# Patient Record
Sex: FEMALE | Race: WHITE | NOT HISPANIC OR LATINO | Employment: OTHER | URBAN - METROPOLITAN AREA
[De-identification: names, ages, dates, MRNs, and addresses within clinical notes are randomized per-mention and may not be internally consistent; named-entity substitution may affect disease eponyms.]

---

## 2017-01-17 ENCOUNTER — HOSPITAL ENCOUNTER (OUTPATIENT)
Dept: NON INVASIVE DIAGNOSTICS | Facility: CLINIC | Age: 82
Discharge: HOME | End: 2017-01-17
Payer: MEDICARE

## 2017-01-17 DIAGNOSIS — I73.9 PERIPHERAL VASCULAR DISEASE (HCC): ICD-10-CM

## 2017-01-17 DIAGNOSIS — L97.919 NON-PRESSURE CHRONIC ULCER OF RIGHT LOWER LEG (HCC): ICD-10-CM

## 2017-01-17 PROCEDURE — 93926 LOWER EXTREMITY STUDY: CPT

## 2017-01-18 ENCOUNTER — GENERIC CONVERSION - ENCOUNTER (OUTPATIENT)
Dept: OTHER | Facility: OTHER | Age: 82
End: 2017-01-18

## 2017-01-23 ENCOUNTER — ALLSCRIPTS OFFICE VISIT (OUTPATIENT)
Dept: OTHER | Facility: OTHER | Age: 82
End: 2017-01-23

## 2017-01-23 ENCOUNTER — LAB REQUISITION (OUTPATIENT)
Dept: LAB | Facility: HOSPITAL | Age: 82
End: 2017-01-23
Payer: MEDICARE

## 2017-01-23 ENCOUNTER — GENERIC CONVERSION - ENCOUNTER (OUTPATIENT)
Dept: OTHER | Facility: OTHER | Age: 82
End: 2017-01-23

## 2017-01-23 DIAGNOSIS — N18.2 CHRONIC KIDNEY DISEASE, STAGE II (MILD): ICD-10-CM

## 2017-01-23 DIAGNOSIS — E78.49 OTHER HYPERLIPIDEMIA: ICD-10-CM

## 2017-01-23 DIAGNOSIS — E11.9 TYPE 2 DIABETES MELLITUS WITHOUT COMPLICATIONS (HCC): ICD-10-CM

## 2017-01-23 DIAGNOSIS — I10 ESSENTIAL (PRIMARY) HYPERTENSION: ICD-10-CM

## 2017-01-23 DIAGNOSIS — E03.9 HYPOTHYROIDISM: ICD-10-CM

## 2017-01-23 DIAGNOSIS — D64.9 ANEMIA: ICD-10-CM

## 2017-01-23 DIAGNOSIS — I73.9 PERIPHERAL VASCULAR DISEASE (HCC): ICD-10-CM

## 2017-01-23 LAB
ALBUMIN SERPL BCP-MCNC: 3.3 G/DL (ref 3.5–5)
ALP SERPL-CCNC: 85 U/L (ref 46–116)
ALT SERPL W P-5'-P-CCNC: 15 U/L (ref 12–78)
ANION GAP SERPL CALCULATED.3IONS-SCNC: 8 MMOL/L (ref 4–13)
AST SERPL W P-5'-P-CCNC: 11 U/L (ref 5–45)
BASOPHILS # BLD AUTO: 0.06 THOUSANDS/ΜL (ref 0–0.1)
BASOPHILS NFR BLD AUTO: 1 % (ref 0–1)
BILIRUB SERPL-MCNC: 0.23 MG/DL (ref 0.2–1)
BUN SERPL-MCNC: 40 MG/DL (ref 5–25)
CALCIUM SERPL-MCNC: 8.4 MG/DL (ref 8.3–10.1)
CHLORIDE SERPL-SCNC: 105 MMOL/L (ref 100–108)
CHOLEST SERPL-MCNC: 139 MG/DL (ref 50–200)
CO2 SERPL-SCNC: 29 MMOL/L (ref 21–32)
CREAT SERPL-MCNC: 2 MG/DL (ref 0.6–1.3)
EOSINOPHIL # BLD AUTO: 0.67 THOUSAND/ΜL (ref 0–0.61)
EOSINOPHIL NFR BLD AUTO: 7 % (ref 0–6)
ERYTHROCYTE [DISTWIDTH] IN BLOOD BY AUTOMATED COUNT: 14.7 % (ref 11.6–15.1)
EST. AVERAGE GLUCOSE BLD GHB EST-MCNC: 154 MG/DL
GFR SERPL CREATININE-BSD FRML MDRD: 24 ML/MIN/1.73SQ M
GLUCOSE SERPL-MCNC: 121 MG/DL (ref 65–140)
HBA1C MFR BLD: 7 % (ref 4.2–6.3)
HCT VFR BLD AUTO: 33.7 % (ref 34.8–46.1)
HDLC SERPL-MCNC: 45 MG/DL (ref 40–60)
HGB BLD-MCNC: 10.7 G/DL (ref 11.5–15.4)
LDLC SERPL CALC-MCNC: 69 MG/DL (ref 0–100)
LYMPHOCYTES # BLD AUTO: 2.06 THOUSANDS/ΜL (ref 0.6–4.47)
LYMPHOCYTES NFR BLD AUTO: 22 % (ref 14–44)
MCH RBC QN AUTO: 29.5 PG (ref 26.8–34.3)
MCHC RBC AUTO-ENTMCNC: 31.8 G/DL (ref 31.4–37.4)
MCV RBC AUTO: 93 FL (ref 82–98)
MONOCYTES # BLD AUTO: 0.71 THOUSAND/ΜL (ref 0.17–1.22)
MONOCYTES NFR BLD AUTO: 8 % (ref 4–12)
NEUTROPHILS # BLD AUTO: 5.94 THOUSANDS/ΜL (ref 1.85–7.62)
NEUTS SEG NFR BLD AUTO: 62 % (ref 43–75)
NRBC BLD AUTO-RTO: 0 /100 WBCS
PLATELET # BLD AUTO: 297 THOUSANDS/UL (ref 149–390)
PMV BLD AUTO: 11.1 FL (ref 8.9–12.7)
POTASSIUM SERPL-SCNC: 4.8 MMOL/L (ref 3.5–5.3)
PROT SERPL-MCNC: 7.2 G/DL (ref 6.4–8.2)
RBC # BLD AUTO: 3.63 MILLION/UL (ref 3.81–5.12)
SODIUM SERPL-SCNC: 142 MMOL/L (ref 136–145)
TRIGL SERPL-MCNC: 123 MG/DL
TSH SERPL DL<=0.05 MIU/L-ACNC: 2.89 UIU/ML (ref 0.36–3.74)
WBC # BLD AUTO: 9.48 THOUSAND/UL (ref 4.31–10.16)

## 2017-01-23 PROCEDURE — 84443 ASSAY THYROID STIM HORMONE: CPT | Performed by: FAMILY MEDICINE

## 2017-01-23 PROCEDURE — 80061 LIPID PANEL: CPT | Performed by: FAMILY MEDICINE

## 2017-01-23 PROCEDURE — 83036 HEMOGLOBIN GLYCOSYLATED A1C: CPT | Performed by: FAMILY MEDICINE

## 2017-01-23 PROCEDURE — 85025 COMPLETE CBC W/AUTO DIFF WBC: CPT | Performed by: FAMILY MEDICINE

## 2017-01-23 PROCEDURE — 80053 COMPREHEN METABOLIC PANEL: CPT | Performed by: FAMILY MEDICINE

## 2017-02-13 ENCOUNTER — ALLSCRIPTS OFFICE VISIT (OUTPATIENT)
Dept: OTHER | Facility: OTHER | Age: 82
End: 2017-02-13

## 2017-02-13 ENCOUNTER — GENERIC CONVERSION - ENCOUNTER (OUTPATIENT)
Dept: OTHER | Facility: OTHER | Age: 82
End: 2017-02-13

## 2017-03-21 ENCOUNTER — GENERIC CONVERSION - ENCOUNTER (OUTPATIENT)
Dept: OTHER | Facility: OTHER | Age: 82
End: 2017-03-21

## 2017-05-01 ENCOUNTER — GENERIC CONVERSION - ENCOUNTER (OUTPATIENT)
Dept: OTHER | Facility: OTHER | Age: 82
End: 2017-05-01

## 2017-05-27 ENCOUNTER — ALLSCRIPTS OFFICE VISIT (OUTPATIENT)
Dept: OTHER | Facility: OTHER | Age: 82
End: 2017-05-27

## 2017-05-27 ENCOUNTER — LAB REQUISITION (OUTPATIENT)
Dept: LAB | Facility: HOSPITAL | Age: 82
End: 2017-05-27
Payer: MEDICARE

## 2017-05-27 DIAGNOSIS — N18.2 CHRONIC KIDNEY DISEASE, STAGE II (MILD): ICD-10-CM

## 2017-05-27 DIAGNOSIS — I10 ESSENTIAL (PRIMARY) HYPERTENSION: ICD-10-CM

## 2017-05-27 DIAGNOSIS — D64.9 ANEMIA: ICD-10-CM

## 2017-05-27 DIAGNOSIS — E03.9 HYPOTHYROIDISM: ICD-10-CM

## 2017-05-27 DIAGNOSIS — E11.3599: ICD-10-CM

## 2017-05-27 LAB
ALBUMIN SERPL BCP-MCNC: 3.4 G/DL (ref 3.5–5)
ALP SERPL-CCNC: 86 U/L (ref 46–116)
ALT SERPL W P-5'-P-CCNC: 17 U/L (ref 12–78)
ANION GAP SERPL CALCULATED.3IONS-SCNC: 6 MMOL/L (ref 4–13)
AST SERPL W P-5'-P-CCNC: 13 U/L (ref 5–45)
BASOPHILS # BLD AUTO: 0.05 THOUSANDS/ΜL (ref 0–0.1)
BASOPHILS NFR BLD AUTO: 1 % (ref 0–1)
BILIRUB SERPL-MCNC: 0.29 MG/DL (ref 0.2–1)
BUN SERPL-MCNC: 37 MG/DL (ref 5–25)
CALCIUM SERPL-MCNC: 8.5 MG/DL (ref 8.3–10.1)
CHLORIDE SERPL-SCNC: 107 MMOL/L (ref 100–108)
CHOLEST SERPL-MCNC: 99 MG/DL (ref 50–200)
CO2 SERPL-SCNC: 30 MMOL/L (ref 21–32)
CREAT SERPL-MCNC: 1.81 MG/DL (ref 0.6–1.3)
EOSINOPHIL # BLD AUTO: 0.49 THOUSAND/ΜL (ref 0–0.61)
EOSINOPHIL NFR BLD AUTO: 5 % (ref 0–6)
ERYTHROCYTE [DISTWIDTH] IN BLOOD BY AUTOMATED COUNT: 14.9 % (ref 11.6–15.1)
EST. AVERAGE GLUCOSE BLD GHB EST-MCNC: 151 MG/DL
GFR SERPL CREATININE-BSD FRML MDRD: 27 ML/MIN/1.73SQ M
GLUCOSE P FAST SERPL-MCNC: 113 MG/DL (ref 65–99)
HBA1C MFR BLD: 6.9 % (ref 4.2–6.3)
HCT VFR BLD AUTO: 35.3 % (ref 34.8–46.1)
HDLC SERPL-MCNC: 47 MG/DL (ref 40–60)
HGB BLD-MCNC: 10.9 G/DL (ref 11.5–15.4)
LDLC SERPL CALC-MCNC: 30 MG/DL (ref 0–100)
LYMPHOCYTES # BLD AUTO: 1.83 THOUSANDS/ΜL (ref 0.6–4.47)
LYMPHOCYTES NFR BLD AUTO: 20 % (ref 14–44)
MCH RBC QN AUTO: 28.7 PG (ref 26.8–34.3)
MCHC RBC AUTO-ENTMCNC: 30.9 G/DL (ref 31.4–37.4)
MCV RBC AUTO: 93 FL (ref 82–98)
MONOCYTES # BLD AUTO: 0.68 THOUSAND/ΜL (ref 0.17–1.22)
MONOCYTES NFR BLD AUTO: 7 % (ref 4–12)
NEUTROPHILS # BLD AUTO: 6.3 THOUSANDS/ΜL (ref 1.85–7.62)
NEUTS SEG NFR BLD AUTO: 67 % (ref 43–75)
NRBC BLD AUTO-RTO: 0 /100 WBCS
PLATELET # BLD AUTO: 286 THOUSANDS/UL (ref 149–390)
PMV BLD AUTO: 11.4 FL (ref 8.9–12.7)
POTASSIUM SERPL-SCNC: 4.8 MMOL/L (ref 3.5–5.3)
PROT SERPL-MCNC: 7.3 G/DL (ref 6.4–8.2)
RBC # BLD AUTO: 3.8 MILLION/UL (ref 3.81–5.12)
SODIUM SERPL-SCNC: 143 MMOL/L (ref 136–145)
TRIGL SERPL-MCNC: 108 MG/DL
TSH SERPL DL<=0.05 MIU/L-ACNC: 3 UIU/ML (ref 0.36–3.74)
WBC # BLD AUTO: 9.38 THOUSAND/UL (ref 4.31–10.16)

## 2017-05-27 PROCEDURE — 80053 COMPREHEN METABOLIC PANEL: CPT | Performed by: FAMILY MEDICINE

## 2017-05-27 PROCEDURE — 83036 HEMOGLOBIN GLYCOSYLATED A1C: CPT | Performed by: FAMILY MEDICINE

## 2017-05-27 PROCEDURE — 84443 ASSAY THYROID STIM HORMONE: CPT | Performed by: FAMILY MEDICINE

## 2017-05-27 PROCEDURE — 85025 COMPLETE CBC W/AUTO DIFF WBC: CPT | Performed by: FAMILY MEDICINE

## 2017-05-27 PROCEDURE — 80061 LIPID PANEL: CPT | Performed by: FAMILY MEDICINE

## 2017-05-28 ENCOUNTER — GENERIC CONVERSION - ENCOUNTER (OUTPATIENT)
Dept: OTHER | Facility: OTHER | Age: 82
End: 2017-05-28

## 2017-06-20 DIAGNOSIS — I73.9 PERIPHERAL VASCULAR DISEASE (HCC): ICD-10-CM

## 2017-06-20 DIAGNOSIS — L97.919 NON-PRESSURE CHRONIC ULCER OF RIGHT LOWER LEG (HCC): ICD-10-CM

## 2017-06-22 ENCOUNTER — HOSPITAL ENCOUNTER (OUTPATIENT)
Dept: NON INVASIVE DIAGNOSTICS | Facility: CLINIC | Age: 82
Discharge: HOME | End: 2017-06-22
Payer: MEDICARE

## 2017-06-22 DIAGNOSIS — I73.9 PERIPHERAL VASCULAR DISEASE (HCC): ICD-10-CM

## 2017-06-22 DIAGNOSIS — L97.919 NON-PRESSURE CHRONIC ULCER OF RIGHT LOWER LEG (HCC): ICD-10-CM

## 2017-06-22 PROCEDURE — 93925 LOWER EXTREMITY STUDY: CPT

## 2017-06-22 PROCEDURE — 93923 UPR/LXTR ART STDY 3+ LVLS: CPT

## 2017-06-28 ENCOUNTER — ALLSCRIPTS OFFICE VISIT (OUTPATIENT)
Dept: OTHER | Facility: OTHER | Age: 82
End: 2017-06-28

## 2017-07-06 ENCOUNTER — APPOINTMENT (OUTPATIENT)
Dept: WOUND CARE | Facility: HOSPITAL | Age: 82
End: 2017-07-06
Payer: MEDICARE

## 2017-07-06 PROCEDURE — 11042 DBRDMT SUBQ TIS 1ST 20SQCM/<: CPT

## 2017-07-06 PROCEDURE — 99203 OFFICE O/P NEW LOW 30 MIN: CPT

## 2017-07-20 ENCOUNTER — APPOINTMENT (OUTPATIENT)
Dept: WOUND CARE | Facility: HOSPITAL | Age: 82
End: 2017-07-20
Payer: MEDICARE

## 2017-07-20 PROCEDURE — 17250 CHEM CAUT OF GRANLTJ TISSUE: CPT | Performed by: PODIATRIST

## 2017-07-27 ENCOUNTER — APPOINTMENT (OUTPATIENT)
Dept: WOUND CARE | Facility: HOSPITAL | Age: 82
End: 2017-07-27
Payer: MEDICARE

## 2017-07-27 PROCEDURE — 11042 DBRDMT SUBQ TIS 1ST 20SQCM/<: CPT | Performed by: PODIATRIST

## 2017-08-03 ENCOUNTER — APPOINTMENT (OUTPATIENT)
Dept: WOUND CARE | Facility: HOSPITAL | Age: 82
End: 2017-08-03
Payer: MEDICARE

## 2017-08-03 PROCEDURE — 11042 DBRDMT SUBQ TIS 1ST 20SQCM/<: CPT | Performed by: PODIATRIST

## 2017-08-17 ENCOUNTER — APPOINTMENT (OUTPATIENT)
Dept: WOUND CARE | Facility: HOSPITAL | Age: 82
End: 2017-08-17
Payer: MEDICARE

## 2017-08-17 PROCEDURE — 99212 OFFICE O/P EST SF 10 MIN: CPT | Performed by: PODIATRIST

## 2017-09-21 ENCOUNTER — APPOINTMENT (OUTPATIENT)
Dept: WOUND CARE | Facility: HOSPITAL | Age: 82
End: 2017-09-21
Payer: MEDICARE

## 2017-09-21 PROCEDURE — 11042 DBRDMT SUBQ TIS 1ST 20SQCM/<: CPT | Performed by: PODIATRIST

## 2017-09-28 ENCOUNTER — APPOINTMENT (OUTPATIENT)
Dept: WOUND CARE | Facility: HOSPITAL | Age: 82
End: 2017-09-28
Payer: MEDICARE

## 2017-09-28 PROCEDURE — 99212 OFFICE O/P EST SF 10 MIN: CPT | Performed by: PODIATRIST

## 2017-09-30 ENCOUNTER — ALLSCRIPTS OFFICE VISIT (OUTPATIENT)
Dept: OTHER | Facility: OTHER | Age: 82
End: 2017-09-30

## 2017-09-30 ENCOUNTER — LAB REQUISITION (OUTPATIENT)
Dept: LAB | Facility: HOSPITAL | Age: 82
End: 2017-09-30
Payer: MEDICARE

## 2017-09-30 DIAGNOSIS — I73.9 PERIPHERAL VASCULAR DISEASE (HCC): ICD-10-CM

## 2017-09-30 DIAGNOSIS — D64.9 ANEMIA: ICD-10-CM

## 2017-09-30 DIAGNOSIS — L97.919 VARICOSE VEINS OF RIGHT LOWER EXTREMITY WITH ULCER (HCC): ICD-10-CM

## 2017-09-30 DIAGNOSIS — I10 ESSENTIAL (PRIMARY) HYPERTENSION: ICD-10-CM

## 2017-09-30 DIAGNOSIS — I83.019 VARICOSE VEINS OF RIGHT LOWER EXTREMITY WITH ULCER (HCC): ICD-10-CM

## 2017-09-30 DIAGNOSIS — E11.3599: ICD-10-CM

## 2017-09-30 DIAGNOSIS — E03.9 HYPOTHYROIDISM: ICD-10-CM

## 2017-09-30 DIAGNOSIS — N18.2 CHRONIC KIDNEY DISEASE, STAGE II (MILD): ICD-10-CM

## 2017-09-30 LAB
ALBUMIN SERPL BCP-MCNC: 3.3 G/DL (ref 3.5–5)
ALP SERPL-CCNC: 82 U/L (ref 46–116)
ALT SERPL W P-5'-P-CCNC: 13 U/L (ref 12–78)
ANION GAP SERPL CALCULATED.3IONS-SCNC: 6 MMOL/L (ref 4–13)
AST SERPL W P-5'-P-CCNC: 11 U/L (ref 5–45)
BASOPHILS # BLD AUTO: 0.05 THOUSANDS/ΜL (ref 0–0.1)
BASOPHILS NFR BLD AUTO: 1 % (ref 0–1)
BILIRUB SERPL-MCNC: 0.26 MG/DL (ref 0.2–1)
BUN SERPL-MCNC: 33 MG/DL (ref 5–25)
CALCIUM SERPL-MCNC: 8.6 MG/DL (ref 8.3–10.1)
CHLORIDE SERPL-SCNC: 105 MMOL/L (ref 100–108)
CHOLEST SERPL-MCNC: 83 MG/DL (ref 50–200)
CO2 SERPL-SCNC: 29 MMOL/L (ref 21–32)
CREAT SERPL-MCNC: 1.91 MG/DL (ref 0.6–1.3)
EOSINOPHIL # BLD AUTO: 0.9 THOUSAND/ΜL (ref 0–0.61)
EOSINOPHIL NFR BLD AUTO: 10 % (ref 0–6)
ERYTHROCYTE [DISTWIDTH] IN BLOOD BY AUTOMATED COUNT: 15 % (ref 11.6–15.1)
EST. AVERAGE GLUCOSE BLD GHB EST-MCNC: 154 MG/DL
GFR SERPL CREATININE-BSD FRML MDRD: 25 ML/MIN/1.73SQ M
GLUCOSE P FAST SERPL-MCNC: 125 MG/DL (ref 65–99)
HBA1C MFR BLD: 7 % (ref 4.2–6.3)
HCT VFR BLD AUTO: 33.6 % (ref 34.8–46.1)
HDLC SERPL-MCNC: 43 MG/DL (ref 40–60)
HGB BLD-MCNC: 10.4 G/DL (ref 11.5–15.4)
LDLC SERPL CALC-MCNC: 15 MG/DL (ref 0–100)
LYMPHOCYTES # BLD AUTO: 1.86 THOUSANDS/ΜL (ref 0.6–4.47)
LYMPHOCYTES NFR BLD AUTO: 20 % (ref 14–44)
MCH RBC QN AUTO: 28.4 PG (ref 26.8–34.3)
MCHC RBC AUTO-ENTMCNC: 31 G/DL (ref 31.4–37.4)
MCV RBC AUTO: 92 FL (ref 82–98)
MONOCYTES # BLD AUTO: 0.72 THOUSAND/ΜL (ref 0.17–1.22)
MONOCYTES NFR BLD AUTO: 8 % (ref 4–12)
NEUTROPHILS # BLD AUTO: 5.64 THOUSANDS/ΜL (ref 1.85–7.62)
NEUTS SEG NFR BLD AUTO: 61 % (ref 43–75)
NRBC BLD AUTO-RTO: 0 /100 WBCS
PLATELET # BLD AUTO: 293 THOUSANDS/UL (ref 149–390)
PMV BLD AUTO: 10.8 FL (ref 8.9–12.7)
POTASSIUM SERPL-SCNC: 5 MMOL/L (ref 3.5–5.3)
PROT SERPL-MCNC: 7.2 G/DL (ref 6.4–8.2)
RBC # BLD AUTO: 3.66 MILLION/UL (ref 3.81–5.12)
SODIUM SERPL-SCNC: 140 MMOL/L (ref 136–145)
TRIGL SERPL-MCNC: 123 MG/DL
TSH SERPL DL<=0.05 MIU/L-ACNC: 2.15 UIU/ML (ref 0.36–3.74)
WBC # BLD AUTO: 9.19 THOUSAND/UL (ref 4.31–10.16)

## 2017-09-30 PROCEDURE — 83036 HEMOGLOBIN GLYCOSYLATED A1C: CPT | Performed by: FAMILY MEDICINE

## 2017-09-30 PROCEDURE — 85025 COMPLETE CBC W/AUTO DIFF WBC: CPT | Performed by: FAMILY MEDICINE

## 2017-09-30 PROCEDURE — 84443 ASSAY THYROID STIM HORMONE: CPT | Performed by: FAMILY MEDICINE

## 2017-09-30 PROCEDURE — 80053 COMPREHEN METABOLIC PANEL: CPT | Performed by: FAMILY MEDICINE

## 2017-09-30 PROCEDURE — 80061 LIPID PANEL: CPT | Performed by: FAMILY MEDICINE

## 2017-10-01 ENCOUNTER — GENERIC CONVERSION - ENCOUNTER (OUTPATIENT)
Dept: OTHER | Facility: OTHER | Age: 82
End: 2017-10-01

## 2017-10-27 ENCOUNTER — GENERIC CONVERSION - ENCOUNTER (OUTPATIENT)
Dept: OTHER | Facility: OTHER | Age: 82
End: 2017-10-27

## 2017-10-27 NOTE — PROGRESS NOTES
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) (E78 4)   5  Hypothyroidism (244 9) (E03 9)   6  Type 2 diabetes mellitus with proliferative retinopathy (250 50,362 02) (M76 6500)   · 3 month followup   7  PAD (peripheral artery disease) (443 9) (I73 9)   8  Venous stasis ulcer of right lower extremity (454 0) (I83 019)   9  Need for vaccination (V05 9) (Z23)    Plan  Anemia, Benign essential hypertension, Chronic kidney disease (CKD), stage II (mild),  Familial combined hyperlipidemia, Hypothyroidism, PAD (peripheral artery disease),  Type 2 diabetes mellitus with proliferative retinopathy, Venous stasis ulcer  of right lower extremity    · (1) CBC/PLT/DIFF; Status:Active; Requested for:66Bjw7095;    · (1) COMPREHENSIVE METABOLIC PANEL; Status:Active; Requested for:68Qvs3522;    · (1) HEMOGLOBIN A1C; Status:Active; Requested for:94Nez2817;    · (1) LIPID PANEL FASTING W DIRECT LDL REFLEX; Status:Active; Requested  for:65Rpi1910;    · (1) TSH WITH FT4 REFLEX; Status:Active; Requested for:47Ftz9877;   Depression screening    · *VB - PHQ-9 Tool; Status:Complete - Retrospective By Protocol Authorization;   Done:  79JOP5270 09:38AM  Need for vaccination    · Fluzone High-Dose 0 5 ML Intramuscular Suspension Prefilled Syringe;  INJECT 0 5  ML Intramuscular; To Be Done: 76UYR1347  Screening for other and unspecified genitourinary condition    · *VB - Urinary Incontinence Screen (Dx Z13 89 Screen for UI); Status:Complete -  Retrospective By Protocol Authorization;   Done: 24GUV5504 09:38AM    Discussion/Summary    --DM: Last A1c was 6 9%  Doing well on current insulin regimen; Lantus 26 and Humalog 21+23  No significant hypoglycemiaulcerations/PAD: Patient still with occasional leg ulcers, but overall this is improved   Patient no longer sees wound care center, but is still seeing vascular surgeonLast creatinine 1 81  This is stable/improved  Will check labs todayDoing well on levothyroxine 75 ?g dailyDoing well on atorvastatin 20 mg dailyDoing well on rare use of alprazolam without side effects or falls  shot todayis due for DEXA scan near future    Patient declines today, states she will get it if we order at next visittoday   will callmos (w/ labs)  Possible side effects of new medications were reviewed with the patient/guardian today  The treatment plan was reviewed with the patient/guardian  The patient/guardian understands and agrees with the treatment plan      Chief Complaint  Pt presents for a f/u  Patient is here today for follow up of chronic conditions described in HPI  History of Present Illness  Patient presents with granddaughter today for recheck of chronic medical problems  Overall she is doing well  She still gets occasional leg ulcers, but overall this is much improved  Patient still sees vascular surgery  Doing well on current insulin regimen without significant hypoglycemia  Doing well on thyroid medication, levothyroxine  Doing well on cholesterol medication, atorvastatin  Patient is fasting this morning      Review of Systems    Constitutional: No fever, no chills, feels well, no tiredness, no recent weight gain or weight loss  Cardiovascular: No complaints of slow heart rate, no fast heart rate, no chest pain, no palpitations, no leg claudication, no lower extremity edema  Respiratory: No complaints of shortness of breath, no wheezing, no cough, no SOB on exertion, no orthopnea, no PND  Gastrointestinal: No complaints of abdominal pain, no constipation, no nausea or vomiting, no diarrhea, no bloody stools  Genitourinary: No complaints of dysuria, no incontinence, no pelvic pain, no dysmenorrhea, no vaginal discharge or bleeding  Active Problems  1  Anemia (285 9) (D64 9)   2  Anxiety (300 00) (F41 9)   3  Arthritis (716 90) (M19 90)   4  Benign essential hypertension (401 1) (I10)   5  Chronic kidney disease (CKD), stage II (mild) (585 2) (N18 2)   6  Chronic low back pain (724 2,338 29) (M54 5,G89 29)   7  Chronic venous insufficiency (459 81) (I87 2)   8  Claudication (443 9) (I73 9)   9  Dermatitis (692 9) (L30 9)   10  Essential anemia (285 9) (D64 9)   11  Familial combined hyperlipidemia (272 2) (E78 4)   12  Hypothyroidism (244 9) (E03 9)   13  Osteopenia (733 90) (M85 80)   14  PAD (peripheral artery disease) (443 9) (I73 9)   15  Type 2 diabetes mellitus with proliferative retinopathy (250 50,362 02) (E11 3599)   16  Ulcer of lower extremity, right, with unspecified severity (707 10) (L97 919)   17  Vaginal candidiasis (112 1) (B37 3)   18  Venous stasis ulcer of right lower extremity (454 0) (I83 019)   19  Vitamin D deficiency (268 9) (E55 9)   20  Wound of right ankle (891 0) (S91 001A)    Past Medical History  1  History of Adenoma of large intestine (211 3) (D12 6)   2  History of Constipation, chronic (564 00) (K59 09)   3  History of Cystocele   4  History of Depression screen (V79 0) (Z13 89)   5  Diabetes Mellitus (250 00)   6  History of Diverticulosis (562 10) (K57 90)   7  History of Fecal incontinence (787 60) (R15 9)   8  History of Female stress incontinence (625 6) (N39 3)   9  History of Flu vaccine need (V04 81) (Z23)   10  History of acute bacterial sinusitis (V12 69) (Z87 09)   11  History of allergy (V15 09) (Z88 9)   12  History of cataract (V12 49) (Z86 69)   13  History of fatigue (V13 89) (Z87 898)   14  History of glaucoma (V12 49) (Z86 69)   15  History of senile atrophic vaginitis (V13 29) (Z87 42)   16  History of urinary frequency (V13 09) (Z87 898)   17  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)   18  History of Mixed incontinence (788 33) (N39 46)   19  History of Need for influenza vaccination (V04 81) (Z23)   20  History of Nocturia (788 43) (R35 1)   21   History of Osteoarthritis, chronic (715 90) (M19 90)   22  Personal history of gout (V12 29) (Z86 39)   23  History of Screening for other and unspecified genitourinary condition (V81 6) (Z13 89)   24  History of Special screening for other neurological conditions (V80 09) (Z13 89)    The active problems and past medical history were reviewed and updated today  Surgical History  1  History of Ankle Surgery   2  History of Cataract Surgery   3  History of Cholecystectomy   4  History of Tubal Ligation    Family History  Mother    1  Family history of malignant neoplasm of stomach (V16 0) (Z80 0)  Father    2  Family history of   Sister    3  Family history of    4  Family history of diabetes mellitus (V18 0) (Z83 3)    Social History   · Denied: History of Alcohol Use (History)   · Former smoker (C93 32) (A86 393)  The social history was reviewed and updated today  The social history was reviewed and is unchanged  Current Meds   1  Allegra Allergy 180 MG Oral Tablet Recorded   2  ALPRAZolam 0 25 MG Oral Tablet; TAKE 1 TABLET AT BEDTIME AS NEEDED; Therapy: 12DQP6411 to (Evaluate:2016); Last Rx:2016; Status: ACTIVE -   Renewal Voided Ordered   3  Aspir-81 81 MG Oral Tablet Delayed Release; Therapy: (Recorded:94Zhd7304) to Recorded   4  Atorvastatin Calcium 20 MG Oral Tablet; Take 1 tablet daily; Therapy: 54QYR4435 to (Evaluate:2018)  Requested for: 2017; Last   Rx:2017 Ordered   5  Doxazosin Mesylate 2 MG Oral Tablet; TAKE 1 TABLET DAILY; Therapy: 34VAM9044 to (Evaluate:2018)  Requested for: 69KAZ3401; Last   FS:05ZHW2189 Ordered   6  DrRx Diflucan 150 MG #1; one tab po now; Therapy: 36IHI9737 to (Last Rx:2017) Ordered   7  Furosemide 80 MG Oral Tablet; TAKE 1/2 TABLET TWICE DAILY; Therapy: 99YXQ9830 to ()  Requested for: 32Sup6635; Last   Rx:26Zbr5929 Ordered   8  Klor-Con M20 20 MEQ Oral Tablet Extended Release; TAKE 1 TABLET DAILY;    Therapy: 64FFO0971 to (Evaluate:18Jan2018)  Requested for: 72HSI0497; Last   XP:23KYI3434 Ordered   9  Lantus SoloStar 100 UNIT/ML Subcutaneous Solution Pen-injector; inject 21 units   subcutaneously once daily; Therapy: 76HNK3559 to (Tomasa Finch)  Requested for: 36HZR0588; Last   Rx:86Ocz3033 Ordered   10  Levothyroxine Sodium 75 MCG Oral Tablet; TAKE 1 TABLET DAILY AS DIRECTED; Therapy: 49Esg1334 to (Evaluate:18Jan2018)  Requested for: 36VBK2446; Last    Rx:23Jan2017 Ordered   11  Losartan Potassium 100 MG Oral Tablet; TAKE 1 TABLET DAILY; Therapy: 50ULP5780 to (Evaluate:18Jan2018)  Requested for: 92XUM1608; Last    RP:61LOY6078 Ordered   12  Mometasone Furoate 0 1 % External Cream; APPLY SPARINGLY TO AFFECTED AREAS    TWICE DAILY  (AM AND PM); Therapy: 18Ntg0840 to (Last Rx:06Ozd6807)  Requested for: 58Reg1019 Ordered   13  NovoLOG 100 UNIT/ML Subcutaneous Solution; 25 UNITS SQ TID; Therapy: 27VKG0845 to (Last Rx:31Ubb3788) Ordered   14  ReliOn Pen Needles 31G X 8 MM Miscellaneous; USE AS DIRECTED; Therapy: 19Kcu7360 to (Evaluate:18Jan2018)  Requested for: 30KKV2219; Last    Rx:23Jan2017 Ordered    The medication list was reviewed and updated today  Allergies  1  Aspirin TABS   2  ACE Inhibitors    Vitals  Vital Signs    Recorded: 44QNS1241 09:25AM   Temperature 98 1 F, Tympanic   Heart Rate 81   Pulse Quality Normal   Respiration Quality Normal   Respiration 16   Systolic 622, LUE, Sitting   Diastolic 78, LUE, Sitting   Height 5 ft 3 in   Weight 226 lb 5 oz   BMI Calculated 40 09   BSA Calculated 2 04   O2 Saturation 99, RA   LMP post menopause   Pain Scale 0     Physical Exam    Constitutional   General appearance: No acute distress, well appearing and well nourished  Pulmonary   Respiratory effort: No increased work of breathing or signs of respiratory distress  Auscultation of lungs: Clear to auscultation  Cardiovascular   Palpation of heart: Normal PMI, no thrills      Auscultation of heart: Normal rate and rhythm, normal S1 and S2, without murmurs  Examination of extremities for edema and/or varicosities: Normal     Carotid pulses: Normal     Lymphatic   Palpation of lymph nodes in neck: No lymphadenopathy  Psychiatric   Orientation to person, place, and time: Normal     Mood and affect: Normal          Results/Data  *VB - PHQ-9 Tool 30Sep2017 09:38AM Erving Pickens     Test Name Result Flag Reference   PHQ-9 Adult Depression Score 0     PHQ-9 Adult Depression Screening Negative       *VB - Urinary Incontinence Screen (Dx Z13 89 Screen for UI) 99VKS8041 09:38AM Erving Pickens     Test Name Result Flag Reference   Urinary Incontinence Assessment 09RBH3700       *VB - Fall Risk Assessment  (Dx Z13 89 Screen for Neurologic Disorder) 58FPK8180 09:34AM Erving Pickens     Test Name Result Flag Reference   Falls Risk      No falls in the past year       Health Management  Type 2 diabetes mellitus with proliferative retinopathy   *VB - Eye Exam; every 1 year; Last 89GLM4044; Next Due: 34ITL0985; Active    Signatures   Electronically signed by :  Danna Gustafson DO; Sep 30 2017  9:55AM EST                       (Author)

## 2017-10-31 ENCOUNTER — GENERIC CONVERSION - ENCOUNTER (OUTPATIENT)
Dept: OTHER | Facility: OTHER | Age: 82
End: 2017-10-31

## 2018-01-09 NOTE — MISCELLANEOUS
October 27, 2017    To Whom It May Concern:    Ms Siomara Johnson is a long time, current patient of [facility]  Due to her current and decreasing health issues, she is having increased difficulty  walking outside of her home and safely walking to the mailbox  I ask that you please make an acceptation and deliver her mail to her door  If you have any questions or concerns, please contact my office at [phone]  Sincerely,         SAE Claire        Electronically signed Ralph Faith   Oct 27 2017 11:42AM EST Author

## 2018-01-10 NOTE — RESULT NOTES
Verified Results  (1) CBC/PLT/DIFF 23JVZ8217 10:22AM [email or web address] Order Number: [id number]     Test Name Result Flag Reference   WBC COUNT 9 48 Thousand/uL  4 31-10 16   RBC COUNT 3 63 Million/uL L 3 81-5 12   HEMOGLOBIN 10 7 g/dL L 11 5-15 4   HEMATOCRIT 33 7 % L 34 8-46  1   MCV 93 fL  82-98   MCH 29 5 pg  26 8-34 3   MCHC 31 8 g/dL  31 4-37 4   RDW 14 7 %  11 6-15 1   MPV 11 1 fL  8 9-12 7   PLATELET COUNT 485 Thousands/uL  149-390   nRBC AUTOMATED 0 /100 WBCs     NEUTROPHILS RELATIVE PERCENT 62 %  43-75   LYMPHOCYTES RELATIVE PERCENT 22 %  14-44   MONOCYTES RELATIVE PERCENT 8 %  4-12   EOSINOPHILS RELATIVE PERCENT 7 % H 0-6   BASOPHILS RELATIVE PERCENT 1 %  0-1   NEUTROPHILS ABSOLUTE COUNT 5 94 Thousands/?L  1 85-7 62   LYMPHOCYTES ABSOLUTE COUNT 2 06 Thousands/?L  0 60-4 47   MONOCYTES ABSOLUTE COUNT 0 71 Thousand/?L  0 17-1 22   EOSINOPHILS ABSOLUTE COUNT 0 67 Thousand/?L H 0 00-0 61   BASOPHILS ABSOLUTE COUNT 0 06 Thousands/?L  0 00-0 10   - Patient Instructions: This bloodwork is non-fasting  Please drink two glasses of water morning of bloodwork  (1) COMPREHENSIVE METABOLIC PANEL 52AIT9798 14:27VA [email or web address] Order Number: [id number]     Test Name Result Flag Reference   GLUCOSE,RANDM 121 mg/dL     If the patient is fasting, the ADA then defines impaired fasting glucose as > 100 mg/dL and diabetes as > or equal to 123 mg/dL     SODIUM 142 mmol/L  136-145   POTASSIUM 4 8 mmol/L  3 5-5 3   CHLORIDE 105 mmol/L  100-108   CARBON DIOXIDE 29 mmol/L  21-32   ANION GAP (CALC) 8 mmol/L  4-13   BLOOD UREA NITROGEN 40 mg/dL H 5-25   CREATININE 2 00 mg/dL H 0 60-1 30   Standardized to IDMS reference method   CALCIUM 8 4 mg/dL  8 3-10 1   BILI, TOTAL 0 23 mg/dL  0 20-1 00   ALK PHOSPHATAS 85 U/L     ALT (SGPT) 15 U/L  12-78   AST(SGOT) 11 U/L  5-45   ALBUMIN 3 3 g/dL L 3 5-5 0   TOTAL PROTEIN 7 2 g/dL  6 4-8 2   eGFR Non-African American 24 0 ml/min/1 73sq m     [place] [facility] Disease Education Program recommendations are as follows:  GFR calculation is accurate only with a steady state creatinine  Chronic Kidney disease less than 60 ml/min/1 73 sq  meters  Kidney failure less than 15 ml/min/1 73 sq  meters  (1) HEMOGLOBIN A1C 61SUY7124 10:22AM [facility] Order Number: [id number]     Test Name Result Flag Reference   HEMOGLOBIN A1C 7 0 % H 4 2-6 3   EST  AVG  GLUCOSE 154 mg/dl       (1) LIPID PANEL FASTING W DIRECT LDL REFLEX 70QUP6385 10:22AM [facility] Order Number: [id number]     Test Name Result Flag Reference   CHOLESTEROL 139 mg/dL     LDL CHOLESTEROL CALCULATED 69 mg/dL  0-100   - Patient Instructions: This is a fasting blood test  Water, black tea or black coffee only after 9:00pm the night before test   Drink 2 glasses of water the morning of test       Triglyceride:         Normal              <150 mg/dl       Borderline High    150-199 mg/dl       High               200-499 mg/dl       Very High          >499 mg/dl  Cholesterol:         Desirable        <200 mg/dl      Borderline High  200-239 mg/dl      High             >239 mg/dl  HDL Cholesterol:        High    >59 mg/dL      Low     <41 mg/dL  LDL Cholesterol:        Optimal          <100 mg/dl        Near Optimal     100-129 mg/dl        Above Optimal          Borderline High   130-159 mg/dl          High              160-189 mg/dl          Very High        >189 mg/dl  LDL CALCULATED:    This screening LDL is a calculated result  It does not have the accuracy of the Direct Measured LDL in the monitoring of patients with hyperlipidemia and/or statin therapy  Direct Measure LDL (LCB266) must be ordered separately in these patients  TRIGLYCERIDES 123 mg/dL  <=150   Specimen collection should occur prior to N-Acetylcysteine or Metamizole administration due to the potential for falsely depressed results     HDL,DIRECT 45 mg/dL  40-60   Specimen collection should occur prior to Metamizole administration due to the potential for falsely depressed results  (1) TSH WITH FT4 REFLEX 23Jan2017 10:22AM Martha Parikh Order Number: [id number]     Test Name Result Flag Reference   TSH 2 890 uIU/mL  0 358-3 740   Patients undergoing fluorescein dye angiography may retain small amounts of fluorescein in the body for 48-72 hours post procedure  Samples containing fluorescein can produce falsely depressed TSH values  If the patient had this procedure,a specimen should be resubmitted post fluorescein clearance            The recommended reference ranges for TSH during pregnancy are as follows:  First trimester 0 1 to 2 5 uIU/mL  Second trimester  0 2 to 3 0 uIU/mL  Third trimester 0 3 to 3 0 uIU/m

## 2018-01-11 NOTE — RESULT NOTES
Verified Results  (1) CBC/PLT/DIFF 55KLF3389 03:36PM Estefany Brambila     Test Name Result Flag Reference   WBC COUNT 8 03 Thousand/uL  4 31-10 16   RBC COUNT 3 62 Million/uL L 3 81-5 12   HEMOGLOBIN 10 7 g/dL L 11 5-15 4   HEMATOCRIT 32 8 % L 34 8-46  1   MCV 90 6 fL  82 0-98 0   MCH 29 6 pg  26 8-34 3   MCHC 32 6 g/dL  31 4-37 4   RDW 14 3 %  11 6-15 1   MPV 11 0 fL  8 9-12 7   PLATELET COUNT 878 Thousands/uL  149-390   nRBC AUTOMATED 0 /100 WBCs     NEUTROPHILS RELATIVE PERCENT 67 %  43-75   LYMPHOCYTES RELATIVE PERCENT 19 %  14-44   MONOCYTES RELATIVE PERCENT 9 %  4-12   EOSINOPHILS RELATIVE PERCENT 5 %  0-6   BASOPHILS RELATIVE PERCENT 0 %  0-1   NEUTROPHILS ABSOLUTE COUNT 5 33 Thousands/µL  1 85-7 62   LYMPHOCYTES ABSOLUTE COUNT 1 56 Thousands/µL  0 60-4 47   MONOCYTES ABSOLUTE COUNT 0 71 Thousand/µL  0 17-1 22   EOSINOPHILS ABSOLUTE COUNT 0 38 Thousand/µL  0 00-0 61   BASOPHILS ABSOLUTE COUNT 0 03 Thousands/µL  0 00-0 10     (1) COMPREHENSIVE METABOLIC PANEL 84XTK9397 95:24MN Estefany Brambila   National Kidney Disease Education Program recommendations are as follows:  GFR calculation is accurate only with a steady state creatinine  Chronic Kidney disease less than 60 ml/min/1 73 sq  meters  Kidney failure less than 15 ml/min/1 73 sq  meters       Test Name Result Flag Reference   GLUCOSE,RANDM 137 mg/dL     SODIUM 137 mmol/L  136-145   POTASSIUM 5 0 mmol/L  3 5-5 3   CHLORIDE 106 mmol/L  100-108   CARBON DIOXIDE 24 mmol/L  21-32   ANION GAP (CALC) 7 mmol/L  4-13   BLOOD UREA NITROGEN 48 mg/dL H 5-25   CREATININE 1 80 mg/dL H 0 60-1 30   CALCIUM 8 3 mg/dL  8 3-10 1   BILI, TOTAL 0 35 mg/dL  0 20-1 00   ALK PHOSPHATAS 88 U/L     ALT (SGPT) 22 U/L  12-78   AST(SGOT) 15 U/L  5-45   ALBUMIN 3 6 g/dL  3 5-5 0   TOTAL PROTEIN 7 1 g/dL  6 4-8 2   eGFR Non-African American 27 2 ml/min/1 73sq m       (1) LIPID PANEL, FASTING 87ELB2871 02:49PM Estefany Brambila   Triglyceride:         Normal              <150 mg/dl Borderline High    150-199 mg/dl       High               200-499 mg/dl       Very High          >499 mg/dl  Cholesterol:         Desirable        <200 mg/dl      Borderline High  200-239 mg/dl      High             >239 mg/dl  HDL Cholesterol:        High    >59 mg/dL      Low     <41 mg/dL  LDL CALCULATED:    This screening LDL is a calculated result  It does not have the accuracy of the Direct Measured LDL in the monitoring of patients with hyperlipidemia and/or statin therapy  Direct Measure LDL (MXS741) must be ordered separately in these patients  Test Name Result Flag Reference   CHOLESTEROL 148 mg/dL     HDL,DIRECT 42 mg/dL  40-60   LDL CHOLESTEROL CALCULATED 78 mg/dL  0-100   TRIGLYCERIDES 138 mg/dL  <=150     (1) TSH WITH FT4 REFLEX [id number] 02:49PM Santosh Preston   Patients undergoing fluorescein dye angiography may retain small amounts of fluorescein in the body for 48-72 hours post procedure  Samples containing fluorescein can produce falsely depressed TSH values  If the patient had this procedure,a specimen should be resubmitted post fluorescein clearance          The recommended reference ranges for TSH during pregnancy are as follows:  First trimester 0 1 to 2 5 uIU/mL  Second trimester  0 2 to 3 0 uIU/mL  Third trimester 0 3 to 3 0 uIU/m     Test Name Result Flag Reference   TSH 2 230 uIU/mL  0 358-3 740

## 2018-01-11 NOTE — MISCELLANEOUS
Message  i spoke w/ pt   she states that new formulary will not cover humalog starting jan 1, 2017, but it will cover novolog  will refill at that time      Signatures   Electronically signed by :  Jona Durham DO; Nov 25 2016 12:31PM EST                       (Author)

## 2018-01-12 VITALS
HEART RATE: 80 BPM | RESPIRATION RATE: 16 BRPM | OXYGEN SATURATION: 91 % | TEMPERATURE: 96.1 F | BODY MASS INDEX: 41.64 KG/M2 | SYSTOLIC BLOOD PRESSURE: 120 MMHG | DIASTOLIC BLOOD PRESSURE: 66 MMHG | WEIGHT: 226.25 LBS | HEIGHT: 62 IN

## 2018-01-12 VITALS
OXYGEN SATURATION: 92 % | HEIGHT: 63 IN | TEMPERATURE: 97.9 F | BODY MASS INDEX: 40.49 KG/M2 | HEART RATE: 79 BPM | RESPIRATION RATE: 17 BRPM | SYSTOLIC BLOOD PRESSURE: 120 MMHG | DIASTOLIC BLOOD PRESSURE: 78 MMHG | WEIGHT: 228.5 LBS

## 2018-01-12 VITALS
RESPIRATION RATE: 18 BRPM | SYSTOLIC BLOOD PRESSURE: 122 MMHG | DIASTOLIC BLOOD PRESSURE: 78 MMHG | HEIGHT: 62 IN | HEART RATE: 86 BPM

## 2018-01-12 NOTE — RESULT NOTES
Verified Results  (1) CBC/PLT/DIFF [id number] 09:42AM [facility] Order Number: [id number]     Test Name Result Flag Reference   WBC COUNT 9 38 Thousand/uL  4 31-10 16   RBC COUNT 3 80 Million/uL L 3 81-5 12   HEMOGLOBIN 10 9 g/dL L 11 5-15 4   HEMATOCRIT 35 3 %  34 8-46  1   MCV 93 fL  82-98   MCH 28 7 pg  26 8-34 3   MCHC 30 9 g/dL L 31 4-37 4   RDW 14 9 %  11 6-15 1   MPV 11 4 fL  8 9-12 7   PLATELET COUNT 979 Thousands/uL  149-390   nRBC AUTOMATED 0 /100 WBCs     NEUTROPHILS RELATIVE PERCENT 67 %  43-75   LYMPHOCYTES RELATIVE PERCENT 20 %  14-44   MONOCYTES RELATIVE PERCENT 7 %  4-12   EOSINOPHILS RELATIVE PERCENT 5 %  0-6   BASOPHILS RELATIVE PERCENT 1 %  0-1   NEUTROPHILS ABSOLUTE COUNT 6 30 Thousands/? ??L  1 85-7 62   LYMPHOCYTES ABSOLUTE COUNT 1 83 Thousands/? ??L  0 60-4 47   MONOCYTES ABSOLUTE COUNT 0 68 Thousand/? ??L  0 17-1 22   EOSINOPHILS ABSOLUTE COUNT 0 49 Thousand/? ??L  0 00-0 61   BASOPHILS ABSOLUTE COUNT 0 05 Thousands/? ??L  0 00-0 10     (1) COMPREHENSIVE METABOLIC PANEL [id number] 41:28UA [facility] Gavel Order Number: [id number]     Test Name Result Flag Reference   SODIUM 143 mmol/L  136-145   POTASSIUM 4 8 mmol/L  3 5-5 3   CHLORIDE 107 mmol/L  100-108   CARBON DIOXIDE 30 mmol/L  21-32   ANION GAP (CALC) 6 mmol/L  4-13   BLOOD UREA NITROGEN 37 mg/dL H 5-25   CREATININE 1 81 mg/dL H 0 60-1 30   Standardized to IDMS reference method   CALCIUM 8 5 mg/dL  8 3-10 1   BILI, TOTAL 0 29 mg/dL  0 20-1 00   ALK PHOSPHATAS 86 U/L     ALT (SGPT) 17 U/L  12-78   AST(SGOT) 13 U/L  5-45   ALBUMIN 3 4 g/dL L 3 5-5 0   TOTAL PROTEIN 7 3 g/dL  6 4-8 2   eGFR Non-African American 27 0 ml/min/1 73sq [facility] Disease Education Program recommendations are as follows:  GFR calculation is accurate only with a steady state creatinine  Chronic Kidney disease less than 60 ml/min/1 73 sq  meters  Kidney failure less than 15 ml/min/1 73 sq  meters     GLUCOSE FASTING 113 mg/dL H 65-99     (1) HEMOGLOBIN A1C [id number] 09:42AM [employer] Order Number: [id number]     Test Name Result Flag Reference   HEMOGLOBIN A1C 6 9 % H 4 2-6 3   EST  AVG  GLUCOSE 151 mg/dl       (1) TSH WITH FT4 REFLEX [id number] 09:42AM [employer] Order Number: [id number]     Test Name Result Flag Reference   TSH 3 000 uIU/mL  0 358-3 740   Patients undergoing fluorescein dye angiography may retain small amounts of fluorescein in the body for 48-72 hours post procedure  Samples containing fluorescein can produce falsely depressed TSH values  If the patient had this procedure,a specimen should be resubmitted post fluorescein clearance  The recommended reference ranges for TSH during pregnancy are as follows:  First trimester 0 1 to 2 5 uIU/mL  Second trimester  0 2 to 3 0 uIU/mL  Third trimester 0 3 to 3 0 uIU/m     (1) LIPID PANEL FASTING W DIRECT LDL REFLEX [id number] 09:42AM [employer] Order Number: [id number]     Test Name Result Flag Reference   CHOLESTEROL 99 mg/dL     LDL CHOLESTEROL CALCULATED 30 mg/dL  0-100   Triglyceride:         Normal              <150 mg/dl       Borderline High    150-199 mg/dl       High               200-499 mg/dl       Very High          >499 mg/dl  Cholesterol:         Desirable        <200 mg/dl      Borderline High  200-239 mg/dl      High             >239 mg/dl  HDL Cholesterol:        High    >59 mg/dL      Low     <41 mg/dL  LDL Cholesterol:        Optimal          <100 mg/dl        Near Optimal     100-129 mg/dl        Above Optimal          Borderline High   130-159 mg/dl          High              160-189 mg/dl          Very High        >189 mg/dl  LDL CALCULATED:    This screening LDL is a calculated result  It does not have the accuracy of the Direct Measured LDL in the monitoring of patients with hyperlipidemia and/or statin therapy  Direct Measure LDL (CKL555) must be ordered separately in these patients  TRIGLYCERIDES 108 mg/dL  <=150   Specimen collection should occur prior to N-Acetylcysteine or Metamizole administration due to the potential for falsely depressed results  HDL,DIRECT 47 mg/dL  40-60   Specimen collection should occur prior to Metamizole administration due to the potential for falsely depressed results

## 2018-01-12 NOTE — RESULT NOTES
Verified Results  VAS LOWER LIMB ARTERIAL DUPLEX, LIMITED UNILATERAL/GRAFT [id number] 02:08PM Walker Mccann Order Number: [id number]    - Patient Instructions: To schedule this appointment, please contact Central Scheduling at [phone]    w/ MAGNOLIA     Test Name Result Flag Reference   VAS LOWER LIMB ARTERIAL DUPLEX, LIMITED UNILATERAL/GRAFT (Report)     [facility] REPORT   CLINICAL:   Indications: Left Atherosclerosis with Ulceration [I70 25]  Patient has a non-pressure superficial skin ulcer of right lower leg, with   skin that is red and shiny  Risk Factors: The patient has history of obesity, HTN and diabetes (diabetes)  Clinical   Right Pressure: 141/ mm Hg, Left Pressure: 144/ mm Hg  FINDINGS:      Segment        Right         Left                        Impression PSV EDV PSV EDV    Common Femoral Artery Normal   183  25         Prox Profunda            99  0         Prox SFA        50-75%   370  44         Mid SFA              153  18         Dist SFA              178  29         Proximal Pop            168  28         Distal Pop             147  22         Dist  Ant  Tibial         146  23 156  0             CONCLUSION:   Impression:   Right Lower Limb:   There is a 50-75% stenosis in the proximal superficial femoral artery, and   diffuse atherosclerotic arterial disease throughout the remaining portions of   the femoropopliteal vessels  Findings suggest tibioperoneal disease  Ankle Pressure: 254 mm Hg , MAGNOLIA: 1 76, supra-normal, consistent with poorly   compressible vessels  Right Metatarsal Pressure: 108 mm Hg  Right Great Toe Pressure 89 mm Hg , above healing level  Left Lower Limb:   Diffuse atherosclerotic arterial disease throughout the femoropopliteal vessels   with no definite evidence for focal stenosis  Ankle Pressure 177 mm Hg , MAGNOLIA: 1 23, normal range  Left Metatarsal Pressure: 158 mm Hg  Left Great Toe Pressure 55 mm Hg , above healing level  SIGNATURE:   Electronically Signed by: Kasandra Marie MD, RPVI on 2017-01-17 06:47:34 PM       Plan  Claudication, PAD (peripheral artery disease)    · *1 - [facility] VASCULAR Physician Referral  Consult Only: the expectation is that the  referring provider will communicate back to the patient on treatment options  Evaluation  and Treatment: the expectation is that the referred to provider will communicate back  to the patient on treatment options    Status: Active  Requested for: 47SWW2022  Care Summary provided  : Yes

## 2018-01-13 VITALS
DIASTOLIC BLOOD PRESSURE: 78 MMHG | WEIGHT: 226.31 LBS | TEMPERATURE: 98.1 F | RESPIRATION RATE: 16 BRPM | HEIGHT: 63 IN | SYSTOLIC BLOOD PRESSURE: 122 MMHG | HEART RATE: 81 BPM | OXYGEN SATURATION: 99 % | BODY MASS INDEX: 40.1 KG/M2

## 2018-01-14 VITALS
DIASTOLIC BLOOD PRESSURE: 80 MMHG | HEART RATE: 86 BPM | HEIGHT: 63 IN | SYSTOLIC BLOOD PRESSURE: 124 MMHG | RESPIRATION RATE: 18 BRPM

## 2018-01-15 NOTE — RESULT NOTES
Verified Results  (1) COMPREHENSIVE METABOLIC PANEL [id number] 00:42YO Hank Cregideons     Test Name Result Flag Reference   GLUCOSE,RANDM 122 mg/dL     If the patient is fasting, the ADA then defines impaired fasting glucose as > 100 mg/dL and diabetes as > or equal to 123 mg/dL  SODIUM 139 mmol/L  136-145   POTASSIUM 4 8 mmol/L  3 5-5 3   CHLORIDE 104 mmol/L  100-108   CARBON DIOXIDE 30 mmol/L  21-32   ANION GAP (CALC) 5 mmol/L  4-13   BLOOD UREA NITROGEN 41 mg/dL H 5-25   CREATININE 2 02 mg/dL H 0 60-1 30   Standardized to IDMS reference method   CALCIUM 8 4 mg/dL  8 3-10 1   BILI, TOTAL 0 34 mg/dL  0 20-1 00   ALK PHOSPHATAS 86 U/L     ALT (SGPT) 18 U/L  12-78   AST(SGOT) 11 U/L  5-45   ALBUMIN 3 5 g/dL  3 5-5 0   TOTAL PROTEIN 7 0 g/dL  6 4-8 2   eGFR Non-African American 23 8 ml/min/1 73sq [facility] [employer] Disease Education Program recommendations are as follows:  GFR calculation is accurate only with a steady state creatinine  Chronic Kidney disease less than 60 ml/min/1 73 sq  meters  Kidney failure less than 15 ml/min/1 73 sq  meters  (1) HEMOGLOBIN A1C 75NKF0684 09:49AM Hank Cregideons     Test Name Result Flag Reference   HEMOGLOBIN A1C 6 9 % H 4 2-6 3   EST  AVG   GLUCOSE 151 mg/dl

## 2018-01-15 NOTE — RESULT NOTES
Verified Results  (1) CBC/PLT/DIFF 56Wmo4938 10:02AM Ivan Gregory Order Number: [id number]     Test Name Result Flag Reference   WBC COUNT 8 94 Thousand/uL  4 31-10 16   RBC COUNT 3 72 Million/uL L 3 81-5 12   HEMOGLOBIN 10 9 g/dL L 11 5-15 4   HEMATOCRIT 35 0 %  34 8-46  1   MCV 94 fL  82-98   MCH 29 3 pg  26 8-34 3   MCHC 31 1 g/dL L 31 4-37 4   RDW 14 9 %  11 6-15 1   MPV 10 9 fL  8 9-12 7   PLATELET COUNT 942 Thousands/uL  149-390   nRBC AUTOMATED 0 /100 WBCs     - Patient Instructions: This bloodwork is non-fasting  Please drink two glasses of water morning of bloodwork  This is an appended report  These results have been appended to a previously verified report  NEUTROPHILS - REL 64 %  43-75   LYMPHOCYTES - REL 24 %  14-44   MONOCYTES - REL 4 %  4-12   EOSINOPHILS - REL 7 % H 0-6   BASOPHILS - REL 1 %  0-1   NEUTROPHILS ABS 5 72 Thousand/uL  1 85-7 62   LYMPHOTCYTES ABS 2 15 Thousand/uL  0 60-4 47   MONOCYTES ABS 0 36 Thousand/uL  0 00-1 22   EOSINOPHILS ABS 0 63 Thousand/uL H 0 00-0 40   BASOPHILS ABS 0 09 Thousand/uL  0 00-0 10   TOTAL COUNTED 100     PLT ESTIMATE Adequate  Adequate   - Patient Instructions: This bloodwork is non-fasting  Please drink two glasses of water morning of bloodwork  (1) COMPREHENSIVE METABOLIC PANEL 60JJU7212 26:40HX Ivan Gregory Order Number: [id number]     Test Name Result Flag Reference   GLUCOSE,RANDM 130 mg/dL     If the patient is fasting, the ADA then defines impaired fasting glucose as > 100 mg/dL and diabetes as > or equal to 123 mg/dL     SODIUM 140 mmol/L  136-145   POTASSIUM 5 5 mmol/L H 3 5-5 3   CHLORIDE 104 mmol/L  100-108   CARBON DIOXIDE 29 mmol/L  21-32   ANION GAP (CALC) 7 mmol/L  4-13   BLOOD UREA NITROGEN 50 mg/dL H 5-25   CREATININE 2 21 mg/dL H 0 60-1 30   Standardized to IDMS reference method   CALCIUM 8 5 mg/dL  8 3-10 1   BILI, TOTAL 0 30 mg/dL  0 20-1 00   ALK PHOSPHATAS 84 U/L     ALT (SGPT) 15 U/L 12-78   AST(SGOT) 11 U/L  5-45   ALBUMIN 3 4 g/dL L 3 5-5 0   TOTAL PROTEIN 7 1 g/dL  6 4-8 2   eGFR Non-African American 21 5 ml/min/1 73sq m     [facility] Disease Education Program recommendations are as follows:  GFR calculation is accurate only with a steady state creatinine  Chronic Kidney disease less than 60 ml/min/1 73 sq  meters  Kidney failure less than 15 ml/min/1 73 sq  meters  (1) HEMOGLOBIN A1C 52Pcr9957 10:02AM [employer] Order Number: [id number]     Test Name Result Flag Reference   HEMOGLOBIN A1C 6 5 % H 4 2-6 3   EST  AVG  GLUCOSE 140 mg/dl       (1) LIPID PANEL FASTING W DIRECT LDL REFLEX 16Mvb6527 10:02AM Vetr Order Number: [id number]     Test Name Result Flag Reference   CHOLESTEROL 151 mg/dL     LDL CHOLESTEROL CALCULATED 77 mg/dL  0-100   - Patient Instructions: This is a fasting blood test  Water, black tea or black coffee only after 9:00pm the night before test   Drink 2 glasses of water the morning of test       Triglyceride:         Normal              <150 mg/dl       Borderline High    150-199 mg/dl       High               200-499 mg/dl       Very High          >499 mg/dl  Cholesterol:         Desirable        <200 mg/dl      Borderline High  200-239 mg/dl      High             >239 mg/dl  HDL Cholesterol:        High    >59 mg/dL      Low     <41 mg/dL  LDL Cholesterol:        Optimal          <100 mg/dl        Near Optimal     100-129 mg/dl        Above Optimal          Borderline High   130-159 mg/dl          High              160-189 mg/dl          Very High        >189 mg/dl  LDL CALCULATED:    This screening LDL is a calculated result  It does not have the accuracy of the Direct Measured LDL in the monitoring of patients with hyperlipidemia and/or statin therapy  Direct Measure LDL (LBE869) must be ordered separately in these patients     TRIGLYCERIDES 165 mg/dL H <=150   Specimen collection should occur prior to N-Acetylcysteine or Metamizole administration due to the potential for falsely depressed results  HDL,DIRECT 41 mg/dL  40-60   Specimen collection should occur prior to Metamizole administration due to the potential for falsely depressed results  (1) TSH WITH FT4 REFLEX 31Hvr3739 10:02AM Jorge De Luna Order Number: [id number]     Test Name Result Flag Reference   TSH 3 300 uIU/mL  0 358-3 740   Patients undergoing fluorescein dye angiography may retain small amounts of fluorescein in the body for 48-72 hours post procedure  Samples containing fluorescein can produce falsely depressed TSH values  If the patient had this procedure,a specimen should be resubmitted post fluorescein clearance            The recommended reference ranges for TSH during pregnancy are as follows:  First trimester 0 1 to 2 5 uIU/mL  Second trimester  0 2 to 3 0 uIU/mL  Third trimester 0 3 to 3 0 uIU/m

## 2018-01-16 NOTE — PROGRESS NOTES
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) (E78 4)   5  Hypothyroidism (244 9) (E03 9)   6  Type 2 diabetes mellitus with proliferative retinopathy (250 50,362 02) (O86 2886)   · 3 month followup   7  PAD (peripheral artery disease) (443 9) (I73 9)   8  Venous stasis ulcer of right lower extremity (454 0) (I83 019)   9  Need for vaccination (V05 9) (Z23)   10  Encounter for preventive health examination (V70 0) (Z00 00)    Plan  Anemia, Benign essential hypertension, Chronic kidney disease (CKD), stage II (mild),  Familial combined hyperlipidemia, Hypothyroidism, PAD (peripheral artery disease),  Type 2 diabetes mellitus with proliferative retinopathy, Venous stasis  ulcer of right lower extremity    · (1) CBC/PLT/DIFF; Status:Active; Requested for:18Bec2994;    · (1) COMPREHENSIVE METABOLIC PANEL; Status:Active; Requested for:88Dnf9557;    · (1) HEMOGLOBIN A1C; Status:Active; Requested for:45Uxt1515;    · (1) LIPID PANEL FASTING W DIRECT LDL REFLEX; Status:Active; Requested  for:90Jco1128;    · (1) TSH WITH FT4 REFLEX; Status:Active; Requested for:50Fdr7294;   Depression screening    · *VB - PHQ-9 Tool; Status:Complete - Retrospective By Protocol Authorization;   Done:  84QWD4914 09:38AM  Need for vaccination    · Fluzone High-Dose 0 5 ML Intramuscular Suspension Prefilled Syringe;  INJECT 0 5  ML Intramuscular; To Be Done: 44MNR0181  Screening for other and unspecified genitourinary condition    · *VB - Urinary Incontinence Screen (Dx Z13 89 Screen for UI);  Status:Complete -  Retrospective By Protocol Authorization;   Done: 43TVK4469 09:38AM  Special screening for other neurological conditions    · *VB - Fall Risk Assessment  (Dx Z13 89 Screen for Neurologic Disorder);  Status:Complete - Retrospective By Protocol Authorization;   Done: 52AQE2370 09: 34AM    Discussion/Summary    Medicare wellness visit  Patient has a living will and health care power of   Prescient screen is negative today  Fall risk assessment was negative  Patient experiences urinary incontinence, but refuses referral  Flu shot given today  Patient is current with pneumonia vaccine series  Patient refuses Zostavax  Patient refuses colonoscopy and further mammograms  She is due for DEXA scan, but would like to get it done early next year  Impression: Subsequent Annual Wellness Visit  Cardiovascular screening and counseling: the risks and benefits of screening were discussed  Diabetes screening and counseling: the risks and benefits of screening were discussed  Colorectal cancer screening and counseling: the risks and benefits of screening were discussed  Breast cancer screening and counseling: the risks and benefits of screening were discussed  Cervical cancer screening and counseling: the risks and benefits of screening were discussed  Osteoporosis screening and counseling: the risks and benefits of screening were discussed  Abdominal aortic aneurysm screening and counseling: the risks and benefits of screening were discussed  Glaucoma screening and counseling: the risks and benefits of screening were discussed  HIV screening and counseling: the risks and benefits of screening were discussed  Immunizations: the risks and benefits of influenza vaccination were discussed with the patient, influenza vaccine is due today, the risks and benefits of pneumococcal vaccination were discussed with the patient, the lifetime pneumococcal vaccine has been completed, the risks and benefits of the Zostavax vaccine were discussed with the patient and Zostavax vaccination up to date  Advance Directive Planning: complete and up to date  History of Present Illness  HPI: AWV   Welcome to [employer] and Wellness Visits:  The patient is being seen for the subsequent annual wellness visit  Medicare Screening and Risk Factors   Hospitalizations: no previous hospitalizations  Medicare Screening Tests Risk Questions   Abdominal aortic aneurysm risk assessment: none indicated  Osteoporosis risk assessment: , female gender and over 48years of age  HIV risk assessment: none indicated  Drug and Alcohol Use: The patient has never smoked cigarettes  The patient reports never drinking alcohol  Alcohol concern:   The patient has no concerns about alcohol abuse  She has never used illicit drugs  Diet and Physical Activity: Current diet includes well balanced meals, 2 servings of fruit per day and 2 servings of meat per day  She exercises 3 times per week  Exercise: walking 45 minutes per day  Mood Disorder and Cognitive Impairment Screening: PHQ-9 Depression Scale   Over the past 2 weeks, how often have you been bothered by the following problems? 1 ) Little interest or pleasure in doing things? Not at all    2 ) Feeling down, depressed or hopeless? Not at all    3 ) Trouble falling asleep or sleeping too much? Not at all    4 ) Feeling tired or having little energy? Not at all    5 ) Poor appetite or overeating? Not at all    6 ) Feeling bad about yourself, or that you are a failure, or have let yourself or your family down? Not at all    7 ) Trouble concentrating on things, such as reading a newspaper or watching television? Not at all    8 ) Moving or speaking so slowly that other people could have noticed, or the opposite, moving or speaking faster than usual? Not at all  TOTAL SCORE: 0    How difficult have these problems made it for you to do your work, take care of things at home, or get along with people? Not at all  She denies feeling down, depressed, or hopeless over the past two weeks  She denies feeling little interest or pleasure in doing things over the past two weeks     Cognitive impairment screening: denies difficulty learning/retaining new information, denies difficulty handling complex tasks, denies difficulty with reasoning, denies difficulty with spatial ability and orientation, denies difficulty with language and denies difficulty with behavior  Functional Ability/Level of Safety: Hearing is normal bilaterally and normal in the right ear  She denies hearing difficulties  She does not use a hearing aid  The patient is currently unable to participate in social activities and unable to drive, but able to do activities of daily living without limitations  Activities of daily living details: needs help doing laundry, but does not need help using the phone, no transportation help needed, does not need help shopping, no meal preparation help needed, does not need help doing housework, does not need help managing medications and does not need help managing money  Fall risk factors: The patient fell 0 times in the past 12 months  Injury History: polypharmacy, no alcohol use, no mobility impairment, no antidepressant use, no deconditioning, no postural hypotension, no sedative use, visual impairment, urinary incontinence, no antihypertensive use, no cognitive impairment, up and go test was unsteady or greater than thirty seconds and no previous fall  Home safety risk factors:  loose rugs, but no unfamiliar surroundings, no poor household lighting, no uneven floors, no household clutter, grab bars in the bathroom and handrails on the stairs  Advance Directives: Advance directives: living will, durable power of  for health care directives and advance directives     Co-Managers and Medical Equipment/Suppliers: See Patient Care Team      Patient Care Team    Care Team Member Role Specialty Office Number   Edmond Martinez MD Specialist Ophthalmology [phone]   Yehuda Bonilla MD Specialist Gastroenterology Adult [phone]   KATHY LAZARO DPM Specialist Podiatry [phone]   [facility] Specialist Obstetrics/Gynecology [phone] Deena Davis DO Specialist Anesthesia [phone]   Maggie Barragan MD Specialist Ophthalmology [phone]   Vivien Garza MD Specialist Cardiology [phone]   [address] Referring [facility] , [place] [address] [facility]  Vascular Surgery [phone], [address] [facility]  Vascular Surgery [phone]     Active Problems    1  Anemia (285 9) (D64 9)   2  Anxiety (300 00) (F41 9)   3  Arthritis (716 90) (M19 90)   4  Benign essential hypertension (401 1) (I10)   5  Chronic kidney disease (CKD), stage II (mild) (585 2) (N18 2)   6  Chronic low back pain (724 2,338 29) (M54 5,G89 29)   7  Chronic venous insufficiency (459 81) (I87 2)   8  Claudication (443 9) (I73 9)   9  Dermatitis (692 9) (L30 9)   10  Essential anemia (285 9) (D64 9)   11  Familial combined hyperlipidemia (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) (E78 4)   12  Hypothyroidism (244 9) (E03 9)   13  Osteopenia (733 90) (M85 80)   14  PAD (peripheral artery disease) (443 9) (I73 9)   15  Type 2 diabetes mellitus with proliferative retinopathy (250 50,362 02) (E11 3599)   16  Ulcer of lower extremity, right, with unspecified severity (707 10) (L97 919)   17  Vaginal candidiasis (112 1) (B37 3)   18  Venous stasis ulcer of right lower extremity (454 0) (I83 019)   19  Vitamin D deficiency (268 9) (E55 9)   20   Wound of right ankle (891 0) (S91 001A)    Past Medical History    · History of Adenoma of large intestine (211 3) (D12 6)   · History of Constipation, chronic (564 00) (K59 09)   · History of Cystocele   · History of Depression screen (V79 0) (Z13 89)   · Diabetes Mellitus (250 00)   · History of Diverticulosis (562 10) (K57 90)   · History of Fecal incontinence (787 60) (R15 9)   · History of Female stress incontinence (625 6) (N39 3)   · History of Flu vaccine need (V04 81) (Z23)   · History of acute bacterial sinusitis (V12 69) (Z87 09)   · History of allergy (V15 09) (Z88 9)   · History of cataract (V12 49) (Z86 69)   · History of fatigue (V13 89) (Q84 042)   · History of glaucoma (V12 49) (Z86 69)   · History of senile atrophic vaginitis (V13 29) (Z87 42)   · History of urinary frequency (V13 09) (U00 003)   · 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)   · History of Mixed incontinence (788 33) (N39 46)   · History of Need for influenza vaccination (V04 81) (Z23)   · History of Nocturia (788 43) (R35 1)   · History of Osteoarthritis, chronic (715 90) (M19 90)   · Personal history of gout (V12 29) (Z86 39)   · History of Screening for other and unspecified genitourinary condition (V81 6) (Z13 89)   · History of Special screening for other neurological conditions (V80 09) (Z13 89)    The active problems and past medical history were reviewed and updated today  Surgical History    · History of Ankle Surgery   · History of Cataract Surgery   · History of Cholecystectomy   · History of Tubal Ligation    Family History  Mother    · Family history of malignant neoplasm of stomach (V16 0) (Z80 0)  Father    · Family history of   Sister    · Family history of    · Family history of diabetes mellitus (V18 0) (Z83 3)    Social History    · Denied: History of Alcohol Use (History)   · Former smoker (V15 82) (M52 876)  The social history was reviewed and updated today  The social history was reviewed and is unchanged  Current Meds   1  Allegra Allergy 180 MG Oral Tablet Recorded   2  ALPRAZolam 0 25 MG Oral Tablet; TAKE 1 TABLET AT BEDTIME AS NEEDED; Therapy: 30FZW3689 to (Evaluate:62Dpu9516); Last Rx:2016; Status: ACTIVE -   Renewal Voided Ordered   3  Aspir-81 81 MG Oral Tablet Delayed Release; Therapy: (Recorded:52Pco6498) to Recorded   4  Atorvastatin Calcium 20 MG Oral Tablet; Take 1 tablet daily; Therapy: 05TSX7338 to (Evaluate:2018)  Requested for: 2017; Last   Rx:2017 Ordered   5  Doxazosin Mesylate 2 MG Oral Tablet; TAKE 1 TABLET DAILY;    Therapy: 06RYS5188 to (FDQSWURS:72IRC2512)  Requested for: 40EFI7570; Last   BJ:87LIG8756 Ordered   6  DrRx Diflucan 150 MG #1; one tab po now; Therapy: 92XND0647 to (Last Rx:04Jan2017) Ordered   7  Furosemide 80 MG Oral Tablet; TAKE 1/2 TABLET TWICE DAILY; Therapy: 81XIH0955 to ()  Requested for: 15Fut9824; Last   Rx:60Xqq6234 Ordered   8  Klor-Con M20 20 MEQ Oral Tablet Extended Release; TAKE 1 TABLET DAILY; Therapy: 86GPD1466 to (Evaluate:18Jan2018)  Requested for: 25JED2086; Last   JZ:19UQP1177 Ordered   9  Lantus SoloStar 100 UNIT/ML Subcutaneous Solution Pen-injector; inject 21 units   subcutaneously once daily; Therapy: 06WDD4399 to (Claudell Sofia)  Requested for: 57PYO0316; Last   Rx:65Gih5257 Ordered   10  Levothyroxine Sodium 75 MCG Oral Tablet; TAKE 1 TABLET DAILY AS DIRECTED; Therapy: 05Rbo7521 to (Evaluate:18Jan2018)  Requested for: 96GWY6879; Last    Rx:23Jan2017 Ordered   11  Losartan Potassium 100 MG Oral Tablet; TAKE 1 TABLET DAILY; Therapy: 55BBT2227 to (Evaluate:18Jan2018)  Requested for: 61NDP0739; Last    IX:33CEE0059 Ordered   12  Mometasone Furoate 0 1 % External Cream; APPLY SPARINGLY TO AFFECTED AREAS    TWICE DAILY  (AM AND PM); Therapy: 92Pxz8339 to (Last Rx:20Xof5731)  Requested for: 16Iul4104 Ordered   13  NovoLOG 100 UNIT/ML Subcutaneous Solution; 25 UNITS SQ TID; Therapy: 86PUI0696 to (Last Rx:56Nas9462) Ordered   14  ReliOn Pen Needles 31G X 8 MM Miscellaneous; USE AS DIRECTED; Therapy: 91Tta3536 to (Evaluate:18Jan2018)  Requested for: 76EUV7585; Last    Rx:23Jan2017 Ordered    The medication list was reviewed and updated today  Allergies    1  Aspirin TABS   2  ACE Inhibitors    Immunizations   ** Printed in Appendix #1 below       Vitals  Signs    Temperature: 98 1 F, Tympanic  Heart Rate: 81  Pulse Quality: Normal  Respiration Quality: Normal  Respiration: 16  Systolic: 397, LUE, Sitting  Diastolic: 78, LUE, Sitting  Height: 5 ft 3 in  Weight: 226 lb 5 oz  BMI Calculated: 40 09  BSA Calculated: 2 04  O2 Saturation: 99, RA  LMP: post menopause  Pain Scale: 0    Results/Data  *VB - Fall Risk Assessment  (Dx Z13 89 Screen for Neurologic Disorder) 2017 09:34AM [employer]     Test Name Result Flag Reference   Falls Risk      No falls in the past year       Health Management  Type 2 diabetes mellitus with proliferative retinopathy   *VB - Eye Exam; every 1 year; Last 96DGV6000; Next Due: 65XJL5551; Active    Signatures   Electronically signed by :  Dalila Hopper DO; Sep 30 2017  9:58AM EST                       (Author)    Appendix #1     Patient: Virginie Ku ; : 1935; MRN: [id number]      1 2 3 4 5 6    Influenza  2012 07-Dec-2012 13-Dec-2013 29-Aug-2014 05-Sep-2015 17-Sep-2016    PCV  2015         PPSV  2012 07-Dec-2012

## 2018-01-18 NOTE — RESULT NOTES
Verified Results  (1) CBC/PLT/DIFF [id number] 10:15AM Gabino Terrell Order Number: [id number]     Test Name Result Flag Reference   WBC COUNT 9 19 Thousand/uL  4 31-10 16   RBC COUNT 3 66 Million/uL L 3 81-5 12   HEMOGLOBIN 10 4 g/dL L 11 5-15 4   HEMATOCRIT 33 6 % L 34 8-46  1   MCV 92 fL  82-98   MCH 28 4 pg  26 8-34 3   MCHC 31 0 g/dL L 31 4-37 4   RDW 15 0 %  11 6-15 1   MPV 10 8 fL  8 9-12 7   PLATELET COUNT 042 Thousands/uL  149-390   nRBC AUTOMATED 0 /100 WBCs     NEUTROPHILS RELATIVE PERCENT 61 %  43-75   LYMPHOCYTES RELATIVE PERCENT 20 %  14-44   MONOCYTES RELATIVE PERCENT 8 %  4-12   EOSINOPHILS RELATIVE PERCENT 10 % H 0-6   BASOPHILS RELATIVE PERCENT 1 %  0-1   NEUTROPHILS ABSOLUTE COUNT 5 64 Thousands/? ??L  1 85-7 62   LYMPHOCYTES ABSOLUTE COUNT 1 86 Thousands/? ??L  0 60-4 47   MONOCYTES ABSOLUTE COUNT 0 72 Thousand/? ??L  0 17-1 22   EOSINOPHILS ABSOLUTE COUNT 0 90 Thousand/? ??L H 0 00-0 61   BASOPHILS ABSOLUTE COUNT 0 05 Thousands/? ??L  0 00-0 10     (1) COMPREHENSIVE METABOLIC PANEL [id number] 19:58YK Gabino Terrell Order Number: [id number]     Test Name Result Flag Reference   SODIUM 140 mmol/L  136-145   POTASSIUM 5 0 mmol/L  3 5-5 3   CHLORIDE 105 mmol/L  100-108   CARBON DIOXIDE 29 mmol/L  21-32   ANION GAP (CALC) 6 mmol/L  4-13   BLOOD UREA NITROGEN 33 mg/dL H 5-25   CREATININE 1 91 mg/dL H 0 60-1 30   Standardized to IDMS reference method   CALCIUM 8 6 mg/dL  8 3-10 1   BILI, TOTAL 0 26 mg/dL  0 20-1 00   ALK PHOSPHATAS 82 U/L     ALT (SGPT) 13 U/L  12-78   Specimen collection should occur prior to Sulfasalazine and/or Sulfapyridine administration due to the potential for falsely depressed results  AST(SGOT) 11 U/L  5-45   Specimen collection should occur prior to Sulfasalazine administration due to the potential for falsely depressed results     ALBUMIN 3 3 g/dL L 3 5-5 0   TOTAL PROTEIN 7 2 g/dL  6 4-8 2   eGFR 25 ml/min/1 73sq m     National Kidney Disease Education Program recommendations are as follows:  GFR calculation is accurate only with a steady state creatinine  Chronic Kidney disease less than 60 ml/min/1 73 sq  meters  Kidney failure less than 15 ml/min/1 73 sq  meters  GLUCOSE FASTING 125 mg/dL H 65-99   Specimen collection should occur prior to Sulfasalazine administration due to the potential for falsely depressed results  Specimen collection should occur prior to Sulfapyridine administration due to the potential for falsely elevated results  (1) HEMOGLOBIN A1C 30Sep2017 10:15AM [facility] Order Number: [id number]     Test Name Result Flag Reference   HEMOGLOBIN A1C 7 0 % H 4 2-6 3   EST  AVG  GLUCOSE 154 mg/dl       (1) LIPID PANEL FASTING W DIRECT LDL REFLEX 30Sep2017 10:15AM [facility] Order Number: [id number]     Test Name Result Flag Reference   CHOLESTEROL 83 mg/dL     LDL CHOLESTEROL CALCULATED 15 mg/dL  0-100   Triglyceride:        Normal <150 mg/dl   Borderline High 150-199 mg/dl   High 200-499 mg/dl   Very High >499 mg/dl      Cholesterol:       Desirable <200 mg/dl    Borderline High 200-239 mg/dl    High >239 mg/dl      HDL Cholesterol:       High>59 mg/dL    Low <41 mg/dL      HDL Cholesterol:       High>59 mg/dL    Low <41 mg/dL      This screening LDL is a calculated result  It does not have the accuracy of the Direct Measured LDL in the monitoring of patients with hyperlipidemia and/or statin therapy  Direct Measure LDL (YCI644) must be ordered separately in these patients  TRIGLYCERIDES 123 mg/dL  <=150   Specimen collection should occur prior to N-Acetylcysteine or Metamizole administration due to the potential for falsely depressed results  HDL,DIRECT 43 mg/dL  40-60   Specimen collection should occur prior to Metamizole administration due to the potential for falsley depressed results       (1) TSH WITH FT4 REFLEX 30Sep2017 10:15AM Ling MAZARIEGOS Order Number: [id number]     Test Name Result Flag Reference   TSH 2 150 uIU/mL  0 358-3 740   Patients undergoing fluorescein dye angiography may retain small amounts of fluorescein in the body for 48-72 hours post procedure  Samples containing fluorescein can produce falsely depressed TSH values  If the patient had this procedure,a specimen should be resubmitted post fluorescein clearance            The recommended reference ranges for TSH during pregnancy are as follows:  First trimester 0 1 to 2 5 uIU/mL  Second trimester  0 2 to 3 0 uIU/mL  Third trimester 0 3 to 3 0 uIU/m

## 2018-01-22 ENCOUNTER — ALLSCRIPTS OFFICE VISIT (OUTPATIENT)
Dept: OTHER | Facility: OTHER | Age: 83
End: 2018-01-22

## 2018-01-22 DIAGNOSIS — Z00.00 ENCOUNTER FOR GENERAL ADULT MEDICAL EXAMINATION WITHOUT ABNORMAL FINDINGS: ICD-10-CM

## 2018-01-23 NOTE — PROGRESS NOTES
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) (E78 4)   5  Anxiety (300 00) (F41 9)   6  Hypothyroidism (244 9) (E03 9)    Plan   Benign essential hypertension    · Doxazosin Mesylate 2 MG Oral Tablet; TAKE 1 TABLET DAILY   · Furosemide 80 MG Oral Tablet; TAKE 1/2 TABLET TWICE DAILY   · Klor-Con M20 20 MEQ Oral Tablet Extended Release; TAKE 1 TABLET DAILY   · Losartan Potassium 100 MG Oral Tablet (Cozaar); TAKE 1 TABLET DAILY  Benign essential hypertension, Chronic kidney disease (CKD), stage II (mild), Familial    combined hyperlipidemia, Hypothyroidism, Type 2 diabetes mellitus with proliferative    retinopathy    · (1) COMPREHENSIVE METABOLIC PANEL; Status:Hold For - Exact Date; Requested    for:After [id number];    · (1) HEMOGLOBIN A1C; Status:Hold For - Exact Date; Requested for:After [id number];    · (1) LIPID PANEL FASTING W DIRECT LDL REFLEX; Status:Hold For - Exact Date; Requested for:After [id number];    · (1) TSH WITH FT4 REFLEX; Status:Hold For - Exact Date; Requested for:After    [id number]; Health Maintenance    · * DXA BONE DENSITY SPINE HIP AND PELVIS; Status:Hold For - Scheduling; Requested    for:22Jan2018;   Hypothyroidism    · Levothyroxine Sodium 75 MCG Oral Tablet; TAKE 1 TABLET DAILY AS DIRECTED  Type 2 diabetes mellitus with proliferative retinopathy    · Lantus SoloStar 100 UNIT/ML Subcutaneous Solution Pen-injector; inject 21    units subcutaneously once daily   · NovoLOG 100 UNIT/ML Subcutaneous Solution; 25 UNITS SQ TID   · ReliOn Pen Needles 31G X 8 MM Miscellaneous; USE AS DIRECTED  Ulcer of lower extremity, right, with unspecified severity    · Atorvastatin Calcium 20 MG Oral Tablet;  Take 1 tablet daily    Discussion/Summary      --DM: last a1c was 6  9%  pt will be getting labs done in near future  doing well on lantus 26 and humalog (21+23)  no significant hypoglycemia  Will call with lab results chronic kidney disease: Last creatinine 1 81 will check labs in near future hypothyroid: Doing well on levothyroxine 75 mcg daily  Will check labs in near future lipids: Doing well on atorvastatin 20 mg daily  Will check lipids in near future anxiety: Doing well with rare use of alprazolam ( without side effects or falls)  Will call with lab results and determine next follow-up ( most likely 4 months with labs prior at at [facility], cbc, cmp   lipids, a1c, tsh, micro)  Possible side effects of new medications were reviewed with the patient/guardian today  The treatment plan was reviewed with the patient/guardian  The patient/guardian understands and agrees with the treatment plan      Chief Complaint   pt here for her 4 month f/u and has a blood blister on her right ankle that she noticed today  History of Present Illness   Patient presents for recheck of chronic medical problems today  Overall patient is doing well  Doing well on insulin without any recent hypoglycemia  Doing well on cholesterol med, atorvastatin  Doing well on levothyroxine for hypothyroid  Review of Systems        Constitutional: No fever, no chills, feels well, no tiredness, no recent weight gain or weight loss  Cardiovascular: No complaints of slow heart rate, no fast heart rate, no chest pain, no palpitations, no leg claudication, no lower extremity edema  Respiratory: No complaints of shortness of breath, no wheezing, no cough, no SOB on exertion, no orthopnea, no PND  Gastrointestinal: No complaints of abdominal pain, no constipation, no nausea or vomiting, no diarrhea, no bloody stools  Genitourinary: No complaints of dysuria, no incontinence, no pelvic pain, no dysmenorrhea, no vaginal discharge or bleeding  Active Problems   1   Anemia (285 9) (D64 9) 2  Anxiety (300 00) (F41 9)   3  Arthritis (716 90) (M19 90)   4  Benign essential hypertension (401 1) (I10)   5  Chronic kidney disease (CKD), stage II (mild) (585 2) (N18 2)   6  Chronic low back pain (724 2,338 29) (M54 5,G89 29)   7  Chronic venous insufficiency (459 81) (I87 2)   8  Claudication (443 9) (I73 9)   9  Depression screening (V79 0) (Z13 89)   10  Dermatitis (692 9) (L30 9)   11  Essential anemia (285 9) (D64 9)   12  Familial combined hyperlipidemia (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) (E78 4)   13  Hypothyroidism (244 9) (E03 9)   14  Need for vaccination (V05 9) (Z23)   15  Osteopenia (733 90) (M85 80)   16  PAD (peripheral artery disease) (443 9) (I73 9)   17  Screening for other and unspecified genitourinary condition (V81 6) (Z13 89)   18  Special screening for other neurological conditions (V80 09) (Z13 89)   19  Type 2 diabetes mellitus with proliferative retinopathy (250 50,362 02) (E11 3599)   20  Ulcer of lower extremity, right, with unspecified severity (707 10) (L97 919)   21  Vaginal candidiasis (112 1) (B37 3)   22  Venous stasis ulcer of right lower extremity (454 0) (I83 019)   23  Vitamin D deficiency (268 9) (E55 9)   24  Wound of right ankle (891 0) (S91 001A)    Past Medical History   1  History of Adenoma of large intestine (211 3) (D12 6)   2  History of Constipation, chronic (564 00) (K59 09)   3  History of Cystocele   4  History of Depression screen (V79 0) (Z13 89)   5  Diabetes Mellitus (250 00)   6  History of Diverticulosis (562 10) (K57 90)   7  History of Fecal incontinence (787 60) (R15 9)   8  History of Female stress incontinence (625 6) (N39 3)   9  History of Flu vaccine need (V04 81) (Z23)   10  History of acute bacterial sinusitis (V12 69) (Z87 09)   11  History of allergy (V15 09) (Z88 9)   12  History of cataract (V12 49) (Z86 69)   13  History of fatigue (V13 89) (Z87 898)   14  History of glaucoma (V12 49) (Z86 69)   15  History of senile atrophic vaginitis (V13 29) (Z87 42)   16  History of urinary frequency (V13 09) (Z87 898)   17  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)   18  History of Mixed incontinence (788 33) (N39 46)   19  History of Need for influenza vaccination (V04 81) (Z23)   20  History of Nocturia (788 43) (R35 1)   21  History of Osteoarthritis, chronic (715 90) (M19 90)   22  Personal history of gout (V12 29) (Z86 39)   23  History of Screening for other and unspecified genitourinary condition (V81 6) (Z13 89)   24  History of Special screening for other neurological conditions (V80 09) (Z13 89)     The active problems and past medical history were reviewed and updated today  Surgical History   1  History of Ankle Surgery   2  History of Cataract Surgery   3  History of Cholecystectomy   4  History of Tubal Ligation    Family History   Mother    1  Family history of malignant neoplasm of stomach (V16 0) (Z80 0)  Father    2  Family history of   Sister    3  Family history of    4  Family history of diabetes mellitus (V18 0) (Z83 3)    Social History    · Denied: History of Alcohol Use (History)   · Former smoker (M96 64) (F48 641)  The social history was reviewed and updated today  The social history was reviewed and is unchanged  Current Meds    1  Allegra Allergy 180 MG Oral Tablet Recorded   2  ALPRAZolam 0 25 MG Oral Tablet; TAKE 1 TABLET AT BEDTIME AS NEEDED; Therapy: 54QEP1813 to (Evaluate:67Hch5931); Last Rx:2016; Status: ACTIVE -     Renewal Voided Ordered   3  Aspir-81 81 MG Oral Tablet Delayed Release; Therapy: (Recorded:79Zcj3620) to Recorded   4  Atorvastatin Calcium 20 MG Oral Tablet; Take 1 tablet daily; Therapy: 59BGK3581 to (Evaluate:2018)  Requested for: 2017; Last     Rx:2017 Ordered   5  Doxazosin Mesylate 2 MG Oral Tablet; TAKE 1 TABLET DAILY; Therapy: 49JQW7151 to (Evaluate:2018)  Requested for: 93SVM2563; Last     AZ:14BUM9991 Ordered   6   Furosemide 80 MG Oral Tablet; TAKE 1/2 TABLET TWICE DAILY; Therapy: 53VXH4915 to ()  Requested for: 65Iju4461; Last     Rx:59Xud9833 Ordered   7  Klor-Con M20 20 MEQ Oral Tablet Extended Release; TAKE 1 TABLET DAILY; Therapy: 74KIE9941 to (Evaluate:18Jan2018)  Requested for: 51HXN2211; Last     GF:72VYX6553 Ordered   8  Lantus SoloStar 100 UNIT/ML Subcutaneous Solution Pen-injector; inject 21 units     subcutaneously once daily; Therapy: 75MXO6422 to 446 1104)  Requested for: 82LUD3758; Last     Rx:06Jan2018 Ordered   9  Levothyroxine Sodium 75 MCG Oral Tablet; TAKE 1 TABLET DAILY AS DIRECTED; Therapy: 30Vtd5385 to (Evaluate:18Jan2018)  Requested for: 36CZY0820; Last     Rx:23Jan2017 Ordered   10  Losartan Potassium 100 MG Oral Tablet; TAKE 1 TABLET DAILY; Therapy: 19RRS7526 to (Evaluate:18Jan2018)  Requested for: 72CEW1991; Last      Rx:23Jan2017 Ordered   11  Mometasone Furoate 0 1 % External Cream; APPLY SPARINGLY TO AFFECTED AREAS      TWICE DAILY  (AM AND PM); Therapy: 27Oma9394 to (Last Rx:16Esp0981)  Requested for: 03Roj1352 Ordered   12  NovoLOG 100 UNIT/ML Subcutaneous Solution; 25 UNITS SQ TID; Therapy: 94GRE0799 to (Last Rx:91Ovu1113) Ordered   13  ReliOn Pen Needles 31G X 8 MM Miscellaneous; USE AS DIRECTED; Therapy: 94Qjj9977 to (Evaluate:18Jan2018)  Requested for: 08PLX5875; Last      Rx:23Jan2017 Ordered     The medication list was reviewed and updated today  Allergies   1  Aspirin TABS   2   ACE Inhibitors    Vitals   Vital Signs    Recorded: 75GEV6403 02:17PM   Temperature 98 1 F, Tympanic   Heart Rate 71   Pulse Quality Normal   Respiration Quality Normal   Respiration 15   Systolic 490, LUE, Sitting   Diastolic 70, LUE, Sitting   Height 5 ft 3 in   Weight 227 lb 3 oz   BMI Calculated 40 24   BSA Calculated 2 04   O2 Saturation 99   Pain Scale 0     Physical Exam        Constitutional      General appearance: No acute distress, well appearing and well nourished  Pulmonary      Respiratory effort: No increased work of breathing or signs of respiratory distress  Auscultation of lungs: Clear to auscultation  Cardiovascular      Palpation of heart: Normal PMI, no thrills  Auscultation of heart: Normal rate and rhythm, normal S1 and S2, without murmurs  Examination of extremities for edema and/or varicosities: Normal        Carotid pulses: Normal        Lymphatic      Palpation of lymph nodes in neck: No lymphadenopathy  Psychiatric      Orientation to person, place, and time: Normal        Mood and affect: Normal           Health Management   Type 2 diabetes mellitus with proliferative retinopathy   *VB - Eye Exam; every 1 year; Last 77NJJ3976; Next Due: 85PKC2874; Active    Signatures    Electronically signed by :  Elvia River DO; Jan 22 2018  2:47PM EST                       (Author)

## 2018-01-31 ENCOUNTER — TELEPHONE (OUTPATIENT)
Dept: FAMILY MEDICINE CLINIC | Facility: CLINIC | Age: 83
End: 2018-01-31

## 2018-01-31 DIAGNOSIS — E11.8 TYPE 2 DIABETES MELLITUS WITH COMPLICATION, WITH LONG-TERM CURRENT USE OF INSULIN (HCC): Primary | ICD-10-CM

## 2018-01-31 DIAGNOSIS — Z79.4 TYPE 2 DIABETES MELLITUS WITH COMPLICATION, WITH LONG-TERM CURRENT USE OF INSULIN (HCC): Primary | ICD-10-CM

## 2018-01-31 RX ORDER — FEXOFENADINE HCL 180 MG/1
TABLET ORAL
COMMUNITY

## 2018-01-31 RX ORDER — LEVOTHYROXINE SODIUM 0.07 MG/1
1 TABLET ORAL DAILY
COMMUNITY
Start: 2012-12-12 | End: 2019-02-16 | Stop reason: SDUPTHER

## 2018-01-31 RX ORDER — LOSARTAN POTASSIUM 100 MG/1
1 TABLET ORAL DAILY
COMMUNITY
Start: 2012-05-07 | End: 2019-03-23 | Stop reason: SDUPTHER

## 2018-01-31 RX ORDER — FUROSEMIDE 80 MG
0.5 TABLET ORAL 2 TIMES DAILY
COMMUNITY
Start: 2011-05-17 | End: 2018-06-14 | Stop reason: SDUPTHER

## 2018-01-31 RX ORDER — ATORVASTATIN CALCIUM 20 MG/1
1 TABLET, FILM COATED ORAL DAILY
COMMUNITY
Start: 2017-02-13 | End: 2019-01-23 | Stop reason: SDUPTHER

## 2018-01-31 RX ORDER — ALPRAZOLAM 0.25 MG/1
1 TABLET ORAL
COMMUNITY
Start: 2013-12-13 | End: 2018-10-15 | Stop reason: ALTCHOICE

## 2018-01-31 RX ORDER — POTASSIUM CHLORIDE 20 MEQ/1
1 TABLET, EXTENDED RELEASE ORAL DAILY
COMMUNITY
Start: 2011-04-26 | End: 2019-06-28 | Stop reason: SDUPTHER

## 2018-01-31 NOTE — TELEPHONE ENCOUNTER
Pt's daughter dayanara called and left a voicemail requesting Brandin Lynn  needs a new rx written  I called and spoke to Brandin Lynn she needs her rx written  for her Novolog 100 unit/ ML pen pt uses 25 units SQ twice a day and pt usually gets one pack of 5 pens per month  She was prescribed Vials instead and the pharmacy stated that they will take them back for her when they received her new rx  Please send pt's rx to the [facility] on [address]  Pt's number is [phone]    Pt was informed that you are out of the office today 1/31/18  She has one full pen left and is still using another pen

## 2018-02-05 DIAGNOSIS — E10.8 TYPE 1 DIABETES MELLITUS WITH COMPLICATION (HCC): Primary | ICD-10-CM

## 2018-02-10 LAB
ALBUMIN SERPL-MCNC: 3.6 G/DL (ref 3.6–5.1)
ALBUMIN/GLOB SERPL: 1.1 (CALC) (ref 1–2.5)
ALP SERPL-CCNC: 78 U/L (ref 33–130)
ALT SERPL-CCNC: 8 U/L (ref 6–29)
AST SERPL-CCNC: 12 U/L (ref 10–35)
BILIRUB SERPL-MCNC: 0.3 MG/DL (ref 0.2–1.2)
BUN SERPL-MCNC: 52 MG/DL (ref 7–25)
BUN/CREAT SERPL: 22 (CALC) (ref 6–22)
CALCIUM SERPL-MCNC: 9.1 MG/DL (ref 8.6–10.4)
CHLORIDE SERPL-SCNC: 105 MMOL/L (ref 98–110)
CHOLEST SERPL-MCNC: 101 MG/DL
CHOLEST/HDLC SERPL: 2.3 (CALC)
CO2 SERPL-SCNC: 29 MMOL/L (ref 20–31)
CREAT SERPL-MCNC: 2.38 MG/DL (ref 0.6–0.88)
GLOBULIN SER CALC-MCNC: 3.2 G/DL (CALC) (ref 1.9–3.7)
GLUCOSE SERPL-MCNC: 151 MG/DL (ref 65–99)
HBA1C MFR BLD: 6.6 % OF TOTAL HGB
HDLC SERPL-MCNC: 43 MG/DL
LDLC SERPL CALC-MCNC: 39 MG/DL (CALC)
NONHDLC SERPL-MCNC: 58 MG/DL (CALC)
POTASSIUM SERPL-SCNC: 5.2 MMOL/L (ref 3.5–5.3)
PROT SERPL-MCNC: 6.8 G/DL (ref 6.1–8.1)
SL AMB EGFR AFRICAN AMERICAN: 22 ML/MIN/1.73M2
SL AMB EGFR NON AFRICAN AMERICAN: 19 ML/MIN/1.73M2
SODIUM SERPL-SCNC: 142 MMOL/L (ref 135–146)
TRIGL SERPL-MCNC: 108 MG/DL
TSH SERPL-ACNC: 1.99 MIU/L (ref 0.4–4.5)

## 2018-02-13 DIAGNOSIS — L97.919 NON-PRESSURE CHRONIC ULCER OF RIGHT LOWER LEG (HCC): ICD-10-CM

## 2018-03-29 ENCOUNTER — HOSPITAL ENCOUNTER (OUTPATIENT)
Dept: BONE DENSITY | Facility: MEDICAL CENTER | Age: 83
Discharge: HOME | End: 2018-03-29
Payer: MEDICARE

## 2018-03-29 DIAGNOSIS — Z00.00 ENCOUNTER FOR GENERAL ADULT MEDICAL EXAMINATION WITHOUT ABNORMAL FINDINGS: ICD-10-CM

## 2018-03-29 DIAGNOSIS — Z13.820 SCREENING FOR OSTEOPOROSIS: ICD-10-CM

## 2018-03-29 PROCEDURE — 77080 DXA BONE DENSITY AXIAL: CPT

## 2018-05-10 ENCOUNTER — OFFICE VISIT (OUTPATIENT)
Dept: FAMILY MEDICINE CLINIC | Facility: CLINIC | Age: 83
End: 2018-05-10
Payer: MEDICARE

## 2018-05-10 VITALS
HEART RATE: 59 BPM | WEIGHT: 229.3 LBS | TEMPERATURE: 98.4 F | DIASTOLIC BLOOD PRESSURE: 70 MMHG | SYSTOLIC BLOOD PRESSURE: 118 MMHG | BODY MASS INDEX: 43.29 KG/M2 | RESPIRATION RATE: 16 BRPM | OXYGEN SATURATION: 99 % | HEIGHT: 61 IN

## 2018-05-10 DIAGNOSIS — I73.9 PAD (PERIPHERAL ARTERY DISEASE) (HCC): ICD-10-CM

## 2018-05-10 DIAGNOSIS — I73.9 CLAUDICATION (HCC): Primary | ICD-10-CM

## 2018-05-10 PROCEDURE — 99214 OFFICE O/P EST MOD 30 MIN: CPT | Performed by: FAMILY MEDICINE

## 2018-05-10 RX ORDER — DOXAZOSIN 2 MG/1
1 TABLET ORAL DAILY
COMMUNITY
Start: 2011-04-26 | End: 2019-01-23 | Stop reason: SDUPTHER

## 2018-05-10 NOTE — PROGRESS NOTES
Assessment/Plan:    PAD (peripheral artery disease) ([facility] )  Patient presents with ongoing bilateral leg swelling, redness, pain and numbness     Symptoms have been present for many months, but somewhat worse in the last few weeks     Patient denies any chest pain or shortness of breath  I reviewed her most recent arterial duplex the July 2017 which revealed diffuse atherosclerotic disease in both legs  She has an appointment to see vascular surgeon on June 11th  I suggested that she call an attempt to move that appointment up a little bit if possible, otherwise keep the June 11th appointment (since otherwise, her symptoms appear stable)  Diagnoses and all orders for this visit:    Claudication [facility])    PAD (peripheral artery disease) ([facility] )    Other orders  -     doxazosin (CARDURA) 2 mg tablet; Take 1 tablet by mouth daily      keep next upcoming appointment in June with fasting blood work prior      Subjective:      Patient ID: Milo De Leon is a [de-identified] y o  female  Patient presents with ongoing bilateral leg swelling, redness, pain and numbness     Symptoms have been present for many months, but somewhat worse in the last few weeks     Patient denies any chest pain or shortness of breath  The following portions of the patient's history were reviewed and updated as appropriate: allergies, current medications, past family history, past medical history, past social history, past surgical history and problem list     Review of Systems   Respiratory: Negative  Cardiovascular: Negative  Gastrointestinal: Negative  Genitourinary: Negative  Skin: Positive for color change           Objective:      /70 (BP Location: Left arm, Patient Position: Sitting, Cuff Size: Standard)   Pulse 59   Temp 98 4 °F (36 9 °C) (Tympanic)   Resp 16   Ht 5' 1" (1 549 m)   Wt 104 kg (229 lb 4 8 oz)   SpO2 99%   BMI 43 33 kg/m²          Physical Exam   Cardiovascular: Normal rate, regular rhythm, normal heart sounds and intact distal pulses  Carotids: no bruits  Ext: no edema   Pulmonary/Chest: Effort normal  No respiratory distress  She has no wheezes  She has no rales  Psychiatric: She has a normal mood and affect   Her behavior is normal  Thought content normal

## 2018-05-10 NOTE — ASSESSMENT & PLAN NOTE
Patient presents with ongoing bilateral leg swelling, redness, pain and numbness     Symptoms have been present for many months, but somewhat worse in the last few weeks     Patient denies any chest pain or shortness of breath  I reviewed her most recent arterial duplex the July 2017 which revealed diffuse atherosclerotic disease in both legs  She has an appointment to see vascular surgeon on June 11th  I suggested that she call an attempt to move that appointment up a little bit if possible, otherwise keep the June 11th appointment (since otherwise, her symptoms appear stable)

## 2018-06-11 ENCOUNTER — HOSPITAL ENCOUNTER (OUTPATIENT)
Dept: NON INVASIVE DIAGNOSTICS | Facility: CLINIC | Age: 83
Discharge: HOME | End: 2018-06-11

## 2018-06-11 DIAGNOSIS — L97.919 NON-PRESSURE CHRONIC ULCER OF RIGHT LOWER LEG (HCC): ICD-10-CM

## 2018-06-14 ENCOUNTER — OFFICE VISIT (OUTPATIENT)
Dept: FAMILY MEDICINE CLINIC | Facility: CLINIC | Age: 83
End: 2018-06-14
Payer: MEDICARE

## 2018-06-14 VITALS
WEIGHT: 225.2 LBS | OXYGEN SATURATION: 99 % | DIASTOLIC BLOOD PRESSURE: 60 MMHG | BODY MASS INDEX: 42.52 KG/M2 | SYSTOLIC BLOOD PRESSURE: 118 MMHG | RESPIRATION RATE: 16 BRPM | HEART RATE: 70 BPM | TEMPERATURE: 98.8 F | HEIGHT: 61 IN

## 2018-06-14 DIAGNOSIS — Z79.4 TYPE 2 DIABETES MELLITUS WITH COMPLICATION, WITH LONG-TERM CURRENT USE OF INSULIN (HCC): ICD-10-CM

## 2018-06-14 DIAGNOSIS — E78.49 FAMILIAL COMBINED HYPERLIPIDEMIA: ICD-10-CM

## 2018-06-14 DIAGNOSIS — E11.3553 TYPE 2 DIABETES MELLITUS WITH STABLE PROLIFERATIVE RETINOPATHY OF BOTH EYES, WITH LONG-TERM CURRENT USE OF INSULIN (HCC): ICD-10-CM

## 2018-06-14 DIAGNOSIS — I73.9 CLAUDICATION (HCC): ICD-10-CM

## 2018-06-14 DIAGNOSIS — Z79.4 TYPE 2 DIABETES MELLITUS WITH STABLE PROLIFERATIVE RETINOPATHY OF BOTH EYES, WITH LONG-TERM CURRENT USE OF INSULIN (HCC): ICD-10-CM

## 2018-06-14 DIAGNOSIS — I10 BENIGN HYPERTENSION: Primary | ICD-10-CM

## 2018-06-14 DIAGNOSIS — E03.9 HYPOTHYROIDISM, UNSPECIFIED TYPE: ICD-10-CM

## 2018-06-14 DIAGNOSIS — N18.2 CHRONIC KIDNEY DISEASE (CKD), STAGE II (MILD): ICD-10-CM

## 2018-06-14 DIAGNOSIS — F41.9 ANXIETY: ICD-10-CM

## 2018-06-14 DIAGNOSIS — E11.8 TYPE 2 DIABETES MELLITUS WITH COMPLICATION, WITH LONG-TERM CURRENT USE OF INSULIN (HCC): ICD-10-CM

## 2018-06-14 DIAGNOSIS — I73.9 PAD (PERIPHERAL ARTERY DISEASE) (HCC): ICD-10-CM

## 2018-06-14 LAB
ALBUMIN SERPL BCP-MCNC: 3.2 G/DL (ref 3.5–5)
ALP SERPL-CCNC: 76 U/L (ref 46–116)
ALT SERPL W P-5'-P-CCNC: 16 U/L (ref 12–78)
ANION GAP SERPL CALCULATED.3IONS-SCNC: 6 MMOL/L (ref 4–13)
AST SERPL W P-5'-P-CCNC: 12 U/L (ref 5–45)
BASOPHILS # BLD AUTO: 0.07 THOUSANDS/ΜL (ref 0–0.1)
BASOPHILS NFR BLD AUTO: 1 % (ref 0–1)
BILIRUB SERPL-MCNC: 0.37 MG/DL (ref 0.2–1)
BUN SERPL-MCNC: 49 MG/DL (ref 5–25)
CALCIUM SERPL-MCNC: 8.5 MG/DL (ref 8.3–10.1)
CHLORIDE SERPL-SCNC: 106 MMOL/L (ref 100–108)
CHOLEST SERPL-MCNC: 83 MG/DL (ref 50–200)
CO2 SERPL-SCNC: 29 MMOL/L (ref 21–32)
CREAT SERPL-MCNC: 2.21 MG/DL (ref 0.6–1.3)
EOSINOPHIL # BLD AUTO: 0.57 THOUSAND/ΜL (ref 0–0.61)
EOSINOPHIL NFR BLD AUTO: 6 % (ref 0–6)
ERYTHROCYTE [DISTWIDTH] IN BLOOD BY AUTOMATED COUNT: 14.4 % (ref 11.6–15.1)
EST. AVERAGE GLUCOSE BLD GHB EST-MCNC: 166 MG/DL
GFR SERPL CREATININE-BSD FRML MDRD: 20 ML/MIN/1.73SQ M
GLUCOSE P FAST SERPL-MCNC: 148 MG/DL (ref 65–99)
HBA1C MFR BLD: 7.4 % (ref 4.2–6.3)
HCT VFR BLD AUTO: 33.5 % (ref 34.8–46.1)
HDLC SERPL-MCNC: 39 MG/DL (ref 40–60)
HGB BLD-MCNC: 10 G/DL (ref 11.5–15.4)
IMM GRANULOCYTES # BLD AUTO: 0.06 THOUSAND/UL (ref 0–0.2)
IMM GRANULOCYTES NFR BLD AUTO: 1 % (ref 0–2)
LDLC SERPL CALC-MCNC: 24 MG/DL (ref 0–100)
LYMPHOCYTES # BLD AUTO: 1.84 THOUSANDS/ΜL (ref 0.6–4.47)
LYMPHOCYTES NFR BLD AUTO: 20 % (ref 14–44)
MCH RBC QN AUTO: 28.3 PG (ref 26.8–34.3)
MCHC RBC AUTO-ENTMCNC: 29.9 G/DL (ref 31.4–37.4)
MCV RBC AUTO: 95 FL (ref 82–98)
MONOCYTES # BLD AUTO: 0.73 THOUSAND/ΜL (ref 0.17–1.22)
MONOCYTES NFR BLD AUTO: 8 % (ref 4–12)
NEUTROPHILS # BLD AUTO: 5.91 THOUSANDS/ΜL (ref 1.85–7.62)
NEUTS SEG NFR BLD AUTO: 64 % (ref 43–75)
NRBC BLD AUTO-RTO: 0 /100 WBCS
PLATELET # BLD AUTO: 281 THOUSANDS/UL (ref 149–390)
PMV BLD AUTO: 10.9 FL (ref 8.9–12.7)
POTASSIUM SERPL-SCNC: 5.1 MMOL/L (ref 3.5–5.3)
PROT SERPL-MCNC: 7.1 G/DL (ref 6.4–8.2)
RBC # BLD AUTO: 3.53 MILLION/UL (ref 3.81–5.12)
SODIUM SERPL-SCNC: 141 MMOL/L (ref 136–145)
TRIGL SERPL-MCNC: 101 MG/DL
TSH SERPL DL<=0.05 MIU/L-ACNC: 3.57 UIU/ML (ref 0.36–3.74)
WBC # BLD AUTO: 9.18 THOUSAND/UL (ref 4.31–10.16)

## 2018-06-14 PROCEDURE — 85025 COMPLETE CBC W/AUTO DIFF WBC: CPT | Performed by: FAMILY MEDICINE

## 2018-06-14 PROCEDURE — 80061 LIPID PANEL: CPT | Performed by: FAMILY MEDICINE

## 2018-06-14 PROCEDURE — 84443 ASSAY THYROID STIM HORMONE: CPT | Performed by: FAMILY MEDICINE

## 2018-06-14 PROCEDURE — 80053 COMPREHEN METABOLIC PANEL: CPT | Performed by: FAMILY MEDICINE

## 2018-06-14 PROCEDURE — 99214 OFFICE O/P EST MOD 30 MIN: CPT | Performed by: FAMILY MEDICINE

## 2018-06-14 PROCEDURE — 83036 HEMOGLOBIN GLYCOSYLATED A1C: CPT | Performed by: FAMILY MEDICINE

## 2018-06-14 PROCEDURE — 36415 COLL VENOUS BLD VENIPUNCTURE: CPT | Performed by: FAMILY MEDICINE

## 2018-06-14 RX ORDER — FUROSEMIDE 80 MG
80 TABLET ORAL DAILY
Qty: 90 TABLET | Refills: 1 | Status: SHIPPED | OUTPATIENT
Start: 2018-06-14 | End: 2019-01-23 | Stop reason: SDUPTHER

## 2018-06-14 NOTE — ASSESSMENT & PLAN NOTE
Lab Results   Component Value Date    HGBA1C 6 6 (H) 02/09/2018       No results for input(s): POCGLU in the last 72 hours  Blood Sugar Average: Last 72 hrs:  last a1c 6 9%  Patient doing well on current dosage of insulin; Lantus 21 and Humalog (21 + 23)  No significant hypoglycemia  Will repeat labs today  Patient is current with foot and eye exams

## 2018-06-14 NOTE — PROGRESS NOTES
Assessment/Plan:    Anxiety  Doing well on rare use of alprazolam  Without side effects or falls    Chronic kidney disease (CKD), stage II (mild)   Last creatinine was 1 81  Will repeat today    Familial combined hyperlipidemia   Doing well on atorvastatin 20 mg daily  Will check lipids today    Hypothyroidism   Doing well on levothyroxine 75 mcg daily  Will check labs today  Type 2 diabetes mellitus with proliferative retinopathy (HCC)  Lab Results   Component Value Date    HGBA1C 6 6 (H) 02/09/2018       No results for input(s): POCGLU in the last 72 hours  Blood Sugar Average: Last 72 hrs:  last a1c 6 9%  Patient doing well on current dosage of insulin; Lantus 21 and Humalog (21 + 23)  No significant hypoglycemia  Will repeat labs today  Patient is current with foot and eye exams  Diagnoses and all orders for this visit:    Benign hypertension  -     furosemide (LASIX) 80 mg tablet; Take 1 tablet (80 mg total) by mouth daily    Type 2 diabetes mellitus with complication, with long-term current use of insulin ([facility])  -     insulin aspart (NOVOLOG) 100 units/mL injection; Inject 25 Units under the skin 2 (two) times a day for 90 days  -     CBC and differential  -     Comprehensive metabolic panel  -     Hemoglobin A1C  -     Microalbumin / creatinine urine ratio    Anxiety    Chronic kidney disease (CKD), stage II (mild)    Claudication (HCC)    Familial combined hyperlipidemia  -     Lipid Panel with Direct LDL reflex    Hypothyroidism, unspecified type  -     TSH, 3rd generation with Free T4 reflex    PAD (peripheral artery disease) ([address] )    Type 2 diabetes mellitus with stable proliferative retinopathy of both eyes, with long-term current use of insulin ([address] )        WILL CHECK LABS TODAY  WILL CALL   4 MONTHS (awv) , FASTING BLOOD WORK AND APPOINTMENT      Subjective:      Patient ID: Matthew Friedman is a [de-identified] y o  female      HPI    The following portions of the patient's history were reviewed and updated as appropriate: allergies, current medications, past family history, past medical history, past social history, past surgical history and problem list     Review of Systems   Respiratory: Negative  Cardiovascular: Negative  Gastrointestinal: Negative  Genitourinary: Negative  Objective:      /60 (BP Location: Left arm, Patient Position: Sitting, Cuff Size: Standard)   Pulse 70   Temp 98 8 °F (37 1 °C) (Tympanic)   Resp 16   Ht 5' 1" (1 549 m)   Wt 102 kg (225 lb 3 2 oz)   SpO2 99%   BMI 42 55 kg/m²          Physical Exam   Cardiovascular: Normal rate, regular rhythm, normal heart sounds and intact distal pulses  Carotids: no bruits  Ext: no edema   Pulmonary/Chest: Effort normal  No respiratory distress  She has no wheezes  She has no rales  Psychiatric: She has a normal mood and affect   Her behavior is normal  Thought content normal

## 2018-06-18 DIAGNOSIS — E10.8 TYPE 1 DIABETES MELLITUS WITH COMPLICATION (HCC): ICD-10-CM

## 2018-06-18 LAB
CREAT UR-MCNC: 122 MG/DL
MICROALBUMIN UR-MCNC: 6.9 MG/L (ref 0–20)
MICROALBUMIN/CREAT 24H UR: 6 MG/G CREATININE (ref 0–30)

## 2018-06-18 PROCEDURE — 82570 ASSAY OF URINE CREATININE: CPT | Performed by: FAMILY MEDICINE

## 2018-06-18 PROCEDURE — 82043 UR ALBUMIN QUANTITATIVE: CPT | Performed by: FAMILY MEDICINE

## 2018-06-18 RX ORDER — INSULIN ASPART 100 [IU]/ML
INJECTION, SOLUTION INTRAVENOUS; SUBCUTANEOUS
Qty: 3 PEN | Refills: 1 | Status: SHIPPED | OUTPATIENT
Start: 2018-06-18 | End: 2018-07-27 | Stop reason: SDUPTHER

## 2018-06-18 NOTE — TELEPHONE ENCOUNTER
The patients grand daughter in law called to fix a mistake that was made with the patients medication as follows: The patients grand daughter in law would like the insulin to be changed to an injectable pen instead of the liquid insulin that was ordered  Please refill the injectable pen in place of the insulin that was accidentally ordered  Please call the patients grand daughter in law back with any questions or concerns  [phone]      insulin aspart (NOVOLOG) 100 units/mL injection [[id number]]   Order Details   Dose: 25 Units Route: Subcutaneous Frequency: 2 times daily   Dispense Quantity:  45 mL Refills:  5 Fills remaining:  --           Sig: Inject 25 Units under the skin 2 (two) times a day for 90 days

## 2018-06-25 ENCOUNTER — HOSPITAL ENCOUNTER (OUTPATIENT)
Dept: NON INVASIVE DIAGNOSTICS | Facility: CLINIC | Age: 83
Discharge: HOME | End: 2018-06-25
Payer: MEDICARE

## 2018-06-25 PROCEDURE — 93923 UPR/LXTR ART STDY 3+ LVLS: CPT

## 2018-06-25 PROCEDURE — 93925 LOWER EXTREMITY STUDY: CPT

## 2018-06-26 DIAGNOSIS — I73.9 PAD (PERIPHERAL ARTERY DISEASE) (HCC): Primary | ICD-10-CM

## 2018-06-26 PROCEDURE — 93922 UPR/L XTREMITY ART 2 LEVELS: CPT | Performed by: SURGERY

## 2018-06-26 PROCEDURE — 93925 LOWER EXTREMITY STUDY: CPT | Performed by: SURGERY

## 2018-07-27 DIAGNOSIS — E10.8 TYPE 1 DIABETES MELLITUS WITH COMPLICATION (HCC): ICD-10-CM

## 2018-09-05 DIAGNOSIS — E10.8 TYPE 1 DIABETES MELLITUS WITH COMPLICATION (HCC): ICD-10-CM

## 2018-10-15 ENCOUNTER — OFFICE VISIT (OUTPATIENT)
Dept: FAMILY MEDICINE CLINIC | Facility: CLINIC | Age: 83
End: 2018-10-15
Payer: MEDICARE

## 2018-10-15 VITALS
SYSTOLIC BLOOD PRESSURE: 118 MMHG | BODY MASS INDEX: 41.14 KG/M2 | HEIGHT: 61 IN | HEART RATE: 74 BPM | OXYGEN SATURATION: 97 % | DIASTOLIC BLOOD PRESSURE: 62 MMHG | RESPIRATION RATE: 15 BRPM | WEIGHT: 217.9 LBS | TEMPERATURE: 96.3 F

## 2018-10-15 DIAGNOSIS — Z23 NEED FOR IMMUNIZATION AGAINST INFLUENZA: ICD-10-CM

## 2018-10-15 DIAGNOSIS — Z79.4 TYPE 2 DIABETES MELLITUS WITH STABLE PROLIFERATIVE RETINOPATHY OF BOTH EYES, WITH LONG-TERM CURRENT USE OF INSULIN (HCC): ICD-10-CM

## 2018-10-15 DIAGNOSIS — E03.9 HYPOTHYROIDISM, UNSPECIFIED TYPE: ICD-10-CM

## 2018-10-15 DIAGNOSIS — R32 URINARY INCONTINENCE, UNSPECIFIED TYPE: ICD-10-CM

## 2018-10-15 DIAGNOSIS — Z00.00 ENCOUNTER FOR MEDICARE ANNUAL WELLNESS EXAM: Primary | ICD-10-CM

## 2018-10-15 DIAGNOSIS — F41.9 ANXIETY: ICD-10-CM

## 2018-10-15 DIAGNOSIS — N18.2 CHRONIC KIDNEY DISEASE (CKD), STAGE II (MILD): ICD-10-CM

## 2018-10-15 DIAGNOSIS — E78.49 FAMILIAL COMBINED HYPERLIPIDEMIA: ICD-10-CM

## 2018-10-15 DIAGNOSIS — E11.3553 TYPE 2 DIABETES MELLITUS WITH STABLE PROLIFERATIVE RETINOPATHY OF BOTH EYES, WITH LONG-TERM CURRENT USE OF INSULIN (HCC): ICD-10-CM

## 2018-10-15 DIAGNOSIS — E10.8 TYPE 1 DIABETES MELLITUS WITH COMPLICATION (HCC): ICD-10-CM

## 2018-10-15 LAB
ALBUMIN SERPL BCP-MCNC: 3.4 G/DL (ref 3.5–5)
ALP SERPL-CCNC: 77 U/L (ref 46–116)
ALT SERPL W P-5'-P-CCNC: 16 U/L (ref 12–78)
ANION GAP SERPL CALCULATED.3IONS-SCNC: 5 MMOL/L (ref 4–13)
AST SERPL W P-5'-P-CCNC: 11 U/L (ref 5–45)
BASOPHILS # BLD AUTO: 0.07 THOUSANDS/ΜL (ref 0–0.1)
BASOPHILS NFR BLD AUTO: 1 % (ref 0–1)
BILIRUB SERPL-MCNC: 0.4 MG/DL (ref 0.2–1)
BUN SERPL-MCNC: 54 MG/DL (ref 5–25)
CALCIUM SERPL-MCNC: 8.5 MG/DL (ref 8.3–10.1)
CHLORIDE SERPL-SCNC: 102 MMOL/L (ref 100–108)
CHOLEST SERPL-MCNC: 86 MG/DL (ref 50–200)
CO2 SERPL-SCNC: 29 MMOL/L (ref 21–32)
CREAT SERPL-MCNC: 2.18 MG/DL (ref 0.6–1.3)
EOSINOPHIL # BLD AUTO: 0.53 THOUSAND/ΜL (ref 0–0.61)
EOSINOPHIL NFR BLD AUTO: 5 % (ref 0–6)
ERYTHROCYTE [DISTWIDTH] IN BLOOD BY AUTOMATED COUNT: 15.6 % (ref 11.6–15.1)
EST. AVERAGE GLUCOSE BLD GHB EST-MCNC: 137 MG/DL
GFR SERPL CREATININE-BSD FRML MDRD: 21 ML/MIN/1.73SQ M
GLUCOSE P FAST SERPL-MCNC: 123 MG/DL (ref 65–99)
HBA1C MFR BLD: 6.4 % (ref 4.2–6.3)
HCT VFR BLD AUTO: 34.6 % (ref 34.8–46.1)
HDLC SERPL-MCNC: 42 MG/DL (ref 40–60)
HGB BLD-MCNC: 10.5 G/DL (ref 11.5–15.4)
IMM GRANULOCYTES # BLD AUTO: 0.02 THOUSAND/UL (ref 0–0.2)
IMM GRANULOCYTES NFR BLD AUTO: 0 % (ref 0–2)
LDLC SERPL CALC-MCNC: 20 MG/DL (ref 0–100)
LYMPHOCYTES # BLD AUTO: 1.73 THOUSANDS/ΜL (ref 0.6–4.47)
LYMPHOCYTES NFR BLD AUTO: 17 % (ref 14–44)
MCH RBC QN AUTO: 28.6 PG (ref 26.8–34.3)
MCHC RBC AUTO-ENTMCNC: 30.3 G/DL (ref 31.4–37.4)
MCV RBC AUTO: 94 FL (ref 82–98)
MONOCYTES # BLD AUTO: 0.68 THOUSAND/ΜL (ref 0.17–1.22)
MONOCYTES NFR BLD AUTO: 7 % (ref 4–12)
NEUTROPHILS # BLD AUTO: 7.01 THOUSANDS/ΜL (ref 1.85–7.62)
NEUTS SEG NFR BLD AUTO: 70 % (ref 43–75)
NRBC BLD AUTO-RTO: 0 /100 WBCS
PLATELET # BLD AUTO: 290 THOUSANDS/UL (ref 149–390)
PMV BLD AUTO: 11.5 FL (ref 8.9–12.7)
POTASSIUM SERPL-SCNC: 4.8 MMOL/L (ref 3.5–5.3)
PROT SERPL-MCNC: 7.1 G/DL (ref 6.4–8.2)
RBC # BLD AUTO: 3.67 MILLION/UL (ref 3.81–5.12)
SODIUM SERPL-SCNC: 136 MMOL/L (ref 136–145)
TRIGL SERPL-MCNC: 119 MG/DL
TSH SERPL DL<=0.05 MIU/L-ACNC: 2.73 UIU/ML (ref 0.36–3.74)
WBC # BLD AUTO: 10.04 THOUSAND/UL (ref 4.31–10.16)

## 2018-10-15 PROCEDURE — 90662 IIV NO PRSV INCREASED AG IM: CPT | Performed by: FAMILY MEDICINE

## 2018-10-15 PROCEDURE — 83036 HEMOGLOBIN GLYCOSYLATED A1C: CPT | Performed by: FAMILY MEDICINE

## 2018-10-15 PROCEDURE — 85025 COMPLETE CBC W/AUTO DIFF WBC: CPT | Performed by: FAMILY MEDICINE

## 2018-10-15 PROCEDURE — 99214 OFFICE O/P EST MOD 30 MIN: CPT | Performed by: FAMILY MEDICINE

## 2018-10-15 PROCEDURE — 80061 LIPID PANEL: CPT | Performed by: FAMILY MEDICINE

## 2018-10-15 PROCEDURE — G0008 ADMIN INFLUENZA VIRUS VAC: HCPCS | Performed by: FAMILY MEDICINE

## 2018-10-15 PROCEDURE — 84443 ASSAY THYROID STIM HORMONE: CPT | Performed by: FAMILY MEDICINE

## 2018-10-15 PROCEDURE — 36415 COLL VENOUS BLD VENIPUNCTURE: CPT | Performed by: FAMILY MEDICINE

## 2018-10-15 PROCEDURE — G0439 PPPS, SUBSEQ VISIT: HCPCS | Performed by: FAMILY MEDICINE

## 2018-10-15 PROCEDURE — 80053 COMPREHEN METABOLIC PANEL: CPT | Performed by: FAMILY MEDICINE

## 2018-10-15 RX ORDER — DIAPER,BRIEF,ADULT, DISPOSABLE
EACH MISCELLANEOUS
Qty: 30 EACH | Refills: 5 | Status: SHIPPED | OUTPATIENT
Start: 2018-10-15

## 2018-10-15 NOTE — PROGRESS NOTES
Assessment/Plan:    Anxiety   Patient still has multiple stressors in her life  Mainly issues with sick friends and relatives  Otherwise she is stable  She has not needed or taken alprazolam in a few years  Chronic kidney disease (CKD), stage II (mild)    Last creatinine 2 21 ( prior 2 38)  Stable  Will continue to monitor  Will check labs today    Familial combined hyperlipidemia   Doing well on atorvastatin 20 mg daily  Will check labs today    Hypothyroidism   Doing well on levothyroxine 75 mcg daily  Will continue to monitor  Will check labs today    Type 2 diabetes mellitus with proliferative retinopathy ([facility] )  Lab Results   Component Value Date    HGBA1C 7 4 (H) 06/14/2018       No results for input(s): POCGLU in the last 72 hours  Blood Sugar Average: Last 72 hrs:    A1c from June was 7 4%, previously 6 9%)  Current insulin dose is Lantus 21 and Humalog ( 21 and 23 )  Patient with occasional bouts of hypoglycemia, which she readily treats with snacks or juice  Current with eye exams  Will check labs today  Diagnoses and all orders for this visit:    Encounter for Medicare annual wellness exam    Type 1 diabetes mellitus with complication (HCC)  -     insulin aspart (NOVOLOG FLEXPEN) 100 Units/mL injection pen; Inject 25 Units under the skin 2 (two) times a day with meals    Need for immunization against influenza  -     influenza vaccine, [id number], high-dose, PF 0 5 mL, for patients 65 yr+ (FLUZONE HIGH-DOSE)    Type 2 diabetes mellitus with stable proliferative retinopathy of both eyes, with long-term current use of insulin (HCC)  -     insulin glargine (LANTUS SOLOSTAR) 100 units/mL injection pen;  Inject 2 Units under the skin daily  -     Comprehensive metabolic panel  -     Hemoglobin A1C    Hypothyroidism, unspecified type  -     TSH, 3rd generation with Free T4 reflex    Familial combined hyperlipidemia  -     Lipid Panel with Direct LDL reflex    Chronic kidney disease (CKD), stage II (mild)  -     CBC and differential  -     Comprehensive metabolic panel    Anxiety    Urinary incontinence, unspecified type  -     Incontinence Supply Disposable (CERTAINTY ADJ UNDERWEAR LARGE) MISC; by Does not apply route daily at bedtime        FLU SHOT TODAY    FASTING BLOOD WORK TODAY  WILL CALL   4 MONTHS, APPOINTMENT AND LABS    Subjective:      Patient ID: Hyacinth Jean-Baptiste is a [de-identified] y o  female  HPI    The following portions of the patient's history were reviewed and updated as appropriate: allergies, current medications, past family history, past medical history, past social history, past surgical history and problem list     Review of Systems   Respiratory: Negative  Cardiovascular: Negative  Gastrointestinal: Negative  Genitourinary: Negative  Objective:      /62   Pulse 74   Temp (!) 96 3 °F (35 7 °C) (Tympanic)   Resp 15   Ht 5' 1" (1 549 m)   Wt 98 8 kg (217 lb 14 4 oz)   SpO2 97%   BMI 41 17 kg/m²          Physical Exam   Cardiovascular: Normal rate, regular rhythm, normal heart sounds and intact distal pulses  Carotids: no bruits  Ext: no edema   Pulmonary/Chest: Effort normal  No respiratory distress  She has no wheezes  She has no rales  Psychiatric: She has a normal mood and affect   Her behavior is normal  Thought content normal

## 2018-10-15 NOTE — ASSESSMENT & PLAN NOTE
Patient still has multiple stressors in her life  Mainly issues with sick friends and relatives  Otherwise she is stable  She has not needed or taken alprazolam in a few years

## 2018-10-15 NOTE — PROGRESS NOTES
Assessment and Plan:  Problem List Items Addressed This Visit     Type 2 diabetes mellitus with proliferative retinopathy (HCC)    Relevant Medications    insulin aspart (NOVOLOG FLEXPEN) 100 Units/mL injection pen    insulin glargine (LANTUS SOLOSTAR) 100 units/mL injection pen    Other Relevant Orders    Comprehensive metabolic panel    Hemoglobin A1C    Chronic kidney disease (CKD), stage II (mild)    Relevant Orders    CBC and differential    Comprehensive metabolic panel    Hypothyroidism    Relevant Orders    TSH, 3rd generation with Free T4 reflex    Familial combined hyperlipidemia    Relevant Orders    Lipid Panel with Direct LDL reflex    Anxiety      Other Visit Diagnoses     Encounter for Medicare annual wellness exam    -  Primary    Type 1 diabetes mellitus with complication ([facility] )        Relevant Medications    insulin aspart (NOVOLOG FLEXPEN) 100 Units/mL injection pen    insulin glargine (LANTUS SOLOSTAR) 100 units/mL injection pen    Need for immunization against influenza        Relevant Orders    influenza vaccine, [id number], high-dose, PF 0 5 mL, for patients 65 yr+ (FLUZONE HIGH-DOSE)        Health Maintenance Due   Topic Date Due    Depression Screening PHQ  1935    DTaP,Tdap,and Td Vaccines (1 - Tdap) 12/01/1958    INFLUENZA VACCINE  07/01/2018      Medicare wellness visit  Depression screen negative  Fall risk negative  Patient does experience urinary incontinence, but refuses medical referral   Flu shot today  Current with pneumonia vaccine  Refuses shingles vaccine  Refuses colonoscopy mammography      HPI:  Patient Active Problem List   Diagnosis    PAD (peripheral artery disease) (HCC)    Type 2 diabetes mellitus with proliferative retinopathy (HCC)    Chronic kidney disease (CKD), stage II (mild)    Hypothyroidism    Familial combined hyperlipidemia    Anxiety     Past Medical History:   Diagnosis Date    Adenoma of large intestine     Allergy     last assessed: 10/18/2013    Cataract     Constipation, chronic     Diabetes mellitus (HCC)     anemia vitamin d deficiency glaucoma hypertension hypothyroidism; last assessed: 5/7/2012    Diverticulosis     Fecal incontinence     Female stress incontinence     Glaucoma     Gout     History of cystocele     Intrinsic sphincter deficiency     Mixed incontinence     Nocturia     Osteoarthritis, chronic     Senile atrophic vaginitis     Urinary frequency      Past Surgical History:   Procedure Laterality Date    ANKLE SURGERY Bilateral     CATARACT EXTRACTION      CHOLECYSTECTOMY      TUBAL LIGATION       Family History   Problem Relation Age of Onset    Stomach cancer Mother     Diabetes Sister         mellitus     History   Smoking Status    Former Smoker   Smokeless Tobacco    Never Used     History   Alcohol Use No      History   Drug Use No         Current Outpatient Prescriptions   Medication Sig Dispense Refill    ALPRAZolam (XANAX) 0 25 mg tablet Take 1 tablet by mouth      atorvastatin (LIPITOR) 20 mg tablet Take 1 tablet by mouth daily      doxazosin (CARDURA) 2 mg tablet Take 1 tablet by mouth daily      fexofenadine (ALLEGRA) 180 MG tablet Take by mouth      furosemide (LASIX) 80 mg tablet Take 1 tablet (80 mg total) by mouth daily 90 tablet 1    levothyroxine 75 mcg tablet Take 1 tablet by mouth daily      losartan (COZAAR) 100 MG tablet Take 1 tablet by mouth daily      potassium chloride (KLOR-CON M20) 20 mEq tablet Take 1 tablet by mouth daily      insulin aspart (NOVOLOG FLEXPEN) 100 Units/mL injection pen Inject 25 Units under the skin 2 (two) times a day with meals 3 pen 5    insulin aspart (NOVOLOG) 100 units/mL injection Inject 25 Units under the skin 2 (two) times a day for 90 days 45 mL 5    insulin glargine (LANTUS SOLOSTAR) 100 units/mL injection pen Inject 2 Units under the skin daily 5 pen 5     No current facility-administered medications for this visit        Allergies Allergen Reactions    Ace Inhibitors      Annotation - [id number]: short of breath    Aspirin      Immunization History   Administered Date(s) Administered    H1N1, All Formulations 01/01/2012    Influenza Split High Dose Preservative Free IM 09/05/2015, 09/17/2016, 09/30/2017    Influenza TIV (IM) 12/07/2012, 12/13/2013, 08/29/2014    Pneumococcal Conjugate 13-Valent 04/25/2015    Pneumococcal Polysaccharide PPV23 01/01/2012, 12/07/2012       Patient Care Team:  Chau Blanchard DO as PCP - MD Ashley Jean PA-C Tresia Murdoch, Daisey Gelinas, DO Marjean Mattocks, MD    Medicare Screening Tests and Risk Assessments:  Brandin Lynn is here for her Subsequent Wellness visit  Last Medicare Wellness visit information reviewed, patient interviewed and updates made to the record today  Health Risk Assessment:  Patient rates overall health as good  Patient feels that their physical health rating is Same  Eyesight was rated as Slightly worse  Hearing was rated as Same  Patient feels that their emotional and mental health rating is Same  Pain experienced by patient in the last 7 days has been None  Patient states that she has experienced no weight loss or gain in last 6 months  Emotional/Mental Health:    PHQ-9 Depression Screening:    Frequency of the following problems over the past two weeks:      1  Little interest or pleasure in doing things: 0 - not at all      2  Feeling down, depressed, or hopeless: 0 - not at all  PHQ-2 Score: 0          Broken Bones/Falls: Fall Risk Assessment:    In the past year, patient has experienced: History of falling in past year     Number of falls: 1  Patient feels unsteady standing  Patient is not taking medication that can cause feelings of lightheadedness or tiredness  Patient often has no need to rush to the toilet  Chronic conditions that may contribute to falls diabetes, neuropathy, arthritis and visual disturbance  Bladder/Bowel:  Patient has leaked urine accidently in the last six months  Patient reports no loss of bowel control  Immunizations:  Patient has had a flu vaccination within the last year  Patient has received a pneumonia shot  Patient has not received a shingles shot  Patient has received tetanus/diphtheria shot  Home Safety:  Patient does not have trouble with stairs inside or outside of their home  Patient currently reports that there are safety hazards present in home , working smoke alarms, working carbon monoxide detectors  Preventative Screenings:   Breast cancer screening performed, colon cancer screen completed, cholesterol screen completed, glaucoma eye exam completed,     Nutrition:  Current diet: Regular with servings of the following:    Medications:  Patient is currently taking over-the-counter supplements  List of OTC medications includes: Vitamins  Patient is able to manage medications  Lifestyle Choices:  Patient reports no tobacco use  Patient has not smoked or used tobacco in the past   Patient reports no alcohol use  Patient does not drive a vehicle  Patient wears seat belt  Activities of Daily Living:  Can get out of bed by his or her self, able to dress self, able to make own meals, able to do own shopping, able to bathe self, can do own laundry/housekeeping, can manage own money, pay bills and track expenses    Previous Hospitalizations:  No hospitalization or ED visit in past 12 months        Advanced Directives:  Patient has decided on a power of   Patient has spoken to designated power of   Patient has completed advanced directive          Preventative Screening/Counseling:      Cardiovascular:      General: Risks and Benefits Discussed          Diabetes:      General: Risks and Benefits Discussed          Colorectal Cancer:      General: Risks and Benefits Discussed          Breast Cancer:      General: Risks and Benefits Discussed          Cervical Cancer:      General: Risks and Benefits Discussed          Osteoporosis:      General: Risks and Benefits Discussed          AAA:      General: Risks and Benefits Discussed          Glaucoma:      General: Risks and Benefits Discussed          HIV:      General: Risks and Benefits Discussed          Hepatitis C:      General: Risks and Benefits Discussed        Advanced Directives:   Patient has living will for healthcare, Information on ACP and/or AD provided  End of life assessment reviewed with patient  Immunizations:      Influenza: Risks & Benefits Discussed and Influenza Due Today      Pneumococcal: Risks & Benefits Discussed and Lifetime Vaccine Completed      Shingrix: Risks & Benefits Discussed      TD: Risks & Benefits Discussed      Other Preventative Counseling (Non-Medicare):  Nutrition Counseling          No exam data present    Physical Exam:  Review of Systems   Eyes: Positive for visual disturbance  Gastrointestinal: Negative for bowel incontinence  Musculoskeletal: Positive for arthritis  Vitals:    10/15/18 0839   BP: (!) 108/46   BP Location: Right arm   Patient Position: Sitting   Cuff Size: Standard   Pulse: 74   Resp: 15   Temp: (!) 96 3 °F (35 7 °C)   TempSrc: Tympanic   SpO2: 97%   Weight: 98 8 kg (217 lb 14 4 oz)   Height: 5' 1" (1 549 m)   Body mass index is 41 17 kg/m²      Physical Exam

## 2018-10-15 NOTE — ASSESSMENT & PLAN NOTE
Lab Results   Component Value Date    HGBA1C 7 4 (H) 06/14/2018       No results for input(s): POCGLU in the last 72 hours  Blood Sugar Average: Last 72 hrs:    A1c from June was 7 4%, previously 6 9%)  Current insulin dose is Lantus 21 and Humalog ( 21 and 23 )  Patient with occasional bouts of hypoglycemia, which she readily treats with snacks or juice  Current with eye exams  Will check labs today

## 2018-11-05 LAB
LEFT EYE DIABETIC RETINOPATHY: NORMAL
RIGHT EYE DIABETIC RETINOPATHY: NORMAL
SEVERITY (EYE EXAM): NORMAL

## 2018-11-06 ENCOUNTER — TELEPHONE (OUTPATIENT)
Dept: FAMILY MEDICINE CLINIC | Facility: CLINIC | Age: 83
End: 2018-11-06

## 2018-11-06 NOTE — TELEPHONE ENCOUNTER
FYI : pharmacy called stating you wrote on the prescription 2 units of Lantuss daily  According to your note on 10/15/2018 pt should be on 21 units   Please confirm thank you

## 2018-11-15 ENCOUNTER — TELEPHONE (OUTPATIENT)
Dept: FAMILY MEDICINE CLINIC | Facility: CLINIC | Age: 83
End: 2018-11-15

## 2019-01-23 DIAGNOSIS — E78.2 MIXED HYPERLIPIDEMIA: Primary | ICD-10-CM

## 2019-01-23 DIAGNOSIS — I10 BENIGN HYPERTENSION: ICD-10-CM

## 2019-01-23 RX ORDER — ATORVASTATIN CALCIUM 20 MG/1
TABLET, FILM COATED ORAL
Qty: 90 TABLET | Refills: 3 | Status: SHIPPED | OUTPATIENT
Start: 2019-01-23 | End: 2019-02-19

## 2019-01-23 RX ORDER — DOXAZOSIN 2 MG/1
2 TABLET ORAL DAILY
Qty: 90 TABLET | Refills: 1 | Status: SHIPPED | OUTPATIENT
Start: 2019-01-23 | End: 2020-12-15 | Stop reason: CLARIF

## 2019-01-23 RX ORDER — FUROSEMIDE 80 MG
80 TABLET ORAL DAILY
Qty: 90 TABLET | Refills: 1 | Status: SHIPPED | OUTPATIENT
Start: 2019-01-23

## 2019-01-24 DIAGNOSIS — Z79.4 TYPE 2 DIABETES MELLITUS WITH COMPLICATION, WITH LONG-TERM CURRENT USE OF INSULIN (HCC): ICD-10-CM

## 2019-01-24 DIAGNOSIS — E11.8 TYPE 2 DIABETES MELLITUS WITH COMPLICATION, WITH LONG-TERM CURRENT USE OF INSULIN (HCC): ICD-10-CM

## 2019-01-24 NOTE — TELEPHONE ENCOUNTER
Pt had called in 4 rxs yesterday and there are only 3 at [facility]   Please send rx for novolog 100 flex pens 25 units twice a day to [facility] [place] ASAP

## 2019-02-01 DIAGNOSIS — Z79.4 TYPE 2 DIABETES MELLITUS WITHOUT COMPLICATION, WITH LONG-TERM CURRENT USE OF INSULIN (HCC): Primary | ICD-10-CM

## 2019-02-01 DIAGNOSIS — E11.9 TYPE 2 DIABETES MELLITUS WITHOUT COMPLICATION, WITH LONG-TERM CURRENT USE OF INSULIN (HCC): Primary | ICD-10-CM

## 2019-02-02 RX ORDER — PEN NEEDLE, DIABETIC 29 G X1/2"
NEEDLE, DISPOSABLE MISCELLANEOUS
Qty: 300 EACH | Refills: 3 | Status: SHIPPED | OUTPATIENT
Start: 2019-02-02

## 2019-02-12 PROBLEM — E66.01 MORBID OBESITY WITH BMI OF 40.0-44.9, ADULT (HCC): Status: ACTIVE | Noted: 2019-02-12

## 2019-02-16 DIAGNOSIS — E03.9 ACQUIRED HYPOTHYROIDISM: Primary | ICD-10-CM

## 2019-02-16 PROBLEM — D64.9 ANEMIA: Status: ACTIVE | Noted: 2019-02-16

## 2019-02-16 RX ORDER — LEVOTHYROXINE SODIUM 0.07 MG/1
TABLET ORAL
Qty: 90 TABLET | Refills: 3 | Status: SHIPPED | OUTPATIENT
Start: 2019-02-16

## 2019-02-18 ENCOUNTER — OFFICE VISIT (OUTPATIENT)
Dept: FAMILY MEDICINE CLINIC | Facility: CLINIC | Age: 84
End: 2019-02-18
Payer: MEDICARE

## 2019-02-18 VITALS
TEMPERATURE: 98 F | OXYGEN SATURATION: 99 % | HEIGHT: 61 IN | DIASTOLIC BLOOD PRESSURE: 56 MMHG | WEIGHT: 216 LBS | HEART RATE: 71 BPM | SYSTOLIC BLOOD PRESSURE: 106 MMHG | BODY MASS INDEX: 40.78 KG/M2 | RESPIRATION RATE: 16 BRPM

## 2019-02-18 DIAGNOSIS — Z79.4 TYPE 2 DIABETES MELLITUS WITH STABLE PROLIFERATIVE RETINOPATHY OF BOTH EYES, WITH LONG-TERM CURRENT USE OF INSULIN (HCC): Primary | ICD-10-CM

## 2019-02-18 DIAGNOSIS — N18.2 CHRONIC KIDNEY DISEASE (CKD), STAGE II (MILD): ICD-10-CM

## 2019-02-18 DIAGNOSIS — E11.3553 TYPE 2 DIABETES MELLITUS WITH STABLE PROLIFERATIVE RETINOPATHY OF BOTH EYES, WITH LONG-TERM CURRENT USE OF INSULIN (HCC): Primary | ICD-10-CM

## 2019-02-18 DIAGNOSIS — D64.9 ANEMIA, UNSPECIFIED TYPE: ICD-10-CM

## 2019-02-18 DIAGNOSIS — E78.49 FAMILIAL COMBINED HYPERLIPIDEMIA: ICD-10-CM

## 2019-02-18 DIAGNOSIS — E66.01 MORBID OBESITY WITH BMI OF 40.0-44.9, ADULT (HCC): ICD-10-CM

## 2019-02-18 DIAGNOSIS — E03.9 HYPOTHYROIDISM, UNSPECIFIED TYPE: ICD-10-CM

## 2019-02-18 DIAGNOSIS — F41.9 ANXIETY: ICD-10-CM

## 2019-02-18 LAB
ALBUMIN SERPL BCP-MCNC: 3.4 G/DL (ref 3.5–5)
ALP SERPL-CCNC: 79 U/L (ref 46–116)
ALT SERPL W P-5'-P-CCNC: 16 U/L (ref 12–78)
ANION GAP SERPL CALCULATED.3IONS-SCNC: 7 MMOL/L (ref 4–13)
AST SERPL W P-5'-P-CCNC: 14 U/L (ref 5–45)
BASOPHILS # BLD AUTO: 0.07 THOUSANDS/ΜL (ref 0–0.1)
BASOPHILS NFR BLD AUTO: 1 % (ref 0–1)
BILIRUB SERPL-MCNC: 0.35 MG/DL (ref 0.2–1)
BUN SERPL-MCNC: 47 MG/DL (ref 5–25)
CALCIUM SERPL-MCNC: 8.6 MG/DL (ref 8.3–10.1)
CHLORIDE SERPL-SCNC: 106 MMOL/L (ref 100–108)
CHOLEST SERPL-MCNC: 89 MG/DL (ref 50–200)
CO2 SERPL-SCNC: 26 MMOL/L (ref 21–32)
CREAT SERPL-MCNC: 2.09 MG/DL (ref 0.6–1.3)
CREAT UR-MCNC: 19.1 MG/DL
EOSINOPHIL # BLD AUTO: 0.53 THOUSAND/ΜL (ref 0–0.61)
EOSINOPHIL NFR BLD AUTO: 6 % (ref 0–6)
ERYTHROCYTE [DISTWIDTH] IN BLOOD BY AUTOMATED COUNT: 14.9 % (ref 11.6–15.1)
EST. AVERAGE GLUCOSE BLD GHB EST-MCNC: 140 MG/DL
GFR SERPL CREATININE-BSD FRML MDRD: 22 ML/MIN/1.73SQ M
GLUCOSE P FAST SERPL-MCNC: 124 MG/DL (ref 65–99)
HBA1C MFR BLD: 6.5 % (ref 4.2–6.3)
HCT VFR BLD AUTO: 33.2 % (ref 34.8–46.1)
HDLC SERPL-MCNC: 42 MG/DL (ref 40–60)
HGB BLD-MCNC: 9.9 G/DL (ref 11.5–15.4)
IMM GRANULOCYTES # BLD AUTO: 0.03 THOUSAND/UL (ref 0–0.2)
IMM GRANULOCYTES NFR BLD AUTO: 0 % (ref 0–2)
LDLC SERPL CALC-MCNC: 25 MG/DL (ref 0–100)
LYMPHOCYTES # BLD AUTO: 1.72 THOUSANDS/ΜL (ref 0.6–4.47)
LYMPHOCYTES NFR BLD AUTO: 18 % (ref 14–44)
MCH RBC QN AUTO: 28.9 PG (ref 26.8–34.3)
MCHC RBC AUTO-ENTMCNC: 29.8 G/DL (ref 31.4–37.4)
MCV RBC AUTO: 97 FL (ref 82–98)
MICROALBUMIN UR-MCNC: <5 MG/L (ref 0–20)
MICROALBUMIN/CREAT 24H UR: <26 MG/G CREATININE (ref 0–30)
MONOCYTES # BLD AUTO: 0.73 THOUSAND/ΜL (ref 0.17–1.22)
MONOCYTES NFR BLD AUTO: 8 % (ref 4–12)
NEUTROPHILS # BLD AUTO: 6.38 THOUSANDS/ΜL (ref 1.85–7.62)
NEUTS SEG NFR BLD AUTO: 67 % (ref 43–75)
NRBC BLD AUTO-RTO: 0 /100 WBCS
PLATELET # BLD AUTO: 273 THOUSANDS/UL (ref 149–390)
PMV BLD AUTO: 11.9 FL (ref 8.9–12.7)
POTASSIUM SERPL-SCNC: 5.1 MMOL/L (ref 3.5–5.3)
PROT SERPL-MCNC: 7.6 G/DL (ref 6.4–8.2)
RBC # BLD AUTO: 3.42 MILLION/UL (ref 3.81–5.12)
SODIUM SERPL-SCNC: 139 MMOL/L (ref 136–145)
TRIGL SERPL-MCNC: 109 MG/DL
TSH SERPL DL<=0.05 MIU/L-ACNC: 3.63 UIU/ML (ref 0.36–3.74)
WBC # BLD AUTO: 9.46 THOUSAND/UL (ref 4.31–10.16)

## 2019-02-18 PROCEDURE — 80061 LIPID PANEL: CPT | Performed by: FAMILY MEDICINE

## 2019-02-18 PROCEDURE — 83036 HEMOGLOBIN GLYCOSYLATED A1C: CPT | Performed by: FAMILY MEDICINE

## 2019-02-18 PROCEDURE — 99214 OFFICE O/P EST MOD 30 MIN: CPT | Performed by: FAMILY MEDICINE

## 2019-02-18 PROCEDURE — 80053 COMPREHEN METABOLIC PANEL: CPT | Performed by: FAMILY MEDICINE

## 2019-02-18 PROCEDURE — 84443 ASSAY THYROID STIM HORMONE: CPT | Performed by: FAMILY MEDICINE

## 2019-02-18 PROCEDURE — 85025 COMPLETE CBC W/AUTO DIFF WBC: CPT | Performed by: FAMILY MEDICINE

## 2019-02-18 PROCEDURE — 82043 UR ALBUMIN QUANTITATIVE: CPT | Performed by: FAMILY MEDICINE

## 2019-02-18 PROCEDURE — 36415 COLL VENOUS BLD VENIPUNCTURE: CPT | Performed by: FAMILY MEDICINE

## 2019-02-18 PROCEDURE — 82570 ASSAY OF URINE CREATININE: CPT | Performed by: FAMILY MEDICINE

## 2019-02-18 NOTE — ASSESSMENT & PLAN NOTE
Still with daily anxiety/stressors  But overall has been stable    Patient has alprazolam, but has not needed to take it for a few years

## 2019-02-18 NOTE — ASSESSMENT & PLAN NOTE
Lab Results   Component Value Date    HGBA1C 6 4 (H) 10/15/2018       No results for input(s): POCGLU in the last 72 hours  Blood Sugar Average: Last 72 hrs:   last A1c 6 4% October 2018  Overall doing well on current meds; Lantus 21, Humalog (1/20/2023)  Occasional bouts of hypoglycemia which are easily treated with snacks or juice  Monofilament exam was negative today  Current with eye   Will check A1c today    Will call with results

## 2019-02-18 NOTE — PROGRESS NOTES
[facility]      NAME: Greg Jerome  AGE: 82 y o  SEX: female  : 1935   MRN: [id number]    DATE: 2019  TIME: 5:30 PM    Assessment and Plan     Problem List Items Addressed This Visit     Type 2 diabetes mellitus with proliferative retinopathy ([facility] ) - Primary     Lab Results   Component Value Date    HGBA1C 6 4 (H) 10/15/2018       No results for input(s): POCGLU in the last 72 hours  Blood Sugar Average: Last 72 hrs:   last A1c 6 4% 2018  Overall doing well on current meds; Lantus 21, Humalog (2023)  Occasional bouts of hypoglycemia which are easily treated with snacks or juice  Monofilament exam was negative today  Current with eye   Will check A1c today  Will call with results         Relevant Orders    Comprehensive metabolic panel    Hemoglobin A1C    Microalbumin / creatinine urine ratio    Chronic kidney disease (CKD), stage II (mild)     Creatinine stable at 2 18, was 2 21  Will continue to monitor         Hypothyroidism     TSH normal   Doing well on levothyroxine 75         Relevant Orders    TSH, 3rd generation with Free T4 reflex    Familial combined hyperlipidemia     Cholesterol 86/20 from 10/15/2018  Will repeat lipids today  If still low, will decrease atorvastatin to 10 mg daily (and send 90 day Rx)         Relevant Orders    Lipid Panel with Direct LDL reflex    Anxiety     Still with daily anxiety/stressors  But overall has been stable  Patient has alprazolam, but has not needed to take it for a few years         Morbid obesity with BMI of 40 0-44 9, adult ([facility] )     Current BMI 41  Her A1c remains well controlled  Unfortunately she is fairly sedentary  Will need to monitor calories closely in continue to follow  Anemia     Hemoglobin from 10/15/2018 was 10 5, previously 10 4    Will continue to monitor         Relevant Orders    CBC and differential              Return to office in:  4 months (labs an appointment)  Next AWV October 2019    Fasting blood work today  Will call with results    Chief Complaint     Chief Complaint   Patient presents with    Follow-up     4 months check up       History of Present Illness     Patient presents for recheck appointment today with her daughter Joshua Nicole  Overall she is feeling well  She is doing well on her diabetic medication, Lantus and Humalog with only occasional bouts of hypoglycemia  Doing well on levothyroxine for thyroid  Doing well on atorvastatin for cholesterol  Patient had labs drawn October 15th  She is fasting this morning      The following portions of the patient's history were reviewed and updated as appropriate: allergies, current medications, past family history, past medical history, past social history, past surgical history and problem list     Review of Systems   Review of Systems   Respiratory: Negative  Cardiovascular: Negative  Gastrointestinal: Negative  Genitourinary: Negative  Active Problem List     Patient Active Problem List   Diagnosis    [facility]) [facility]    Type 2 diabetes mellitus with proliferative retinopathy ([facility]    Chronic kidney disease (CKD), stage II (mild)    Hypothyroidism    Familial combined hyperlipidemia    Anxiety    Morbid obesity with BMI of 40 0-44 9, adult ([facility]    Anemia       Objective   /56 (BP Location: Left arm, Patient Position: Sitting, Cuff Size: Adult)   Pulse 71   Temp 98 °F (36 7 °C) (Tympanic)   Resp 16   Ht 5' 0 63" (1 54 m)   Wt 98 kg (216 lb)   SpO2 99%   BMI 41 31 kg/m²   Right Foot/Ankle   Right Foot Inspection  Skin Exam: skin normal and skin intact no dry skin, no warmth, no callus, no erythema, no maceration, no abnormal color, no pre-ulcer, no ulcer and no callus                          Toe Exam: ROM and strength within normal limits  Sensory       Monofilament testing: intact  Vascular  Capillary refills: < 3 seconds  The right DP pulse is 2+   The right PT pulse is 2+  Left Foot/Ankle  Left Foot Inspection  Skin Exam: skin normal and skin intactno dry skin, no warmth, no erythema, no maceration, normal color, no pre-ulcer, no ulcer and no callus                         Toe Exam: ROM and strength within normal limits                   Sensory       Monofilament: intact  Vascular  Capillary refills: < 3 seconds  The left DP pulse is 2+  The left PT pulse is 2+  Assign Risk Category:  No deformity present; No loss of protective sensation; No weak pulses       Risk: 0    Physical Exam   Cardiovascular: Normal rate, regular rhythm, normal heart sounds and intact distal pulses  Pulses are no weak pulses  Pulses:       Dorsalis pedis pulses are 2+ on the right side, and 2+ on the left side  Posterior tibial pulses are 2+ on the right side, and 2+ on the left side  Carotids: no bruits  Ext: no edema   Pulmonary/Chest: Effort normal  No respiratory distress  She has no wheezes  She has no rales  Feet:   Right Foot:   Skin Integrity: Negative for ulcer, skin breakdown, erythema, warmth, callus or dry skin  Left Foot:   Skin Integrity: Negative for ulcer, skin breakdown, erythema, warmth, callus or dry skin  Psychiatric: She has a normal mood and affect   Her behavior is normal  Thought content normal        Pertinent Laboratory/Diagnostic Studies:  Urine Micro:  Labs from October 201    Current Medications     Current Outpatient Medications:     atorvastatin (LIPITOR) 20 mg tablet, TAKE ONE TABLET BY MOUTH ONCE DAILY, Disp: 90 tablet, Rfl: 3    doxazosin (CARDURA) 2 mg tablet, Take 1 tablet (2 mg total) by mouth daily, Disp: 90 tablet, Rfl: 1    fexofenadine (ALLEGRA) 180 MG tablet, Take by mouth, Disp: , Rfl:     furosemide (LASIX) 80 mg tablet, Take 1 tablet (80 mg total) by mouth daily, Disp: 90 tablet, Rfl: 1    Incontinence Supply Disposable (CERTAINTY ADJ UNDERWEAR LARGE) MISC, by Does not apply route daily at bedtime, Disp: 30 each, Rfl: 5   insulin aspart (NOVOLOG FLEXPEN) 100 Units/mL injection pen, Inject 25 Units under the skin 2 (two) times a day with meals, Disp: 3 pen, Rfl: 5    insulin aspart (NOVOLOG) 100 units/mL injection, Inject 25 Units under the skin 2 (two) times a day for 90 days, Disp: 45 mL, Rfl: 5    insulin glargine (LANTUS SOLOSTAR) 100 units/mL injection pen, Inject 2 Units under the skin daily, Disp: 5 pen, Rfl: 5    levothyroxine 75 mcg tablet, TAKE ONE TABLET BY MOUTH ONCE DAILY AS DIRECTED, Disp: 90 tablet, Rfl: 3    losartan (COZAAR) 100 MG tablet, Take 1 tablet by mouth daily, Disp: , Rfl:     potassium chloride (KLOR-CON M20) 20 mEq tablet, Take 1 tablet by mouth daily, Disp: , Rfl:     RELION PEN NEEDLE 31G/8MM 31G X 8 MM MISC, USE AS DIRECTED THREE TIMES DAILY, Disp: 300 each, Rfl: 3    Health Maintenance     Health Maintenance   Topic Date Due    BMI: Followup Plan  12[birth year]/1955    HEPATITIS B VACCINES (1 of 3 - Risk 3-dose series) 12/01/1956    DTaP,Tdap,and Td Vaccines (1 - Tdap) 12/01/1958    Diabetic Foot Exam  03/15/2019    HEMOGLOBIN A1C  04/15/2019    URINE MICROALBUMIN  06/18/2019    Fall Risk  10/15/2019    Depression Screening PHQ  10/15/2019    Urinary Incontinence Screening  10/15/2019    Medicare Annual Wellness Visit (AWV)  10/15/2019    DM Eye Exam  11/05/2019    BMI: Adult  02/18/2020    INFLUENZA VACCINE  Completed    Pneumococcal PPSV23/PCV13 65+ Years / Low and Medium Risk  Completed     Immunization History   Administered Date(s) Administered    H1N1, All Formulations 01/01/2012    Influenza Split High Dose Preservative Free IM 09/05/2015, 09/17/2016, 09/30/2017    Influenza TIV (IM) 12/07/2012, 12/13/2013, 08/29/2014    Influenza, high dose seasonal 0 5 mL 10/15/2018    Pneumococcal Conjugate 13-Valent 04/25/2015    Pneumococcal Polysaccharide PPV23 01/01/2012, 12/07/2012       Maria Elena Siddiqui DO  [facility]

## 2019-02-18 NOTE — ASSESSMENT & PLAN NOTE
Cholesterol 86/20 from 10/15/2018  Will repeat lipids today    If still low, will decrease atorvastatin to 10 mg daily (and send 90 day Rx)

## 2019-02-18 NOTE — ASSESSMENT & PLAN NOTE
Current BMI 41  Her A1c remains well controlled  Unfortunately she is fairly sedentary  Will need to monitor calories closely in continue to follow

## 2019-02-19 DIAGNOSIS — E78.2 MIXED HYPERLIPIDEMIA: ICD-10-CM

## 2019-02-19 DIAGNOSIS — E78.49 FAMILIAL COMBINED HYPERLIPIDEMIA: Primary | ICD-10-CM

## 2019-02-19 RX ORDER — ATORVASTATIN CALCIUM 10 MG/1
10 TABLET, FILM COATED ORAL DAILY
Qty: 90 TABLET | Refills: 1 | Status: SHIPPED | OUTPATIENT
Start: 2019-02-19

## 2019-03-15 ENCOUNTER — TELEPHONE (OUTPATIENT)
Dept: FAMILY MEDICINE CLINIC | Facility: CLINIC | Age: 84
End: 2019-03-15

## 2019-03-15 NOTE — TELEPHONE ENCOUNTER
Pt called into the office stating that her Losartan 100 mg is being recalled  Pt uses [address]  Please advise  Thank you!

## 2019-03-23 DIAGNOSIS — I10 BENIGN HYPERTENSION: Primary | ICD-10-CM

## 2019-03-24 RX ORDER — LOSARTAN POTASSIUM 100 MG/1
TABLET ORAL
Qty: 90 TABLET | Refills: 3 | Status: SHIPPED | OUTPATIENT
Start: 2019-03-24 | End: 2020-12-15 | Stop reason: CLARIF

## 2019-04-12 ENCOUNTER — OFFICE VISIT (OUTPATIENT)
Dept: FAMILY MEDICINE CLINIC | Facility: CLINIC | Age: 84
End: 2019-04-12
Payer: MEDICARE

## 2019-04-12 VITALS
BODY MASS INDEX: 41.54 KG/M2 | TEMPERATURE: 98 F | DIASTOLIC BLOOD PRESSURE: 54 MMHG | SYSTOLIC BLOOD PRESSURE: 110 MMHG | HEIGHT: 61 IN | HEART RATE: 76 BPM | RESPIRATION RATE: 18 BRPM | WEIGHT: 220 LBS | OXYGEN SATURATION: 95 %

## 2019-04-12 DIAGNOSIS — J01.00 ACUTE NON-RECURRENT MAXILLARY SINUSITIS: Primary | ICD-10-CM

## 2019-04-12 DIAGNOSIS — J40 BRONCHITIS: ICD-10-CM

## 2019-04-12 PROCEDURE — 99213 OFFICE O/P EST LOW 20 MIN: CPT | Performed by: FAMILY MEDICINE

## 2019-04-12 RX ORDER — CEFUROXIME AXETIL 500 MG/1
500 TABLET ORAL EVERY 12 HOURS SCHEDULED
Qty: 20 TABLET | Refills: 0 | Status: SHIPPED | OUTPATIENT
Start: 2019-04-12 | End: 2019-04-22

## 2019-04-22 DIAGNOSIS — Z79.4 TYPE 2 DIABETES MELLITUS WITH STABLE PROLIFERATIVE RETINOPATHY OF BOTH EYES, WITH LONG-TERM CURRENT USE OF INSULIN (HCC): ICD-10-CM

## 2019-04-22 DIAGNOSIS — E11.3553 TYPE 2 DIABETES MELLITUS WITH STABLE PROLIFERATIVE RETINOPATHY OF BOTH EYES, WITH LONG-TERM CURRENT USE OF INSULIN (HCC): ICD-10-CM

## 2019-04-23 ENCOUNTER — TELEPHONE (OUTPATIENT)
Dept: FAMILY MEDICINE CLINIC | Facility: CLINIC | Age: 84
End: 2019-04-23

## 2019-04-30 ENCOUNTER — TELEPHONE (OUTPATIENT)
Dept: FAMILY MEDICINE CLINIC | Facility: CLINIC | Age: 84
End: 2019-04-30

## 2019-05-03 LAB
LEFT EYE DIABETIC RETINOPATHY: NORMAL
RIGHT EYE DIABETIC RETINOPATHY: NORMAL

## 2019-05-28 ENCOUNTER — TELEPHONE (OUTPATIENT)
Dept: FAMILY MEDICINE CLINIC | Facility: CLINIC | Age: 84
End: 2019-05-28

## 2019-06-19 ENCOUNTER — TELEPHONE (OUTPATIENT)
Dept: ADMINISTRATIVE | Facility: HOSPITAL | Age: 84
End: 2019-06-19

## 2019-06-21 ENCOUNTER — APPOINTMENT (OUTPATIENT)
Dept: LAB | Facility: CLINIC | Age: 84
End: 2019-06-21
Payer: MEDICARE

## 2019-06-21 DIAGNOSIS — E03.9 HYPOTHYROIDISM, UNSPECIFIED TYPE: ICD-10-CM

## 2019-06-21 DIAGNOSIS — N18.2 CHRONIC KIDNEY DISEASE (CKD), STAGE II (MILD): ICD-10-CM

## 2019-06-21 DIAGNOSIS — D64.9 ANEMIA, UNSPECIFIED TYPE: ICD-10-CM

## 2019-06-21 DIAGNOSIS — Z79.4 TYPE 2 DIABETES MELLITUS WITH STABLE PROLIFERATIVE RETINOPATHY OF BOTH EYES, WITH LONG-TERM CURRENT USE OF INSULIN (HCC): ICD-10-CM

## 2019-06-21 DIAGNOSIS — E11.3553 TYPE 2 DIABETES MELLITUS WITH STABLE PROLIFERATIVE RETINOPATHY OF BOTH EYES, WITH LONG-TERM CURRENT USE OF INSULIN (HCC): ICD-10-CM

## 2019-06-21 DIAGNOSIS — D64.9 ANEMIA, UNSPECIFIED TYPE: Primary | ICD-10-CM

## 2019-06-21 DIAGNOSIS — E78.49 FAMILIAL COMBINED HYPERLIPIDEMIA: ICD-10-CM

## 2019-06-21 DIAGNOSIS — F41.9 ANXIETY: ICD-10-CM

## 2019-06-21 LAB
ALBUMIN SERPL BCP-MCNC: 3.1 G/DL (ref 3.5–5)
ALP SERPL-CCNC: 73 U/L (ref 46–116)
ALT SERPL W P-5'-P-CCNC: 14 U/L (ref 12–78)
ANION GAP SERPL CALCULATED.3IONS-SCNC: 2 MMOL/L (ref 4–13)
AST SERPL W P-5'-P-CCNC: 11 U/L (ref 5–45)
BASOPHILS # BLD AUTO: 0.05 THOUSANDS/ΜL (ref 0–0.1)
BASOPHILS NFR BLD AUTO: 1 % (ref 0–1)
BILIRUB SERPL-MCNC: 0.28 MG/DL (ref 0.2–1)
BUN SERPL-MCNC: 52 MG/DL (ref 5–25)
CALCIUM SERPL-MCNC: 8.5 MG/DL (ref 8.3–10.1)
CHLORIDE SERPL-SCNC: 106 MMOL/L (ref 100–108)
CHOLEST SERPL-MCNC: 90 MG/DL (ref 50–200)
CO2 SERPL-SCNC: 30 MMOL/L (ref 21–32)
CREAT SERPL-MCNC: 2.24 MG/DL (ref 0.6–1.3)
EOSINOPHIL # BLD AUTO: 0.52 THOUSAND/ΜL (ref 0–0.61)
EOSINOPHIL NFR BLD AUTO: 6 % (ref 0–6)
ERYTHROCYTE [DISTWIDTH] IN BLOOD BY AUTOMATED COUNT: 14.8 % (ref 11.6–15.1)
EST. AVERAGE GLUCOSE BLD GHB EST-MCNC: 140 MG/DL
GFR SERPL CREATININE-BSD FRML MDRD: 20 ML/MIN/1.73SQ M
GLUCOSE P FAST SERPL-MCNC: 85 MG/DL (ref 65–99)
HBA1C MFR BLD: 6.5 % (ref 4.2–6.3)
HCT VFR BLD AUTO: 33 % (ref 34.8–46.1)
HDLC SERPL-MCNC: 40 MG/DL (ref 40–60)
HGB BLD-MCNC: 10.2 G/DL (ref 11.5–15.4)
IMM GRANULOCYTES # BLD AUTO: 0.02 THOUSAND/UL (ref 0–0.2)
IMM GRANULOCYTES NFR BLD AUTO: 0 % (ref 0–2)
LDLC SERPL CALC-MCNC: 33 MG/DL (ref 0–100)
LYMPHOCYTES # BLD AUTO: 2.09 THOUSANDS/ΜL (ref 0.6–4.47)
LYMPHOCYTES NFR BLD AUTO: 24 % (ref 14–44)
MCH RBC QN AUTO: 28.9 PG (ref 26.8–34.3)
MCHC RBC AUTO-ENTMCNC: 30.9 G/DL (ref 31.4–37.4)
MCV RBC AUTO: 94 FL (ref 82–98)
MONOCYTES # BLD AUTO: 0.76 THOUSAND/ΜL (ref 0.17–1.22)
MONOCYTES NFR BLD AUTO: 9 % (ref 4–12)
NEUTROPHILS # BLD AUTO: 5.45 THOUSANDS/ΜL (ref 1.85–7.62)
NEUTS SEG NFR BLD AUTO: 60 % (ref 43–75)
NRBC BLD AUTO-RTO: 0 /100 WBCS
PLATELET # BLD AUTO: 248 THOUSANDS/UL (ref 149–390)
PMV BLD AUTO: 11.3 FL (ref 8.9–12.7)
POTASSIUM SERPL-SCNC: 4.7 MMOL/L (ref 3.5–5.3)
PROT SERPL-MCNC: 7.2 G/DL (ref 6.4–8.2)
RBC # BLD AUTO: 3.53 MILLION/UL (ref 3.81–5.12)
SODIUM SERPL-SCNC: 138 MMOL/L (ref 136–145)
TRIGL SERPL-MCNC: 87 MG/DL
TSH SERPL DL<=0.05 MIU/L-ACNC: 3.32 UIU/ML (ref 0.36–3.74)
WBC # BLD AUTO: 8.89 THOUSAND/UL (ref 4.31–10.16)

## 2019-06-21 PROCEDURE — 85025 COMPLETE CBC W/AUTO DIFF WBC: CPT

## 2019-06-21 PROCEDURE — 80061 LIPID PANEL: CPT

## 2019-06-21 PROCEDURE — 84443 ASSAY THYROID STIM HORMONE: CPT

## 2019-06-21 PROCEDURE — 83036 HEMOGLOBIN GLYCOSYLATED A1C: CPT

## 2019-06-21 PROCEDURE — 80053 COMPREHEN METABOLIC PANEL: CPT

## 2019-06-21 PROCEDURE — 36415 COLL VENOUS BLD VENIPUNCTURE: CPT

## 2019-06-26 ENCOUNTER — TELEPHONE (OUTPATIENT)
Dept: FAMILY MEDICINE CLINIC | Facility: CLINIC | Age: 84
End: 2019-06-26

## 2019-06-26 ENCOUNTER — HOSPITAL ENCOUNTER (OUTPATIENT)
Dept: NON INVASIVE DIAGNOSTICS | Facility: CLINIC | Age: 84
Discharge: HOME | End: 2019-06-26
Payer: MEDICARE

## 2019-06-26 DIAGNOSIS — I73.9 PAD (PERIPHERAL ARTERY DISEASE) (HCC): ICD-10-CM

## 2019-06-26 PROCEDURE — 93925 LOWER EXTREMITY STUDY: CPT

## 2019-06-26 PROCEDURE — 93922 UPR/L XTREMITY ART 2 LEVELS: CPT | Performed by: SURGERY

## 2019-06-26 PROCEDURE — 93923 UPR/LXTR ART STDY 3+ LVLS: CPT

## 2019-06-26 PROCEDURE — 93925 LOWER EXTREMITY STUDY: CPT | Performed by: SURGERY

## 2019-06-27 ENCOUNTER — OFFICE VISIT (OUTPATIENT)
Dept: VASCULAR SURGERY | Facility: CLINIC | Age: 84
End: 2019-06-27
Payer: MEDICARE

## 2019-06-27 VITALS
HEIGHT: 60 IN | BODY MASS INDEX: 42.97 KG/M2 | HEART RATE: 80 BPM | DIASTOLIC BLOOD PRESSURE: 78 MMHG | SYSTOLIC BLOOD PRESSURE: 138 MMHG | TEMPERATURE: 98.6 F

## 2019-06-27 DIAGNOSIS — I73.9 PAD (PERIPHERAL ARTERY DISEASE) (HCC): Primary | ICD-10-CM

## 2019-06-27 DIAGNOSIS — Z79.4 TYPE 2 DIABETES MELLITUS WITH STABLE PROLIFERATIVE RETINOPATHY OF BOTH EYES, WITH LONG-TERM CURRENT USE OF INSULIN (HCC): ICD-10-CM

## 2019-06-27 DIAGNOSIS — E11.3553 TYPE 2 DIABETES MELLITUS WITH STABLE PROLIFERATIVE RETINOPATHY OF BOTH EYES, WITH LONG-TERM CURRENT USE OF INSULIN (HCC): ICD-10-CM

## 2019-06-27 DIAGNOSIS — E78.49 FAMILIAL COMBINED HYPERLIPIDEMIA: ICD-10-CM

## 2019-06-27 DIAGNOSIS — I87.2 CHRONIC VENOUS INSUFFICIENCY: ICD-10-CM

## 2019-06-27 DIAGNOSIS — N18.2 CHRONIC KIDNEY DISEASE (CKD), STAGE II (MILD): ICD-10-CM

## 2019-06-27 PROCEDURE — 99214 OFFICE O/P EST MOD 30 MIN: CPT | Performed by: PHYSICIAN ASSISTANT

## 2019-06-28 ENCOUNTER — OFFICE VISIT (OUTPATIENT)
Dept: FAMILY MEDICINE CLINIC | Facility: CLINIC | Age: 84
End: 2019-06-28
Payer: MEDICARE

## 2019-06-28 VITALS
HEIGHT: 60 IN | SYSTOLIC BLOOD PRESSURE: 130 MMHG | RESPIRATION RATE: 18 BRPM | OXYGEN SATURATION: 96 % | HEART RATE: 73 BPM | TEMPERATURE: 98.6 F | WEIGHT: 220 LBS | DIASTOLIC BLOOD PRESSURE: 60 MMHG | BODY MASS INDEX: 43.19 KG/M2

## 2019-06-28 DIAGNOSIS — D64.9 ANEMIA, UNSPECIFIED TYPE: ICD-10-CM

## 2019-06-28 DIAGNOSIS — E11.3553 TYPE 2 DIABETES MELLITUS WITH STABLE PROLIFERATIVE RETINOPATHY OF BOTH EYES, WITH LONG-TERM CURRENT USE OF INSULIN (HCC): Primary | ICD-10-CM

## 2019-06-28 DIAGNOSIS — Z79.4 TYPE 2 DIABETES MELLITUS WITH STABLE PROLIFERATIVE RETINOPATHY OF BOTH EYES, WITH LONG-TERM CURRENT USE OF INSULIN (HCC): Primary | ICD-10-CM

## 2019-06-28 DIAGNOSIS — E66.01 MORBID OBESITY WITH BMI OF 40.0-44.9, ADULT (HCC): ICD-10-CM

## 2019-06-28 DIAGNOSIS — F41.9 ANXIETY: ICD-10-CM

## 2019-06-28 DIAGNOSIS — E87.6 HYPOKALEMIA: ICD-10-CM

## 2019-06-28 PROCEDURE — 99214 OFFICE O/P EST MOD 30 MIN: CPT | Performed by: FAMILY MEDICINE

## 2019-06-28 RX ORDER — POTASSIUM CHLORIDE 20 MEQ/1
20 TABLET, EXTENDED RELEASE ORAL DAILY
Qty: 90 TABLET | Refills: 1 | Status: SHIPPED | OUTPATIENT
Start: 2019-06-28

## 2019-07-04 ENCOUNTER — TELEPHONE (OUTPATIENT)
Dept: FAMILY MEDICINE CLINIC | Facility: CLINIC | Age: 84
End: 2019-07-04

## 2019-07-04 ENCOUNTER — TELEPHONE (OUTPATIENT)
Dept: OTHER | Facility: OTHER | Age: 84
End: 2019-07-04

## 2019-07-04 DIAGNOSIS — Z79.4 TYPE 2 DIABETES MELLITUS WITH STABLE PROLIFERATIVE RETINOPATHY OF BOTH EYES, WITH LONG-TERM CURRENT USE OF INSULIN (HCC): ICD-10-CM

## 2019-07-04 DIAGNOSIS — Z79.4 TYPE 2 DIABETES MELLITUS WITH COMPLICATION, WITH LONG-TERM CURRENT USE OF INSULIN (HCC): ICD-10-CM

## 2019-07-04 DIAGNOSIS — E11.8 TYPE 2 DIABETES MELLITUS WITH COMPLICATION, WITH LONG-TERM CURRENT USE OF INSULIN (HCC): ICD-10-CM

## 2019-07-04 DIAGNOSIS — E11.3553 TYPE 2 DIABETES MELLITUS WITH STABLE PROLIFERATIVE RETINOPATHY OF BOTH EYES, WITH LONG-TERM CURRENT USE OF INSULIN (HCC): ICD-10-CM

## 2019-07-04 NOTE — TELEPHONE ENCOUNTER
I had a message to call [facility] regarding patient's insulin  They were questioning her orders on the NovoLog and Lantus  After reviewing Dr Aaron Bonilla note, I called Therese Vargas ([phone])ADY the orders for Lantus 21 units at bedtime and NovoLog 20 units b i d  at breakfast and supper with a hold for a blood sugar less than 150

## 2019-07-08 ENCOUNTER — TELEPHONE (OUTPATIENT)
Dept: FAMILY MEDICINE CLINIC | Facility: CLINIC | Age: 84
End: 2019-07-08

## 2019-07-08 DIAGNOSIS — S80.822D BLISTER OF LEFT LOWER EXTREMITY, SUBSEQUENT ENCOUNTER: Primary | ICD-10-CM

## 2019-07-09 ENCOUNTER — TELEPHONE (OUTPATIENT)
Dept: FAMILY MEDICINE CLINIC | Facility: CLINIC | Age: 84
End: 2019-07-09

## 2019-07-11 NOTE — TELEPHONE ENCOUNTER
Josy from [address] Nurse received referral for home care went to pt today at [facility] in [facility] [place]  she did her full assessment and took care of her wound on left lower leg then went down to [facility] for med list  and rosana was told they don't use [facility] they use [facility] home care  so rosana so she won't be following her

## 2019-07-16 ENCOUNTER — TELEPHONE (OUTPATIENT)
Dept: FAMILY MEDICINE CLINIC | Facility: CLINIC | Age: 84
End: 2019-07-16

## 2019-07-16 NOTE — TELEPHONE ENCOUNTER
Lorrie Conway from [facility] called stating they need encounter or office visit note to bill pt appropriately for blister of L lower extremity  Stated they had received referral for home health care, but need office note for billing  Called pt and LMOM asking to schedule so that we can fax office not over to Lorrie Conway for billing purposes  Bernadette's phone number is [phone] and Fax is [phone]

## 2019-07-17 ENCOUNTER — APPOINTMENT (EMERGENCY)
Dept: RADIOLOGY | Facility: HOSPITAL | Age: 84
DRG: 178 | End: 2019-07-17
Payer: MEDICARE

## 2019-07-17 ENCOUNTER — HOSPITAL ENCOUNTER (INPATIENT)
Facility: HOSPITAL | Age: 84
LOS: 5 days | Discharge: HOME HEALTH | DRG: 178 | End: 2019-07-22
Attending: EMERGENCY MEDICINE | Admitting: INTERNAL MEDICINE
Payer: MEDICARE

## 2019-07-17 DIAGNOSIS — J20.9 ACUTE BRONCHITIS, UNSPECIFIED ORGANISM: ICD-10-CM

## 2019-07-17 DIAGNOSIS — E11.65 TYPE 2 DIABETES MELLITUS WITH HYPERGLYCEMIA, WITH LONG-TERM CURRENT USE OF INSULIN (HCC): ICD-10-CM

## 2019-07-17 DIAGNOSIS — Z79.4 TYPE 2 DIABETES MELLITUS WITH HYPERGLYCEMIA, WITH LONG-TERM CURRENT USE OF INSULIN (HCC): ICD-10-CM

## 2019-07-17 DIAGNOSIS — J18.9 PNEUMONIA: ICD-10-CM

## 2019-07-17 DIAGNOSIS — E66.01 MORBID OBESITY WITH BMI OF 40.0-44.9, ADULT (HCC): ICD-10-CM

## 2019-07-17 DIAGNOSIS — R05.3 CHRONIC COUGH: ICD-10-CM

## 2019-07-17 DIAGNOSIS — R09.02 HYPOXIA: ICD-10-CM

## 2019-07-17 DIAGNOSIS — N17.9 ACUTE KIDNEY INJURY SUPERIMPOSED ON CHRONIC KIDNEY DISEASE (HCC): ICD-10-CM

## 2019-07-17 DIAGNOSIS — N17.9 AKI (ACUTE KIDNEY INJURY) (HCC): ICD-10-CM

## 2019-07-17 DIAGNOSIS — N18.9 ACUTE KIDNEY INJURY SUPERIMPOSED ON CHRONIC KIDNEY DISEASE (HCC): ICD-10-CM

## 2019-07-17 DIAGNOSIS — I50.9 CHF (CONGESTIVE HEART FAILURE) (HCC): Primary | ICD-10-CM

## 2019-07-17 PROBLEM — I10 HYPERTENSION: Status: ACTIVE | Noted: 2019-07-17

## 2019-07-17 PROBLEM — D72.829 LEUKOCYTOSIS: Status: ACTIVE | Noted: 2019-07-17

## 2019-07-17 LAB
ALBUMIN SERPL BCP-MCNC: 2.4 G/DL (ref 3.5–5)
ALP SERPL-CCNC: 89 U/L (ref 46–116)
ALT SERPL W P-5'-P-CCNC: 22 U/L (ref 12–78)
ANION GAP SERPL CALCULATED.3IONS-SCNC: 5 MMOL/L (ref 4–13)
AST SERPL W P-5'-P-CCNC: 22 U/L (ref 5–45)
BASOPHILS # BLD AUTO: 0.07 THOUSANDS/ΜL (ref 0–0.1)
BASOPHILS NFR BLD AUTO: 0 % (ref 0–1)
BILIRUB SERPL-MCNC: 0.17 MG/DL (ref 0.2–1)
BUN SERPL-MCNC: 64 MG/DL (ref 5–25)
CALCIUM SERPL-MCNC: 9.2 MG/DL (ref 8.3–10.1)
CHLORIDE SERPL-SCNC: 100 MMOL/L (ref 100–108)
CO2 SERPL-SCNC: 31 MMOL/L (ref 21–32)
CREAT SERPL-MCNC: 2.69 MG/DL (ref 0.6–1.3)
EOSINOPHIL # BLD AUTO: 0.45 THOUSAND/ΜL (ref 0–0.61)
EOSINOPHIL NFR BLD AUTO: 3 % (ref 0–6)
ERYTHROCYTE [DISTWIDTH] IN BLOOD BY AUTOMATED COUNT: 14.4 % (ref 11.6–15.1)
GFR SERPL CREATININE-BSD FRML MDRD: 16 ML/MIN/1.73SQ M
GLUCOSE SERPL-MCNC: 145 MG/DL (ref 65–140)
HCT VFR BLD AUTO: 32.6 % (ref 34.8–46.1)
HGB BLD-MCNC: 10 G/DL (ref 11.5–15.4)
IMM GRANULOCYTES # BLD AUTO: 0.12 THOUSAND/UL (ref 0–0.2)
IMM GRANULOCYTES NFR BLD AUTO: 1 % (ref 0–2)
LYMPHOCYTES # BLD AUTO: 1.34 THOUSANDS/ΜL (ref 0.6–4.47)
LYMPHOCYTES NFR BLD AUTO: 9 % (ref 14–44)
MCH RBC QN AUTO: 28.7 PG (ref 26.8–34.3)
MCHC RBC AUTO-ENTMCNC: 30.7 G/DL (ref 31.4–37.4)
MCV RBC AUTO: 93 FL (ref 82–98)
MONOCYTES # BLD AUTO: 1.72 THOUSAND/ΜL (ref 0.17–1.22)
MONOCYTES NFR BLD AUTO: 11 % (ref 4–12)
NEUTROPHILS # BLD AUTO: 11.95 THOUSANDS/ΜL (ref 1.85–7.62)
NEUTS SEG NFR BLD AUTO: 76 % (ref 43–75)
NRBC BLD AUTO-RTO: 0 /100 WBCS
NT-PROBNP SERPL-MCNC: 563 PG/ML
PLATELET # BLD AUTO: 318 THOUSANDS/UL (ref 149–390)
PMV BLD AUTO: 10 FL (ref 8.9–12.7)
POTASSIUM SERPL-SCNC: 5.5 MMOL/L (ref 3.5–5.3)
PROT SERPL-MCNC: 7.4 G/DL (ref 6.4–8.2)
RBC # BLD AUTO: 3.49 MILLION/UL (ref 3.81–5.12)
SODIUM SERPL-SCNC: 136 MMOL/L (ref 136–145)
TROPONIN I SERPL-MCNC: <0.02 NG/ML
WBC # BLD AUTO: 15.65 THOUSAND/UL (ref 4.31–10.16)

## 2019-07-17 PROCEDURE — 84484 ASSAY OF TROPONIN QUANT: CPT | Performed by: EMERGENCY MEDICINE

## 2019-07-17 PROCEDURE — 99284 EMERGENCY DEPT VISIT MOD MDM: CPT | Performed by: EMERGENCY MEDICINE

## 2019-07-17 PROCEDURE — 99223 1ST HOSP IP/OBS HIGH 75: CPT | Performed by: PHYSICIAN ASSISTANT

## 2019-07-17 PROCEDURE — 93005 ELECTROCARDIOGRAM TRACING: CPT

## 2019-07-17 PROCEDURE — 96374 THER/PROPH/DIAG INJ IV PUSH: CPT

## 2019-07-17 PROCEDURE — 83880 ASSAY OF NATRIURETIC PEPTIDE: CPT | Performed by: EMERGENCY MEDICINE

## 2019-07-17 PROCEDURE — 36415 COLL VENOUS BLD VENIPUNCTURE: CPT | Performed by: EMERGENCY MEDICINE

## 2019-07-17 PROCEDURE — 80053 COMPREHEN METABOLIC PANEL: CPT | Performed by: EMERGENCY MEDICINE

## 2019-07-17 PROCEDURE — 71046 X-RAY EXAM CHEST 2 VIEWS: CPT

## 2019-07-17 PROCEDURE — 85025 COMPLETE CBC W/AUTO DIFF WBC: CPT | Performed by: EMERGENCY MEDICINE

## 2019-07-17 PROCEDURE — 1123F ACP DISCUSS/DSCN MKR DOCD: CPT | Performed by: INTERNAL MEDICINE

## 2019-07-17 PROCEDURE — 99285 EMERGENCY DEPT VISIT HI MDM: CPT

## 2019-07-17 RX ORDER — BENZONATATE 100 MG/1
100 CAPSULE ORAL 3 TIMES DAILY PRN
Status: DISCONTINUED | OUTPATIENT
Start: 2019-07-17 | End: 2019-07-22 | Stop reason: HOSPADM

## 2019-07-17 RX ORDER — POTASSIUM CHLORIDE 20 MEQ/1
20 TABLET, EXTENDED RELEASE ORAL DAILY
Status: DISCONTINUED | OUTPATIENT
Start: 2019-07-18 | End: 2019-07-18

## 2019-07-17 RX ORDER — IPRATROPIUM BROMIDE AND ALBUTEROL SULFATE 2.5; .5 MG/3ML; MG/3ML
3 SOLUTION RESPIRATORY (INHALATION) EVERY 6 HOURS PRN
Status: DISCONTINUED | OUTPATIENT
Start: 2019-07-17 | End: 2019-07-18

## 2019-07-17 RX ORDER — DOXAZOSIN 2 MG/1
2 TABLET ORAL DAILY
Status: DISCONTINUED | OUTPATIENT
Start: 2019-07-18 | End: 2019-07-22 | Stop reason: HOSPADM

## 2019-07-17 RX ORDER — ACETAMINOPHEN 325 MG/1
650 TABLET ORAL EVERY 6 HOURS PRN
Status: DISCONTINUED | OUTPATIENT
Start: 2019-07-17 | End: 2019-07-22 | Stop reason: HOSPADM

## 2019-07-17 RX ORDER — ONDANSETRON 2 MG/ML
4 INJECTION INTRAMUSCULAR; INTRAVENOUS EVERY 6 HOURS PRN
Status: DISCONTINUED | OUTPATIENT
Start: 2019-07-17 | End: 2019-07-22 | Stop reason: HOSPADM

## 2019-07-17 RX ORDER — ATORVASTATIN CALCIUM 10 MG/1
10 TABLET, FILM COATED ORAL
Status: DISCONTINUED | OUTPATIENT
Start: 2019-07-18 | End: 2019-07-22 | Stop reason: HOSPADM

## 2019-07-17 RX ORDER — LEVOTHYROXINE SODIUM 0.07 MG/1
75 TABLET ORAL
Status: DISCONTINUED | OUTPATIENT
Start: 2019-07-18 | End: 2019-07-22 | Stop reason: HOSPADM

## 2019-07-17 RX ORDER — LOSARTAN POTASSIUM 50 MG/1
100 TABLET ORAL DAILY
Status: DISCONTINUED | OUTPATIENT
Start: 2019-07-18 | End: 2019-07-18

## 2019-07-17 RX ORDER — DORZOLAMIDE HYDROCHLORIDE AND TIMOLOL MALEATE 20; 5 MG/ML; MG/ML
1 SOLUTION/ DROPS OPHTHALMIC 2 TIMES DAILY
COMMUNITY

## 2019-07-17 RX ORDER — FUROSEMIDE 10 MG/ML
40 INJECTION INTRAMUSCULAR; INTRAVENOUS ONCE
Status: COMPLETED | OUTPATIENT
Start: 2019-07-17 | End: 2019-07-17

## 2019-07-17 RX ORDER — HEPARIN SODIUM 5000 [USP'U]/ML
5000 INJECTION, SOLUTION INTRAVENOUS; SUBCUTANEOUS EVERY 8 HOURS SCHEDULED
Status: DISCONTINUED | OUTPATIENT
Start: 2019-07-17 | End: 2019-07-22 | Stop reason: HOSPADM

## 2019-07-17 RX ADMIN — FUROSEMIDE 40 MG: 10 INJECTION, SOLUTION INTRAMUSCULAR; INTRAVENOUS at 21:18

## 2019-07-17 NOTE — ED PROVIDER NOTES
History  Chief Complaint   Patient presents with    Fever - 76 years or older     Pt  brought in via EMS  Pt  [facility]  Pt  was sent in because of having a fever at the facility  Pt  reports feeling "not good"  Patient is an 60-year-old female that presents from assisted living for evaluation of a fever  Patient states that today she was complaining to the nursing staff that she was not feeling good  They took her temperature and it was 100 5  She was sent to the ER for evaluation  Patient states she has been coughing since July 4th which is 13 days  She states that she has been coughing up white phlegm  Denies any chest pain  Patient was also noted to be hypoxic according to records at the nursing home  Her son that is with her now states that she was in the mid to upper [de-identified] with her pulse ox  Patient was 90% on room air here  History provided by:  Patient and relative   used: No    Fever - 75 years or older   Max temp prior to arrival:  100 5  Temp source:  Unable to specify  Severity:  Unable to specify  Onset quality:  Unable to specify  Timing:  Constant  Chronicity:  New  Relieved by:  None tried  Worsened by:  Nothing  Ineffective treatments:  None tried  Associated symptoms: cough    Associated symptoms: no chest pain, no chills, no congestion, no headaches, no nausea, no sore throat and no vomiting    Risk factors: sick contacts    Risk factors: no immunosuppression        Prior to Admission Medications   Prescriptions Last Dose Informant Patient Reported? Taking?    Incontinence Supply Disposable (CERTAINTY ADJ UNDERWEAR LARGE) MISC   No No   Sig: by Does not apply route daily at bedtime   RELION PEN NEEDLE 31G/8MM 31G X 8 MM MISC   No No   Sig: USE AS DIRECTED THREE TIMES DAILY   atorvastatin (LIPITOR) 10 mg tablet   No Yes   Sig: Take 1 tablet (10 mg total) by mouth daily   doxazosin (CARDURA) 2 mg tablet   No Yes   Sig: Take 1 tablet (2 mg total) by mouth daily   fexofenadine (ALLEGRA) 180 MG tablet  Self Yes Yes   Sig: Take by mouth   furosemide (LASIX) 80 mg tablet   No Yes   Sig: Take 1 tablet (80 mg total) by mouth daily   insulin aspart (NOVOLOG) 100 units/mL injection   No Yes   Si units BID at Breakfast and supper  Hold for blood sugar under 150  insulin glargine (LANTUS SOLOSTAR) 100 units/mL injection pen   No Yes   Sig: Inject 21 Units under the skin daily At [facility]   levothyroxine 75 mcg tablet   No Yes   Sig: TAKE ONE TABLET BY MOUTH ONCE DAILY AS DIRECTED   losartan (COZAAR) 100 MG tablet   No No   Sig: TAKE ONE TABLET BY MOUTH ONCE DAILY   potassium chloride (KLOR-CON M20) 20 mEq tablet   No Yes   Sig: Take 1 tablet (20 mEq total) by mouth daily      Facility-Administered Medications: None       Past Medical History:   Diagnosis Date    Adenoma of large intestine     Allergy     last assessed: 10/18/2013    Cataract     Constipation, chronic     Diabetes mellitus ([address] )     anemia vitamin d deficiency glaucoma hypertension hypothyroidism; last assessed: 2012    Diverticulosis     Fecal incontinence     Female stress incontinence     Glaucoma     Gout     History of cystocele     Intrinsic sphincter deficiency     Mixed incontinence     Nocturia     Osteoarthritis, chronic     Senile atrophic vaginitis     Urinary frequency        Past Surgical History:   Procedure Laterality Date    ANKLE SURGERY Bilateral     CATARACT EXTRACTION      CHOLECYSTECTOMY      TUBAL LIGATION         Family History   Problem Relation Age of Onset    Stomach cancer Mother     Diabetes Sister         mellitus     I have reviewed and agree with the history as documented  Social History     Tobacco Use    Smoking status: Former Smoker    Smokeless tobacco: Never Used   Substance Use Topics    Alcohol use: No    Drug use: No        Review of Systems   Constitutional: Positive for fatigue and fever  Negative for chills     HENT: Negative for congestion and sore throat  Respiratory: Positive for cough and shortness of breath  Cardiovascular: Negative for chest pain and leg swelling  Gastrointestinal: Negative for abdominal pain, nausea and vomiting  Skin: Positive for wound (Chronic left lower extremity wound that is unchanged)  Negative for color change  Allergic/Immunologic: Negative for immunocompromised state  Neurological: Negative for headaches  All other systems reviewed and are negative  Physical Exam  Physical Exam   Constitutional: She is oriented to person, place, and time  She appears well-developed and well-nourished  HENT:   Head: Normocephalic and atraumatic  Mouth/Throat: Oropharynx is clear and moist    Eyes: Pupils are equal, round, and reactive to light  Conjunctivae are normal    Neck: Normal range of motion  Neck supple  Cardiovascular: Normal rate, regular rhythm, normal heart sounds and intact distal pulses  Pulmonary/Chest: Effort normal  No respiratory distress  She has decreased breath sounds  She has wheezes  She has no rales  Abdominal: Soft  She exhibits no distension  There is no tenderness  There is no rebound and no guarding  Musculoskeletal: Normal range of motion  She exhibits no edema, tenderness or deformity  Neurological: She is alert and oriented to person, place, and time  Skin: Skin is warm and dry  Psychiatric: She has a normal mood and affect  Nursing note and vitals reviewed        Vital Signs  ED Triage Vitals   Temperature Pulse Respirations Blood Pressure SpO2   07/17/19 1817 07/17/19 1817 07/17/19 1817 07/17/19 1817 07/17/19 1817   98 5 °F (36 9 °C) 85 18 154/67 93 %      Temp Source Heart Rate Source Patient Position - Orthostatic VS BP Location FiO2 (%)   07/17/19 1817 07/17/19 1817 07/17/19 1817 07/17/19 1817 --   Oral Monitor Lying Right arm       Pain Score       07/17/19 1921       No Pain           Vitals:    07/17/19 1817 07/17/19 1921 07/17/19 2015 07/17/19 2109   BP: 154/67 143/78 141/56 (!) 109/46   Pulse: 85 83 78 80   Patient Position - Orthostatic VS: Lying  Lying Sitting         Visual Acuity      ED Medications  Medications   furosemide (LASIX) injection 40 mg (40 mg Intravenous Given 7/17/19 2118)       Diagnostic Studies  Results Reviewed     Procedure Component Value Units Date/Time    B-type natriuretic peptide [[id number]]  (Abnormal) Collected:  07/17/19 2014    Lab Status:  Final result Specimen:  Blood from Arm, Right Updated:  07/17/19 2108     NT-proBNP 563 pg/mL     Troponin I [[id number]]  (Normal) Collected:  07/17/19 2014    Lab Status:  Final result Specimen:  Blood from Arm, Right Updated:  07/17/19 2051     Troponin I <0 02 ng/mL     Comprehensive metabolic panel [[id number]]  (Abnormal) Collected:  07/17/19 2014    Lab Status:  Final result Specimen:  Blood from Arm, Right Updated:  07/17/19 2048     Sodium 136 mmol/L      Potassium 5 5 mmol/L      Chloride 100 mmol/L      CO2 31 mmol/L      ANION GAP 5 mmol/L      BUN 64 mg/dL      Creatinine 2 69 mg/dL      Glucose 145 mg/dL      Calcium 9 2 mg/dL      AST 22 U/L      ALT 22 U/L      Alkaline Phosphatase 89 U/L      Total Protein 7 4 g/dL      Albumin 2 4 g/dL      Total Bilirubin 0 17 mg/dL      eGFR 16 ml/min/1 73sq m     Narrative:       Ra guidelines for Chronic Kidney Disease (CKD):     Stage 1 with normal or high GFR (GFR > 90 mL/min/1 73 square meters)    Stage 2 Mild CKD (GFR = 60-89 mL/min/1 73 square meters)    Stage 3A Moderate CKD (GFR = 45-59 mL/min/1 73 square meters)    Stage 3B Moderate CKD (GFR = 30-44 mL/min/1 73 square meters)    Stage 4 Severe CKD (GFR = 15-29 mL/min/1 73 square meters)    Stage 5 End Stage CKD (GFR <15 mL/min/1 73 square meters)  Note: GFR calculation is accurate only with a steady state creatinine    CBC and differential [[id number]]  (Abnormal) Collected:  07/17/19 2014    Lab Status:  Final result Specimen:  Blood from Arm, Right Updated:  07/17/19 2028     WBC 15 65 Thousand/uL      RBC 3 49 Million/uL      Hemoglobin 10 0 g/dL      Hematocrit 32 6 %      MCV 93 fL      MCH 28 7 pg      MCHC 30 7 g/dL      RDW 14 4 %      MPV 10 0 fL      Platelets 312 Thousands/uL      nRBC 0 /100 WBCs      Neutrophils Relative 76 %      Immat GRANS % 1 %      Lymphocytes Relative 9 %      Monocytes Relative 11 %      Eosinophils Relative 3 %      Basophils Relative 0 %      Neutrophils Absolute 11 95 Thousands/µL      Immature Grans Absolute 0 12 Thousand/uL      Lymphocytes Absolute 1 34 Thousands/µL      Monocytes Absolute 1 72 Thousand/µL      Eosinophils Absolute 0 45 Thousand/µL      Basophils Absolute 0 07 Thousands/µL                  XR chest 2 views   ED Interpretation by Eduin Lea DO (07/17 2003)   Pulmonary vascular congestion                 Procedures  ECG 12 Lead Documentation Only  Date/Time: 7/17/2019 8:19 PM  Performed by: Eduin Lea DO  Authorized by: Eduin Lea DO     Indications / Diagnosis:  SOB  ECG reviewed by me, the ED Provider: yes    Patient location:  ED  Previous ECG:     Previous ECG:  Unavailable  Interpretation:     Interpretation: non-specific    Quality:     Tracing quality:  Limited by artifact  Rate:     ECG rate:  76    ECG rate assessment: normal    Rhythm:     Rhythm: sinus rhythm    Ectopy:     Ectopy: none    QRS:     QRS axis:  Normal    QRS intervals:  Normal  Conduction:     Conduction: normal    ST segments:     ST segments:  Non-specific  T waves:     T waves: non-specific             ED Course                               MDM  Number of Diagnoses or Management Options  DINORA (acute kidney injury) [facility]): new and requires workup  CHF (congestive heart failure) ([facility] [address] ): new and requires workup  Hypoxia: new and requires workup  Diagnosis management comments: Patient presented for possible fever with coughing  No fever here    Patient has decreased breath sounds with scattered wheezing  Chest x-ray shows CHF  Creatinine is elevated from baseline  Will give Lasix and admit for CHF exacerbation with hypoxia and acute kidney injury  Amount and/or Complexity of Data Reviewed  Clinical lab tests: ordered and reviewed  Tests in the radiology section of CPT®: ordered and reviewed  Tests in the medicine section of CPT®: reviewed and ordered  Review and summarize past medical records: yes  Independent visualization of images, tracings, or specimens: yes    Patient Progress  Patient progress: stable      Disposition  Final diagnoses:   CHF (congestive heart failure) ([facility] )   DINORA (acute kidney injury) ([place] [address] )   Hypoxia     Time reflects when diagnosis was documented in both MDM as applicable and the Disposition within this note     Time User Action Codes Description Comment    7/17/2019  9:04 PM Ravin Enciso Add [I50 9] CHF (congestive heart failure) ([place] [address] )     7/17/2019  9:04 PM Ashely Cooper Add [N17 9] DINORA (acute kidney injury) ([place] [address] )     7/17/2019  9:04 PM Ravin Enciso Add [R09 02] Hypoxia       ED Disposition     ED Disposition Condition Date/Time Comment    Admit Stable Wed Jul 17, 2019  9:04 PM Case was discussed with KYLIE and the patient's admission status was agreed to be Admission Status: inpatient status to the service of Dr Elder Rivera   Follow-up Information    None         Patient's Medications   Discharge Prescriptions    No medications on file     No discharge procedures on file      ED Provider  Electronically Signed by           Anny Gonzalez DO  07/17/19 6514

## 2019-07-18 ENCOUNTER — APPOINTMENT (INPATIENT)
Dept: NON INVASIVE DIAGNOSTICS | Facility: HOSPITAL | Age: 84
DRG: 178 | End: 2019-07-18
Payer: MEDICARE

## 2019-07-18 ENCOUNTER — APPOINTMENT (INPATIENT)
Dept: CT IMAGING | Facility: HOSPITAL | Age: 84
DRG: 178 | End: 2019-07-18
Payer: MEDICARE

## 2019-07-18 DIAGNOSIS — Z71.89 COMPLEX CARE COORDINATION: Primary | ICD-10-CM

## 2019-07-18 PROBLEM — J18.9 COMMUNITY ACQUIRED PNEUMONIA OF LEFT LOWER LOBE OF LUNG: Status: ACTIVE | Noted: 2019-07-18

## 2019-07-18 PROBLEM — E87.5 HYPERKALEMIA: Status: ACTIVE | Noted: 2019-07-18

## 2019-07-18 LAB
ANION GAP SERPL CALCULATED.3IONS-SCNC: 7 MMOL/L (ref 4–13)
BUN SERPL-MCNC: 64 MG/DL (ref 5–25)
CALCIUM SERPL-MCNC: 8.9 MG/DL (ref 8.3–10.1)
CHLORIDE SERPL-SCNC: 100 MMOL/L (ref 100–108)
CO2 SERPL-SCNC: 28 MMOL/L (ref 21–32)
CREAT SERPL-MCNC: 2.6 MG/DL (ref 0.6–1.3)
ERYTHROCYTE [DISTWIDTH] IN BLOOD BY AUTOMATED COUNT: 14.3 % (ref 11.6–15.1)
GFR SERPL CREATININE-BSD FRML MDRD: 17 ML/MIN/1.73SQ M
GLUCOSE SERPL-MCNC: 103 MG/DL (ref 65–140)
GLUCOSE SERPL-MCNC: 190 MG/DL (ref 65–140)
GLUCOSE SERPL-MCNC: 234 MG/DL (ref 65–140)
GLUCOSE SERPL-MCNC: 235 MG/DL (ref 65–140)
GLUCOSE SERPL-MCNC: 281 MG/DL (ref 65–140)
HCT VFR BLD AUTO: 30.6 % (ref 34.8–46.1)
HGB BLD-MCNC: 9.3 G/DL (ref 11.5–15.4)
L PNEUMO1 AG UR QL IA.RAPID: NEGATIVE
MCH RBC QN AUTO: 28.7 PG (ref 26.8–34.3)
MCHC RBC AUTO-ENTMCNC: 30.4 G/DL (ref 31.4–37.4)
MCV RBC AUTO: 94 FL (ref 82–98)
PLATELET # BLD AUTO: 290 THOUSANDS/UL (ref 149–390)
PMV BLD AUTO: 10.2 FL (ref 8.9–12.7)
POTASSIUM SERPL-SCNC: 5.1 MMOL/L (ref 3.5–5.3)
POTASSIUM SERPL-SCNC: 5.2 MMOL/L (ref 3.5–5.3)
PROCALCITONIN SERPL-MCNC: 3.52 NG/ML
PROCALCITONIN SERPL-MCNC: 3.59 NG/ML
RBC # BLD AUTO: 3.24 MILLION/UL (ref 3.81–5.12)
S PNEUM AG UR QL: NEGATIVE
SODIUM SERPL-SCNC: 135 MMOL/L (ref 136–145)
WBC # BLD AUTO: 14.39 THOUSAND/UL (ref 4.31–10.16)

## 2019-07-18 PROCEDURE — 84132 ASSAY OF SERUM POTASSIUM: CPT | Performed by: PHYSICIAN ASSISTANT

## 2019-07-18 PROCEDURE — 93306 TTE W/DOPPLER COMPLETE: CPT | Performed by: INTERNAL MEDICINE

## 2019-07-18 PROCEDURE — 84145 PROCALCITONIN (PCT): CPT | Performed by: INTERNAL MEDICINE

## 2019-07-18 PROCEDURE — 80048 BASIC METABOLIC PNL TOTAL CA: CPT | Performed by: PHYSICIAN ASSISTANT

## 2019-07-18 PROCEDURE — 93306 TTE W/DOPPLER COMPLETE: CPT

## 2019-07-18 PROCEDURE — 84145 PROCALCITONIN (PCT): CPT | Performed by: PHYSICIAN ASSISTANT

## 2019-07-18 PROCEDURE — 82948 REAGENT STRIP/BLOOD GLUCOSE: CPT

## 2019-07-18 PROCEDURE — 85027 COMPLETE CBC AUTOMATED: CPT | Performed by: PHYSICIAN ASSISTANT

## 2019-07-18 PROCEDURE — 71250 CT THORAX DX C-: CPT

## 2019-07-18 PROCEDURE — 94760 N-INVAS EAR/PLS OXIMETRY 1: CPT

## 2019-07-18 PROCEDURE — 94640 AIRWAY INHALATION TREATMENT: CPT

## 2019-07-18 PROCEDURE — 87449 NOS EACH ORGANISM AG IA: CPT | Performed by: INTERNAL MEDICINE

## 2019-07-18 RX ORDER — NYSTATIN 100000 [USP'U]/G
POWDER TOPICAL 2 TIMES DAILY
Status: DISCONTINUED | OUTPATIENT
Start: 2019-07-18 | End: 2019-07-22 | Stop reason: HOSPADM

## 2019-07-18 RX ORDER — DORZOLAMIDE HYDROCHLORIDE AND TIMOLOL MALEATE 20; 5 MG/ML; MG/ML
1 SOLUTION/ DROPS OPHTHALMIC 2 TIMES DAILY
Status: DISCONTINUED | OUTPATIENT
Start: 2019-07-18 | End: 2019-07-22 | Stop reason: HOSPADM

## 2019-07-18 RX ORDER — GUAIFENESIN 600 MG
1200 TABLET, EXTENDED RELEASE 12 HR ORAL 2 TIMES DAILY
Status: DISCONTINUED | OUTPATIENT
Start: 2019-07-18 | End: 2019-07-22 | Stop reason: HOSPADM

## 2019-07-18 RX ORDER — FUROSEMIDE 80 MG
80 TABLET ORAL DAILY
Status: DISCONTINUED | OUTPATIENT
Start: 2019-07-18 | End: 2019-07-22 | Stop reason: HOSPADM

## 2019-07-18 RX ORDER — LEVALBUTEROL 1.25 MG/.5ML
1.25 SOLUTION, CONCENTRATE RESPIRATORY (INHALATION)
Status: DISCONTINUED | OUTPATIENT
Start: 2019-07-18 | End: 2019-07-22 | Stop reason: HOSPADM

## 2019-07-18 RX ADMIN — INSULIN LISPRO 4 UNITS: 100 INJECTION, SOLUTION INTRAVENOUS; SUBCUTANEOUS at 12:00

## 2019-07-18 RX ADMIN — INSULIN LISPRO 2 UNITS: 100 INJECTION, SOLUTION INTRAVENOUS; SUBCUTANEOUS at 16:46

## 2019-07-18 RX ADMIN — FUROSEMIDE 80 MG: 80 TABLET ORAL at 15:02

## 2019-07-18 RX ADMIN — INSULIN LISPRO 10 UNITS: 100 INJECTION, SOLUTION INTRAVENOUS; SUBCUTANEOUS at 16:46

## 2019-07-18 RX ADMIN — CEFTRIAXONE 1000 MG: 1 INJECTION, POWDER, FOR SOLUTION INTRAMUSCULAR; INTRAVENOUS at 16:42

## 2019-07-18 RX ADMIN — DOXAZOSIN 2 MG: 2 TABLET ORAL at 08:42

## 2019-07-18 RX ADMIN — LEVOTHYROXINE SODIUM 75 MCG: 75 TABLET ORAL at 06:33

## 2019-07-18 RX ADMIN — NYSTATIN: 100000 POWDER TOPICAL at 16:49

## 2019-07-18 RX ADMIN — AZITHROMYCIN MONOHYDRATE 500 MG: 500 INJECTION, POWDER, LYOPHILIZED, FOR SOLUTION INTRAVENOUS at 17:16

## 2019-07-18 RX ADMIN — ATORVASTATIN CALCIUM 10 MG: 10 TABLET, FILM COATED ORAL at 16:46

## 2019-07-18 RX ADMIN — DORZOLAMIDE HYDROCHLORIDE AND TIMOLOL MALEATE 1 DROP: 20; 5 SOLUTION/ DROPS OPHTHALMIC at 08:42

## 2019-07-18 RX ADMIN — HEPARIN SODIUM 5000 UNITS: 5000 INJECTION INTRAVENOUS; SUBCUTANEOUS at 13:25

## 2019-07-18 RX ADMIN — DORZOLAMIDE HYDROCHLORIDE AND TIMOLOL MALEATE 1 DROP: 20; 5 SOLUTION/ DROPS OPHTHALMIC at 16:47

## 2019-07-18 RX ADMIN — NYSTATIN: 100000 POWDER TOPICAL at 08:51

## 2019-07-18 RX ADMIN — HEPARIN SODIUM 5000 UNITS: 5000 INJECTION INTRAVENOUS; SUBCUTANEOUS at 06:33

## 2019-07-18 RX ADMIN — LEVALBUTEROL 1.25 MG: 1.25 SOLUTION, CONCENTRATE RESPIRATORY (INHALATION) at 19:17

## 2019-07-18 RX ADMIN — GUAIFENESIN 1200 MG: 600 TABLET, EXTENDED RELEASE ORAL at 15:02

## 2019-07-18 RX ADMIN — IPRATROPIUM BROMIDE 0.5 MG: 0.5 SOLUTION RESPIRATORY (INHALATION) at 19:17

## 2019-07-18 RX ADMIN — HEPARIN SODIUM 5000 UNITS: 5000 INJECTION INTRAVENOUS; SUBCUTANEOUS at 23:00

## 2019-07-18 RX ADMIN — INSULIN LISPRO 10 UNITS: 100 INJECTION, SOLUTION INTRAVENOUS; SUBCUTANEOUS at 08:43

## 2019-07-18 RX ADMIN — INSULIN LISPRO 3 UNITS: 100 INJECTION, SOLUTION INTRAVENOUS; SUBCUTANEOUS at 08:42

## 2019-07-18 NOTE — ASSESSMENT & PLAN NOTE
Lab Results   Component Value Date    HGBA1C 6 5 (H) 06/21/2019       No results for input(s): POCGLU in the last 72 hours      Blood Sugar Average: Last 72 hrs:  · Continue lispro 10 units BID with meals  · SSI + accuchek  · Diabetic diet

## 2019-07-18 NOTE — ASSESSMENT & PLAN NOTE
· Currently follows with vascular as an outpatient  · Recent duplex negative for DVT  · Also follows with wound care for chronic lower extremity wounds

## 2019-07-18 NOTE — ASSESSMENT & PLAN NOTE
· Baseline creatinine appears to be 2 0-2 2  · Creatinine at time of admission 2 69  Patient reports some decreased PO intake since going to nursing facility  · Did receive additional dose of Lasix in the ED    Hold off on further diuresis at this time  · Repeat BMP in AM  · Hold nephrotoxic agents

## 2019-07-18 NOTE — H&P
H&P- Dex Nina 1935, 82 y o  female MRN: [id number]    Unit/Bed#: E4 -01 Encounter: [id number]    Primary Care Provider: Sherman Michelle DO   Date and time admitted to hospital: 7/17/2019  6:15 PM    * Acute bronchitis  Assessment & Plan  · Patient brought to the emergency department due to reported fever at nursing facility  Afebrile here  Patient states since going to nursing facility July 4th she has had a hacking cough that is typically dry  No history of lung disease  · Vital signs stable at time of admission however pulse ox noted to dip to 90% on RA   · EKG: NSR  · CXR: bibasilar prominence ? No consolidation f/u final read no prior to compare  · Obtain procalcitonin - hold off on abx for now  Consider allergy component  · Supportive care - mucinex BID, tessalon perles PRN  · Duo-nebs PRN for wheezing  · Consider CT chest wo contrast  · O2 supplementation as needed to maintain sats > 90%    Chronic congestive heart failure (HCC)  Assessment & Plan  · Initially admission was requested for congestive heart failure exacerbation  However patient does not appear acutely volume overloaded  Her weight is actually decreased from previously  · BNP only minimally elevated at 563  · Received dose of 40 mg IV Lasix in the ED  Patient also states she had already taken her 80 mg PO lasix earlier today  · Hold off on further diuresis  · No recent echocardiogram - will obtain  · Continue low-sodium diet, I/O, daily weights  Wt Readings from Last 3 Encounters:   07/17/19 98 2 kg (216 lb 7 9 oz)   06/28/19 99 8 kg (220 lb)   04/12/19 99 8 kg (220 lb)           Hyperkalemia  Assessment & Plan  · Noted to be 5 5 at time of admission  Likely in setting of DINORA, continued potassium supplementation and losartan  · No EKG changes  · Hold losartan and potassium supplementation  · Did receive Lasix in ED    Repeat potassium now    Hypertension  Assessment & Plan  · Blood pressure currently stable  · Continue Cardura  Hold losartan and setting of DINORA and hyperkalemia    Leukocytosis  Assessment & Plan  · CBC with leukocytosis of 15 K - no definitive source of infection at this time  · Could be related to viral bronchitis  · Obtain procalcitonin, hold off on antibiotics for now    Diabetes mellitus [facility])  Assessment & Plan  Lab Results   Component Value Date    HGBA1C 6 5 (H) 06/21/2019       No results for input(s): POCGLU in the last 72 hours  Blood Sugar Average: Last 72 hrs:  · Continue lispro 10 units BID with meals  · SSI + accuchek  · Diabetic diet    Hypothyroidism  Assessment & Plan  · Continue Synthroid    Acute kidney injury superimposed on chronic kidney disease ([facility] [address] )  Assessment & Plan  · Baseline creatinine appears to be 2 0-2 2  · Creatinine at time of admission 2 69  Patient reports some decreased PO intake since going to nursing facility  · Did receive additional dose of Lasix in the ED  Hold off on further diuresis at this time  · Repeat BMP in AM  · Hold nephrotoxic agents    PAD (peripheral artery disease) ([facility])  Assessment & Plan  · Currently follows with vascular as an outpatient  · Recent duplex negative for DVT  · Also follows with wound care for chronic lower extremity wounds    VTE Prophylaxis: Heparin  / sequential compression device   Code Status:  Level 3 - DNR/DNI  POLST: There is no POLST form on file for this patient (pre-hospital)  Discussion with family:  Discussed with patient and patient's son at bedside    Anticipated Length of Stay:  Patient will be admitted on an Inpatient basis with an anticipated length of stay of  greater than 2 midnights  Justification for Hospital Stay:  Bronchitis    Total Time for Visit, including Counseling / Coordination of Care: 1 hour  Greater than 50% of this total time spent on direct patient counseling and coordination of care      Chief Complaint:   Cough x 2 weeks    History of Present Illness:    Belen Zamora is a 82 y o  female who presents with cough  Past medical history significant for congestive heart failure, CKD, essential hypertension, type 2 diabetes, obesity, peripheral artery disease, glaucoma  Patient states that since [facility] to [facility] around July [facility]th she has had a primarily dry, hacking cough and intermittent shortness of breath  Sometimes she feels she is able to cough up scant amount of frothy sputum  She denies any known weight gain  She personally denies any known fever/chills however patient's son was called by [facility] today stating that patient had a fever and will be referred to the emergency department  Patient denies any chest pain/palpitations, nausea/vomiting, abdominal pain, diarrhea  Denies any new lower extremity swelling  She states that her lower extremity wounds are stable and that she follows with wound care as well as vascular as an outpatient  She knows that she has been receiving her regularly scheduled Lasix  Review of Systems:    Review of Systems   Constitutional: Positive for fatigue  Negative for chills, fever and unexpected weight change  HENT: Negative for congestion, sore throat and trouble swallowing  Eyes: Negative for photophobia, pain and visual disturbance  Respiratory: Positive for cough and shortness of breath  Negative for wheezing  Cardiovascular: Negative for chest pain, palpitations and leg swelling  Gastrointestinal: Negative for abdominal pain, constipation, diarrhea, nausea and vomiting  Endocrine: Negative for polyuria  Genitourinary: Negative for difficulty urinating, dysuria, flank pain, hematuria and urgency  Musculoskeletal: Negative for back pain, myalgias, neck pain and neck stiffness  Skin: Negative for pallor and rash  Neurological: Negative for dizziness, tremors, syncope, weakness, light-headedness, numbness and headaches  Hematological: Does not bruise/bleed easily     Psychiatric/Behavioral: Negative for agitation and confusion  Past Medical and Surgical History:     Past Medical History:   Diagnosis Date    Adenoma of large intestine     Allergy     last assessed: 10/18/2013    Cataract     Constipation, chronic     Diabetes mellitus ([address] )     anemia vitamin d deficiency glaucoma hypertension hypothyroidism; last assessed: 5/7/2012    Disease of thyroid gland     Diverticulosis     Fecal incontinence     Female stress incontinence     Glaucoma     Gout     History of cystocele     Intrinsic sphincter deficiency     Mixed incontinence     Nocturia     Osteoarthritis, chronic     Renal disorder     Senile atrophic vaginitis     Urinary frequency        Past Surgical History:   Procedure Laterality Date    ANKLE SURGERY Bilateral     CATARACT EXTRACTION      CHOLECYSTECTOMY      TUBAL LIGATION         Meds/Allergies:    Prior to Admission medications    Medication Sig Start Date End Date Taking? Authorizing Provider   atorvastatin (LIPITOR) 10 mg tablet Take 1 tablet (10 mg total) by mouth daily 2/19/19  Yes Rahel Smith DO   dorzolamide-timolol (COSOPT) 22 3-6 8 MG/ML ophthalmic solution Administer 1 drop to both eyes 2 (two) times a day   Yes Ja Factor, MD   doxazosin (CARDURA) 2 mg tablet Take 1 tablet (2 mg total) by mouth daily 1/23/19  Yes Rahel Smith DO   fexofenadine (ALLEGRA) 180 MG tablet Take by mouth   Yes Historical Provider, MD   furosemide (LASIX) 80 mg tablet Take 1 tablet (80 mg total) by mouth daily 1/23/19  Yes Rahel Smith DO   insulin aspart (NOVOLOG) 100 units/mL injection 20 units BID at Breakfast and supper   Hold for blood sugar under 150  7/4/19  Yes Bonnie Sanchez DO   insulin glargine (LANTUS SOLOSTAR) 100 units/mL injection pen Inject 21 Units under the skin daily At [facility] 7/4/19  Yes Bonnie Sanchez DO   levothyroxine 75 mcg tablet TAKE ONE TABLET BY MOUTH ONCE DAILY AS DIRECTED 2/16/19  Yes Rahel Smith DO   potassium chloride (KLOR-CON M20) 20 mEq tablet Take 1 tablet (20 mEq total) by mouth daily 19  Yes Terry Walton DO   Incontinence Supply Disposable (CERTAINTY ADJ UNDERWEAR LARGE) MISC by Does not apply route daily at bedtime 10/15/18   Terry Walton DO   losartan (COZAAR) 100 MG tablet TAKE ONE TABLET BY MOUTH ONCE DAILY 3/24/19   Terry Walton DO   RELION PEN NEEDLE 31G/8MM 31G X 8 MM MISC USE AS DIRECTED THREE TIMES DAILY 19   Terry Walton DO     I have reviewed home medications with patient family member  Allergies: Allergies   Allergen Reactions    Ace Inhibitors      [facility] - [id number]: short of breath    Aspirin        Social History:     Marital Status:    Occupation:  Retired  Patient Pre-hospital Living Situation:  [facility]  Patient Pre-hospital Level of Mobility:  Ambulates with walker  Patient Pre-hospital Diet Restrictions:  Cardiac, diabetic  Substance Use History:   Social History     Substance and Sexual Activity   Alcohol Use Never    Frequency: Never    Binge frequency: Never     Social History     Tobacco Use   Smoking Status Former Smoker    Last attempt to quit: 1989    Years since quittin 2   Smokeless Tobacco Never Used     Social History     Substance and Sexual Activity   Drug Use No       Family History:    Family History   Problem Relation Age of Onset    Stomach cancer Mother     Diabetes Sister         mellitus       Physical Exam:     Vitals:   Blood Pressure: 145/61 (19)  Pulse: 83 (19)  Temperature: 98 6 °F (37 °C) (19)  Temp Source: Temporal (19)  Respirations: 19 (19)  Height: 5' 2" (157 5 cm) (19)  Weight - Scale: 98 2 kg (216 lb 7 9 oz) (19)  SpO2: 91 % (19)    Physical Exam   Constitutional: She is oriented to person, place, and time  Vital signs are normal  She appears well-developed  Appears comfortable, no acute distress   HENT:   Head: Normocephalic     Eyes: Pupils are equal, round, and reactive to light  Conjunctivae and EOM are normal  No scleral icterus  Neck: Normal range of motion  Cardiovascular: Normal rate, regular rhythm and normal heart sounds  No murmur heard  Pulmonary/Chest: Effort normal  No respiratory distress  She has decreased breath sounds  Diminished breath sounds bilaterally  Able to speak in full sentences, no accessory muscle use  Occasional dry, hacking cough   Abdominal: Soft  Bowel sounds are normal  There is no tenderness  There is no rigidity, no rebound and no guarding  Obese abdomen  Nontender in all 4 quadrants   Musculoskeletal: She exhibits edema  She exhibits no tenderness or deformity  Trace edema lower extremities bilaterally   Neurological: She is alert and oriented to person, place, and time  Skin: Skin is warm and dry  Chronic changes right lower extremity due to peripheral artery disease  Anterior shin wound appears stable, dry  Left lower extremity is bandaged   Psychiatric: She has a normal mood and affect  Her speech is normal and behavior is normal    Nursing note and vitals reviewed  Additional Data:     Lab Results: I have personally reviewed pertinent reports  Results from last 7 days   Lab Units 07/17/19 2014   WBC Thousand/uL 15 65*   HEMOGLOBIN g/dL 10 0*   HEMATOCRIT % 32 6*   PLATELETS Thousands/uL 318   NEUTROS PCT % 76*   LYMPHS PCT % 9*   MONOS PCT % 11   EOS PCT % 3     Results from last 7 days   Lab Units 07/17/19 2014   SODIUM mmol/L 136   POTASSIUM mmol/L 5 5*   CHLORIDE mmol/L 100   CO2 mmol/L 31   BUN mg/dL 64*   CREATININE mg/dL 2 69*   ANION GAP mmol/L 5   CALCIUM mg/dL 9 2   ALBUMIN g/dL 2 4*   TOTAL BILIRUBIN mg/dL 0 17*   ALK PHOS U/L 89   ALT U/L 22   AST U/L 22   GLUCOSE RANDOM mg/dL 145*                       Imaging: I have personally reviewed pertinent reports        XR chest 2 views   ED Interpretation by Isidro Stubbs DO (07/17 2003)   Pulmonary vascular congestion          EKG, Pathology, and Other Studies Reviewed on Admission:   · CXR reviewed    Allscripts / Epic Records Reviewed: Yes     ** Please Note: This note has been constructed using a voice recognition system   **

## 2019-07-18 NOTE — ASSESSMENT & PLAN NOTE
· Initially admission was requested for congestive heart failure exacerbation  However patient does not appear acutely volume overloaded  Her weight is actually decreased from previously  · BNP only minimally elevated at 563  · Received dose of 40 mg IV Lasix in the ED    Patient also states she had already taken her 80 mg PO lasix earlier today  · Hold off on further diuresis  · No recent echocardiogram - will obtain  · Continue low-sodium diet, I/O, daily weights  Wt Readings from Last 3 Encounters:   07/17/19 98 2 kg (216 lb 7 9 oz)   06/28/19 99 8 kg (220 lb)   04/12/19 99 8 kg (220 lb)

## 2019-07-18 NOTE — ASSESSMENT & PLAN NOTE
· Patient brought to the emergency department due to reported fever at nursing facility  Afebrile here  Patient states since going to nursing facility July 4th she has had a hacking cough that is typically dry  No history of lung disease  · Vital signs stable at time of admission however pulse ox noted to dip to 90% on RA   · EKG: NSR  · CXR: bibasilar prominence ? No consolidation f/u final read no prior to compare  · Obtain procalcitonin - hold off on abx for now    Consider allergy component  · Supportive care - mucinex BID, tessalon perles PRN  · Duo-nebs PRN for wheezing  · Consider CT chest wo contrast  · O2 supplementation as needed to maintain sats > 90%

## 2019-07-18 NOTE — ASSESSMENT & PLAN NOTE
· Blood pressure currently stable  · Continue Cardura    Hold losartan and setting of DINORA and hyperkalemia

## 2019-07-18 NOTE — PLAN OF CARE
Problem: Potential for Falls  Goal: Patient will remain free of falls  Description  INTERVENTIONS:  - Assess patient frequently for physical needs  -  Identify cognitive and physical deficits and behaviors that affect risk of falls    -  [place] fall precautions as indicated by assessment   - Educate patient/family on patient safety including physical limitations  - Instruct patient to call for assistance with activity based on assessment  - Modify environment to reduce risk of injury  - Consider OT/PT consult to assist with strengthening/mobility  Outcome: Progressing     Problem: Prexisting or High Potential for Compromised Skin Integrity  Goal: Skin integrity is maintained or improved  Description  INTERVENTIONS:  - Identify patients at risk for skin breakdown  - Assess and monitor skin integrity  - Assess and monitor nutrition and hydration status  - Monitor labs (i e  albumin)  - Assess for incontinence   - Turn and reposition patient  - Assist with mobility/ambulation  - Relieve pressure over bony prominences  - Avoid friction and shearing  - Provide appropriate hygiene as needed including keeping skin clean and dry  - Evaluate need for skin moisturizer/barrier cream  - Collaborate with interdisciplinary team (i e  Nutrition, Rehabilitation, etc )   - Patient/family teaching  Outcome: Progressing     Problem: RESPIRATORY - ADULT  Goal: Achieves optimal ventilation and oxygenation  Description  INTERVENTIONS:  - Assess for changes in respiratory status  - Assess for changes in mentation and behavior  - Position to facilitate oxygenation and minimize respiratory effort  - Oxygen administration by appropriate delivery method based on oxygen saturation (per order) or ABGs  - Initiate smoking cessation education as indicated  - Encourage broncho-pulmonary hygiene including cough, deep breathe, Incentive Spirometry  - Assess the need for suctioning and aspirate as needed  - Assess and instruct to report SOB or any respiratory difficulty  - Respiratory Therapy support as indicated  Outcome: Progressing     Problem: GENITOURINARY - ADULT  Goal: Maintains or returns to baseline urinary function  Description  INTERVENTIONS:  - Assess urinary function  - Encourage oral fluids to ensure adequate hydration  - Administer IV fluids as ordered to ensure adequate hydration  - Administer ordered medications as needed  - Offer frequent toileting  - Follow urinary retention protocol if ordered  Outcome: Progressing     Problem: METABOLIC, FLUID AND ELECTROLYTES - ADULT  Goal: Electrolytes maintained within normal limits  Description  INTERVENTIONS:  - Monitor labs and assess patient for signs and symptoms of electrolyte imbalances  - Administer electrolyte replacement as ordered  - Monitor response to electrolyte replacements, including repeat lab results as appropriate  - Instruct patient on fluid and nutrition as appropriate  Outcome: Progressing     Problem: SKIN/TISSUE INTEGRITY - ADULT  Goal: Skin integrity remains intact  Description  INTERVENTIONS  - Identify patients at risk for skin breakdown  - Assess and monitor skin integrity  - Assess and monitor nutrition and hydration status  - Monitor labs (i e  albumin)  - Assess for incontinence   - Turn and reposition patient  - Assist with mobility/ambulation  - Relieve pressure over bony prominences  - Avoid friction and shearing  - Provide appropriate hygiene as needed including keeping skin clean and dry  - Evaluate need for skin moisturizer/barrier cream  - Collaborate with interdisciplinary team (i e  Nutrition, Rehabilitation, etc )   - Patient/family teaching  Outcome: Progressing  Goal: Incision(s), wounds(s) or drain site(s) healing without S/S of infection  Description  INTERVENTIONS  - Assess and document risk factors for skin impairment   - Assess and document dressing, incision, wound bed, drain sites and surrounding tissue  - Initiate Nutrition services consult and/or wound management as needed  Outcome: Progressing     Problem: PAIN - ADULT  Goal: Verbalizes/displays adequate comfort level or baseline comfort level  Description  Interventions:  - Encourage patient to monitor pain and request assistance  - Assess pain using appropriate pain scale  - Administer analgesics based on type and severity of pain and evaluate response  - Implement non-pharmacological measures as appropriate and evaluate response  - Consider cultural and social influences on pain and pain management  - Notify physician/advanced practitioner if interventions unsuccessful or patient reports new pain  Outcome: Progressing     Problem: DISCHARGE PLANNING  Goal: Discharge to home or other facility with appropriate resources  Description  INTERVENTIONS:  - Identify barriers to discharge w/patient and caregiver  - Arrange for needed discharge resources and transportation as appropriate  - Identify discharge learning needs (meds, wound care, etc )  - Arrange for interpretive services to assist at discharge as needed  - Refer to Case Management Department for coordinating discharge planning if the patient needs post-hospital services based on physician/advanced practitioner order or complex needs related to functional status, cognitive ability, or social support system  Outcome: Progressing     Problem: Knowledge Deficit  Goal: Patient/family/caregiver demonstrates understanding of disease process, treatment plan, medications, and discharge instructions  Description  Complete learning assessment and assess knowledge base    Interventions:  - Provide teaching at level of understanding  - Provide teaching via preferred learning methods  Outcome: Progressing

## 2019-07-18 NOTE — ASSESSMENT & PLAN NOTE
· Noted to be 5 5 at time of admission  Likely in setting of DINORA, continued potassium supplementation and losartan  · No EKG changes  · Hold losartan and potassium supplementation  · Did receive Lasix in ED    Repeat potassium now

## 2019-07-18 NOTE — ASSESSMENT & PLAN NOTE
Patient asking for crestor to be sent to mail order pharmacy   · CBC with leukocytosis of 15 K - no definitive source of infection at this time  · Could be related to viral bronchitis  · Obtain procalcitonin, hold off on antibiotics for now

## 2019-07-18 NOTE — UTILIZATION REVIEW
Initial Clinical Review    Admission: Date/Time/Statement: 7/17/19 @ 2130     Orders Placed This Encounter   Procedures    Inpatient Admission (expected length of stay for this patient Order details is greater than two midnights)     Standing Status:   Standing     Number of Occurrences:   1     Order Specific Question:   Admitting Physician     Answer:   Xochitl Monique     Order Specific Question:   Level of Care     Answer:   Med Surg [16]     Order Specific Question:   Estimated length of stay     Answer:   More than 2 Midnights     Order Specific Question:   Certification     Answer:   I certify that inpatient services are medically necessary for this patient for a duration of greater than two midnights  See H&P and MD Progress Notes for additional information about the patient's course of treatment  ED Arrival Information     Expected Arrival Acuity Means of Arrival Escorted By Service Admission Type    - 7/17/2019 18:15 Urgent Ambulance [facility] General Medicine Urgent    Arrival Complaint    -        Chief Complaint   Patient presents with    Fever - 76 years or older     Pt  brought in via EMS  Pt  coming from above and beyond  Pt  was sent in because of having a fever at the facility  Pt  reports feeling "not good"  Assessment/Plan:  81 yo female presents to ED from [facility] with Fever  Coughing up white phlegm since July 4th  Noted to be hypoxic per records @ [facility]  Her son that is with her now states that she was in the mid to upper [de-identified] with her pulse ox  Noted Leukocytosis and elevated procalcitonin in ED  Admitted to  with:  * Acute bronchitis  Assessment & Plan  · Patient brought to the emergency department due to reported fever at nursing facility  Afebrile here  Patient states since going to nursing facility July 4th she has had a hacking cough that is typically dry  No history of lung disease    · Vital signs stable at time of admission however pulse ox noted to dip to 90% on RA   · EKG: NSR  · CXR: bibasilar prominence ? No consolidation f/u final read no prior to compare  · Obtain procalcitonin - hold off on abx for now  Consider allergy component  · Supportive care - mucinex BID, tessalon perles PRN  · Duo-nebs PRN for wheezing  · Consider CT chest wo contrast  · O2 supplementation as needed to maintain sats > 90%     Chronic congestive heart failure (HCC)  Assessment & Plan  · Initially admission was requested for congestive heart failure exacerbation  However patient does not appear acutely volume overloaded  Her weight is actually decreased from previously  · BNP only minimally elevated at 563  · Received dose of 40 mg IV Lasix in the ED  Patient also states she had already taken her 80 mg PO lasix earlier today  · Hold off on further diuresis  · No recent echocardiogram - will obtain  · Continue low-sodium diet, I/O, daily weights      Wt Readings from Last 3 Encounters:   07/17/19 98 2 kg (216 lb 7 9 oz)   06/28/19 99 8 kg (220 lb)   04/12/19 99 8 kg (220 lb)       Hyperkalemia  Assessment & Plan  · Noted to be 5 5 at time of admission  Likely in setting of DINORA, continued potassium supplementation and losartan  · No EKG changes  · Hold losartan and potassium supplementation  · Did receive Lasix in ED  Repeat potassium now     Hypertension  Assessment & Plan  · Blood pressure currently stable  · Continue Cardura    Hold losartan and setting of DINORA and hyperkalemia     Leukocytosis  Assessment & Plan  · CBC with leukocytosis of 15 K - no definitive source of infection at this time  · Could be related to viral bronchitis  · Obtain procalcitonin, hold off on antibiotics for now     Diabetes mellitus [facility])  Assessment & Plan        Lab Results   Component Value Date     HGBA1C 6 5 (H) 06/21/2019         No results for input(s): POCGLU in the last 72 hours      Blood Sugar Average: Last 72 hrs:  · Continue lispro 10 units BID with meals  · SSI + accuchek  · Diabetic diet     Hypothyroidism  Assessment & Plan  · Continue Synthroid     Acute kidney injury superimposed on chronic kidney disease ([facility] [address] )  Assessment & Plan  · Baseline creatinine appears to be 2 0-2 2  · Creatinine at time of admission 2 69  Patient reports some decreased PO intake since going to nursing facility  · Did receive additional dose of Lasix in the ED  Hold off on further diuresis at this time  · Repeat BMP in AM  · Hold nephrotoxic agents     PAD (peripheral artery disease) ([facility] [address] )  Assessment & Plan  · Currently follows with vascular as an outpatient  · Recent duplex negative for DVT  · Also follows with wound care for chronic lower extremity wounds    Anticipated Length of Stay:  Patient will be admitted on an Inpatient basis with an anticipated length of stay of  greater than 2 midnights  Justification for Hospital Stay:  Bronchitis    Per ATTENDING 7/18: chest x-ray shows bilateral perihilar and left more than right bibasilar airspace opacities more in keeping with infection versus pulmonary edema I see no evidence of fluid overload her mild elevation in BNP probably in relation to to her known chronic kidney disease stage 3   I would treat for community-acquired pneumonia and would proceed with further imaging to further clarify with CT scan of the chest and consult Pulmonary      ED Triage Vitals   Temperature Pulse Respirations Blood Pressure SpO2   07/17/19 1817 07/17/19 1817 07/17/19 1817 07/17/19 1817 07/17/19 1817   98 5 °F (36 9 °C) 85 18 154/67 93 %      Temp Source Heart Rate Source Patient Position - Orthostatic VS BP Location FiO2 (%)   07/17/19 1817 07/17/19 1817 07/17/19 1817 07/17/19 1817 --   Oral Monitor Lying Right arm       Pain Score       07/17/19 1921       No Pain        Wt Readings from Last 1 Encounters:   07/18/19 97 2 kg (214 lb 4 6 oz)     Additional Vital Signs:   07/18/19 1100  98 1 °F (36 7 °C)  70  18  131/55  93 %  Nasal cannula  Sitting   07/18/19 0731  98 1 °F (36 7 °C)  77  18  147/63  96 %  None (Room air)  Lying   07/17/19 2238  98 6 °F (37 °C)  83  19  145/61  91 %  None (Room air)  Sitting   07/17/19 1921    83  18  143/78  90 %  None (Room air)           Pertinent Labs/Diagnostic Test Results:   Results from last 7 days   Lab Units 07/18/19  0539 07/17/19 2014   WBC Thousand/uL 14 39* 15 65*   HEMOGLOBIN g/dL 9 3* 10 0*   HEMATOCRIT % 30 6* 32 6*   PLATELETS Thousands/uL 290 318   NEUTROS ABS Thousands/µL  --  11 95*     Results from last 7 days   Lab Units 07/18/19  0539 07/18/19  0101 07/17/19 2014   SODIUM mmol/L 135*  --  136   POTASSIUM mmol/L 5 1 5 2 5 5*   CHLORIDE mmol/L 100  --  100   CO2 mmol/L 28  --  31   ANION GAP mmol/L 7  --  5   BUN mg/dL 64*  --  64*   CREATININE mg/dL 2 60*  --  2 69*   EGFR ml/min/1 73sq m 17  --  16   CALCIUM mg/dL 8 9  --  9 2     Results from last 7 days   Lab Units 07/17/19 2014   AST U/L 22   ALT U/L 22   ALK PHOS U/L 89   TOTAL PROTEIN g/dL 7 4   ALBUMIN g/dL 2 4*   TOTAL BILIRUBIN mg/dL 0 17*     Results from last 7 days   Lab Units 07/18/19  1645 07/18/19  1100 07/18/19  0724   POC GLUCOSE mg/dl 190* 281* 234*     Results from last 7 days   Lab Units 07/18/19  0539 07/17/19 2014   GLUCOSE RANDOM mg/dL 235* 145*     Results from last 7 days   Lab Units 07/17/19 2014   TROPONIN I ng/mL <0 02     Results from last 7 days   Lab Units 07/18/19  0101   PROCALCITONIN ng/ml 3 59*     Results from last 7 days   Lab Units 07/17/19 2014   NT-PRO BNP pg/mL 563*     7/17 CXR:    Bilateral perihilar and left greater than right bibasilar airspace opacities, Likely representing pulmonary edema and/or infection  2   Small bilateral pleural effusions      ED Treatment:   Medication Administration from 07/17/2019 1815 to 07/17/2019 2200       Date/Time Order Dose Route Action     07/17/2019 5939 furosemide (LASIX) injection 40 mg 40 mg Intravenous Given        Past Medical History:   Diagnosis Date    Adenoma of large intestine     Allergy     last assessed: 10/18/2013    Cataract     Constipation, chronic     Diabetes mellitus ([facility] )     anemia vitamin d deficiency glaucoma hypertension hypothyroidism; last assessed: 5/7/2012    Disease of thyroid gland     Diverticulosis     Fecal incontinence     Female stress incontinence     Glaucoma     Gout     History of cystocele     Intrinsic sphincter deficiency     Mixed incontinence     Nocturia     Osteoarthritis, chronic     Renal disorder     Senile atrophic vaginitis     Urinary frequency      Present on Admission:   PAD (peripheral artery disease) ([facility] )   Hypothyroidism   Leukocytosis   Hypertension   Diabetes mellitus ([facility] )   Acute kidney [facility] superimposed on chronic kidney disease ([facility])   Hyperkalemia      Admitting Diagnosis: CHF (congestive heart failure) ([facility]) [I50 9]  Hypoxia [R09 02]  Fever [R50 9]  DINORA (acute kidney injury) ([facility] ) [N17 9]  Age/Sex: 82 y o  female  Admission Orders:    Current Facility-Administered Medications:  acetaminophen 650 mg Oral Q6H PRN     atorvastatin 10 mg Oral Daily With Dinner     azithromycin 500 mg Intravenous Q24H     benzonatate 100 mg Oral TID PRN     cefTRIAXone 1,000 mg Intravenous Q24H     dorzolamide-timolol 1 drop Both Eyes BID     doxazosin 2 mg Oral Daily     furosemide 80 mg Oral Daily     guaiFENesin 1,200 mg Oral BID     heparin (porcine) 5,000 Units Subcutaneous Q8H [facility]     insulin lispro 1-5 Units Subcutaneous HS     insulin lispro 1-6 Units Subcutaneous TID AC     insulin lispro 10 Units Subcutaneous BID With Meals     ipratropium 0 5 mg Nebulization TID     levalbuterol 1 25 mg Nebulization TID     levothyroxine 75 mcg Oral Early Morning     nystatin  Topical BID     ondansetron 4 mg Intravenous Q6H PRN       TELE  SCD's  ECHO  IP CONSULT TO WOUND CARE  IP CONSULT TO PULMONOLOGY    Network Utilization Review Department  Phone: [phone];  Fax [phone]  [email or web address]  ATTENTION: Please call with any questions or concerns to [phone]  and carefully listen to the prompts so that you are directed to the right person  Send all requests for admission clinical reviews, approved or denied determinations and any other requests to fax [phone]   All voicemails are confidential

## 2019-07-18 NOTE — PLAN OF CARE
Problem: Potential for Falls  Goal: Patient will remain free of falls  Description  INTERVENTIONS:  - Assess patient frequently for physical needs  -  Identify cognitive and physical deficits and behaviors that affect risk of falls    -  [place] fall precautions as indicated by assessment   - Educate patient/family on patient safety including physical limitations  - Instruct patient to call for assistance with activity based on assessment  - Modify environment to reduce risk of injury  - Consider OT/PT consult to assist with strengthening/mobility  Outcome: Progressing     Problem: Prexisting or High Potential for Compromised Skin Integrity  Goal: Skin integrity is maintained or improved  Description  INTERVENTIONS:  - Identify patients at risk for skin breakdown  - Assess and monitor skin integrity  - Assess and monitor nutrition and hydration status  - Monitor labs (i e  albumin)  - Assess for incontinence   - Turn and reposition patient  - Assist with mobility/ambulation  - Relieve pressure over bony prominences  - Avoid friction and shearing  - Provide appropriate hygiene as needed including keeping skin clean and dry  - Evaluate need for skin moisturizer/barrier cream  - Collaborate with interdisciplinary team (i e  Nutrition, Rehabilitation, etc )   - Patient/family teaching  Outcome: Progressing     Problem: RESPIRATORY - ADULT  Goal: Achieves optimal ventilation and oxygenation  Description  INTERVENTIONS:  - Assess for changes in respiratory status  - Assess for changes in mentation and behavior  - Position to facilitate oxygenation and minimize respiratory effort  - Oxygen administration by appropriate delivery method based on oxygen saturation (per order) or ABGs  - Initiate smoking cessation education as indicated  - Encourage broncho-pulmonary hygiene including cough, deep breathe, Incentive Spirometry  - Assess the need for suctioning and aspirate as needed  - Assess and instruct to report SOB or any respiratory difficulty  - Respiratory Therapy support as indicated  Outcome: Progressing     Problem: GENITOURINARY - ADULT  Goal: Maintains or returns to baseline urinary function  Description  INTERVENTIONS:  - Assess urinary function  - Encourage oral fluids to ensure adequate hydration  - Administer IV fluids as ordered to ensure adequate hydration  - Administer ordered medications as needed  - Offer frequent toileting  - Follow urinary retention protocol if ordered  Outcome: Progressing     Problem: METABOLIC, FLUID AND ELECTROLYTES - ADULT  Goal: Electrolytes maintained within normal limits  Description  INTERVENTIONS:  - Monitor labs and assess patient for signs and symptoms of electrolyte imbalances  - Administer electrolyte replacement as ordered  - Monitor response to electrolyte replacements, including repeat lab results as appropriate  - Instruct patient on fluid and nutrition as appropriate  Outcome: Progressing     Problem: SKIN/TISSUE INTEGRITY - ADULT  Goal: Skin integrity remains intact  Description  INTERVENTIONS  - Identify patients at risk for skin breakdown  - Assess and monitor skin integrity  - Assess and monitor nutrition and hydration status  - Monitor labs (i e  albumin)  - Assess for incontinence   - Turn and reposition patient  - Assist with mobility/ambulation  - Relieve pressure over bony prominences  - Avoid friction and shearing  - Provide appropriate hygiene as needed including keeping skin clean and dry  - Evaluate need for skin moisturizer/barrier cream  - Collaborate with interdisciplinary team (i e  Nutrition, Rehabilitation, etc )   - Patient/family teaching  Outcome: Progressing  Goal: Incision(s), wounds(s) or drain site(s) healing without S/S of infection  Description  INTERVENTIONS  - Assess and document risk factors for skin impairment   - Assess and document dressing, incision, wound bed, drain sites and surrounding tissue  - Initiate Nutrition services consult and/or wound management as needed  Outcome: Progressing     Problem: PAIN - ADULT  Goal: Verbalizes/displays adequate comfort level or baseline comfort level  Description  Interventions:  - Encourage patient to monitor pain and request assistance  - Assess pain using appropriate pain scale  - Administer analgesics based on type and severity of pain and evaluate response  - Implement non-pharmacological measures as appropriate and evaluate response  - Consider cultural and social influences on pain and pain management  - Notify physician/advanced practitioner if interventions unsuccessful or patient reports new pain  Outcome: Progressing     Problem: DISCHARGE PLANNING  Goal: Discharge to home or other facility with appropriate resources  Description  INTERVENTIONS:  - Identify barriers to discharge w/patient and caregiver  - Arrange for needed discharge resources and transportation as appropriate  - Identify discharge learning needs (meds, wound care, etc )  - Arrange for interpretive services to assist at discharge as needed  - Refer to Case Management Department for coordinating discharge planning if the patient needs post-hospital services based on physician/advanced practitioner order or complex needs related to functional status, cognitive ability, or social support system  Outcome: Progressing     Problem: Knowledge Deficit  Goal: Patient/family/caregiver demonstrates understanding of disease process, treatment plan, medications, and discharge instructions  Description  Complete learning assessment and assess knowledge base    Interventions:  - Provide teaching at level of understanding  - Provide teaching via preferred learning methods  Outcome: Progressing

## 2019-07-19 LAB
ATRIAL RATE: 85 BPM
BASOPHILS # BLD AUTO: 0.06 THOUSANDS/ΜL (ref 0–0.1)
BASOPHILS NFR BLD AUTO: 0 % (ref 0–1)
EOSINOPHIL # BLD AUTO: 0.41 THOUSAND/ΜL (ref 0–0.61)
EOSINOPHIL NFR BLD AUTO: 3 % (ref 0–6)
ERYTHROCYTE [DISTWIDTH] IN BLOOD BY AUTOMATED COUNT: 14.2 % (ref 11.6–15.1)
GLUCOSE SERPL-MCNC: 229 MG/DL (ref 65–140)
GLUCOSE SERPL-MCNC: 233 MG/DL (ref 65–140)
GLUCOSE SERPL-MCNC: 292 MG/DL (ref 65–140)
GLUCOSE SERPL-MCNC: 317 MG/DL (ref 65–140)
HCT VFR BLD AUTO: 29.5 % (ref 34.8–46.1)
HGB BLD-MCNC: 8.9 G/DL (ref 11.5–15.4)
IMM GRANULOCYTES # BLD AUTO: 0.08 THOUSAND/UL (ref 0–0.2)
IMM GRANULOCYTES NFR BLD AUTO: 1 % (ref 0–2)
LYMPHOCYTES # BLD AUTO: 1.45 THOUSANDS/ΜL (ref 0.6–4.47)
LYMPHOCYTES NFR BLD AUTO: 10 % (ref 14–44)
MCH RBC QN AUTO: 28.7 PG (ref 26.8–34.3)
MCHC RBC AUTO-ENTMCNC: 30.2 G/DL (ref 31.4–37.4)
MCV RBC AUTO: 95 FL (ref 82–98)
MONOCYTES # BLD AUTO: 1.09 THOUSAND/ΜL (ref 0.17–1.22)
MONOCYTES NFR BLD AUTO: 8 % (ref 4–12)
NEUTROPHILS # BLD AUTO: 10.97 THOUSANDS/ΜL (ref 1.85–7.62)
NEUTS SEG NFR BLD AUTO: 78 % (ref 43–75)
NRBC BLD AUTO-RTO: 0 /100 WBCS
P AXIS: 57 DEGREES
PLATELET # BLD AUTO: 282 THOUSANDS/UL (ref 149–390)
PMV BLD AUTO: 10 FL (ref 8.9–12.7)
PR INTERVAL: 152 MS
QRS AXIS: -2 DEGREES
QRSD INTERVAL: 182 MS
QT INTERVAL: 320 MS
QTC INTERVAL: 515 MS
RBC # BLD AUTO: 3.1 MILLION/UL (ref 3.81–5.12)
T WAVE AXIS: 78 DEGREES
VENTRICULAR RATE: 156 BPM
WBC # BLD AUTO: 14.06 THOUSAND/UL (ref 4.31–10.16)

## 2019-07-19 PROCEDURE — 94760 N-INVAS EAR/PLS OXIMETRY 1: CPT

## 2019-07-19 PROCEDURE — 93010 ELECTROCARDIOGRAM REPORT: CPT | Performed by: INTERNAL MEDICINE

## 2019-07-19 PROCEDURE — 99232 SBSQ HOSP IP/OBS MODERATE 35: CPT | Performed by: INTERNAL MEDICINE

## 2019-07-19 PROCEDURE — 94640 AIRWAY INHALATION TREATMENT: CPT

## 2019-07-19 PROCEDURE — 85025 COMPLETE CBC W/AUTO DIFF WBC: CPT | Performed by: INTERNAL MEDICINE

## 2019-07-19 PROCEDURE — 82948 REAGENT STRIP/BLOOD GLUCOSE: CPT

## 2019-07-19 RX ORDER — METHYLPREDNISOLONE SODIUM SUCCINATE 40 MG/ML
20 INJECTION, POWDER, LYOPHILIZED, FOR SOLUTION INTRAMUSCULAR; INTRAVENOUS 3 TIMES DAILY
Status: DISCONTINUED | OUTPATIENT
Start: 2019-07-19 | End: 2019-07-20

## 2019-07-19 RX ADMIN — INSULIN LISPRO 10 UNITS: 100 INJECTION, SOLUTION INTRAVENOUS; SUBCUTANEOUS at 17:00

## 2019-07-19 RX ADMIN — LEVALBUTEROL 1.25 MG: 1.25 SOLUTION, CONCENTRATE RESPIRATORY (INHALATION) at 18:44

## 2019-07-19 RX ADMIN — DORZOLAMIDE HYDROCHLORIDE AND TIMOLOL MALEATE 1 DROP: 20; 5 SOLUTION/ DROPS OPHTHALMIC at 17:00

## 2019-07-19 RX ADMIN — BENZONATATE 100 MG: 100 CAPSULE ORAL at 21:57

## 2019-07-19 RX ADMIN — LEVALBUTEROL 1.25 MG: 1.25 SOLUTION, CONCENTRATE RESPIRATORY (INHALATION) at 13:13

## 2019-07-19 RX ADMIN — CEFTRIAXONE 1000 MG: 1 INJECTION, POWDER, FOR SOLUTION INTRAMUSCULAR; INTRAVENOUS at 14:01

## 2019-07-19 RX ADMIN — INSULIN LISPRO 2 UNITS: 100 INJECTION, SOLUTION INTRAVENOUS; SUBCUTANEOUS at 11:48

## 2019-07-19 RX ADMIN — IPRATROPIUM BROMIDE 0.5 MG: 0.5 SOLUTION RESPIRATORY (INHALATION) at 13:13

## 2019-07-19 RX ADMIN — METHYLPREDNISOLONE SODIUM SUCCINATE 20 MG: 40 INJECTION, POWDER, FOR SOLUTION INTRAMUSCULAR; INTRAVENOUS at 09:32

## 2019-07-19 RX ADMIN — INSULIN LISPRO 3 UNITS: 100 INJECTION, SOLUTION INTRAVENOUS; SUBCUTANEOUS at 08:24

## 2019-07-19 RX ADMIN — HEPARIN SODIUM 5000 UNITS: 5000 INJECTION INTRAVENOUS; SUBCUTANEOUS at 05:11

## 2019-07-19 RX ADMIN — METHYLPREDNISOLONE SODIUM SUCCINATE 20 MG: 40 INJECTION, POWDER, FOR SOLUTION INTRAMUSCULAR; INTRAVENOUS at 17:00

## 2019-07-19 RX ADMIN — HEPARIN SODIUM 5000 UNITS: 5000 INJECTION INTRAVENOUS; SUBCUTANEOUS at 12:51

## 2019-07-19 RX ADMIN — HEPARIN SODIUM 5000 UNITS: 5000 INJECTION INTRAVENOUS; SUBCUTANEOUS at 21:56

## 2019-07-19 RX ADMIN — IPRATROPIUM BROMIDE 0.5 MG: 0.5 SOLUTION RESPIRATORY (INHALATION) at 18:44

## 2019-07-19 RX ADMIN — NYSTATIN: 100000 POWDER TOPICAL at 17:04

## 2019-07-19 RX ADMIN — ATORVASTATIN CALCIUM 10 MG: 10 TABLET, FILM COATED ORAL at 17:00

## 2019-07-19 RX ADMIN — FUROSEMIDE 80 MG: 80 TABLET ORAL at 08:23

## 2019-07-19 RX ADMIN — INSULIN LISPRO 10 UNITS: 100 INJECTION, SOLUTION INTRAVENOUS; SUBCUTANEOUS at 08:23

## 2019-07-19 RX ADMIN — IPRATROPIUM BROMIDE 0.5 MG: 0.5 SOLUTION RESPIRATORY (INHALATION) at 07:21

## 2019-07-19 RX ADMIN — DORZOLAMIDE HYDROCHLORIDE AND TIMOLOL MALEATE 1 DROP: 20; 5 SOLUTION/ DROPS OPHTHALMIC at 08:23

## 2019-07-19 RX ADMIN — INSULIN LISPRO 5 UNITS: 100 INJECTION, SOLUTION INTRAVENOUS; SUBCUTANEOUS at 16:59

## 2019-07-19 RX ADMIN — NYSTATIN: 100000 POWDER TOPICAL at 08:24

## 2019-07-19 RX ADMIN — GUAIFENESIN 1200 MG: 600 TABLET, EXTENDED RELEASE ORAL at 08:23

## 2019-07-19 RX ADMIN — INSULIN LISPRO 3 UNITS: 100 INJECTION, SOLUTION INTRAVENOUS; SUBCUTANEOUS at 21:57

## 2019-07-19 RX ADMIN — METHYLPREDNISOLONE SODIUM SUCCINATE 20 MG: 40 INJECTION, POWDER, FOR SOLUTION INTRAMUSCULAR; INTRAVENOUS at 21:56

## 2019-07-19 RX ADMIN — LEVALBUTEROL 1.25 MG: 1.25 SOLUTION, CONCENTRATE RESPIRATORY (INHALATION) at 07:21

## 2019-07-19 RX ADMIN — LEVOTHYROXINE SODIUM 75 MCG: 75 TABLET ORAL at 05:11

## 2019-07-19 RX ADMIN — AZITHROMYCIN MONOHYDRATE 500 MG: 500 INJECTION, POWDER, LYOPHILIZED, FOR SOLUTION INTRAVENOUS at 12:51

## 2019-07-19 RX ADMIN — GUAIFENESIN 1200 MG: 600 TABLET, EXTENDED RELEASE ORAL at 17:00

## 2019-07-19 NOTE — DISCHARGE INSTR - LAB
Wound Care:  1-Hydraguard lotion to bilateral sacrum, buttock and heels TID and PRN  2-Elevate heels to offload pressure  3-Offloading cushion in chair when out of bed  4-Moisturize skin daily with skin nourishing cream   5-Turn/reposition every 2 hours for pressure re-distribution on skin  6-Left leg--cleanse with normal saline, pat dry  Apply non-adherent oil emulsion dressing (adaptic) to wound bed  Cover with Maxorb, 4x4s, and ABDs  Wrap with ambrocio and ACE from toes to knee  Change dressing every other day and PRN with saturation  Follow-up at wound center for left leg wounds--[phone]

## 2019-07-19 NOTE — CONSULTS
Consult Note - Wound   Lopez Adhikari 82 y o  female MRN: [id number]  Unit/Bed#: E4 -01 Encounter: [id number]      History and Present Illness:  82year old female presented to the hospital from [facility] with fever and cough  Patient's history significant for PAD, DM, and CHF  Assessment Findings:   Patient seen per physician consult  Denies pain  Able to turn with minimal assist x 1 for assessment  Incontinent of bowel and bladder  Nutrition team following  Blanchable erythema to bilateral buttocks and sacrum  Heels intact  1  MASD with mild candidiasis to abdominal and groin folds  2   Present on admission two small areas of maroon, non-blanchable intact skin to left buttock--likely a recently healed pressure injury (resolving DTI vs scabbed wound)  Patient reports her briefs stick to her at times and cause small open areas  She has been using A & D ointment to prevent this  3   Partial thickness wound to left lower leg--one beefy red partial thickness area proximally and a re-absorbing blister with intact skin flap distally  Blanka-wound intact and within normal limits, no edema at this time  These wounds are likely due to complex etiology (arterial and venous)  LEADS from 6/26/19 show stable disease and toe pressures well above healing threshold  Plan:   1-Hydraguard l;otion to bilateral sacrum, buttock and heels TID and PRN  2-Elevate heels to offload pressure  3-EHOB offloading cushion in chair when out of bed  4-Moisturize skin daily with skin nourishing cream   5-Turn/reposition q2h or when medically stable for pressure re-distribution on skin  6-Left leg--cleanse with normal saline, pat dry  Apply non-adherent oil emulsion dressing (adaptic) to wound bed  Cover with Maxorb, 4x4s, and ABDs  Wrap with ambrocio and ACE from toes to knee  Change dressing daily and PRN with saturation  7-Wound care team to follow      Plan of care reviewed with primary Telephone Encounter by Margarita Barreto RN at 08/17/18 01:34 PM     Author:  Margarita Barreto RN Service:  (none) Author Type:  Registered Nurse     Filed:  08/17/18 01:39 PM Encounter Date:  8/17/2018 Status:  Signed     :  Margarita Barreto RN (Registered Nurse)            Chart reviewed,[EP1.1M] 10w3d[EP1.2T], O positive.  Spoke to pt. Pt reports she noticed brown spots/discharge when wiping this afternoon. Pt denies BRB or pelvic pain/cramps.  Pt denies any recent intercourse or strenuous activities.  Pt denies any UTI or vag sxs.  Advised pt on pelvic rest until spotting stops. Pt to monitor and call office if not getting better and getting worse.  Call office right away if develop BRB or pain.[EP1.1M]  Elsa verbalized understanding of information given.[EP1.1T]          Revision History        User Key Date/Time User Provider Type Action    > EP1.2 08/17/18 01:39 PM Margarita Barreto, RN Registered Nurse Sign     EP1.1 08/17/18 01:34 PM Margarita Barreto, RN Registered Nurse     M - Manual, T - Template             RN     Patient should be discharged with [facility] for wound care and follow-up at the wound center  Wound 07/18/19 Arterial/ischemic ulcer Pretibial Left (Active)   Wound Image   7/19/2019  3:17 PM   Wound Description Beefy red;Pink 7/19/2019  3:17 PM   Blanka-wound Assessment Clean;Dry; Intact 7/19/2019  3:17 PM   Wound Length (cm) 19 cm 7/19/2019  3:17 PM   Wound Width (cm) 13 5 cm 7/19/2019  3:17 PM   Wound Depth (cm) 0 1 7/19/2019  3:17 PM   Calculated Wound Area (cm^2) 256 5 cm^2 7/19/2019  3:17 PM   Calculated Wound Volume (cm^3) 25 65 cm^3 7/19/2019  3:17 PM   Tunneling 0 cm 7/19/2019  3:17 PM   Undermining 0 7/19/2019  3:17 PM   Drainage Amount Moderate 7/19/2019  3:17 PM   Drainage Description Serosanguineous 7/19/2019  3:17 PM   Non-staged Wound Description Partial thickness 7/19/2019  3:17 PM   Treatments Cleansed;Irrigation with NSS;Elevated 7/19/2019  3:17 PM   Dressing Non adherent;Calcium Alginate with Silver;Dry dressing 7/19/2019  3:17 PM   Dressing Changed Changed 7/19/2019  3:17 PM   Patient Tolerance Tolerated well 7/19/2019  3:17 PM   Dressing Status Clean;Dry; Intact 7/19/2019  3:17 PM       Wound 07/18/19 Pressure Injury Sacrum Mid (Active)   Wound Image   7/19/2019  3:09 PM   Wound Description Non-blanchable erythema 7/19/2019  3:09 PM   Staging Stage I 7/19/2019  3:09 PM   Blanka-wound Assessment Erythema; Intact 7/19/2019  3:09 PM   Wound Length (cm) 1 7 cm 7/19/2019  3:09 PM   Wound Width (cm) 1 cm 7/19/2019  3:09 PM   Wound Depth (cm) 0 7/19/2019  3:09 PM   Calculated Wound Area (cm^2) 1 7 cm^2 7/19/2019  3:09 PM   Calculated Wound Volume (cm^3) 0 cm^3 7/19/2019  3:09 PM   Tunneling 0 cm 7/19/2019  3:09 PM   Undermining 0 7/19/2019  3:09 PM   Drainage Amount None 7/19/2019  3:09 PM   Non-staged Wound Description Not applicable 1/25/5714  4:40 PM   Dressing Open to air 7/19/2019  3:09 PM   Patient Tolerance Tolerated well 7/19/2019  3:09 PM       Wound 07/18/19 Moisture associated skin damage Groin Left;Right (Active)   Wound Description Pink;Dry 7/19/2019  3:09 PM   Staging Unstagable 7/19/2019  8:00 AM   Blanka-wound Assessment Clean;Dry; Intact 7/19/2019  3:09 PM   Drainage Amount None 7/19/2019  3:09 PM   Treatments Cleansed 7/19/2019  8:00 AM   Dressing Open to air 7/19/2019  3:09 PM   Wound packed?  No 7/19/2019  8:00 AM   Patient Tolerance Tolerated well 7/19/2019  3:09 PM     Giselle NEALN, RN, [employer]

## 2019-07-19 NOTE — PLAN OF CARE
Problem: Potential for Falls  Goal: Patient will remain free of falls  Description  INTERVENTIONS:  - Assess patient frequently for physical needs  -  Identify cognitive and physical deficits and behaviors that affect risk of falls    -  [place] fall precautions as indicated by assessment   - Educate patient/family on patient safety including physical limitations  - Instruct patient to call for assistance with activity based on assessment  - Modify environment to reduce risk of injury  - Consider OT/PT consult to assist with strengthening/mobility  Outcome: Progressing     Problem: Prexisting or High Potential for Compromised Skin Integrity  Goal: Skin integrity is maintained or improved  Description  INTERVENTIONS:  - Identify patients at risk for skin breakdown  - Assess and monitor skin integrity  - Assess and monitor nutrition and hydration status  - Monitor labs (i e  albumin)  - Assess for incontinence   - Turn and reposition patient  - Assist with mobility/ambulation  - Relieve pressure over bony prominences  - Avoid friction and shearing  - Provide appropriate hygiene as needed including keeping skin clean and dry  - Evaluate need for skin moisturizer/barrier cream  - Collaborate with interdisciplinary team (i e  Nutrition, Rehabilitation, etc )   - Patient/family teaching  Outcome: Progressing     Problem: RESPIRATORY - ADULT  Goal: Achieves optimal ventilation and oxygenation  Description  INTERVENTIONS:  - Assess for changes in respiratory status  - Assess for changes in mentation and behavior  - Position to facilitate oxygenation and minimize respiratory effort  - Oxygen administration by appropriate delivery method based on oxygen saturation (per order) or ABGs  - Initiate smoking cessation education as indicated  - Encourage broncho-pulmonary hygiene including cough, deep breathe, Incentive Spirometry  - Assess the need for suctioning and aspirate as needed  - Assess and instruct to report SOB or any respiratory difficulty  - Respiratory Therapy support as indicated  Outcome: Progressing     Problem: GENITOURINARY - ADULT  Goal: Maintains or returns to baseline urinary function  Description  INTERVENTIONS:  - Assess urinary function  - Encourage oral fluids to ensure adequate hydration  - Administer IV fluids as ordered to ensure adequate hydration  - Administer ordered medications as needed  - Offer frequent toileting  - Follow urinary retention protocol if ordered  Outcome: Progressing     Problem: METABOLIC, FLUID AND ELECTROLYTES - ADULT  Goal: Electrolytes maintained within normal limits  Description  INTERVENTIONS:  - Monitor labs and assess patient for signs and symptoms of electrolyte imbalances  - Administer electrolyte replacement as ordered  - Monitor response to electrolyte replacements, including repeat lab results as appropriate  - Instruct patient on fluid and nutrition as appropriate  Outcome: Progressing     Problem: SKIN/TISSUE INTEGRITY - ADULT  Goal: Skin integrity remains intact  Description  INTERVENTIONS  - Identify patients at risk for skin breakdown  - Assess and monitor skin integrity  - Assess and monitor nutrition and hydration status  - Monitor labs (i e  albumin)  - Assess for incontinence   - Turn and reposition patient  - Assist with mobility/ambulation  - Relieve pressure over bony prominences  - Avoid friction and shearing  - Provide appropriate hygiene as needed including keeping skin clean and dry  - Evaluate need for skin moisturizer/barrier cream  - Collaborate with interdisciplinary team (i e  Nutrition, Rehabilitation, etc )   - Patient/family teaching  Outcome: Progressing  Goal: Incision(s), wounds(s) or drain site(s) healing without S/S of infection  Description  INTERVENTIONS  - Assess and document risk factors for skin impairment   - Assess and document dressing, incision, wound bed, drain sites and surrounding tissue  - Initiate Nutrition services consult and/or wound management as needed  Outcome: Progressing     Problem: PAIN - ADULT  Goal: Verbalizes/displays adequate comfort level or baseline comfort level  Description  Interventions:  - Encourage patient to monitor pain and request assistance  - Assess pain using appropriate pain scale  - Administer analgesics based on type and severity of pain and evaluate response  - Implement non-pharmacological measures as appropriate and evaluate response  - Consider cultural and social influences on pain and pain management  - Notify physician/advanced practitioner if interventions unsuccessful or patient reports new pain  Outcome: Progressing     Problem: DISCHARGE PLANNING  Goal: Discharge to home or other facility with appropriate resources  Description  INTERVENTIONS:  - Identify barriers to discharge w/patient and caregiver  - Arrange for needed discharge resources and transportation as appropriate  - Identify discharge learning needs (meds, wound care, etc )  - Arrange for interpretive services to assist at discharge as needed  - Refer to Case Management Department for coordinating discharge planning if the patient needs post-hospital services based on physician/advanced practitioner order or complex needs related to functional status, cognitive ability, or social support system  Outcome: Progressing     Problem: Knowledge Deficit  Goal: Patient/family/caregiver demonstrates understanding of disease process, treatment plan, medications, and discharge instructions  Description  Complete learning assessment and assess knowledge base    Interventions:  - Provide teaching at level of understanding  - Provide teaching via preferred learning methods  Outcome: Progressing     Problem: Nutrition/Hydration-ADULT  Goal: Nutrient/Hydration intake appropriate for improving, restoring or maintaining nutritional needs  Description  Monitor and assess patient's nutrition/hydration status for malnutrition (ex- brittle hair, bruises, dry skin, pale skin and conjunctiva, muscle wasting, smooth red tongue, and disorientation)  Collaborate with interdisciplinary team and initiate plan and interventions as ordered  Monitor patient's weight and dietary intake as ordered or per policy  Utilize nutrition screening tool and intervene per policy  Determine patient's food preferences and provide high-protein, high-caloric foods as appropriate       INTERVENTIONS:  - Monitor oral intake, urinary output, labs, and treatment plans  - Assess nutrition and hydration status and recommend course of action  - Evaluate amount of meals eaten  - Assist patient with eating if necessary   - Allow adequate time for meals  - Recommend/ encourage appropriate diets, oral nutritional supplements, and vitamin/mineral supplements  - Order, calculate, and assess calorie counts as needed  - Recommend, monitor, and adjust tube feedings and TPN/PPN based on assessed needs  - Assess need for intravenous fluids  - Provide specific nutrition/hydration education as appropriate  - Include patient/family/caregiver in decisions related to nutrition  Outcome: Progressing

## 2019-07-19 NOTE — PLAN OF CARE
Problem: Potential for Falls  Goal: Patient will remain free of falls  Description  INTERVENTIONS:  - Assess patient frequently for physical needs  -  Identify cognitive and physical deficits and behaviors that affect risk of falls    -  [place] fall precautions as indicated by assessment   - Educate patient/family on patient safety including physical limitations  - Instruct patient to call for assistance with activity based on assessment  - Modify environment to reduce risk of injury  - Consider OT/PT consult to assist with strengthening/mobility  Outcome: Progressing     Problem: Prexisting or High Potential for Compromised Skin Integrity  Goal: Skin integrity is maintained or improved  Description  INTERVENTIONS:  - Identify patients at risk for skin breakdown  - Assess and monitor skin integrity  - Assess and monitor nutrition and hydration status  - Monitor labs (i e  albumin)  - Assess for incontinence   - Turn and reposition patient  - Assist with mobility/ambulation  - Relieve pressure over bony prominences  - Avoid friction and shearing  - Provide appropriate hygiene as needed including keeping skin clean and dry  - Evaluate need for skin moisturizer/barrier cream  - Collaborate with interdisciplinary team (i e  Nutrition, Rehabilitation, etc )   - Patient/family teaching  Outcome: Progressing     Problem: RESPIRATORY - ADULT  Goal: Achieves optimal ventilation and oxygenation  Description  INTERVENTIONS:  - Assess for changes in respiratory status  - Assess for changes in mentation and behavior  - Position to facilitate oxygenation and minimize respiratory effort  - Oxygen administration by appropriate delivery method based on oxygen saturation (per order) or ABGs  - Initiate smoking cessation education as indicated  - Encourage broncho-pulmonary hygiene including cough, deep breathe, Incentive Spirometry  - Assess the need for suctioning and aspirate as needed  - Assess and instruct to report SOB or any respiratory difficulty  - Respiratory Therapy support as indicated  Outcome: Progressing     Problem: GENITOURINARY - ADULT  Goal: Maintains or returns to baseline urinary function  Description  INTERVENTIONS:  - Assess urinary function  - Encourage oral fluids to ensure adequate hydration  - Administer IV fluids as ordered to ensure adequate hydration  - Administer ordered medications as needed  - Offer frequent toileting  - Follow urinary retention protocol if ordered  Outcome: Progressing     Problem: METABOLIC, FLUID AND ELECTROLYTES - ADULT  Goal: Electrolytes maintained within normal limits  Description  INTERVENTIONS:  - Monitor labs and assess patient for signs and symptoms of electrolyte imbalances  - Administer electrolyte replacement as ordered  - Monitor response to electrolyte replacements, including repeat lab results as appropriate  - Instruct patient on fluid and nutrition as appropriate  Outcome: Progressing     Problem: SKIN/TISSUE INTEGRITY - ADULT  Goal: Skin integrity remains intact  Description  INTERVENTIONS  - Identify patients at risk for skin breakdown  - Assess and monitor skin integrity  - Assess and monitor nutrition and hydration status  - Monitor labs (i e  albumin)  - Assess for incontinence   - Turn and reposition patient  - Assist with mobility/ambulation  - Relieve pressure over bony prominences  - Avoid friction and shearing  - Provide appropriate hygiene as needed including keeping skin clean and dry  - Evaluate need for skin moisturizer/barrier cream  - Collaborate with interdisciplinary team (i e  Nutrition, Rehabilitation, etc )   - Patient/family teaching  Outcome: Progressing  Goal: Incision(s), wounds(s) or drain site(s) healing without S/S of infection  Description  INTERVENTIONS  - Assess and document risk factors for skin impairment   - Assess and document dressing, incision, wound bed, drain sites and surrounding tissue  - Initiate Nutrition services consult and/or wound management as needed  Outcome: Progressing     Problem: PAIN - ADULT  Goal: Verbalizes/displays adequate comfort level or baseline comfort level  Description  Interventions:  - Encourage patient to monitor pain and request assistance  - Assess pain using appropriate pain scale  - Administer analgesics based on type and severity of pain and evaluate response  - Implement non-pharmacological measures as appropriate and evaluate response  - Consider cultural and social influences on pain and pain management  - Notify physician/advanced practitioner if interventions unsuccessful or patient reports new pain  Outcome: Progressing     Problem: DISCHARGE PLANNING  Goal: Discharge to home or other facility with appropriate resources  Description  INTERVENTIONS:  - Identify barriers to discharge w/patient and caregiver  - Arrange for needed discharge resources and transportation as appropriate  - Identify discharge learning needs (meds, wound care, etc )  - Arrange for interpretive services to assist at discharge as needed  - Refer to Case Management Department for coordinating discharge planning if the patient needs post-hospital services based on physician/advanced practitioner order or complex needs related to functional status, cognitive ability, or social support system  Outcome: Progressing     Problem: Knowledge Deficit  Goal: Patient/family/caregiver demonstrates understanding of disease process, treatment plan, medications, and discharge instructions  Description  Complete learning assessment and assess knowledge base    Interventions:  - Provide teaching at level of understanding  - Provide teaching via preferred learning methods  Outcome: Progressing     Problem: Nutrition/Hydration-ADULT  Goal: Nutrient/Hydration intake appropriate for improving, restoring or maintaining nutritional needs  Description  Monitor and assess patient's nutrition/hydration status for malnutrition (ex- brittle hair, bruises, dry skin, pale skin and conjunctiva, muscle wasting, smooth red tongue, and disorientation)  Collaborate with interdisciplinary team and initiate plan and interventions as ordered  Monitor patient's weight and dietary intake as ordered or per policy  Utilize nutrition screening tool and intervene per policy  Determine patient's food preferences and provide high-protein, high-caloric foods as appropriate       INTERVENTIONS:  - Monitor oral intake, urinary output, labs, and treatment plans  - Assess nutrition and hydration status and recommend course of action  - Evaluate amount of meals eaten  - Assist patient with eating if necessary   - Allow adequate time for meals  - Recommend/ encourage appropriate diets, oral nutritional supplements, and vitamin/mineral supplements  - Order, calculate, and assess calorie counts as needed  - Recommend, monitor, and adjust tube feedings and TPN/PPN based on assessed needs  - Assess need for intravenous fluids  - Provide specific nutrition/hydration education as appropriate  - Include patient/family/caregiver in decisions related to nutrition  Outcome: Progressing

## 2019-07-19 NOTE — CONSULTS
Consultation - Pulmonary Medicine   Edin Girard 82 y o  female MRN: [id number]  Unit/Bed#: E4 -01 Encounter: [id number]      Assessment:  1  Multilobar pneumonia, concerns for aspiration as the patient was having dinner when her symptoms started  She is not known to have history of chronic aspiration and likely it is a single event  2   Allergic rhinitis  3  Diabetes  4  Chronic kidney disease, elevated BNP is related  Plan:   1  Agree with ceftriaxone and azithromycin, complete 3 days and then downgrade the patient to single agent such as azithromycin alone or Augmentin if there is no bacteria been isolated  2  The patient recently got exposed to her grandson who had bronchitis, and she was treated accordingly according to her  3  The persistent leukocytosis is not showing signs of bandemia  4  The patient had the episode while she was eating dinner, and a she was sent due to dyspnea, fever and confusion  Which makes me concerned regarding aspiration  She is not known to have history of it  However she is morbidly obese diabetic on insulin  Which place her at risk for GERD, gastroparesis and recurrent aspiration  History of Present Illness   Physician Requesting Consult: Evelyn Bear MD  Reason for Consult / Principal Problem:  Pneumonia  Hx and PE limited by:  Patient age  HPI: Edin Girard is a 82y o  year old female who presents with   Pneumonia  Dear Dr Narayanan Player:    Thank you for your kind referral of Mrs Main to pulmonary service      This is 72-year-old female who was recently placed in assisted living after she has been difficult to take care of herself, she has been at the assisted living for the past 2 and half weeks, recently also has an exposure to her grandson who was sick with bronchitis, she was treated according to her with her physician, she cannot tell me if she was admitted or not, the patient presented to the hospital after she was in an assisted living eating dinner where she become more short of breath and they thought she must have passed out  She patient brought it to the ED where she was found to be febrile  The patient started empirically on antibiotics and a chest x-ray showed multilobar infiltrates followed by a CT scan which showed similar findings  The patient denies similar episodes in the past   She has not had a history of pneumonia  She denies any dysphagia, she tolerate oral intake without any difficulty  She has been diabetic on insulin, she denies any heartburn nausea or vomiting  She does have burping and bloating occasionally  She is ambulatory but her exercise capacity is only few feet with the walker  She has no nocturnal symptoms, she does not use oxygen at home, she has not been using inhalers at home      Consults    Review of Systems    Historical Information   Past Medical History:   Diagnosis Date    Adenoma of large intestine     Allergy     last assessed: 10/18/2013    Cataract     Constipation, chronic     Diabetes mellitus (HCC)     anemia vitamin d deficiency glaucoma hypertension hypothyroidism; last assessed: 5/7/2012    Disease of thyroid gland     Diverticulosis     Fecal incontinence     Female stress incontinence     Glaucoma     Gout     History of cystocele     Intrinsic sphincter deficiency     Mixed incontinence     Nocturia     Osteoarthritis, chronic     Renal disorder     Senile atrophic vaginitis     Urinary frequency      Past Surgical History:   Procedure Laterality Date    ANKLE SURGERY Bilateral     CATARACT EXTRACTION      CHOLECYSTECTOMY      TUBAL LIGATION       Social History   Social History     Substance and Sexual Activity   Alcohol Use Never    Frequency: Never    Binge frequency: Never     Social History     Substance and Sexual Activity   Drug Use No     Social History     Tobacco Use   Smoking Status Former Smoker    Last attempt to quit: 4/24/1989    Years since quittin 2   Smokeless Tobacco Never Used     Occupational History:  She is ex-smoker, quit 30 years ago  Lives in assisted living as of   Family History: non-contributory    Meds/Allergies   all current active meds have been reviewed    Allergies   Allergen Reactions    Ace Inhibitors      Annotation - [id number]: short of breath    Aspirin        Objective   Vitals: Blood pressure 142/53, pulse 78, temperature 97 5 °F (36 4 °C), temperature source Tympanic, resp  rate 20, height 5' 2" (1 575 m), weight 97 9 kg (215 lb 13 3 oz), SpO2 94 %  ,Body mass index is 39 48 kg/m²  Intake/Output Summary (Last 24 hours) at 2019 1623  Last data filed at 2019 1400  Gross per 24 hour   Intake 480 ml   Output 755 ml   Net -275 ml     Invasive Devices     Peripheral Intravenous Line            Peripheral IV 19 Left;Ventral (anterior) Forearm 1 day                Physical Exam her vital signs are stable, no fever reported, S1-S2, regular rate and rhythm, lungs with distant scattered rhonchi, rule crackles, no wheezing, abdomen is benign, trace edema in the periphery  Neurologically she is intact, somewhat appropriate but confused for her age  No skin changes except for the left lower extremity at surgically wrapped  Lab Results: I have personally reviewed pertinent lab results  Imaging Studies: I have personally reviewed pertinent reports  CT scan of the chest reviewed personally which showed multilobar infiltrates, minimal mucoid impaction in the airways, otherwise unremarkable  EKG, Pathology, and Other Studies: I have personally reviewed pertinent reports      VTE Prophylaxis: Heparin    Code Status: Level 3 - DNAR and DNI  Advance Directive and Living Will:      Power of :    POLST:      None

## 2019-07-19 NOTE — PROGRESS NOTES
[facility] Internal Medicine Progress Note  Patient: Marjorie Newman 82 y o  female   MRN: [id number]  PCP: Alyce Tubbs DO  Unit/Bed#: E4 -01 Encounter: [id number]  Date Of Visit: 07/19/19    Assessment:    Principal Problem:    Community acquired pneumonia of left lower lobe of lung ([facility] )  Active Problems:    PAD (peripheral artery disease) ([place] [address] )    Acute kidney injury superimposed on chronic kidney disease ([place] [address] )    Hypothyroidism    Diabetes mellitus ([place] [address] )    Leukocytosis    Hypertension    Chronic congestive heart failure ([place] [address] )    Hyperkalemia      Plan:    · Community-acquired pneumonia with CT imaging now showing multifocal airspace opacities bilaterally possibly in keeping with atypical presentation Strep and Legionella antigens both negative  continue Z max and ceftriaxone started yesterday procalcitonin remains elevated at 3 5 with continued leukocytosis continue nebulizer therapy will add IV steroids watching blood sugars as she is diabetic  · Chronic congestive heart failure by history with mild elevation in BNP however no signs of fluid overload and has chronic kidney disease stage 3 probably explain mild elevation in BNP will continue her outpatient dosing of furosemide  · Chronic kidney disease stage 3 with baseline creatinine of 2  2 presently 2 6 continue to monitor may need to reduce Lasix dosing in the setting  · Diabetes mellitus type 2 will resume her Lantus dosing and continue mealtime NovoLog along with coverage may need to adjust further in setting of IV steroids      VTE Pharmacologic Prophylaxis:   Pharmacologic: Heparin  Mechanical VTE Prophylaxis in Place: Yes    Discussions with Specialists or Other Care Team Provider:  Nursing    Time Spent for Care: 45 minutes  More than 50% of total time spent on counseling and coordination of care as described above  Subjective:   Awake alert complaint a 90 in this morning as yet did not get her tray  States that she slept fairly well  Remains on nasal flow O2 at 2 L  Denies any chest pain still with productive thick whitish sputum  Asked her about exposures at her assisted living center she she eats and a communal dining room otherwise is single room  Objective:     Vitals:   Temp (24hrs), Av 9 °F (36 6 °C), Min:97 2 °F (36 2 °C), Max:98 9 °F (37 2 °C)    Temp:  [97 2 °F (36 2 °C)-98 9 °F (37 2 °C)] 97 2 °F (36 2 °C)  HR:  [61-75] 61  Resp:  [18-20] 20  BP: (104-131)/(51-59) 106/51  SpO2:  [93 %-97 %] 95 %  Body mass index is 39 48 kg/m²  Input and Output Summary (last 24 hours): Intake/Output Summary (Last 24 hours) at 2019 0855  Last data filed at 2019 9030  Gross per 24 hour   Intake 480 ml   Output 300 ml   Net 180 ml       Physical Exam:     Physical Exam:   General appearance: alert, appears stated age and cooperative  Head: Normocephalic, without obvious abnormality, atraumatic  Lungs: rhonchi wheezes occasion  Heart: regular rate and rhythm  Abdomen: soft, non-tender; bowel sounds normal; no masses,  no organomegaly  Back: negative  Extremities: Superficial left pretibial ulcer wrapped and extremities normal, atraumatic, no cyanosis or edema  Neurologic: Grossly normal      Additional Data:     Labs:    Results from last 7 days   Lab Units 19  0508   WBC Thousand/uL 14 06*   HEMOGLOBIN g/dL 8 9*   HEMATOCRIT % 29 5*   PLATELETS Thousands/uL 282   NEUTROS PCT % 78*   LYMPHS PCT % 10*   MONOS PCT % 8   EOS PCT % 3     Results from last 7 days   Lab Units 19  0539  19   POTASSIUM mmol/L 5 1   < > 5 5*   CHLORIDE mmol/L 100  --  100   CO2 mmol/L 28  --  31   BUN mg/dL 64*  --  64*   CREATININE mg/dL 2 60*  --  2 69*   CALCIUM mg/dL 8 9  --  9 2   ALK PHOS U/L  --   --  89   ALT U/L  --   --  22   AST U/L  --   --  22    < > = values in this interval not displayed  * I Have Reviewed All Lab Data Listed Above  * Additional Pertinent Lab Tests Reviewed:  All Labs For Current Hospital Admission Reviewed    Imaging:  Xr Chest 2 Views    Result Date: 7/18/2019  Narrative: CHEST INDICATION:   cough, fever  COMPARISON:  Not available EXAM PERFORMED/VIEWS:  XR CHEST PA & LATERAL FINDINGS: Cardiomediastinal silhouette appears unremarkable  Bilateral perihilar and bibasilar airspace opacities  Small bilateral pleural effusions No acute or aggressive appearing osseous abnormality  Prior postsurgical changes in the left humerus head  Impression: 1  Bilateral perihilar and left greater than right bibasilar airspace opacities, Likely representing pulmonary edema and/or infection  2   Small bilateral pleural effusions  The study was marked in [facility] for immediate notification  Workstation performed: [id number]     Ct Chest Wo Contrast    Result Date: 7/18/2019  Narrative: CT CHEST WITHOUT IV CONTRAST INDICATION:   Fever, cough and shortness of breath  COMPARISON:  7/17/2019  TECHNIQUE: CT examination of the chest was performed without intravenous contrast   Axial, sagittal, and coronal 2D reformatted images were created from the source data and submitted for interpretation  Radiation dose length product (DLP) for this visit:  503 mGy-cm   This examination, like all CT scans performed in the [facility], was performed utilizing techniques to minimize radiation dose exposure, including the use of iterative reconstruction and automated exposure control  FINDINGS: LUNGS:  Patchy airspace opacities in the bilateral lower lobes and left upper lobe  Scattered groundglass opacities throughout the lungs  There is no tracheal or endobronchial lesion  PLEURA:  No effusion  HEART/GREAT VESSELS:  Normal heart size  No thoracic aortic aneurysm  MEDIASTINUM AND MESFIN:  No lymphadenopathy  CHEST WALL AND LOWER NECK:   Unremarkable  VISUALIZED STRUCTURES IN THE UPPER ABDOMEN:  Unremarkable  OSSEOUS STRUCTURES:  No acute fracture or destructive osseous lesion       Impression: Findings consistent with multifocal pneumonia  Workstation performed: [id number]     Vas Lower Limb Arterial Duplex, Complete Bilateral    Result Date: 6/26/2019  Narrative:  [facility] REPORT CLINICAL: Indications:  Yearly surveillance for progression of disease  Patient reports leg weakness since December  She also presents with a right calf wound x 3 weeks s/p trauma to the leg (per patient, the wound is healing)  Operative History: No cardiovascular surgeries Risk Factors The patient has history of HTN and Diabetes  The patient current BMI is 42 96, Weight is 220 lb and height is 60 in  Clinical Right Pressure:  147/ mm Hg, Left Pressure:  150/ mm Hg  FINDINGS:  Segment                Right                        Left                                          Impression         PSV  EDV  PSV  EDV  Common Femoral Artery                     202    0  113    0  Prox Profunda                             124    0  110    0  Prox SFA               approximately 50%  326   46  151    0  Mid SFA                                   129    9  124    0  Dist SFA                                  153   13  143    0  Proximal Pop                              144   19  133    0  Distal Pop                                151   13   80    0  Dist Post Tibial                          113   13  122    4  Dist  Ant  Tibial                          67   16  131    0     CONCLUSION: Impression: RIGHT LOWER LIMB: Evaluation shows approximately 50% stenosis in the proximal superficial femoral artery  Diffuse atherosclerotic plaque is noted in the remainder of the femoro-popliteal and tibioperoneal arteries  Ankle/Brachial index: unobtainable due to non-compressible tibial vessels  Prior: unreliable  PVR/ PPG tracings are mildly dampened   Metatarsal pressure of 130 mmHg Great toe pressure of 104 mmHg, within the healing range  LEFT LOWER LIMB: Evaluation shows diffuse atherosclerotic plaque throughout the femoro-popliteal and tibioperoneal arteries  Ankle/Brachial index: unobtainable due to non-compressible tibial vessels  Prior: unreliable  PVR/ PPG tracings are mildly dampened  Metatarsal pressure of 142 mmHg Great toe pressure of 83 mmHg, within the healing range  Compared to previous study on 6/25/18, there is no significant interval change in the disease process  SIGNATURE: Electronically Signed by: Julissa Ojeda MD, RPVI on 2019-06-26 09:17:08 PM    Imaging Reports Reviewed Today Include:  Reviewed CT of the chest  Imaging Personally Reviewed by Myself Includes:    Procedure: Xr Chest 2 Views    Result Date: 7/18/2019  Narrative: CHEST INDICATION:   cough, fever  COMPARISON:  Not available EXAM PERFORMED/VIEWS:  XR CHEST PA & LATERAL FINDINGS: Cardiomediastinal silhouette appears unremarkable  Bilateral perihilar and bibasilar airspace opacities  Small bilateral pleural effusions No acute or aggressive appearing osseous abnormality  Prior postsurgical changes in the left humerus head  Impression: 1  Bilateral perihilar and left greater than right bibasilar airspace opacities, Likely representing pulmonary edema and/or infection  2   Small bilateral pleural effusions  The study was marked in [facility] for immediate notification  Workstation performed: [id number]     Procedure: Ct Chest Wo Contrast    Result Date: 7/18/2019  Narrative: CT CHEST WITHOUT IV CONTRAST INDICATION:   Fever, cough and shortness of breath  COMPARISON:  7/17/2019  TECHNIQUE: CT examination of the chest was performed without intravenous contrast   Axial, sagittal, and coronal 2D reformatted images were created from the source data and submitted for interpretation  Radiation dose length product (DLP) for this visit:  503 mGy-cm     This examination, like all CT scans performed in the [facility], was performed utilizing techniques to minimize radiation dose exposure, including the use of iterative reconstruction and automated exposure control  FINDINGS: LUNGS:  Patchy airspace opacities in the bilateral lower lobes and left upper lobe  Scattered groundglass opacities throughout the lungs  There is no tracheal or endobronchial lesion  PLEURA:  No effusion  HEART/GREAT VESSELS:  Normal heart size  No thoracic aortic aneurysm  MEDIASTINUM AND MESFIN:  No lymphadenopathy  CHEST WALL AND LOWER NECK:   Unremarkable  VISUALIZED STRUCTURES IN THE UPPER ABDOMEN:  Unremarkable  OSSEOUS STRUCTURES:  No acute fracture or destructive osseous lesion  Impression: Findings consistent with multifocal pneumonia   Workstation performed: [id number]        Recent Cultures (last 7 days):     Results from last 7 days   Lab Units 07/18/19 2040   LEGIONELLA URINARY ANTIGEN  Negative       Last 24 Hours Medication List:     Current Facility-Administered Medications:  acetaminophen 650 mg Oral Q6H PRN Jana Neves PA-C    atorvastatin 10 mg Oral Daily With [employer]    azithromycin 500 mg Intravenous Q24H Loreto Peña MD Last Rate: Stopped (07/18/19 1953)   benzonatate 100 mg Oral TID PRN Marley Donis PA-C    cefTRIAXone 1,000 mg Intravenous Q24H Loreto Peña MD Last Rate: 1,000 mg (07/18/19 1642)   dorzolamide-timolol 1 drop Both Eyes BID Marley Donis PA-C    doxazosin 2 mg Oral Daily Jessica Amaral PA-C    furosemide 80 mg Oral Daily Loreto Peña MD    guaiFENesin 1,200 mg Oral BID Loreto Peña MD    heparin (porcine) 5,000 Units Subcutaneous Q8H [address] Jessica Amaral PA-C    insulin lispro 1-5 Units Subcutaneous HS Jessica Amaral PA-C    insulin lispro 1-6 Units Subcutaneous TID AC Jessica Amaral PA-C    insulin lispro 10 Units Subcutaneous BID With Meals Jessica Amaral PA-C    ipratropium 0 5 mg Nebulization TID Stephen Hill MD    levalbuterol 1 25 mg Nebulization TID Stephen Hill MD    levothyroxine 75 mcg Oral Early Morning Jessica Amaral PA-C    methylPREDNISolone sodium succinate 20 mg Intravenous TID Barbara Bocanegra MD    nystatin  Topical BID Katie Mata PA-C    ondansetron 4 mg Intravenous Q6H PRN Sue Whiteside PA-C         Today, Patient Was Seen By: Barbara Bocanegra MD    ** Please Note: Dragon 360 Dictation voice to text software may have been used in the creation of this document   **

## 2019-07-20 LAB
ANION GAP SERPL CALCULATED.3IONS-SCNC: 7 MMOL/L (ref 4–13)
BUN SERPL-MCNC: 84 MG/DL (ref 5–25)
CALCIUM SERPL-MCNC: 9.6 MG/DL (ref 8.3–10.1)
CHLORIDE SERPL-SCNC: 100 MMOL/L (ref 100–108)
CO2 SERPL-SCNC: 30 MMOL/L (ref 21–32)
CREAT SERPL-MCNC: 2.48 MG/DL (ref 0.6–1.3)
ERYTHROCYTE [DISTWIDTH] IN BLOOD BY AUTOMATED COUNT: 14 % (ref 11.6–15.1)
GFR SERPL CREATININE-BSD FRML MDRD: 18 ML/MIN/1.73SQ M
GLUCOSE SERPL-MCNC: 300 MG/DL (ref 65–140)
GLUCOSE SERPL-MCNC: 305 MG/DL (ref 65–140)
GLUCOSE SERPL-MCNC: 355 MG/DL (ref 65–140)
GLUCOSE SERPL-MCNC: 441 MG/DL (ref 65–140)
GLUCOSE SERPL-MCNC: 473 MG/DL (ref 65–140)
HCT VFR BLD AUTO: 30.5 % (ref 34.8–46.1)
HGB BLD-MCNC: 9.5 G/DL (ref 11.5–15.4)
MCH RBC QN AUTO: 29.3 PG (ref 26.8–34.3)
MCHC RBC AUTO-ENTMCNC: 31.1 G/DL (ref 31.4–37.4)
MCV RBC AUTO: 94 FL (ref 82–98)
PLATELET # BLD AUTO: 319 THOUSANDS/UL (ref 149–390)
PMV BLD AUTO: 9.9 FL (ref 8.9–12.7)
POTASSIUM SERPL-SCNC: 5.3 MMOL/L (ref 3.5–5.3)
PROCALCITONIN SERPL-MCNC: 3.42 NG/ML
RBC # BLD AUTO: 3.24 MILLION/UL (ref 3.81–5.12)
SODIUM SERPL-SCNC: 137 MMOL/L (ref 136–145)
WBC # BLD AUTO: 15.19 THOUSAND/UL (ref 4.31–10.16)

## 2019-07-20 PROCEDURE — 80048 BASIC METABOLIC PNL TOTAL CA: CPT | Performed by: INTERNAL MEDICINE

## 2019-07-20 PROCEDURE — 99232 SBSQ HOSP IP/OBS MODERATE 35: CPT | Performed by: INTERNAL MEDICINE

## 2019-07-20 PROCEDURE — 94760 N-INVAS EAR/PLS OXIMETRY 1: CPT

## 2019-07-20 PROCEDURE — G8998 SWALLOW D/C STATUS: HCPCS

## 2019-07-20 PROCEDURE — G8996 SWALLOW CURRENT STATUS: HCPCS

## 2019-07-20 PROCEDURE — G8997 SWALLOW GOAL STATUS: HCPCS

## 2019-07-20 PROCEDURE — 85025 COMPLETE CBC W/AUTO DIFF WBC: CPT | Performed by: INTERNAL MEDICINE

## 2019-07-20 PROCEDURE — 82948 REAGENT STRIP/BLOOD GLUCOSE: CPT

## 2019-07-20 PROCEDURE — 84145 PROCALCITONIN (PCT): CPT | Performed by: INTERNAL MEDICINE

## 2019-07-20 PROCEDURE — 94640 AIRWAY INHALATION TREATMENT: CPT

## 2019-07-20 PROCEDURE — 92610 EVALUATE SWALLOWING FUNCTION: CPT

## 2019-07-20 RX ADMIN — INSULIN LISPRO 10 UNITS: 100 INJECTION, SOLUTION INTRAVENOUS; SUBCUTANEOUS at 08:03

## 2019-07-20 RX ADMIN — AZITHROMYCIN MONOHYDRATE 500 MG: 500 INJECTION, POWDER, LYOPHILIZED, FOR SOLUTION INTRAVENOUS at 14:53

## 2019-07-20 RX ADMIN — INSULIN LISPRO 10 UNITS: 100 INJECTION, SOLUTION INTRAVENOUS; SUBCUTANEOUS at 17:12

## 2019-07-20 RX ADMIN — IPRATROPIUM BROMIDE 0.5 MG: 0.5 SOLUTION RESPIRATORY (INHALATION) at 07:05

## 2019-07-20 RX ADMIN — DOXAZOSIN 2 MG: 2 TABLET ORAL at 08:02

## 2019-07-20 RX ADMIN — GUAIFENESIN 1200 MG: 600 TABLET, EXTENDED RELEASE ORAL at 17:12

## 2019-07-20 RX ADMIN — ATORVASTATIN CALCIUM 10 MG: 10 TABLET, FILM COATED ORAL at 17:12

## 2019-07-20 RX ADMIN — LEVALBUTEROL 1.25 MG: 1.25 SOLUTION, CONCENTRATE RESPIRATORY (INHALATION) at 07:05

## 2019-07-20 RX ADMIN — GUAIFENESIN 1200 MG: 600 TABLET, EXTENDED RELEASE ORAL at 08:02

## 2019-07-20 RX ADMIN — INSULIN LISPRO 4 UNITS: 100 INJECTION, SOLUTION INTRAVENOUS; SUBCUTANEOUS at 08:04

## 2019-07-20 RX ADMIN — LEVALBUTEROL 1.25 MG: 1.25 SOLUTION, CONCENTRATE RESPIRATORY (INHALATION) at 19:04

## 2019-07-20 RX ADMIN — METHYLPREDNISOLONE SODIUM SUCCINATE 20 MG: 40 INJECTION, POWDER, FOR SOLUTION INTRAMUSCULAR; INTRAVENOUS at 08:02

## 2019-07-20 RX ADMIN — DORZOLAMIDE HYDROCHLORIDE AND TIMOLOL MALEATE 1 DROP: 20; 5 SOLUTION/ DROPS OPHTHALMIC at 08:03

## 2019-07-20 RX ADMIN — CEFTRIAXONE 1000 MG: 1 INJECTION, POWDER, FOR SOLUTION INTRAMUSCULAR; INTRAVENOUS at 13:46

## 2019-07-20 RX ADMIN — INSULIN LISPRO 4 UNITS: 100 INJECTION, SOLUTION INTRAVENOUS; SUBCUTANEOUS at 22:20

## 2019-07-20 RX ADMIN — HEPARIN SODIUM 5000 UNITS: 5000 INJECTION INTRAVENOUS; SUBCUTANEOUS at 22:20

## 2019-07-20 RX ADMIN — DORZOLAMIDE HYDROCHLORIDE AND TIMOLOL MALEATE 1 DROP: 20; 5 SOLUTION/ DROPS OPHTHALMIC at 17:12

## 2019-07-20 RX ADMIN — INSULIN LISPRO 6 UNITS: 100 INJECTION, SOLUTION INTRAVENOUS; SUBCUTANEOUS at 17:12

## 2019-07-20 RX ADMIN — FUROSEMIDE 80 MG: 80 TABLET ORAL at 08:02

## 2019-07-20 RX ADMIN — NYSTATIN: 100000 POWDER TOPICAL at 08:04

## 2019-07-20 RX ADMIN — LEVALBUTEROL 1.25 MG: 1.25 SOLUTION, CONCENTRATE RESPIRATORY (INHALATION) at 13:03

## 2019-07-20 RX ADMIN — LEVOTHYROXINE SODIUM 75 MCG: 75 TABLET ORAL at 05:55

## 2019-07-20 RX ADMIN — INSULIN LISPRO 6 UNITS: 100 INJECTION, SOLUTION INTRAVENOUS; SUBCUTANEOUS at 11:42

## 2019-07-20 RX ADMIN — IPRATROPIUM BROMIDE 0.5 MG: 0.5 SOLUTION RESPIRATORY (INHALATION) at 13:02

## 2019-07-20 RX ADMIN — IPRATROPIUM BROMIDE 0.5 MG: 0.5 SOLUTION RESPIRATORY (INHALATION) at 19:04

## 2019-07-20 RX ADMIN — NYSTATIN: 100000 POWDER TOPICAL at 17:12

## 2019-07-20 RX ADMIN — HEPARIN SODIUM 5000 UNITS: 5000 INJECTION INTRAVENOUS; SUBCUTANEOUS at 05:55

## 2019-07-20 RX ADMIN — HEPARIN SODIUM 5000 UNITS: 5000 INJECTION INTRAVENOUS; SUBCUTANEOUS at 13:46

## 2019-07-20 RX ADMIN — BENZONATATE 100 MG: 100 CAPSULE ORAL at 05:55

## 2019-07-20 NOTE — SPEECH THERAPY NOTE
Speech Language/Pathology  Speech/Language Pathology  Assessment    Patient Name: Arvin Heredia  [id number]'[id number] Date: 7/20/2019     Problem List  Patient Active Problem List   Diagnosis    [facility]) ([facility])    Type 2 diabetes mellitus with proliferative retinopathy ([facility]    [facility] superimposed on [facility] ([facility]    Hypothyroidism    Familial combined hyperlipidemia    Anxiety    Morbid obesity with BMI of 40 0-44 9, adult ([facility] )    Anemia    Chronic venous insufficiency    Hypokalemia    Diabetes mellitus (HCC)    Leukocytosis    Hypertension    Chronic cough    Chronic congestive heart failure (HCC)    Acute bronchitis    Hyperkalemia    Community acquired pneumonia of left lower lobe of lung [facility]     Past Medical History  Past Medical History:   Diagnosis Date    Adenoma of large intestine     Allergy     last assessed: 10/18/2013    Cataract     Constipation, chronic     Diabetes mellitus [facility]     anemia vitamin d deficiency glaucoma hypertension hypothyroidism; last assessed: 5/7/2012    Disease of thyroid gland     Diverticulosis     Fecal incontinence     Female stress incontinence     Glaucoma     Gout     History of cystocele     Intrinsic sphincter deficiency     Mixed incontinence     Nocturia     Osteoarthritis, chronic     Renal disorder     Senile atrophic vaginitis     Urinary frequency      Past Surgical History  Past Surgical History:   Procedure Laterality Date    ANKLE SURGERY Bilateral     CATARACT EXTRACTION      CHOLECYSTECTOMY      TUBAL LIGATION           []Cheikhver for details  Consultation - Pulmonary Medicine   Arvin Heredia 82 y o  female MRN: [id number]  Unit/Bed#: E4 -01 Encounter: [id number]  Assessment:  1  Multilobar pneumonia, concerns for aspiration as the patient was having dinner when her symptoms started    She is not known to have history of chronic aspiration and likely it is a single event   2   Allergic rhinitis  3  Diabetes  4  Chronic kidney disease, elevated BNP is related  Plan:   1  Agree with ceftriaxone and azithromycin, complete 3 days and then downgrade the patient to single agent such as azithromycin alone or Augmentin if there is no bacteria been isolated  2  The patient recently got exposed to her grandson who had bronchitis, and she was treated accordingly according to her  3  The persistent leukocytosis is not showing signs of bandemia  4  The patient had the episode while she was eating dinner, and a she was sent due to dyspnea, fever and confusion  Which makes me concerned regarding aspiration  She is not known to have history of it  However she is morbidly obese diabetic on insulin  Which place her at risk for GERD, gastroparesis and recurrent aspiration    History of Present Illness  Physician Requesting Consult: Tory Disla MD  Reason for Consult / Principal Problem:  Pneumonia  Hx and PE limited by:  Patient age  HPI: Ada Germain is a 82y o  year old female who presents with   Pneumonia        This is 45-year-old female who was recently placed in assisted living after she has been difficult to take care of herself, she has been at the assisted living for the past 2 and half weeks, recently also has an exposure to her grandson who was sick with bronchitis, she was treated according to her with her physician, she cannot tell me if she was admitted or not, the patient presented to the hospital after she was in an assisted living eating dinner where she become more short of breath and they thought she must have passed out  She patient brought it to the ED where she was found to be febrile  The patient started empirically on antibiotics and a chest x-ray showed multilobar infiltrates followed by a CT scan which showed similar findings  The patient denies similar episodes in the past   She has not had a history of pneumonia    She denies any dysphagia, she tolerate oral intake without any difficulty  She has been diabetic on insulin, she denies any heartburn nausea or vomiting  She does have burping and bloating occasionally  She is ambulatory but her exercise capacity is only few feet with the walker  She has no nocturnal symptoms, she does not use oxygen at home, she has not been using inhalers at home  Bedside Swallow Evaluation:    Summary:  Pt presents w/ no overt s/s aspiration  She is now stating she does not recall having a coughing/choking episode while eating  Pt coughed several times when seated upright prior to breakfast, but no cough w/ pills/water, pancakes, turkey lafleur, coffee, small amount of radha (disliked), cheerios, milk  She frequently talks while eating  Recommendations:  Diet: regular diet  Assist w/ tray set up due to vision (pointed vision impaired sign outside of room), cut food  Liquid:thin  Meds:whole w/ thin as tolerated  Supervision: assist as needed due to vision, intermittent  Positioning:Upright  Strategies: Pt to take PO/Meds only when fully alert and upright  Allow pt to sit upright for a few minutes prior to starting meal   Oral care  Limit speaking while chewing/swallowing    Eval only, No f/u tx indicated  Patient's goal: " I need to be cleaned up"  Nurse informed  Consider consult w/:  Nutrition    Reason for consult:  R/o aspiration  Determine safest and least restrictive diet  current pna  Current diet:  regular  Premorbid diet[de-identified]  regular  Previous VBS:  none  O2 requirement:  none  Voice/Speech:  wnl  Social:  Recently went into above and beyond  Follows commands:  yes                    Cognitive Status:  A&O  Oral [facility] exam:  Dentition: edentulous   Dentures are at facility but she states she has been eating w/out them "since the 70's"  Labial strength and ROM:wnl  Lingual strength and ROM:wnl  Mandibular strength and ROM:wnl  Secretion management: wnl    Items administered:  Noted above    Oral stage:  WFL/WNL  Lip closure:+  Mastication:+  Bolus formation:+  Bolus control:+  Transfer:+  Oral residue:-  Pocketing:-    Pharyngeal stage:   WNL  Swallow promptness:+  Laryngeal rise:+  Wet voice:-  Throat clear:-  Cough:-  Secondary swallows:-  Audible swallows:-  No overt s/s aspiration    Esophageal stage:  No s/s reported      Results d/w:  Pt, nursing, physician

## 2019-07-20 NOTE — PROGRESS NOTES
Progress Note - Pulmonary   Erlene Phalen 82 y o  female MRN: [id number]  Unit/Bed#: E4 -01 Encounter: [id number]    Assessment:  1  Multilobar pneumonia  Aspiration is highly suspicious  2  Allergic rhinitis  3  Diabetes  4  Chronic kidney disease  Plan:  1  Continue ceftriaxone and azithromycin for 3 days then change to Augmentin p  O  Persistent leukocytosis  No bandemia, follow the trend  2  She will need evaluation for COPD and obstructive sleep apnea as an outpatient  3  Treatment for GERD  4  Disposition plan per the principal team     Thank you, will follow as needed  Please call me with any questions  Chief Complaint:   Feeling better, sitting in the chair, denies any cough or sputum production, no shortness of breath, no pain  Review of system otherwise was unremarkable  Overall improving  Subjective:   See above  Objective:     Vitals: Blood pressure 136/63, pulse 69, temperature (!) 97 4 °F (36 3 °C), temperature source Tympanic, resp  rate 20, height 5' 2" (1 575 m), weight 97 kg (213 lb 13 5 oz), SpO2 99 %  ,Body mass index is 39 11 kg/m²  Intake/Output Summary (Last 24 hours) at 7/20/2019 1449  Last data filed at 7/20/2019 1145  Gross per 24 hour   Intake 240 ml   Output 500 ml   Net -260 ml       Invasive Devices     Peripheral Intravenous Line            Peripheral IV 07/18/19 Left;Ventral (anterior) Forearm 2 days                Physical Exam:  Vital signs are stable, S1-S2, regular rate and rhythm, O2 saturation 99% on 2 L, scattered rhonchi bilaterally, abdomen is benign, no edema  Left lower extremity surgically right  Labs: I have personally reviewed pertinent lab results  Imaging and other studies: I have personally reviewed pertinent reports

## 2019-07-20 NOTE — PLAN OF CARE
Problem: Potential for Falls  Goal: Patient will remain free of falls  Description  INTERVENTIONS:  - Assess patient frequently for physical needs  -  Identify cognitive and physical deficits and behaviors that affect risk of falls    -  [place] fall precautions as indicated by assessment   - Educate patient/family on patient safety including physical limitations  - Instruct patient to call for assistance with activity based on assessment  - Modify environment to reduce risk of injury  - Consider OT/PT consult to assist with strengthening/mobility  Outcome: Progressing     Problem: Prexisting or High Potential for Compromised Skin Integrity  Goal: Skin integrity is maintained or improved  Description  INTERVENTIONS:  - Identify patients at risk for skin breakdown  - Assess and monitor skin integrity  - Assess and monitor nutrition and hydration status  - Monitor labs (i e  albumin)  - Assess for incontinence   - Turn and reposition patient  - Assist with mobility/ambulation  - Relieve pressure over bony prominences  - Avoid friction and shearing  - Provide appropriate hygiene as needed including keeping skin clean and dry  - Evaluate need for skin moisturizer/barrier cream  - Collaborate with interdisciplinary team (i e  Nutrition, Rehabilitation, etc )   - Patient/family teaching  Outcome: Progressing     Problem: RESPIRATORY - ADULT  Goal: Achieves optimal ventilation and oxygenation  Description  INTERVENTIONS:  - Assess for changes in respiratory status  - Assess for changes in mentation and behavior  - Position to facilitate oxygenation and minimize respiratory effort  - Oxygen administration by appropriate delivery method based on oxygen saturation (per order) or ABGs  - Initiate smoking cessation education as indicated  - Encourage broncho-pulmonary hygiene including cough, deep breathe, Incentive Spirometry  - Assess the need for suctioning and aspirate as needed  - Assess and instruct to report SOB or any respiratory difficulty  - Respiratory Therapy support as indicated  Outcome: Progressing     Problem: GENITOURINARY - ADULT  Goal: Maintains or returns to baseline urinary function  Description  INTERVENTIONS:  - Assess urinary function  - Encourage oral fluids to ensure adequate hydration  - Administer IV fluids as ordered to ensure adequate hydration  - Administer ordered medications as needed  - Offer frequent toileting  - Follow urinary retention protocol if ordered  Outcome: Progressing     Problem: METABOLIC, FLUID AND ELECTROLYTES - ADULT  Goal: Electrolytes maintained within normal limits  Description  INTERVENTIONS:  - Monitor labs and assess patient for signs and symptoms of electrolyte imbalances  - Administer electrolyte replacement as ordered  - Monitor response to electrolyte replacements, including repeat lab results as appropriate  - Instruct patient on fluid and nutrition as appropriate  Outcome: Progressing  Goal: Glucose maintained within target range  Description  INTERVENTIONS:  - Monitor Blood Glucose as ordered  - Assess for signs and symptoms of hyperglycemia and hypoglycemia  - Administer ordered medications to maintain glucose within target range  - Assess nutritional intake and initiate nutrition service referral as needed  Outcome: Progressing     Problem: SKIN/TISSUE INTEGRITY - ADULT  Goal: Skin integrity remains intact  Description  INTERVENTIONS  - Identify patients at risk for skin breakdown  - Assess and monitor skin integrity  - Assess and monitor nutrition and hydration status  - Monitor labs (i e  albumin)  - Assess for incontinence   - Turn and reposition patient  - Assist with mobility/ambulation  - Relieve pressure over bony prominences  - Avoid friction and shearing  - Provide appropriate hygiene as needed including keeping skin clean and dry  - Evaluate need for skin moisturizer/barrier cream  - Collaborate with interdisciplinary team (i e  Nutrition, Rehabilitation, etc )   - Patient/family teaching  Outcome: Progressing  Goal: Incision(s), wounds(s) or drain site(s) healing without S/S of infection  Description  INTERVENTIONS  - Assess and document risk factors for skin impairment   - Assess and document dressing, incision, wound bed, drain sites and surrounding tissue  - Initiate Nutrition services consult and/or wound management as needed  Outcome: Progressing     Problem: PAIN - ADULT  Goal: Verbalizes/displays adequate comfort level or baseline comfort level  Description  Interventions:  - Encourage patient to monitor pain and request assistance  - Assess pain using appropriate pain scale  - Administer analgesics based on type and severity of pain and evaluate response  - Implement non-pharmacological measures as appropriate and evaluate response  - Consider cultural and social influences on pain and pain management  - Notify physician/advanced practitioner if interventions unsuccessful or patient reports new pain  Outcome: Progressing     Problem: DISCHARGE PLANNING  Goal: Discharge to home or other facility with appropriate resources  Description  INTERVENTIONS:  - Identify barriers to discharge w/patient and caregiver  - Arrange for needed discharge resources and transportation as appropriate  - Identify discharge learning needs (meds, wound care, etc )  - Arrange for interpretive services to assist at discharge as needed  - Refer to Case Management Department for coordinating discharge planning if the patient needs post-hospital services based on physician/advanced practitioner order or complex needs related to functional status, cognitive ability, or social support system  Outcome: Progressing     Problem: Knowledge Deficit  Goal: Patient/family/caregiver demonstrates understanding of disease process, treatment plan, medications, and discharge instructions  Description  Complete learning assessment and assess knowledge base    Interventions:  - Provide teaching at level of understanding  - Provide teaching via preferred learning methods  Outcome: Progressing     Problem: Nutrition/Hydration-ADULT  Goal: Nutrient/Hydration intake appropriate for improving, restoring or maintaining nutritional needs  Description  Monitor and assess patient's nutrition/hydration status for malnutrition (ex- brittle hair, bruises, dry skin, pale skin and conjunctiva, muscle wasting, smooth red tongue, and disorientation)  Collaborate with interdisciplinary team and initiate plan and interventions as ordered  Monitor patient's weight and dietary intake as ordered or per policy  Utilize nutrition screening tool and intervene per policy  Determine patient's food preferences and provide high-protein, high-caloric foods as appropriate       INTERVENTIONS:  - Monitor oral intake, urinary output, labs, and treatment plans  - Assess nutrition and hydration status and recommend course of action  - Evaluate amount of meals eaten  - Assist patient with eating if necessary   - Allow adequate time for meals  - Recommend/ encourage appropriate diets, oral nutritional supplements, and vitamin/mineral supplements  - Order, calculate, and assess calorie counts as needed  - Recommend, monitor, and adjust tube feedings and TPN/PPN based on assessed needs  - Assess need for intravenous fluids  - Provide specific nutrition/hydration education as appropriate  - Include patient/family/caregiver in decisions related to nutrition  Outcome: Progressing     Problem: INFECTION - ADULT  Goal: Absence or prevention of progression during hospitalization  Description  INTERVENTIONS:  - Assess and monitor for signs and symptoms of infection  - Monitor lab/diagnostic results  - Monitor all insertion sites, i e  indwelling lines, tubes, and drains  - [place] appropriate cooling/warming therapies per order  - Administer medications as ordered  - Instruct and encourage patient and family to use good hand hygiene technique  - Identify and instruct in appropriate isolation precautions for identified infection/condition   Outcome: Progressing

## 2019-07-20 NOTE — PROGRESS NOTES
[facility] Progress Note  Patient: Ada Germain 82 y o  female   MRN: [id number]  PCP: Alyssia Martínez DO  Unit/Bed#: E4 -01 Encounter: [id number]  Date Of Visit: 07/20/19    Assessment:    Principal Problem:    Community acquired pneumonia of left lower lobe of lung ([facility] [address] )  Active Problems:    PAD (peripheral artery disease) ([facility] )    Acute kidney injury superimposed on chronic kidney disease ([facility] )    Hypothyroidism    Diabetes mellitus ([facility] )    Leukocytosis    Hypertension    Chronic congestive heart failure (HCC)    Hyperkalemia      Plan:    · Community-acquired pneumonia versus aspiration pneumonitis with CT imaging now showing multifocal airspace opacities bilaterally possibly in keeping with atypical presentation Strep and Legionella antigens both negative  continue Z max and ceftriaxone started yesterday procalcitonin remains elevated at 3 5 with continued leukocytosis continue nebulizer therapy showing some improvement appreciate Pulmonary input plan will now be to complete a 3 day course of present ceftriaxone and Zithromax and then transition to single agent either Zithromax for Augmentin liquid versus tablet based on speech eval today  · Chronic congestive heart failure by history with mild elevation in BNP however no signs of fluid overload and has chronic kidney disease stage 3 probably explain mild elevation in BNP will continue her outpatient dosing of furosemide  · Chronic kidney disease stage 3 with baseline creatinine of 2  2 presently 2 6 continue to monitor may need to reduce Lasix dosing in the setting  · Diabetes mellitus type 2 will resume her Lantus dosing and continue mealtime NovoLog       VTE Pharmacologic Prophylaxis:   Pharmacologic: Heparin  Mechanical VTE Prophylaxis in Place: Yes    Discussions with Specialists or Other Care Team Provider:  Nursing    Time Spent for Care: 45 minutes    More than 50% of total time spent on counseling and coordination of care as described above  Subjective:   Awake alert complaint a 90 in this morning as yet did not get her tray  States that she slept fairly well  Remains on nasal flow O2 at 2 L  Denies any chest pain still with productive thick whitish sputum  Asked her about exposures at her assisted living center she she eats and a communal dining room otherwise is single room  She mention to Pulmonary consultant onset of respiratory symptoms happened while she was eating at assisted living center raising question of possible aspiration and undergoing speech eval presently      Objective:     Vitals:   Temp (24hrs), Av 6 °F (36 4 °C), Min:97 4 °F (36 3 °C), Max:97 9 °F (36 6 °C)    Temp:  [97 4 °F (36 3 °C)-97 9 °F (36 6 °C)] 97 4 °F (36 3 °C)  HR:  [69-78] 69  Resp:  [20] 20  BP: (102-144)/(53-86) 136/63  SpO2:  [94 %-98 %] 98 %  Body mass index is 39 11 kg/m²  Input and Output Summary (last 24 hours):        Intake/Output Summary (Last 24 hours) at 2019 0383  Last data filed at 2019 0558  Gross per 24 hour   Intake 900 ml   Output 955 ml   Net -55 ml       Physical Exam:     Physical Exam:   General appearance: alert, appears stated age and cooperative  Head: Normocephalic, without obvious abnormality, atraumatic  Lungs: rhonchi wheezes occasion  Heart: regular rate and rhythm  Abdomen: soft, non-tender; bowel sounds normal; no masses,  no organomegaly  Back: negative  Extremities: Superficial left pretibial ulcer wrapped and extremities normal, atraumatic, no cyanosis or edema  Neurologic: Grossly normal      Additional Data:     Labs:    Results from last 7 days   Lab Units 19  0511 19  0508   WBC Thousand/uL 15 19* 14 06*   HEMOGLOBIN g/dL 9 5* 8 9*   HEMATOCRIT % 30 5* 29 5*   PLATELETS Thousands/uL 319 282   NEUTROS PCT %  --  78*   LYMPHS PCT %  --  10*   MONOS PCT %  --  8   EOS PCT %  --  3     Results from last 7 days   Lab Units 19  0511  19   POTASSIUM mmol/L 5 3   < > 5 5*   CHLORIDE mmol/L 100   < > 100   CO2 mmol/L 30   < > 31   BUN mg/dL 84*   < > 64*   CREATININE mg/dL 2 48*   < > 2 69*   CALCIUM mg/dL 9 6   < > 9 2   ALK PHOS U/L  --   --  89   ALT U/L  --   --  22   AST U/L  --   --  22    < > = values in this interval not displayed  * I Have Reviewed All Lab Data Listed Above  * Additional Pertinent Lab Tests Reviewed: All Labs For Current Hospital Admission Reviewed    Imaging:  Xr Chest 2 Views    Result Date: 7/18/2019  Narrative: CHEST INDICATION:   cough, fever  COMPARISON:  Not available EXAM PERFORMED/VIEWS:  XR CHEST PA & LATERAL FINDINGS: Cardiomediastinal silhouette appears unremarkable  Bilateral perihilar and bibasilar airspace opacities  Small bilateral pleural effusions No acute or aggressive appearing osseous abnormality  Prior postsurgical changes in the left humerus head  Impression: 1  Bilateral perihilar and left greater than right bibasilar airspace opacities, Likely representing pulmonary edema and/or infection  2   Small bilateral pleural effusions  The study was marked in [facility] for immediate notification  Workstation performed: [id number]     Ct Chest Wo Contrast    Result Date: 7/18/2019  Narrative: CT CHEST WITHOUT IV CONTRAST INDICATION:   Fever, cough and shortness of breath  COMPARISON:  7/17/2019  TECHNIQUE: CT examination of the chest was performed without intravenous contrast   Axial, sagittal, and coronal 2D reformatted images were created from the source data and submitted for interpretation  Radiation dose length product (DLP) for this visit:  503 mGy-cm   This examination, like all CT scans performed in the [facility], was performed utilizing techniques to minimize radiation dose exposure, including the use of iterative reconstruction and automated exposure control  FINDINGS: LUNGS:  Patchy airspace opacities in the bilateral lower lobes and left upper lobe    Scattered groundglass opacities throughout the lungs  There is no tracheal or endobronchial lesion  PLEURA:  No effusion  HEART/GREAT VESSELS:  Normal heart size  No thoracic aortic aneurysm  MEDIASTINUM AND MESFIN:  No lymphadenopathy  CHEST WALL AND LOWER NECK:   Unremarkable  VISUALIZED STRUCTURES IN THE UPPER ABDOMEN:  Unremarkable  OSSEOUS STRUCTURES:  No acute fracture or destructive osseous lesion  Impression: Findings consistent with multifocal pneumonia  Workstation performed: [id number]     Vas Lower Limb Arterial Duplex, Complete Bilateral    Result Date: 6/26/2019  Narrative:  [facility] REPORT CLINICAL: Indications:  Yearly surveillance for progression of disease  Patient reports leg weakness since December  She also presents with a right calf wound x 3 weeks s/p trauma to the leg (per patient, the wound is healing)  Operative History: No cardiovascular surgeries Risk Factors The patient has history of HTN and Diabetes  The patient current BMI is 42 96, Weight is 220 lb and height is 60 in  Clinical Right Pressure:  147/ mm Hg, Left Pressure:  150/ mm Hg  FINDINGS:  Segment                Right                        Left                                          Impression         PSV  EDV  PSV  EDV  Common Femoral Artery                     202    0  113    0  Prox Profunda                             124    0  110    0  Prox SFA               approximately 50%  326   46  151    0  Mid SFA                                   129    9  124    0  Dist SFA                                  153   13  143    0  Proximal Pop                              144   19  133    0  Distal Pop                                151   13   80    0  Dist Post Tibial                          113   13  122    4  Dist  Ant  Tibial                          67   16  131    0     CONCLUSION: Impression: RIGHT LOWER LIMB: Evaluation shows approximately 50% stenosis in the proximal superficial femoral artery   Diffuse atherosclerotic plaque is noted in the remainder of the femoro-popliteal and tibioperoneal arteries  Ankle/Brachial index: unobtainable due to non-compressible tibial vessels  Prior: unreliable  PVR/ PPG tracings are mildly dampened  Metatarsal pressure of 130 mmHg Great toe pressure of 104 mmHg, within the healing range  LEFT LOWER LIMB: Evaluation shows diffuse atherosclerotic plaque throughout the femoro-popliteal and tibioperoneal arteries  Ankle/Brachial index: unobtainable due to non-compressible tibial vessels  Prior: unreliable  PVR/ PPG tracings are mildly dampened  Metatarsal pressure of 142 mmHg Great toe pressure of 83 mmHg, within the healing range  Compared to previous study on 6/25/18, there is no significant interval change in the disease process  SIGNATURE: Electronically Signed by: Julissa Ojeda MD, RPVI on 2019-06-26 09:17:08 PM    Imaging Reports Reviewed Today Include:  Reviewed CT of the chest  Imaging Personally Reviewed by Myself Includes:    Procedure: Xr Chest 2 Views    Result Date: 7/18/2019  Narrative: CHEST INDICATION:   cough, fever  COMPARISON:  Not available EXAM PERFORMED/VIEWS:  XR CHEST PA & LATERAL FINDINGS: Cardiomediastinal silhouette appears unremarkable  Bilateral perihilar and bibasilar airspace opacities  Small bilateral pleural effusions No acute or aggressive appearing osseous abnormality  Prior postsurgical changes in the left humerus head  Impression: 1  Bilateral perihilar and left greater than right bibasilar airspace opacities, Likely representing pulmonary edema and/or infection  2   Small bilateral pleural effusions  The study was marked in [facility] for immediate notification  Workstation performed: [id number]     Procedure: Ct Chest Wo Contrast    Result Date: 7/18/2019  Narrative: CT CHEST WITHOUT IV CONTRAST INDICATION:   Fever, cough and shortness of breath  COMPARISON:  7/17/2019   TECHNIQUE: CT examination of the chest was performed without intravenous contrast   Axial, sagittal, and coronal 2D reformatted images were created from the source data and submitted for interpretation  Radiation dose length product (DLP) for this visit:  503 mGy-cm   This examination, like all CT scans performed in the [facility], was performed utilizing techniques to minimize radiation dose exposure, including the use of iterative reconstruction and automated exposure control  FINDINGS: LUNGS:  Patchy airspace opacities in the bilateral lower lobes and left upper lobe  Scattered groundglass opacities throughout the lungs  There is no tracheal or endobronchial lesion  PLEURA:  No effusion  HEART/GREAT VESSELS:  Normal heart size  No thoracic aortic aneurysm  MEDIASTINUM AND MESFIN:  No lymphadenopathy  CHEST WALL AND LOWER NECK:   Unremarkable  VISUALIZED STRUCTURES IN THE UPPER ABDOMEN:  Unremarkable  OSSEOUS STRUCTURES:  No acute fracture or destructive osseous lesion  Impression: Findings consistent with multifocal pneumonia   Workstation performed: [id number]        Recent Cultures (last 7 days):     Results from last 7 days   Lab Units 07/18/19 2040   LEGIONELLA URINARY ANTIGEN  Negative       Last 24 Hours Medication List:     Current Facility-Administered Medications:  acetaminophen 650 mg Oral Q6H PRN Jessica Amraal PA-C    atorvastatin 10 mg Oral Daily With CEINTTOMER    azithromycin 500 mg Intravenous Q24H Jamaal Nelson MD Last Rate: Stopped (07/19/19 1351)   benzonatate 100 mg Oral TID PRN Marizol Hirsch PA-C    cefTRIAXone 1,000 mg Intravenous Q24H Jamaal Nelson MD Last Rate: Stopped (07/19/19 1431)   dorzolamide-timolol 1 drop Both Eyes BID Marizol Hirsch PA-C    doxazosin 2 mg Oral Daily Jessica Amaral PA-C    furosemide 80 mg Oral Daily Jamaal Nelson MD    guaiFENesin 1,200 mg Oral BID Jamaal Nelson MD    heparin (porcine) 5,000 Units Subcutaneous Q8H [address] Jessica Amaral PA-C    insulin lispro 1-5 Units Subcutaneous HS Lia Kang PA-C insulin lispro 1-6 Units Subcutaneous TID AC Jessica Amaral PA-C    insulin lispro 10 Units Subcutaneous BID With Meals Jessica Amaral PA-C    ipratropium 0 5 mg Nebulization TID Stephon Dwyer MD    levalbuterol 1 25 mg Nebulization TID Stephon Dwyer MD    levothyroxine 75 mcg Oral Early Morning Jessica Amaral PA-C    methylPREDNISolone sodium succinate 20 mg Intravenous TID Jory Ely MD    nystatin  Topical BID Lexi Mata PA-C    ondansetron 4 mg Intravenous Q6H PRN Edison Copeland PA-C         Today, Patient Was Seen By: Jory Ely MD    ** Please Note: Dragon 360 Dictation voice to text software may have been used in the creation of this document   **

## 2019-07-21 LAB
ANION GAP SERPL CALCULATED.3IONS-SCNC: 6 MMOL/L (ref 4–13)
BASOPHILS # BLD AUTO: 0.01 THOUSANDS/ΜL (ref 0–0.1)
BASOPHILS NFR BLD AUTO: 0 % (ref 0–1)
BUN SERPL-MCNC: 105 MG/DL (ref 5–25)
CALCIUM SERPL-MCNC: 9.2 MG/DL (ref 8.3–10.1)
CHLORIDE SERPL-SCNC: 100 MMOL/L (ref 100–108)
CO2 SERPL-SCNC: 30 MMOL/L (ref 21–32)
CREAT SERPL-MCNC: 2.34 MG/DL (ref 0.6–1.3)
EOSINOPHIL # BLD AUTO: 0 THOUSAND/ΜL (ref 0–0.61)
EOSINOPHIL NFR BLD AUTO: 0 % (ref 0–6)
ERYTHROCYTE [DISTWIDTH] IN BLOOD BY AUTOMATED COUNT: 13.9 % (ref 11.6–15.1)
GFR SERPL CREATININE-BSD FRML MDRD: 19 ML/MIN/1.73SQ M
GLUCOSE SERPL-MCNC: 250 MG/DL (ref 65–140)
GLUCOSE SERPL-MCNC: 332 MG/DL (ref 65–140)
GLUCOSE SERPL-MCNC: 351 MG/DL (ref 65–140)
GLUCOSE SERPL-MCNC: 391 MG/DL (ref 65–140)
GLUCOSE SERPL-MCNC: 473 MG/DL (ref 65–140)
HCT VFR BLD AUTO: 30.3 % (ref 34.8–46.1)
HGB BLD-MCNC: 9.4 G/DL (ref 11.5–15.4)
IMM GRANULOCYTES # BLD AUTO: 0.3 THOUSAND/UL (ref 0–0.2)
IMM GRANULOCYTES NFR BLD AUTO: 2 % (ref 0–2)
LYMPHOCYTES # BLD AUTO: 1.33 THOUSANDS/ΜL (ref 0.6–4.47)
LYMPHOCYTES NFR BLD AUTO: 7 % (ref 14–44)
MCH RBC QN AUTO: 28.7 PG (ref 26.8–34.3)
MCHC RBC AUTO-ENTMCNC: 31 G/DL (ref 31.4–37.4)
MCV RBC AUTO: 93 FL (ref 82–98)
MONOCYTES # BLD AUTO: 0.96 THOUSAND/ΜL (ref 0.17–1.22)
MONOCYTES NFR BLD AUTO: 5 % (ref 4–12)
NEUTROPHILS # BLD AUTO: 16.93 THOUSANDS/ΜL (ref 1.85–7.62)
NEUTS SEG NFR BLD AUTO: 86 % (ref 43–75)
NRBC BLD AUTO-RTO: 0 /100 WBCS
PLATELET # BLD AUTO: 340 THOUSANDS/UL (ref 149–390)
PMV BLD AUTO: 10.1 FL (ref 8.9–12.7)
POTASSIUM SERPL-SCNC: 4.9 MMOL/L (ref 3.5–5.3)
RBC # BLD AUTO: 3.27 MILLION/UL (ref 3.81–5.12)
SODIUM SERPL-SCNC: 136 MMOL/L (ref 136–145)
WBC # BLD AUTO: 19.53 THOUSAND/UL (ref 4.31–10.16)

## 2019-07-21 PROCEDURE — 85025 COMPLETE CBC W/AUTO DIFF WBC: CPT | Performed by: INTERNAL MEDICINE

## 2019-07-21 PROCEDURE — 94760 N-INVAS EAR/PLS OXIMETRY 1: CPT

## 2019-07-21 PROCEDURE — 94640 AIRWAY INHALATION TREATMENT: CPT

## 2019-07-21 PROCEDURE — 80048 BASIC METABOLIC PNL TOTAL CA: CPT | Performed by: INTERNAL MEDICINE

## 2019-07-21 PROCEDURE — 82948 REAGENT STRIP/BLOOD GLUCOSE: CPT

## 2019-07-21 PROCEDURE — 99232 SBSQ HOSP IP/OBS MODERATE 35: CPT | Performed by: INTERNAL MEDICINE

## 2019-07-21 RX ORDER — PANTOPRAZOLE SODIUM 40 MG/1
40 TABLET, DELAYED RELEASE ORAL
Qty: 30 TABLET | Refills: 0
Start: 2019-07-21

## 2019-07-21 RX ORDER — AMOXICILLIN AND CLAVULANATE POTASSIUM 875; 125 MG/1; MG/1
1 TABLET, FILM COATED ORAL EVERY 12 HOURS SCHEDULED
Status: DISCONTINUED | OUTPATIENT
Start: 2019-07-21 | End: 2019-07-21 | Stop reason: DRUGHIGH

## 2019-07-21 RX ORDER — AMOXICILLIN AND CLAVULANATE POTASSIUM 500; 125 MG/1; MG/1
1 TABLET, FILM COATED ORAL EVERY 12 HOURS SCHEDULED
Status: DISCONTINUED | OUTPATIENT
Start: 2019-07-21 | End: 2019-07-22 | Stop reason: HOSPADM

## 2019-07-21 RX ORDER — PANTOPRAZOLE SODIUM 40 MG/1
40 TABLET, DELAYED RELEASE ORAL
Status: DISCONTINUED | OUTPATIENT
Start: 2019-07-21 | End: 2019-07-22 | Stop reason: HOSPADM

## 2019-07-21 RX ORDER — AMOXICILLIN AND CLAVULANATE POTASSIUM 500; 125 MG/1; MG/1
1 TABLET, FILM COATED ORAL EVERY 12 HOURS SCHEDULED
Qty: 10 TABLET | Refills: 0
Start: 2019-07-21 | End: 2019-07-26

## 2019-07-21 RX ADMIN — IPRATROPIUM BROMIDE 0.5 MG: 0.5 SOLUTION RESPIRATORY (INHALATION) at 18:55

## 2019-07-21 RX ADMIN — INSULIN LISPRO 6 UNITS: 100 INJECTION, SOLUTION INTRAVENOUS; SUBCUTANEOUS at 17:17

## 2019-07-21 RX ADMIN — HEPARIN SODIUM 5000 UNITS: 5000 INJECTION INTRAVENOUS; SUBCUTANEOUS at 06:01

## 2019-07-21 RX ADMIN — HEPARIN SODIUM 5000 UNITS: 5000 INJECTION INTRAVENOUS; SUBCUTANEOUS at 14:03

## 2019-07-21 RX ADMIN — LEVALBUTEROL 1.25 MG: 1.25 SOLUTION, CONCENTRATE RESPIRATORY (INHALATION) at 13:05

## 2019-07-21 RX ADMIN — DORZOLAMIDE HYDROCHLORIDE AND TIMOLOL MALEATE 1 DROP: 20; 5 SOLUTION/ DROPS OPHTHALMIC at 09:23

## 2019-07-21 RX ADMIN — INSULIN LISPRO 6 UNITS: 100 INJECTION, SOLUTION INTRAVENOUS; SUBCUTANEOUS at 12:07

## 2019-07-21 RX ADMIN — HEPARIN SODIUM 5000 UNITS: 5000 INJECTION INTRAVENOUS; SUBCUTANEOUS at 21:23

## 2019-07-21 RX ADMIN — IPRATROPIUM BROMIDE 0.5 MG: 0.5 SOLUTION RESPIRATORY (INHALATION) at 06:53

## 2019-07-21 RX ADMIN — AMOXICILLIN AND CLAVULANATE POTASSIUM 1 TABLET: 500; 125 TABLET, FILM COATED ORAL at 21:22

## 2019-07-21 RX ADMIN — NYSTATIN: 100000 POWDER TOPICAL at 17:18

## 2019-07-21 RX ADMIN — IPRATROPIUM BROMIDE 0.5 MG: 0.5 SOLUTION RESPIRATORY (INHALATION) at 13:05

## 2019-07-21 RX ADMIN — INSULIN LISPRO 5 UNITS: 100 INJECTION, SOLUTION INTRAVENOUS; SUBCUTANEOUS at 09:22

## 2019-07-21 RX ADMIN — NYSTATIN: 100000 POWDER TOPICAL at 09:23

## 2019-07-21 RX ADMIN — AMOXICILLIN AND CLAVULANATE POTASSIUM 1 TABLET: 500; 125 TABLET, FILM COATED ORAL at 12:07

## 2019-07-21 RX ADMIN — LEVALBUTEROL 1.25 MG: 1.25 SOLUTION, CONCENTRATE RESPIRATORY (INHALATION) at 18:55

## 2019-07-21 RX ADMIN — LEVOTHYROXINE SODIUM 75 MCG: 75 TABLET ORAL at 06:01

## 2019-07-21 RX ADMIN — PANTOPRAZOLE SODIUM 40 MG: 40 TABLET, DELAYED RELEASE ORAL at 12:10

## 2019-07-21 RX ADMIN — LEVALBUTEROL 1.25 MG: 1.25 SOLUTION, CONCENTRATE RESPIRATORY (INHALATION) at 06:53

## 2019-07-21 RX ADMIN — DOXAZOSIN 2 MG: 2 TABLET ORAL at 09:22

## 2019-07-21 RX ADMIN — INSULIN LISPRO 2 UNITS: 100 INJECTION, SOLUTION INTRAVENOUS; SUBCUTANEOUS at 21:23

## 2019-07-21 RX ADMIN — INSULIN LISPRO 10 UNITS: 100 INJECTION, SOLUTION INTRAVENOUS; SUBCUTANEOUS at 17:18

## 2019-07-21 RX ADMIN — DORZOLAMIDE HYDROCHLORIDE AND TIMOLOL MALEATE 1 DROP: 20; 5 SOLUTION/ DROPS OPHTHALMIC at 17:17

## 2019-07-21 RX ADMIN — GUAIFENESIN 1200 MG: 600 TABLET, EXTENDED RELEASE ORAL at 09:21

## 2019-07-21 RX ADMIN — INSULIN LISPRO 10 UNITS: 100 INJECTION, SOLUTION INTRAVENOUS; SUBCUTANEOUS at 09:22

## 2019-07-21 RX ADMIN — ATORVASTATIN CALCIUM 10 MG: 10 TABLET, FILM COATED ORAL at 17:17

## 2019-07-21 RX ADMIN — INSULIN LISPRO 10 UNITS: 100 INJECTION, SOLUTION INTRAVENOUS; SUBCUTANEOUS at 12:07

## 2019-07-21 RX ADMIN — GUAIFENESIN 1200 MG: 600 TABLET, EXTENDED RELEASE ORAL at 17:17

## 2019-07-21 RX ADMIN — FUROSEMIDE 80 MG: 80 TABLET ORAL at 09:22

## 2019-07-21 NOTE — PROGRESS NOTES
Case Management the pt's daughter states that she is a life long member for free transportation with [employer]   Pt's daughter would like you to f/u with them if they will provide transport for pt tomorrow back to [employer]  If not please notify family, they daughter and son both work til 4pm tomorrow  TY! : )

## 2019-07-21 NOTE — PROGRESS NOTES
[facility] Progress Note  Patient: Augustine Menjivar 82 y o  female   MRN: [id number]  PCP: Mary Anne Herring DO  Unit/Bed#: E4 -01 Encounter: [id number]  Date Of Visit: 07/21/19    Assessment:    Principal Problem:    Community acquired pneumonia of left lower lobe of lung ([facility] )  Active Problems:    PAD (peripheral artery disease) ([facility] )    Acute kidney injury superimposed on chronic kidney disease ([facility] )    Hypothyroidism    Diabetes mellitus ([facility] )    Leukocytosis    Hypertension    Chronic congestive heart failure (HCC)    Hyperkalemia      Plan:    · Community-acquired pneumonia versus aspiration pneumonitis with CT imaging now showing multifocal airspace opacities bilaterally possibly in keeping with atypical presentation Strep and Legionella antigens both negative  continue Z max and ceftriaxone started yesterday procalcitonin remains elevated at 3 5 with continued leukocytosis continue nebulizer therapy showing some improvement appreciate Pulmonary input plan will now be to complete a 3 day course of present ceftriaxone and Zithromax and then transition Augmentin today 500 mg q 12 based on chronic kidney disease  · Chronic congestive heart failure by history with mild elevation in BNP however no signs of fluid overload and has chronic kidney disease stage 3 probably explain mild elevation in BNP will continue her outpatient dosing of furosemide  · Chronic kidney disease stage 3 with baseline creatinine of 2  2 presently 2 6 continue to monitor may need to reduce Lasix dosing in the setting  · Diabetes mellitus type 2 will resume her Lantus dosing and continue mealtime NovoLog   · Leukocytosis that continues to trend up and probably in relation to short course of IV steroids that were stopped yesterday will cancel discharge schedule for today until see trend of white cells down      VTE Pharmacologic Prophylaxis:   Pharmacologic: Heparin  Mechanical VTE Prophylaxis in Place: Yes    Discussions with Specialists or Other Care Team Provider:  Nursing    Time Spent for Care: 45 minutes  More than 50% of total time spent on counseling and coordination of care as described above  Subjective:   Awake alert complaint a 90 in this morning as yet did not get her tray  States that she slept fairly well  Remains on nasal flow O2 at 2 L  Denies any chest pain still with productive thick whitish sputum  Asked her about exposures at her assisted living center she she eats and a communal dining room otherwise is single room  She mention to Pulmonary consultant onset of respiratory symptoms happened while she was eating at assisted living center raising question of possible aspiration and undergoing speech eval presently      Objective:     Vitals:   Temp (24hrs), Av 2 °F (36 2 °C), Min:96 4 °F (35 8 °C), Max:97 8 °F (36 6 °C)    Temp:  [96 4 °F (35 8 °C)-97 8 °F (36 6 °C)] 97 3 °F (36 3 °C)  HR:  [67-71] 71  Resp:  [20] 20  BP: (120-139)/(63-72) 139/63  SpO2:  [92 %-99 %] 97 %  Body mass index is 39 8 kg/m²  Input and Output Summary (last 24 hours):        Intake/Output Summary (Last 24 hours) at 2019 1101  Last data filed at 2019 0841  Gross per 24 hour   Intake 1020 ml   Output 800 ml   Net 220 ml       Physical Exam:     Physical Exam:   General appearance: alert, appears stated age and cooperative  Head: Normocephalic, without obvious abnormality, atraumatic  Lungs: rhonchi wheezes occasion  Heart: regular rate and rhythm  Abdomen: soft, non-tender; bowel sounds normal; no masses,  no organomegaly  Back: negative  Extremities: Superficial left pretibial ulcer wrapped and extremities normal, atraumatic, no cyanosis or edema  Neurologic: Grossly normal      Additional Data:     Labs:    Results from last 7 days   Lab Units 19  0524   WBC Thousand/uL 19 53*   HEMOGLOBIN g/dL 9 4*   HEMATOCRIT % 30 3*   PLATELETS Thousands/uL 340   NEUTROS PCT % 86*   LYMPHS PCT % 7* MONOS PCT % 5   EOS PCT % 0     Results from last 7 days   Lab Units 07/21/19  0524  07/17/19 2014   POTASSIUM mmol/L 4 9   < > 5 5*   CHLORIDE mmol/L 100   < > 100   CO2 mmol/L 30   < > 31   BUN mg/dL 105*   < > 64*   CREATININE mg/dL 2 34*   < > 2 69*   CALCIUM mg/dL 9 2   < > 9 2   ALK PHOS U/L  --   --  89   ALT U/L  --   --  22   AST U/L  --   --  22    < > = values in this interval not displayed  * I Have Reviewed All Lab Data Listed Above  * Additional Pertinent Lab Tests Reviewed: All Labs For Current Hospital Admission Reviewed    Imaging:  Xr Chest 2 Views    Result Date: 7/18/2019  Narrative: CHEST INDICATION:   cough, fever  COMPARISON:  Not available EXAM PERFORMED/VIEWS:  XR CHEST PA & LATERAL FINDINGS: Cardiomediastinal silhouette appears unremarkable  Bilateral perihilar and bibasilar airspace opacities  Small bilateral pleural effusions No acute or aggressive appearing osseous abnormality  Prior postsurgical changes in the left humerus head  Impression: 1  Bilateral perihilar and left greater than right bibasilar airspace opacities, Likely representing pulmonary edema and/or infection  2   Small bilateral pleural effusions  The study was marked in [facility] for immediate notification  Workstation performed: [id number]     Ct Chest Wo Contrast    Result Date: 7/18/2019  Narrative: CT CHEST WITHOUT IV CONTRAST INDICATION:   Fever, cough and shortness of breath  COMPARISON:  7/17/2019  TECHNIQUE: CT examination of the chest was performed without intravenous contrast   Axial, sagittal, and coronal 2D reformatted images were created from the source data and submitted for interpretation  Radiation dose length product (DLP) for this visit:  503 mGy-cm   This examination, like all CT scans performed in the [facility], was performed utilizing techniques to minimize radiation dose exposure, including the use of iterative reconstruction and automated exposure control  FINDINGS: LUNGS:  Patchy airspace opacities in the bilateral lower lobes and left upper lobe  Scattered groundglass opacities throughout the lungs  There is no tracheal or endobronchial lesion  PLEURA:  No effusion  HEART/GREAT VESSELS:  Normal heart size  No thoracic aortic aneurysm  MEDIASTINUM AND MESFIN:  No lymphadenopathy  CHEST WALL AND LOWER NECK:   Unremarkable  VISUALIZED STRUCTURES IN THE UPPER ABDOMEN:  Unremarkable  OSSEOUS STRUCTURES:  No acute fracture or destructive osseous lesion  Impression: Findings consistent with multifocal pneumonia  Workstation performed: [id number]     Vas Lower Limb Arterial Duplex, Complete Bilateral    Result Date: 6/26/2019  Narrative:  THE [facility]ULAR [facility]ENTER REPORT CLINICAL: Indications:  Yearly surveillance for progression of disease  Patient reports leg weakness since December  She also presents with a right calf wound x 3 weeks s/p trauma to the leg (per patient, the wound is healing)  Operative History: No cardiovascular surgeries Risk Factors The patient has history of HTN and Diabetes  The patient current BMI is 42 96, Weight is 220 lb and height is 60 in  Clinical Right Pressure:  147/ mm Hg, Left Pressure:  150/ mm Hg  FINDINGS:  Segment                Right                        Left                                          Impression         PSV  EDV  PSV  EDV  Common Femoral Artery                     202    0  113    0  Prox Profunda                             124    0  110    0  Prox SFA               approximately 50%  326   46  151    0  Mid SFA                                   129    9  124    0  Dist SFA                                  153   13  143    0  Proximal Pop                              144   19  133    0  Distal Pop                                151   13   80    0  Dist Post Tibial                          113   13  122    4  Dist  Ant   Tibial                          67   16  131    0     CONCLUSION: Impression: RIGHT LOWER LIMB: Evaluation shows approximately 50% stenosis in the proximal superficial femoral artery  Diffuse atherosclerotic plaque is noted in the remainder of the femoro-popliteal and tibioperoneal arteries  Ankle/Brachial index: unobtainable due to non-compressible tibial vessels  Prior: unreliable  PVR/ PPG tracings are mildly dampened  Metatarsal pressure of 130 mmHg Great toe pressure of 104 mmHg, within the healing range  LEFT LOWER LIMB: Evaluation shows diffuse atherosclerotic plaque throughout the femoro-popliteal and tibioperoneal arteries  Ankle/Brachial index: unobtainable due to non-compressible tibial vessels  Prior: unreliable  PVR/ PPG tracings are mildly dampened  Metatarsal pressure of 142 mmHg Great toe pressure of 83 mmHg, within the healing range  Compared to previous study on 6/25/18, there is no significant interval change in the disease process  SIGNATURE: Electronically Signed by: Roger Bui MD, RPVI on 2019-06-26 09:17:08 PM    Imaging Reports Reviewed Today Include:  Reviewed CT of the chest  Imaging Personally Reviewed by Myself Includes:    Procedure: Xr Chest 2 Views    Result Date: 7/18/2019  Narrative: CHEST INDICATION:   cough, fever  COMPARISON:  Not available EXAM PERFORMED/VIEWS:  XR CHEST PA & LATERAL FINDINGS: Cardiomediastinal silhouette appears unremarkable  Bilateral perihilar and bibasilar airspace opacities  Small bilateral pleural effusions No acute or aggressive appearing osseous abnormality  Prior postsurgical changes in the left humerus head  Impression: 1  Bilateral perihilar and left greater than right bibasilar airspace opacities, Likely representing pulmonary edema and/or infection  2   Small bilateral pleural effusions  The study was marked in [facility] for immediate notification  Workstation performed: [id number]     Procedure: Ct Chest Wo Contrast    Result Date: 7/18/2019  Narrative: CT CHEST WITHOUT IV CONTRAST INDICATION:   Fever, cough and shortness of breath  COMPARISON:  7/17/2019  TECHNIQUE: CT examination of the chest was performed without intravenous contrast   Axial, sagittal, and coronal 2D reformatted images were created from the source data and submitted for interpretation  Radiation dose length product (DLP) for this visit:  503 mGy-cm   This examination, like all CT scans performed in the [facility], was performed utilizing techniques to minimize radiation dose exposure, including the use of iterative reconstruction and automated exposure control  FINDINGS: LUNGS:  Patchy airspace opacities in the bilateral lower lobes and left upper lobe  Scattered groundglass opacities throughout the lungs  There is no tracheal or endobronchial lesion  PLEURA:  No effusion  HEART/GREAT VESSELS:  Normal heart size  No thoracic aortic aneurysm  MEDIASTINUM AND MESFIN:  No lymphadenopathy  CHEST WALL AND LOWER NECK:   Unremarkable  VISUALIZED STRUCTURES IN THE UPPER ABDOMEN:  Unremarkable  OSSEOUS STRUCTURES:  No acute fracture or destructive osseous lesion  Impression: Findings consistent with multifocal pneumonia   Workstation performed: [id number]        Recent Cultures (last 7 days):     Results from last 7 days   Lab Units 07/18/19 2040   LEGIONELLA URINARY ANTIGEN  Negative       Last 24 Hours Medication List:     Current Facility-Administered Medications:  acetaminophen 650 mg Oral Q6H PRN Eliazar Waters PA-C   amoxicillin-clavulanate 1 tablet Oral Q12H [address] Fara Omer MD   atorvastatin 10 mg Oral Daily With SMGBBTOMER   benzonatate 100 mg Oral TID PRN Keeley Schultz PA-C   dorzolamide-timolol 1 drop Both Eyes BID Keeley Schultz PA-C   doxazosin 2 mg Oral Daily Jessica Amaral PA-C   furosemide 80 mg Oral Daily Fara Omer MD   guaiFENesin 1,200 mg Oral BID Fara Omer MD   heparin (porcine) 5,000 Units Subcutaneous Q8H [address] [facility], [facility]   insulin lispro 1-5 Units Subcutaneous HS Eliazar Waters PA-C insulin lispro 1-6 Units Subcutaneous TID AC Jessica Amaral PA-C   insulin lispro 10 Units Subcutaneous BID With Meals Jessica Amaral PA-C   ipratropium 0 5 mg Nebulization TID Raul Noyola MD   levalbuterol 1 25 mg Nebulization TID Raul Noyola MD   levothyroxine 75 mcg Oral Early Morning Jessica Amaral PA-C   nystatin  Topical BID Jose Mata PA-C   ondansetron 4 mg Intravenous Q6H PRN Jessica Amaral PA-C   pantoprazole 40 mg Oral Daily Before Breakfast Ludwig Gavin MD        Today, Patient Was Seen By: Ludwig Gavin MD    ** Please Note: Dragon 360 Dictation voice to text software may have been used in the creation of this document   **

## 2019-07-21 NOTE — PLAN OF CARE
Problem: Potential for Falls  Goal: Patient will remain free of falls  Description  INTERVENTIONS:  - Assess patient frequently for physical needs  -  Identify cognitive and physical deficits and behaviors that affect risk of falls    -  [place] fall precautions as indicated by assessment   - Educate patient/family on patient safety including physical limitations  - Instruct patient to call for assistance with activity based on assessment  - Modify environment to reduce risk of injury  - Consider OT/PT consult to assist with strengthening/mobility  Outcome: Progressing     Problem: Prexisting or High Potential for Compromised Skin Integrity  Goal: Skin integrity is maintained or improved  Description  INTERVENTIONS:  - Identify patients at risk for skin breakdown  - Assess and monitor skin integrity  - Assess and monitor nutrition and hydration status  - Monitor labs (i e  albumin)  - Assess for incontinence   - Turn and reposition patient  - Assist with mobility/ambulation  - Relieve pressure over bony prominences  - Avoid friction and shearing  - Provide appropriate hygiene as needed including keeping skin clean and dry  - Evaluate need for skin moisturizer/barrier cream  - Collaborate with interdisciplinary team (i e  Nutrition, Rehabilitation, etc )   - Patient/family teaching  Outcome: Progressing     Problem: RESPIRATORY - ADULT  Goal: Achieves optimal ventilation and oxygenation  Description  INTERVENTIONS:  - Assess for changes in respiratory status  - Assess for changes in mentation and behavior  - Position to facilitate oxygenation and minimize respiratory effort  - Oxygen administration by appropriate delivery method based on oxygen saturation (per order) or ABGs  - Initiate smoking cessation education as indicated  - Encourage broncho-pulmonary hygiene including cough, deep breathe, Incentive Spirometry  - Assess the need for suctioning and aspirate as needed  - Assess and instruct to report SOB or any respiratory difficulty  - Respiratory Therapy support as indicated  Outcome: Progressing     Problem: GENITOURINARY - ADULT  Goal: Maintains or returns to baseline urinary function  Description  INTERVENTIONS:  - Assess urinary function  - Encourage oral fluids to ensure adequate hydration  - Administer IV fluids as ordered to ensure adequate hydration  - Administer ordered medications as needed  - Offer frequent toileting  - Follow urinary retention protocol if ordered  Outcome: Progressing     Problem: METABOLIC, FLUID AND ELECTROLYTES - ADULT  Goal: Electrolytes maintained within normal limits  Description  INTERVENTIONS:  - Monitor labs and assess patient for signs and symptoms of electrolyte imbalances  - Administer electrolyte replacement as ordered  - Monitor response to electrolyte replacements, including repeat lab results as appropriate  - Instruct patient on fluid and nutrition as appropriate  Outcome: Progressing  Goal: Glucose maintained within target range  Description  INTERVENTIONS:  - Monitor Blood Glucose as ordered  - Assess for signs and symptoms of hyperglycemia and hypoglycemia  - Administer ordered medications to maintain glucose within target range  - Assess nutritional intake and initiate nutrition service referral as needed  Outcome: Progressing     Problem: SKIN/TISSUE INTEGRITY - ADULT  Goal: Skin integrity remains intact  Description  INTERVENTIONS  - Identify patients at risk for skin breakdown  - Assess and monitor skin integrity  - Assess and monitor nutrition and hydration status  - Monitor labs (i e  albumin)  - Assess for incontinence   - Turn and reposition patient  - Assist with mobility/ambulation  - Relieve pressure over bony prominences  - Avoid friction and shearing  - Provide appropriate hygiene as needed including keeping skin clean and dry  - Evaluate need for skin moisturizer/barrier cream  - Collaborate with interdisciplinary team (i e  Nutrition, Rehabilitation, etc )   - Patient/family teaching  Outcome: Progressing  Goal: Incision(s), wounds(s) or drain site(s) healing without S/S of infection  Description  INTERVENTIONS  - Assess and document risk factors for skin impairment   - Assess and document dressing, incision, wound bed, drain sites and surrounding tissue  - Initiate Nutrition services consult and/or wound management as needed  Outcome: Progressing     Problem: PAIN - ADULT  Goal: Verbalizes/displays adequate comfort level or baseline comfort level  Description  Interventions:  - Encourage patient to monitor pain and request assistance  - Assess pain using appropriate pain scale  - Administer analgesics based on type and severity of pain and evaluate response  - Implement non-pharmacological measures as appropriate and evaluate response  - Consider cultural and social influences on pain and pain management  - Notify physician/advanced practitioner if interventions unsuccessful or patient reports new pain  Outcome: Progressing     Problem: DISCHARGE PLANNING  Goal: Discharge to home or other facility with appropriate resources  Description  INTERVENTIONS:  - Identify barriers to discharge w/patient and caregiver  - Arrange for needed discharge resources and transportation as appropriate  - Identify discharge learning needs (meds, wound care, etc )  - Arrange for interpretive services to assist at discharge as needed  - Refer to Case Management Department for coordinating discharge planning if the patient needs post-hospital services based on physician/advanced practitioner order or complex needs related to functional status, cognitive ability, or social support system  Outcome: Progressing     Problem: Knowledge Deficit  Goal: Patient/family/caregiver demonstrates understanding of disease process, treatment plan, medications, and discharge instructions  Description  Complete learning assessment and assess knowledge base    Interventions:  - Provide teaching at level of understanding  - Provide teaching via preferred learning methods  Outcome: Progressing     Problem: Nutrition/Hydration-ADULT  Goal: Nutrient/Hydration intake appropriate for improving, restoring or maintaining nutritional needs  Description  Monitor and assess patient's nutrition/hydration status for malnutrition (ex- brittle hair, bruises, dry skin, pale skin and conjunctiva, muscle wasting, smooth red tongue, and disorientation)  Collaborate with interdisciplinary team and initiate plan and interventions as ordered  Monitor patient's weight and dietary intake as ordered or per policy  Utilize nutrition screening tool and intervene per policy  Determine patient's food preferences and provide high-protein, high-caloric foods as appropriate       INTERVENTIONS:  - Monitor oral intake, urinary output, labs, and treatment plans  - Assess nutrition and hydration status and recommend course of action  - Evaluate amount of meals eaten  - Assist patient with eating if necessary   - Allow adequate time for meals  - Recommend/ encourage appropriate diets, oral nutritional supplements, and vitamin/mineral supplements  - Order, calculate, and assess calorie counts as needed  - Recommend, monitor, and adjust tube feedings and TPN/PPN based on assessed needs  - Assess need for intravenous fluids  - Provide specific nutrition/hydration education as appropriate  - Include patient/family/caregiver in decisions related to nutrition  Outcome: Progressing     Problem: INFECTION - ADULT  Goal: Absence or prevention of progression during hospitalization  Description  INTERVENTIONS:  - Assess and monitor for signs and symptoms of infection  - Monitor lab/diagnostic results  - Monitor all insertion sites, i e  indwelling lines, tubes, and drains  - [place] appropriate cooling/warming therapies per order  - Administer medications as ordered  - Instruct and encourage patient and family to use good hand hygiene technique  - Identify and instruct in appropriate isolation precautions for identified infection/condition   Outcome: Progressing

## 2019-07-22 VITALS
WEIGHT: 215.39 LBS | BODY MASS INDEX: 39.64 KG/M2 | RESPIRATION RATE: 18 BRPM | HEIGHT: 62 IN | TEMPERATURE: 97.6 F | DIASTOLIC BLOOD PRESSURE: 62 MMHG | SYSTOLIC BLOOD PRESSURE: 137 MMHG | HEART RATE: 92 BPM | OXYGEN SATURATION: 93 %

## 2019-07-22 LAB
BASOPHILS # BLD AUTO: 0.02 THOUSANDS/ΜL (ref 0–0.1)
BASOPHILS NFR BLD AUTO: 0 % (ref 0–1)
EOSINOPHIL # BLD AUTO: 0.33 THOUSAND/ΜL (ref 0–0.61)
EOSINOPHIL NFR BLD AUTO: 2 % (ref 0–6)
ERYTHROCYTE [DISTWIDTH] IN BLOOD BY AUTOMATED COUNT: 14 % (ref 11.6–15.1)
GLUCOSE SERPL-MCNC: 266 MG/DL (ref 65–140)
HCT VFR BLD AUTO: 31.4 % (ref 34.8–46.1)
HGB BLD-MCNC: 9.9 G/DL (ref 11.5–15.4)
IMM GRANULOCYTES # BLD AUTO: 0.27 THOUSAND/UL (ref 0–0.2)
IMM GRANULOCYTES NFR BLD AUTO: 2 % (ref 0–2)
LYMPHOCYTES # BLD AUTO: 2.03 THOUSANDS/ΜL (ref 0.6–4.47)
LYMPHOCYTES NFR BLD AUTO: 14 % (ref 14–44)
MCH RBC QN AUTO: 28.5 PG (ref 26.8–34.3)
MCHC RBC AUTO-ENTMCNC: 31.5 G/DL (ref 31.4–37.4)
MCV RBC AUTO: 91 FL (ref 82–98)
MONOCYTES # BLD AUTO: 1.23 THOUSAND/ΜL (ref 0.17–1.22)
MONOCYTES NFR BLD AUTO: 9 % (ref 4–12)
NEUTROPHILS # BLD AUTO: 10.56 THOUSANDS/ΜL (ref 1.85–7.62)
NEUTS SEG NFR BLD AUTO: 73 % (ref 43–75)
NRBC BLD AUTO-RTO: 0 /100 WBCS
PLATELET # BLD AUTO: 341 THOUSANDS/UL (ref 149–390)
PMV BLD AUTO: 10.2 FL (ref 8.9–12.7)
RBC # BLD AUTO: 3.47 MILLION/UL (ref 3.81–5.12)
WBC # BLD AUTO: 14.44 THOUSAND/UL (ref 4.31–10.16)

## 2019-07-22 PROCEDURE — 82948 REAGENT STRIP/BLOOD GLUCOSE: CPT

## 2019-07-22 PROCEDURE — 85025 COMPLETE CBC W/AUTO DIFF WBC: CPT | Performed by: INTERNAL MEDICINE

## 2019-07-22 RX ADMIN — HEPARIN SODIUM 5000 UNITS: 5000 INJECTION INTRAVENOUS; SUBCUTANEOUS at 06:22

## 2019-07-22 RX ADMIN — INSULIN LISPRO 10 UNITS: 100 INJECTION, SOLUTION INTRAVENOUS; SUBCUTANEOUS at 11:10

## 2019-07-22 RX ADMIN — ONDANSETRON 4 MG: 2 INJECTION INTRAMUSCULAR; INTRAVENOUS at 08:44

## 2019-07-22 RX ADMIN — NYSTATIN: 100000 POWDER TOPICAL at 11:10

## 2019-07-22 RX ADMIN — DOXAZOSIN 2 MG: 2 TABLET ORAL at 11:09

## 2019-07-22 RX ADMIN — DORZOLAMIDE HYDROCHLORIDE AND TIMOLOL MALEATE 1 DROP: 20; 5 SOLUTION/ DROPS OPHTHALMIC at 11:09

## 2019-07-22 RX ADMIN — INSULIN LISPRO 3 UNITS: 100 INJECTION, SOLUTION INTRAVENOUS; SUBCUTANEOUS at 11:09

## 2019-07-22 RX ADMIN — AMOXICILLIN AND CLAVULANATE POTASSIUM 1 TABLET: 500; 125 TABLET, FILM COATED ORAL at 11:08

## 2019-07-22 RX ADMIN — GUAIFENESIN 1200 MG: 600 TABLET, EXTENDED RELEASE ORAL at 08:44

## 2019-07-22 RX ADMIN — LEVOTHYROXINE SODIUM 75 MCG: 75 TABLET ORAL at 06:22

## 2019-07-22 RX ADMIN — PANTOPRAZOLE SODIUM 40 MG: 40 TABLET, DELAYED RELEASE ORAL at 06:22

## 2019-07-22 RX ADMIN — FUROSEMIDE 80 MG: 80 TABLET ORAL at 08:44

## 2019-07-22 NOTE — SOCIAL WORK
MD has discharge pt back to [facility]  RN reports pt is a life time member of [facility] at [phone]  They do not have [facility] [employer] [facility], but partner with [employer] [employer] [facility] ([phone] and fax [phone]) for their life time members  Arcenio can  at 1130 am  Fax face sheet to them  Face sheet was NOT correct with the right [facility]  Pt is from the one [address]  This cm changed face sheet  TC to [facility] to inform them of the correct address and re-fax face sheet  TC to [facility] [address] ([phone] and fax [phone]) and gave them the  time and fax discharge instruction sheets  They report pt was open with [facility] for RN, OT and PT  A referral sent to them today  CM will f/u with faxing them discharge orders  Re-fax discharge orders to the right [facility] on [address]  Text MD that pt is open with [facility] for order and face to face  MD, RN, pt, dtr and nursing home aware of  time

## 2019-07-22 NOTE — PLAN OF CARE
Problem: Potential for Falls  Goal: Patient will remain free of falls  Description  INTERVENTIONS:  - Assess patient frequently for physical needs  -  Identify cognitive and physical deficits and behaviors that affect risk of falls    -  [place] fall precautions as indicated by assessment   - Educate patient/family on patient safety including physical limitations  - Instruct patient to call for assistance with activity based on assessment  - Modify environment to reduce risk of injury  - Consider OT/PT consult to assist with strengthening/mobility  7/22/2019 0731 by Maggie Arenas RN  Outcome: Progressing  7/22/2019 0729 by Maggie Arenas RN  Outcome: Progressing     Problem: Prexisting or High Potential for Compromised Skin Integrity  Goal: Skin integrity is maintained or improved  Description  INTERVENTIONS:  - Identify patients at risk for skin breakdown  - Assess and monitor skin integrity  - Assess and monitor nutrition and hydration status  - Monitor labs (i e  albumin)  - Assess for incontinence   - Turn and reposition patient  - Assist with mobility/ambulation  - Relieve pressure over bony prominences  - Avoid friction and shearing  - Provide appropriate hygiene as needed including keeping skin clean and dry  - Evaluate need for skin moisturizer/barrier cream  - Collaborate with interdisciplinary team (i e  Nutrition, Rehabilitation, etc )   - Patient/family teaching  7/22/2019 0731 by Maggie Arenas RN  Outcome: Progressing  7/22/2019 0729 by Maggie Arenas RN  Outcome: Progressing     Problem: RESPIRATORY - ADULT  Goal: Achieves optimal ventilation and oxygenation  Description  INTERVENTIONS:  - Assess for changes in respiratory status  - Assess for changes in mentation and behavior  - Position to facilitate oxygenation and minimize respiratory effort  - Oxygen administration by appropriate delivery method based on oxygen saturation (per order) or ABGs  - Initiate smoking cessation education as indicated  - Encourage broncho-pulmonary hygiene including cough, deep breathe, Incentive Spirometry  - Assess the need for suctioning and aspirate as needed  - Assess and instruct to report SOB or any respiratory difficulty  - Respiratory Therapy support as indicated  7/22/2019 0731 by Maggie Arenas RN  Outcome: Progressing  7/22/2019 0729 by Maggie Arenas RN  Outcome: Progressing     Problem: GENITOURINARY - ADULT  Goal: Maintains or returns to baseline urinary function  Description  INTERVENTIONS:  - Assess urinary function  - Encourage oral fluids to ensure adequate hydration  - Administer IV fluids as ordered to ensure adequate hydration  - Administer ordered medications as needed  - Offer frequent toileting  - Follow urinary retention protocol if ordered  7/22/2019 0731 by Maggie Arenas RN  Outcome: Progressing  7/22/2019 0729 by Maggie Arenas RN  Outcome: Progressing     Problem: METABOLIC, FLUID AND ELECTROLYTES - ADULT  Goal: Electrolytes maintained within normal limits  Description  INTERVENTIONS:  - Monitor labs and assess patient for signs and symptoms of electrolyte imbalances  - Administer electrolyte replacement as ordered  - Monitor response to electrolyte replacements, including repeat lab results as appropriate  - Instruct patient on fluid and nutrition as appropriate  7/22/2019 0731 by Maggie Arenas RN  Outcome: Progressing  7/22/2019 0729 by Maggie Arenas RN  Outcome: Progressing  Goal: Glucose maintained within target range  Description  INTERVENTIONS:  - Monitor Blood Glucose as ordered  - Assess for signs and symptoms of hyperglycemia and hypoglycemia  - Administer ordered medications to maintain glucose within target range  - Assess nutritional intake and initiate nutrition service referral as needed  7/22/2019 0731 by Maggie Arenas RN  Outcome: Progressing  7/22/2019 0729 by Maggie Arenas RN  Outcome: Progressing     Problem: SKIN/TISSUE INTEGRITY - ADULT  Goal: Skin integrity remains intact  Description  INTERVENTIONS  - Identify patients at risk for skin breakdown  - Assess and monitor skin integrity  - Assess and monitor nutrition and hydration status  - Monitor labs (i e  albumin)  - Assess for incontinence   - Turn and reposition patient  - Assist with mobility/ambulation  - Relieve pressure over bony prominences  - Avoid friction and shearing  - Provide appropriate hygiene as needed including keeping skin clean and dry  - Evaluate need for skin moisturizer/barrier cream  - Collaborate with interdisciplinary team (i e  Nutrition, Rehabilitation, etc )   - Patient/family teaching  7/22/2019 0731 by Arnulfo Lam RN  Outcome: Progressing  7/22/2019 0729 by Arnulfo Lam RN  Outcome: Progressing  Goal: Incision(s), wounds(s) or drain site(s) healing without S/S of infection  Description  INTERVENTIONS  - Assess and document risk factors for skin impairment   - Assess and document dressing, incision, wound bed, drain sites and surrounding tissue  - Initiate Nutrition services consult and/or wound management as needed  7/22/2019 0731 by Arnulfo Lam RN  Outcome: Progressing  7/22/2019 0729 by Arnulfo Lam RN  Outcome: Progressing     Problem: PAIN - ADULT  Goal: Verbalizes/displays adequate comfort level or baseline comfort level  Description  Interventions:  - Encourage patient to monitor pain and request assistance  - Assess pain using appropriate pain scale  - Administer analgesics based on type and severity of pain and evaluate response  - Implement non-pharmacological measures as appropriate and evaluate response  - Consider cultural and social influences on pain and pain management  - Notify physician/advanced practitioner if interventions unsuccessful or patient reports new pain  7/22/2019 0731 by Arnulfo Lam RN  Outcome: Progressing  7/22/2019 0729 by Arnulfo Lam RN  Outcome: Progressing     Problem: DISCHARGE PLANNING  Goal: Discharge to home or other facility with appropriate resources  Description  INTERVENTIONS:  - Identify barriers to discharge w/patient and caregiver  - Arrange for needed discharge resources and transportation as appropriate  - Identify discharge learning needs (meds, wound care, etc )  - Arrange for interpretive services to assist at discharge as needed  - Refer to Case Management Department for coordinating discharge planning if the patient needs post-hospital services based on physician/advanced practitioner order or complex needs related to functional status, cognitive ability, or social support system  7/22/2019 0731 by Richy Yao RN  Outcome: Progressing  7/22/2019 0729 by Richy Yao RN  Outcome: Progressing     Problem: Knowledge Deficit  Goal: Patient/family/caregiver demonstrates understanding of disease process, treatment plan, medications, and discharge instructions  Description  Complete learning assessment and assess knowledge base  Interventions:  - Provide teaching at level of understanding  - Provide teaching via preferred learning methods  7/22/2019 0731 by Richy Yao RN  Outcome: Progressing  7/22/2019 0729 by Richy Yao RN  Outcome: Progressing     Problem: Nutrition/Hydration-ADULT  Goal: Nutrient/Hydration intake appropriate for improving, restoring or maintaining nutritional needs  Description  Monitor and assess patient's nutrition/hydration status for malnutrition (ex- brittle hair, bruises, dry skin, pale skin and conjunctiva, muscle wasting, smooth red tongue, and disorientation)  Collaborate with interdisciplinary team and initiate plan and interventions as ordered  Monitor patient's weight and dietary intake as ordered or per policy  Utilize nutrition screening tool and intervene per policy  Determine patient's food preferences and provide high-protein, high-caloric foods as appropriate       INTERVENTIONS:  - Monitor oral intake, urinary output, labs, and treatment plans  - Assess nutrition and hydration status and recommend course of action  - Evaluate amount of meals eaten  - Assist patient with eating if necessary   - Allow adequate time for meals  - Recommend/ encourage appropriate diets, oral nutritional supplements, and vitamin/mineral supplements  - Order, calculate, and assess calorie counts as needed  - Recommend, monitor, and adjust tube feedings and TPN/PPN based on assessed needs  - Assess need for intravenous fluids  - Provide specific nutrition/hydration education as appropriate  - Include patient/family/caregiver in decisions related to nutrition  7/22/2019 0731 by Jose Juan Caro RN  Outcome: Progressing  7/22/2019 0729 by Jose Juan Caro RN  Outcome: Progressing     Problem: INFECTION - ADULT  Goal: Absence or prevention of progression during hospitalization  Description  INTERVENTIONS:  - Assess and monitor for signs and symptoms of infection  - Monitor lab/diagnostic results  - Monitor all insertion sites, i e  indwelling lines, tubes, and drains  - [place] appropriate cooling/warming therapies per order  - Administer medications as ordered  - Instruct and encourage patient and family to use good hand hygiene technique  - Identify and instruct in appropriate isolation precautions for identified infection/condition   7/22/2019 0731 by Jose Juan Caro RN  Outcome: Progressing  7/22/2019 0729 by Jose Juan Caro RN  Outcome: Progressing

## 2019-07-22 NOTE — PROGRESS NOTES
Pt states "somethings wrong!, I don't know what but somethings wrong?" Pt reports nausea  Zofran given, pt asked to be returned to bed because shes "somethings wrong" and shes cold

## 2019-07-22 NOTE — PLAN OF CARE
Problem: Potential for Falls  Goal: Patient will remain free of falls  Description  INTERVENTIONS:  - Assess patient frequently for physical needs  -  Identify cognitive and physical deficits and behaviors that affect risk of falls    -  [place] fall precautions as indicated by assessment   - Educate patient/family on patient safety including physical limitations  - Instruct patient to call for assistance with activity based on assessment  - Modify environment to reduce risk of injury  - Consider OT/PT consult to assist with strengthening/mobility  7/22/2019 1034 by Lisbeth Smalls RN  Outcome: Adequate for Discharge  7/22/2019 0731 by Lisbeth Smalls RN  Outcome: Progressing  7/22/2019 0729 by Lisbeth Smalls RN  Outcome: Progressing     Problem: Prexisting or High Potential for Compromised Skin Integrity  Goal: Skin integrity is maintained or improved  Description  INTERVENTIONS:  - Identify patients at risk for skin breakdown  - Assess and monitor skin integrity  - Assess and monitor nutrition and hydration status  - Monitor labs (i e  albumin)  - Assess for incontinence   - Turn and reposition patient  - Assist with mobility/ambulation  - Relieve pressure over bony prominences  - Avoid friction and shearing  - Provide appropriate hygiene as needed including keeping skin clean and dry  - Evaluate need for skin moisturizer/barrier cream  - Collaborate with interdisciplinary team (i e  Nutrition, Rehabilitation, etc )   - Patient/family teaching  7/22/2019 1034 by Lisbeth Smalls RN  Outcome: Adequate for Discharge  7/22/2019 0731 by Lisbeth Smalls RN  Outcome: Progressing  7/22/2019 0729 by Lisbeth Smalls RN  Outcome: Progressing     Problem: RESPIRATORY - ADULT  Goal: Achieves optimal ventilation and oxygenation  Description  INTERVENTIONS:  - Assess for changes in respiratory status  - Assess for changes in mentation and behavior  - Position to facilitate oxygenation and minimize respiratory effort  - Oxygen administration by appropriate delivery method based on oxygen saturation (per order) or ABGs  - Initiate smoking cessation education as indicated  - Encourage broncho-pulmonary hygiene including cough, deep breathe, Incentive Spirometry  - Assess the need for suctioning and aspirate as needed  - Assess and instruct to report SOB or any respiratory difficulty  - Respiratory Therapy support as indicated  7/22/2019 1034 by Isatu Quezada RN  Outcome: Adequate for Discharge  7/22/2019 0731 by Isatu Quezada RN  Outcome: Progressing  7/22/2019 0729 by Isatu Quezada RN  Outcome: Progressing     Problem: GENITOURINARY - ADULT  Goal: Maintains or returns to baseline urinary function  Description  INTERVENTIONS:  - Assess urinary function  - Encourage oral fluids to ensure adequate hydration  - Administer IV fluids as ordered to ensure adequate hydration  - Administer ordered medications as needed  - Offer frequent toileting  - Follow urinary retention protocol if ordered  7/22/2019 1034 by Isatu Quezada RN  Outcome: Adequate for Discharge  7/22/2019 0731 by Isatu Quezada RN  Outcome: Progressing  7/22/2019 0729 by Isatu Quezada RN  Outcome: Progressing     Problem: METABOLIC, FLUID AND ELECTROLYTES - ADULT  Goal: Electrolytes maintained within normal limits  Description  INTERVENTIONS:  - Monitor labs and assess patient for signs and symptoms of electrolyte imbalances  - Administer electrolyte replacement as ordered  - Monitor response to electrolyte replacements, including repeat lab results as appropriate  - Instruct patient on fluid and nutrition as appropriate  7/22/2019 1034 by Isatu Quezada RN  Outcome: Adequate for Discharge  7/22/2019 0731 by Isatu Quezada RN  Outcome: Progressing  7/22/2019 0729 by Isatu Quezada RN  Outcome: Progressing  Goal: Glucose maintained within target range  Description  INTERVENTIONS:  - Monitor Blood Glucose as ordered  - Assess for signs and symptoms of hyperglycemia and hypoglycemia  - Administer ordered medications to maintain glucose within target range  - Assess nutritional intake and initiate nutrition service referral as needed  7/22/2019 1034 by Nicole Douglas RN  Outcome: Adequate for Discharge  7/22/2019 0731 by Nicole Douglas RN  Outcome: Progressing  7/22/2019 0729 by Nicole Douglas RN  Outcome: Progressing     Problem: SKIN/TISSUE INTEGRITY - ADULT  Goal: Skin integrity remains intact  Description  INTERVENTIONS  - Identify patients at risk for skin breakdown  - Assess and monitor skin integrity  - Assess and monitor nutrition and hydration status  - Monitor labs (i e  albumin)  - Assess for incontinence   - Turn and reposition patient  - Assist with mobility/ambulation  - Relieve pressure over bony prominences  - Avoid friction and shearing  - Provide appropriate hygiene as needed including keeping skin clean and dry  - Evaluate need for skin moisturizer/barrier cream  - Collaborate with interdisciplinary team (i e  Nutrition, Rehabilitation, etc )   - Patient/family teaching  7/22/2019 1034 by Nicole Douglas RN  Outcome: Adequate for Discharge  7/22/2019 0731 by Nicole Douglas RN  Outcome: Progressing  7/22/2019 0729 by Nicole Douglas RN  Outcome: Progressing  Goal: Incision(s), wounds(s) or drain site(s) healing without S/S of infection  Description  INTERVENTIONS  - Assess and document risk factors for skin impairment   - Assess and document dressing, incision, wound bed, drain sites and surrounding tissue  - Initiate Nutrition services consult and/or wound management as needed  7/22/2019 1034 by Nicole Douglas RN  Outcome: Adequate for Discharge  7/22/2019 0731 by Nicole Douglas RN  Outcome: Progressing  7/22/2019 0729 by Nicole Douglas RN  Outcome: Progressing     Problem: PAIN - ADULT  Goal: Verbalizes/displays adequate comfort level or baseline comfort level  Description  Interventions:  - Encourage patient to monitor pain and request assistance  - Assess pain using appropriate pain scale  - Administer analgesics based on type and severity of pain and evaluate response  - Implement non-pharmacological measures as appropriate and evaluate response  - Consider cultural and social influences on pain and pain management  - Notify physician/advanced practitioner if interventions unsuccessful or patient reports new pain  7/22/2019 1034 by Moe Mott RN  Outcome: Adequate for Discharge  7/22/2019 0731 by Moe Mott RN  Outcome: Progressing  7/22/2019 0729 by Moe Mott RN  Outcome: Progressing     Problem: DISCHARGE PLANNING  Goal: Discharge to home or other facility with appropriate resources  Description  INTERVENTIONS:  - Identify barriers to discharge w/patient and caregiver  - Arrange for needed discharge resources and transportation as appropriate  - Identify discharge learning needs (meds, wound care, etc )  - Arrange for interpretive services to assist at discharge as needed  - Refer to Case Management Department for coordinating discharge planning if the patient needs post-hospital services based on physician/advanced practitioner order or complex needs related to functional status, cognitive ability, or social support system  7/22/2019 1034 by Moe Mott RN  Outcome: Adequate for Discharge  7/22/2019 0731 by Moe Mott RN  Outcome: Progressing  7/22/2019 0729 by Moe Mott RN  Outcome: Progressing     Problem: Knowledge Deficit  Goal: Patient/family/caregiver demonstrates understanding of disease process, treatment plan, medications, and discharge instructions  Description  Complete learning assessment and assess knowledge base    Interventions:  - Provide teaching at level of understanding  - Provide teaching via preferred learning methods  7/22/2019 1034 by Moe Mott RN  Outcome: Adequate for Discharge  7/22/2019 0731 by Moe Mott RN  Outcome: Progressing  7/22/2019 0729 by Sondra Brown Cheo Hooper RN  Outcome: Progressing     Problem: Nutrition/Hydration-ADULT  Goal: Nutrient/Hydration intake appropriate for improving, restoring or maintaining nutritional needs  Description  Monitor and assess patient's nutrition/hydration status for malnutrition (ex- brittle hair, bruises, dry skin, pale skin and conjunctiva, muscle wasting, smooth red tongue, and disorientation)  Collaborate with interdisciplinary team and initiate plan and interventions as ordered  Monitor patient's weight and dietary intake as ordered or per policy  Utilize nutrition screening tool and intervene per policy  Determine patient's food preferences and provide high-protein, high-caloric foods as appropriate       INTERVENTIONS:  - Monitor oral intake, urinary output, labs, and treatment plans  - Assess nutrition and hydration status and recommend course of action  - Evaluate amount of meals eaten  - Assist patient with eating if necessary   - Allow adequate time for meals  - Recommend/ encourage appropriate diets, oral nutritional supplements, and vitamin/mineral supplements  - Order, calculate, and assess calorie counts as needed  - Recommend, monitor, and adjust tube feedings and TPN/PPN based on assessed needs  - Assess need for intravenous fluids  - Provide specific nutrition/hydration education as appropriate  - Include patient/family/caregiver in decisions related to nutrition  7/22/2019 1034 by Rob Moran RN  Outcome: Adequate for Discharge  7/22/2019 0731 by Rob Moran RN  Outcome: Progressing  7/22/2019 0729 by Rob Moran RN  Outcome: Progressing     Problem: INFECTION - ADULT  Goal: Absence or prevention of progression during hospitalization  Description  INTERVENTIONS:  - Assess and monitor for signs and symptoms of infection  - Monitor lab/diagnostic results  - Monitor all insertion sites, i e  indwelling lines, tubes, and drains  - [place] appropriate cooling/warming therapies per order  - Administer medications as ordered  - Instruct and encourage patient and family to use good hand hygiene technique  - Identify and instruct in appropriate isolation precautions for identified infection/condition   7/22/2019 1034 by Tomasa Carrasco RN  Outcome: Adequate for Discharge  7/22/2019 0731 by Tomasa Carrasco RN  Outcome: Progressing  7/22/2019 0729 by Tomasa Carrasco, RN  Outcome: Progressing

## 2019-07-22 NOTE — PLAN OF CARE
Problem: Potential for Falls  Goal: Patient will remain free of falls  Description  INTERVENTIONS:  - Assess patient frequently for physical needs  -  Identify cognitive and physical deficits and behaviors that affect risk of falls    -  [place] fall precautions as indicated by assessment   - Educate patient/family on patient safety including physical limitations  - Instruct patient to call for assistance with activity based on assessment  - Modify environment to reduce risk of injury  - Consider OT/PT consult to assist with strengthening/mobility  Outcome: Progressing     Problem: Prexisting or High Potential for Compromised Skin Integrity  Goal: Skin integrity is maintained or improved  Description  INTERVENTIONS:  - Identify patients at risk for skin breakdown  - Assess and monitor skin integrity  - Assess and monitor nutrition and hydration status  - Monitor labs (i e  albumin)  - Assess for incontinence   - Turn and reposition patient  - Assist with mobility/ambulation  - Relieve pressure over bony prominences  - Avoid friction and shearing  - Provide appropriate hygiene as needed including keeping skin clean and dry  - Evaluate need for skin moisturizer/barrier cream  - Collaborate with interdisciplinary team (i e  Nutrition, Rehabilitation, etc )   - Patient/family teaching  Outcome: Progressing     Problem: RESPIRATORY - ADULT  Goal: Achieves optimal ventilation and oxygenation  Description  INTERVENTIONS:  - Assess for changes in respiratory status  - Assess for changes in mentation and behavior  - Position to facilitate oxygenation and minimize respiratory effort  - Oxygen administration by appropriate delivery method based on oxygen saturation (per order) or ABGs  - Initiate smoking cessation education as indicated  - Encourage broncho-pulmonary hygiene including cough, deep breathe, Incentive Spirometry  - Assess the need for suctioning and aspirate as needed  - Assess and instruct to report SOB or any respiratory difficulty  - Respiratory Therapy support as indicated  Outcome: Progressing     Problem: GENITOURINARY - ADULT  Goal: Maintains or returns to baseline urinary function  Description  INTERVENTIONS:  - Assess urinary function  - Encourage oral fluids to ensure adequate hydration  - Administer IV fluids as ordered to ensure adequate hydration  - Administer ordered medications as needed  - Offer frequent toileting  - Follow urinary retention protocol if ordered  Outcome: Progressing     Problem: METABOLIC, FLUID AND ELECTROLYTES - ADULT  Goal: Electrolytes maintained within normal limits  Description  INTERVENTIONS:  - Monitor labs and assess patient for signs and symptoms of electrolyte imbalances  - Administer electrolyte replacement as ordered  - Monitor response to electrolyte replacements, including repeat lab results as appropriate  - Instruct patient on fluid and nutrition as appropriate  Outcome: Progressing  Goal: Glucose maintained within target range  Description  INTERVENTIONS:  - Monitor Blood Glucose as ordered  - Assess for signs and symptoms of hyperglycemia and hypoglycemia  - Administer ordered medications to maintain glucose within target range  - Assess nutritional intake and initiate nutrition service referral as needed  Outcome: Progressing     Problem: SKIN/TISSUE INTEGRITY - ADULT  Goal: Skin integrity remains intact  Description  INTERVENTIONS  - Identify patients at risk for skin breakdown  - Assess and monitor skin integrity  - Assess and monitor nutrition and hydration status  - Monitor labs (i e  albumin)  - Assess for incontinence   - Turn and reposition patient  - Assist with mobility/ambulation  - Relieve pressure over bony prominences  - Avoid friction and shearing  - Provide appropriate hygiene as needed including keeping skin clean and dry  - Evaluate need for skin moisturizer/barrier cream  - Collaborate with interdisciplinary team (i e  Nutrition, Rehabilitation, etc )   - Patient/family teaching  Outcome: Progressing  Goal: Incision(s), wounds(s) or drain site(s) healing without S/S of infection  Description  INTERVENTIONS  - Assess and document risk factors for skin impairment   - Assess and document dressing, incision, wound bed, drain sites and surrounding tissue  - Initiate Nutrition services consult and/or wound management as needed  Outcome: Progressing     Problem: PAIN - ADULT  Goal: Verbalizes/displays adequate comfort level or baseline comfort level  Description  Interventions:  - Encourage patient to monitor pain and request assistance  - Assess pain using appropriate pain scale  - Administer analgesics based on type and severity of pain and evaluate response  - Implement non-pharmacological measures as appropriate and evaluate response  - Consider cultural and social influences on pain and pain management  - Notify physician/advanced practitioner if interventions unsuccessful or patient reports new pain  Outcome: Progressing     Problem: DISCHARGE PLANNING  Goal: Discharge to home or other facility with appropriate resources  Description  INTERVENTIONS:  - Identify barriers to discharge w/patient and caregiver  - Arrange for needed discharge resources and transportation as appropriate  - Identify discharge learning needs (meds, wound care, etc )  - Arrange for interpretive services to assist at discharge as needed  - Refer to Case Management Department for coordinating discharge planning if the patient needs post-hospital services based on physician/advanced practitioner order or complex needs related to functional status, cognitive ability, or social support system  Outcome: Progressing     Problem: Knowledge Deficit  Goal: Patient/family/caregiver demonstrates understanding of disease process, treatment plan, medications, and discharge instructions  Description  Complete learning assessment and assess knowledge base    Interventions:  - Provide teaching at level of understanding  - Provide teaching via preferred learning methods  Outcome: Progressing     Problem: Nutrition/Hydration-ADULT  Goal: Nutrient/Hydration intake appropriate for improving, restoring or maintaining nutritional needs  Description  Monitor and assess patient's nutrition/hydration status for malnutrition (ex- brittle hair, bruises, dry skin, pale skin and conjunctiva, muscle wasting, smooth red tongue, and disorientation)  Collaborate with interdisciplinary team and initiate plan and interventions as ordered  Monitor patient's weight and dietary intake as ordered or per policy  Utilize nutrition screening tool and intervene per policy  Determine patient's food preferences and provide high-protein, high-caloric foods as appropriate       INTERVENTIONS:  - Monitor oral intake, urinary output, labs, and treatment plans  - Assess nutrition and hydration status and recommend course of action  - Evaluate amount of meals eaten  - Assist patient with eating if necessary   - Allow adequate time for meals  - Recommend/ encourage appropriate diets, oral nutritional supplements, and vitamin/mineral supplements  - Order, calculate, and assess calorie counts as needed  - Recommend, monitor, and adjust tube feedings and TPN/PPN based on assessed needs  - Assess need for intravenous fluids  - Provide specific nutrition/hydration education as appropriate  - Include patient/family/caregiver in decisions related to nutrition  Outcome: Progressing     Problem: INFECTION - ADULT  Goal: Absence or prevention of progression during hospitalization  Description  INTERVENTIONS:  - Assess and monitor for signs and symptoms of infection  - Monitor lab/diagnostic results  - Monitor all insertion sites, i e  indwelling lines, tubes, and drains  - [place] appropriate cooling/warming therapies per order  - Administer medications as ordered  - Instruct and encourage patient and family to use good hand hygiene technique  - Identify and instruct in appropriate isolation precautions for identified infection/condition   Outcome: Progressing

## 2019-07-22 NOTE — SOCIAL WORK
Rec a message from [facility] requesting quantity of all meds and that the med sheets be signed by MD Nicole CAVAZOS and informed RN of this

## 2019-07-22 NOTE — PLAN OF CARE
Problem: Potential for Falls  Goal: Patient will remain free of falls  Description  INTERVENTIONS:  - Assess patient frequently for physical needs  -  Identify cognitive and physical deficits and behaviors that affect risk of falls    -  [place] fall precautions as indicated by assessment   - Educate patient/family on patient safety including physical limitations  - Instruct patient to call for assistance with activity based on assessment  - Modify environment to reduce risk of injury  - Consider OT/PT consult to assist with strengthening/mobility  Outcome: Progressing     Problem: Prexisting or High Potential for Compromised Skin Integrity  Goal: Skin integrity is maintained or improved  Description  INTERVENTIONS:  - Identify patients at risk for skin breakdown  - Assess and monitor skin integrity  - Assess and monitor nutrition and hydration status  - Monitor labs (i e  albumin)  - Assess for incontinence   - Turn and reposition patient  - Assist with mobility/ambulation  - Relieve pressure over bony prominences  - Avoid friction and shearing  - Provide appropriate hygiene as needed including keeping skin clean and dry  - Evaluate need for skin moisturizer/barrier cream  - Collaborate with interdisciplinary team (i e  Nutrition, Rehabilitation, etc )   - Patient/family teaching  Outcome: Progressing     Problem: RESPIRATORY - ADULT  Goal: Achieves optimal ventilation and oxygenation  Description  INTERVENTIONS:  - Assess for changes in respiratory status  - Assess for changes in mentation and behavior  - Position to facilitate oxygenation and minimize respiratory effort  - Oxygen administration by appropriate delivery method based on oxygen saturation (per order) or ABGs  - Initiate smoking cessation education as indicated  - Encourage broncho-pulmonary hygiene including cough, deep breathe, Incentive Spirometry  - Assess the need for suctioning and aspirate as needed  - Assess and instruct to report SOB or any respiratory difficulty  - Respiratory Therapy support as indicated  Outcome: Progressing     Problem: GENITOURINARY - ADULT  Goal: Maintains or returns to baseline urinary function  Description  INTERVENTIONS:  - Assess urinary function  - Encourage oral fluids to ensure adequate hydration  - Administer IV fluids as ordered to ensure adequate hydration  - Administer ordered medications as needed  - Offer frequent toileting  - Follow urinary retention protocol if ordered  Outcome: Progressing     Problem: METABOLIC, FLUID AND ELECTROLYTES - ADULT  Goal: Electrolytes maintained within normal limits  Description  INTERVENTIONS:  - Monitor labs and assess patient for signs and symptoms of electrolyte imbalances  - Administer electrolyte replacement as ordered  - Monitor response to electrolyte replacements, including repeat lab results as appropriate  - Instruct patient on fluid and nutrition as appropriate  Outcome: Progressing  Goal: Glucose maintained within target range  Description  INTERVENTIONS:  - Monitor Blood Glucose as ordered  - Assess for signs and symptoms of hyperglycemia and hypoglycemia  - Administer ordered medications to maintain glucose within target range  - Assess nutritional intake and initiate nutrition service referral as needed  Outcome: Progressing     Problem: SKIN/TISSUE INTEGRITY - ADULT  Goal: Skin integrity remains intact  Description  INTERVENTIONS  - Identify patients at risk for skin breakdown  - Assess and monitor skin integrity  - Assess and monitor nutrition and hydration status  - Monitor labs (i e  albumin)  - Assess for incontinence   - Turn and reposition patient  - Assist with mobility/ambulation  - Relieve pressure over bony prominences  - Avoid friction and shearing  - Provide appropriate hygiene as needed including keeping skin clean and dry  - Evaluate need for skin moisturizer/barrier cream  - Collaborate with interdisciplinary team (i e  Nutrition, Rehabilitation, etc )   - Patient/family teaching  Outcome: Progressing  Goal: Incision(s), wounds(s) or drain site(s) healing without S/S of infection  Description  INTERVENTIONS  - Assess and document risk factors for skin impairment   - Assess and document dressing, incision, wound bed, drain sites and surrounding tissue  - Initiate Nutrition services consult and/or wound management as needed  Outcome: Progressing     Problem: PAIN - ADULT  Goal: Verbalizes/displays adequate comfort level or baseline comfort level  Description  Interventions:  - Encourage patient to monitor pain and request assistance  - Assess pain using appropriate pain scale  - Administer analgesics based on type and severity of pain and evaluate response  - Implement non-pharmacological measures as appropriate and evaluate response  - Consider cultural and social influences on pain and pain management  - Notify physician/advanced practitioner if interventions unsuccessful or patient reports new pain  Outcome: Progressing     Problem: DISCHARGE PLANNING  Goal: Discharge to home or other facility with appropriate resources  Description  INTERVENTIONS:  - Identify barriers to discharge w/patient and caregiver  - Arrange for needed discharge resources and transportation as appropriate  - Identify discharge learning needs (meds, wound care, etc )  - Arrange for interpretive services to assist at discharge as needed  - Refer to Case Management Department for coordinating discharge planning if the patient needs post-hospital services based on physician/advanced practitioner order or complex needs related to functional status, cognitive ability, or social support system  Outcome: Progressing     Problem: Knowledge Deficit  Goal: Patient/family/caregiver demonstrates understanding of disease process, treatment plan, medications, and discharge instructions  Description  Complete learning assessment and assess knowledge base    Interventions:  - Provide teaching at level of understanding  - Provide teaching via preferred learning methods  Outcome: Progressing     Problem: Nutrition/Hydration-ADULT  Goal: Nutrient/Hydration intake appropriate for improving, restoring or maintaining nutritional needs  Description  Monitor and assess patient's nutrition/hydration status for malnutrition (ex- brittle hair, bruises, dry skin, pale skin and conjunctiva, muscle wasting, smooth red tongue, and disorientation)  Collaborate with interdisciplinary team and initiate plan and interventions as ordered  Monitor patient's weight and dietary intake as ordered or per policy  Utilize nutrition screening tool and intervene per policy  Determine patient's food preferences and provide high-protein, high-caloric foods as appropriate       INTERVENTIONS:  - Monitor oral intake, urinary output, labs, and treatment plans  - Assess nutrition and hydration status and recommend course of action  - Evaluate amount of meals eaten  - Assist patient with eating if necessary   - Allow adequate time for meals  - Recommend/ encourage appropriate diets, oral nutritional supplements, and vitamin/mineral supplements  - Order, calculate, and assess calorie counts as needed  - Recommend, monitor, and adjust tube feedings and TPN/PPN based on assessed needs  - Assess need for intravenous fluids  - Provide specific nutrition/hydration education as appropriate  - Include patient/family/caregiver in decisions related to nutrition  Outcome: Progressing     Problem: INFECTION - ADULT  Goal: Absence or prevention of progression during hospitalization  Description  INTERVENTIONS:  - Assess and monitor for signs and symptoms of infection  - Monitor lab/diagnostic results  - Monitor all insertion sites, i e  indwelling lines, tubes, and drains  - [place] appropriate cooling/warming therapies per order  - Administer medications as ordered  - Instruct and encourage patient and family to use good hand hygiene technique  - Identify and instruct in appropriate isolation precautions for identified infection/condition   Outcome: Progressing

## 2019-07-24 ENCOUNTER — PATIENT OUTREACH (OUTPATIENT)
Dept: FAMILY MEDICINE CLINIC | Facility: CLINIC | Age: 84
End: 2019-07-24

## 2019-07-24 NOTE — PROGRESS NOTES
Outpatient Care Management Note:    Care manager called the contact number on Kendra's chart attempting to outreach patient  It was for [facility]-her assisted living facility  I spoke with a staff member in the Wellness office named Ingris Dupree  She states that Brionna James is back in their community  They provide medications, meals, transportation, and assistance as needed  Ingris Dupree denied any current issues or concerns with Brionna James  Ingris Dupree stated they would have to get Brionna James in order for her to get on the phone and this could take some time  CM noted that there was another contact number on the chart  Ingris Dupree did think Brionna James had a cell phone  CM will try to reach patient directly  Care manager attempted to reach Brionna James on her other contact number  The number was not in service  Unable to reach letter mailed to patient

## 2019-07-24 NOTE — LETTER
Date: 07/24/19    Dear Erasto Huang,   My name is Kylee Castro  I am a registered nurse care manager working with [employer]  I have not been able to reach you and would like to make you aware of my services  My work is to help patients that have complex medical conditions get the care they need  This includes patients who may have been in the hospital or emergency room  I am aware that you live at [facility], and they assist you as needed  If I can be of any assistance, or you have any questions, please do not hesitate to call me  I look forward to speaking with you    Sincerely,  [address] [place] [address]  [phone]    Copy:  (primary care physician name and address)

## 2019-07-24 NOTE — TELEPHONE ENCOUNTER
S/shira Woodruff from [facility] [place] [place] called stating that she needs last office note for pt  Fax sent  Thank you!

## 2019-07-24 NOTE — DISCHARGE SUMMARY
Discharge Summary - Marcela Late 1935, [id number]        Admission Date: 7/17/2019  Discharge Date: 7/22/2019    Admitting Diagnosis: CHF (congestive heart failure) ([place] [address] ) [I50 9]  Hypoxia [R09 02]  Fever [R50 9]  DINORA (acute kidney injury) ([place] [address] ) [N17 9]    Discharge Diagnosis:   1  Aspiration pneumonia  2  Chronic diastolic congestive heart failure  3  Acute kidney injury superimposed on chronic kidney disease stage 4  4  Type 2 diabetes  5  Peripheral vascular disease  6  Hypertension  7  Hypothyroidism     Consulting Physicians:  Dr Alexy Seals, pulmonary Medicine    Procedures Performed:   None    HPI:  The patient is an 51-year-old woman who has had a dry hacking cough with some shortness of breath for the past several weeks  More recently, she developed fever  Because of this she was brought to the emergency room where she was found to have pneumonia  She was admitted for further evaluation and care  Aspiration pneumonia was suspected  Hospital Course: The patient was admitted to the hospital and appropriate cultures were obtained  These were nondiagnostic  She was treated with intravenous antibiotics and did improve over the next several days  The possibility of acute exacerbation of her chronic diastolic congestive heart failure was considered but little supporting data was accumulated  This was not thought to be much of a contributory factor to her acute situation  The patient was evaluated by Pulmonary Medicine  When she stabilized she was transitioned to oral antibiotics and continued to do well  The patient is known to have chronic kidney disease stage 4  Her creatinine was slightly elevated at the time of her admission but returned to parikh baseline  The patient's other medical issues were generally stable throughout her hospitalization  Disposition:  The patient was [facility] to [facility]  She will continue a diabetic diet    Her activity will be as tolerated  She will follow up with Dr Shilpa Dolan within 1 week  Discharge instructions/Information to patient and family:   See after visit summary for information provided to patient and family  Provisions for Follow-Up Care:  See after visit summary for information related to follow-up care and any pertinent home health orders  Planned Readmission: No    Discharge Statement   The patient was not seen by me on the day of discharge  Discharge Medications:  See after visit summary for reconciled discharge medications provided to patient and family

## 2019-07-25 ENCOUNTER — TELEPHONE (OUTPATIENT)
Dept: FAMILY MEDICINE CLINIC | Facility: CLINIC | Age: 84
End: 2019-07-25

## 2019-07-25 NOTE — TELEPHONE ENCOUNTER
I had a call from [facility] in home pt)  Pt will be starting PT and OT with there services  If you need to talk to her therapist for any reason you can call Brianna at [phone]

## 2019-08-05 ENCOUNTER — APPOINTMENT (OUTPATIENT)
Dept: WOUND CARE | Facility: HOSPITAL | Age: 84
End: 2019-08-05
Payer: MEDICARE

## 2019-08-05 PROCEDURE — 99214 OFFICE O/P EST MOD 30 MIN: CPT | Performed by: FAMILY MEDICINE

## 2019-08-05 PROCEDURE — 97597 DBRDMT OPN WND 1ST 20 CM/<: CPT | Performed by: FAMILY MEDICINE

## 2019-08-05 PROCEDURE — 11042 DBRDMT SUBQ TIS 1ST 20SQCM/<: CPT | Performed by: FAMILY MEDICINE

## 2019-08-14 ENCOUNTER — PATIENT OUTREACH (OUTPATIENT)
Dept: FAMILY MEDICINE CLINIC | Facility: CLINIC | Age: 84
End: 2019-08-14

## 2019-08-14 NOTE — PROGRESS NOTES
Outpatient Care Management Note:    No response to unable to reach letter mailed to patient  Patient closed to care management services at this time

## 2019-08-15 ENCOUNTER — APPOINTMENT (OUTPATIENT)
Dept: WOUND CARE | Facility: HOSPITAL | Age: 84
End: 2019-08-15
Payer: MEDICARE

## 2019-08-15 PROCEDURE — 11042 DBRDMT SUBQ TIS 1ST 20SQCM/<: CPT | Performed by: PODIATRIST

## 2019-08-29 ENCOUNTER — APPOINTMENT (OUTPATIENT)
Dept: WOUND CARE | Facility: HOSPITAL | Age: 84
End: 2019-08-29
Payer: MEDICARE

## 2019-08-29 PROCEDURE — 99213 OFFICE O/P EST LOW 20 MIN: CPT

## 2019-08-29 PROCEDURE — 11042 DBRDMT SUBQ TIS 1ST 20SQCM/<: CPT

## 2019-09-12 ENCOUNTER — APPOINTMENT (OUTPATIENT)
Dept: WOUND CARE | Facility: HOSPITAL | Age: 84
End: 2019-09-12
Payer: MEDICARE

## 2019-09-12 PROCEDURE — 11042 DBRDMT SUBQ TIS 1ST 20SQCM/<: CPT

## 2019-09-26 ENCOUNTER — APPOINTMENT (OUTPATIENT)
Dept: WOUND CARE | Facility: HOSPITAL | Age: 84
End: 2019-09-26
Payer: MEDICARE

## 2019-09-26 PROCEDURE — 11042 DBRDMT SUBQ TIS 1ST 20SQCM/<: CPT

## 2019-10-10 ENCOUNTER — APPOINTMENT (OUTPATIENT)
Dept: WOUND CARE | Facility: HOSPITAL | Age: 84
End: 2019-10-10
Payer: MEDICARE

## 2019-10-10 PROCEDURE — 11042 DBRDMT SUBQ TIS 1ST 20SQCM/<: CPT

## 2019-10-22 LAB
HBA1C MFR BLD HPLC: 7 %
HBA1C MFR BLD HPLC: 7 %

## 2019-10-24 ENCOUNTER — APPOINTMENT (OUTPATIENT)
Dept: WOUND CARE | Facility: HOSPITAL | Age: 84
End: 2019-10-24
Payer: MEDICARE

## 2019-10-24 PROCEDURE — 11042 DBRDMT SUBQ TIS 1ST 20SQCM/<: CPT

## 2019-10-25 PROBLEM — E87.5 HYPERKALEMIA: Status: RESOLVED | Noted: 2019-07-18 | Resolved: 2019-10-25

## 2019-10-25 PROBLEM — K21.9 GASTROESOPHAGEAL REFLUX DISEASE WITHOUT ESOPHAGITIS: Status: ACTIVE | Noted: 2019-10-25

## 2019-10-25 PROBLEM — N18.4 STAGE 4 CHRONIC KIDNEY DISEASE (HCC): Status: ACTIVE | Noted: 2019-10-25

## 2019-11-07 ENCOUNTER — APPOINTMENT (OUTPATIENT)
Dept: WOUND CARE | Facility: HOSPITAL | Age: 84
End: 2019-11-07
Payer: MEDICARE

## 2019-11-07 PROCEDURE — 99212 OFFICE O/P EST SF 10 MIN: CPT

## 2020-01-07 LAB
LEFT EYE DIABETIC RETINOPATHY: NORMAL
RIGHT EYE DIABETIC RETINOPATHY: NORMAL

## 2020-01-30 PROBLEM — I10 ESSENTIAL HYPERTENSION: Status: ACTIVE | Noted: 2020-01-30

## 2020-01-30 PROBLEM — R32 URINARY INCONTINENCE: Status: ACTIVE | Noted: 2020-01-30

## 2020-01-30 PROBLEM — R60.0 LOCALIZED EDEMA: Status: ACTIVE | Noted: 2020-01-30

## 2020-02-07 ENCOUNTER — APPOINTMENT (OUTPATIENT)
Dept: WOUND CARE | Facility: HOSPITAL | Age: 85
End: 2020-02-07
Payer: MEDICARE

## 2020-02-07 PROCEDURE — 99213 OFFICE O/P EST LOW 20 MIN: CPT

## 2020-02-11 ENCOUNTER — CONSULT (OUTPATIENT)
Dept: NEPHROLOGY | Facility: CLINIC | Age: 85
End: 2020-02-11
Payer: MEDICARE

## 2020-02-11 VITALS
DIASTOLIC BLOOD PRESSURE: 64 MMHG | HEIGHT: 62 IN | HEART RATE: 80 BPM | SYSTOLIC BLOOD PRESSURE: 124 MMHG | BODY MASS INDEX: 41.11 KG/M2 | WEIGHT: 223.4 LBS

## 2020-02-11 DIAGNOSIS — I12.9 NEPHROSCLEROSIS ARTERIOLAR, STAGE 1-4 OR UNSPECIFIED CHRONIC KIDNEY DISEASE: Primary | ICD-10-CM

## 2020-02-11 DIAGNOSIS — I10 ESSENTIAL HYPERTENSION: ICD-10-CM

## 2020-02-11 PROBLEM — E87.6 HYPOKALEMIA: Status: RESOLVED | Noted: 2019-06-28 | Resolved: 2020-02-11

## 2020-02-11 PROBLEM — N18.4 STAGE 4 CHRONIC KIDNEY DISEASE (HCC): Status: RESOLVED | Noted: 2019-10-25 | Resolved: 2020-02-11

## 2020-02-11 PROCEDURE — 99214 OFFICE O/P EST MOD 30 MIN: CPT | Performed by: INTERNAL MEDICINE

## 2020-02-11 PROCEDURE — 3008F BODY MASS INDEX DOCD: CPT | Performed by: INTERNAL MEDICINE

## 2020-02-11 RX ORDER — ALBUTEROL SULFATE 2.5 MG/3ML
2.5 SOLUTION RESPIRATORY (INHALATION) EVERY 6 HOURS PRN
COMMUNITY

## 2020-02-11 RX ORDER — MONTELUKAST SODIUM 10 MG/1
10 TABLET ORAL EVERY MORNING
COMMUNITY

## 2020-02-11 RX ORDER — BUDESONIDE AND FORMOTEROL FUMARATE DIHYDRATE 160; 4.5 UG/1; UG/1
2 AEROSOL RESPIRATORY (INHALATION) 2 TIMES DAILY
COMMUNITY

## 2020-02-11 RX ORDER — ACETAMINOPHEN 325 MG/1
650 TABLET ORAL EVERY 6 HOURS PRN
COMMUNITY

## 2020-02-11 RX ORDER — ALBUTEROL SULFATE 90 UG/1
2 AEROSOL, METERED RESPIRATORY (INHALATION) EVERY 6 HOURS PRN
COMMUNITY

## 2020-02-11 RX ORDER — SODIUM PHOSPHATE,MONO-DIBASIC 19G-7G/118
1 ENEMA (ML) RECTAL 2 TIMES DAILY
COMMUNITY

## 2020-02-11 NOTE — PATIENT INSTRUCTIONS
You are here for your initial visit  When I look over things your creatinine level was 2 3 and this is the measure of your kidney function I looked back even going as far back as 6 years and the kidney function was slightly abnormal then so really has not progressed that much  Also when I look your doctors had checked for protein in the urine and it was always negative which is a good thing  That would tell me that the kidney abnormalities not related to diabetes and also most serious kidney disease is have a lot of protein in the urine so it is good that there was none present  I am just going to repeat labs now also screen the urine for protein again make sure there is no changes  Overall if there is no protein in the urine you likely have what we call glomerulosclerosis which is aging of the kidney  Also the fluctuations could occur from some of the medicines because you on a water pill to prevent swelling and that might dehydrate you slightly  But I understand it is necessary to continue these so I am not changing that  No changes in medications  Blood pressure is excellent today  I will give instructions to have repeat labs forwarded to me and I will send correspondence to her physician  We will continue to follow and monitor

## 2020-02-11 NOTE — PROGRESS NOTES
Consultation - Nephrology   Nicko Alvarez 82 y o  female MRN: [id number]  Unit/Bed#:  Encounter: [id number]      Assessment/Plan     Assessment / Plan:  1  Renal    The patient appears to have chronic kidney disease and I looked back as far back as 6 years ago she had a creatinine 1 7  Her latest creatinine is 2 3 and that is been around the top end of her baseline range for a while  Looking back even though she is a diabetic her urine microalbumin screens have always been negative so that would tell me it is not related to diabetic nephropathy and most aggressive kidney disease is have significant proteinuria so that rules out a lot of those issues  The patient has an excellent memory and she remembers that she had kidney issues back at 1 time when she was hospitalized 1975 when she was very ill  I suspect that her creatinine is elevated due to glomerulosclerosis and I suspect also that the fluctuations and slight elevation could be due to her loop diuretic use  She has good cardiac function no history of true edematous state so the diuretic so used to help avoid lymphedema and blister development  It may make her little volume depleted  At this point I would continue all her medications the same I am going to repeat labs to make sure things have remained stable over the last 4 months  And we will continue to monitor  No changes in medications for now  BMP in urine microalbumin screen  Results will come to me and I will evaluate them to make sure things remain stable    If things are stable continue with glycemic control blood pressure control lipid control and I will see her back in about 4 months with repeat labs  If there is any problems or concerns please call me before the next visit        History of Present Illness   Physician Requesting Consult: No att  providers found  Reason for Consult / Principal Problem:  Chronic kidney disease  Hx and PE limited by:   HPI: Nicko Alvarez is a 82 y o  year old female who presents to the office for 1st evaluation  The patient has been living in a care facility and states that she recently saw the doctor who comes through 0 and likely was referred for that reason  She has was unaware of the appointment today and has no specific complaints  When I look back she has a history of chronic kidney disease and going back to 2014 she had a creatinine of 1 7  History obtained from chart review and the patient  Consults    Review of Systems   Constitutional: Negative for activity change, appetite change, chills, fatigue and fever  HENT: Negative  Eyes: Negative for photophobia, discharge and itching  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   Respiratory: Positive for cough  Negative for choking, chest tightness, shortness of breath and wheezing  Chronic nonproductive cough  Cardiovascular: Positive for leg swelling  Negative for chest pain and palpitations  Legs were swollen wrapped but improved and at her usual baseline  Gastrointestinal: Negative  Negative for abdominal distention, diarrhea, nausea and vomiting  Genitourinary: Negative for difficulty urinating, dysuria, flank pain and hematuria  Skin: Negative for rash  Occasional blisters on lower extremities  No pruritus  Neurological: Negative for dizziness, tremors, syncope, facial asymmetry and light-headedness  Psychiatric/Behavioral: Negative          Historical Information   Patient Active Problem List   Diagnosis    PAD (peripheral artery disease) (HCC)    Type 2 diabetes mellitus with proliferative retinopathy ([facility] )    Hypothyroidism    Familial combined hyperlipidemia    Anxiety    Morbid obesity with BMI of 40 0-44 9, adult ([address] )    Anemia    Chronic venous insufficiency    Diabetes mellitus (HCC)    Leukocytosis    Hypertension    Chronic cough    Chronic congestive heart failure (HCC)    Acute bronchitis    [facility] lower [facility]    Gastroesophageal reflux disease without esophagitis    Urinary incontinence    Localized edema    Nephrosclerosis arteriolar, 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 disease     Past Medical History:   Diagnosis Date    Adenoma of large intestine     Allergy     last assessed: 10/18/2013    Cataract     Constipation, chronic     Diabetes mellitus ([facility] )     anemia vitamin d deficiency glaucoma hypertension hypothyroidism; last assessed: 2012    Disease of thyroid gland     Diverticulosis     Fecal incontinence     Female stress incontinence     Glaucoma     Gout     History of cystocele     Intrinsic sphincter deficiency     Mixed incontinence     Nocturia     Osteoarthritis, chronic     Renal disorder     Senile atrophic vaginitis     Urinary frequency      Past Surgical History:   Procedure Laterality Date    ANKLE SURGERY Bilateral     CATARACT EXTRACTION      CHOLECYSTECTOMY      TUBAL LIGATION       Social History   Social History     Substance and Sexual Activity   Alcohol Use Never    Frequency: Never    Binge frequency: Never     Social History     Substance and Sexual Activity   Drug Use No     Social History     Tobacco Use   Smoking Status Former Smoker    Last attempt to quit: 1989    Years since quittin 8   Smokeless Tobacco Never Used     Family History   Problem Relation Age of Onset    Stomach cancer Mother     Diabetes Sister         mellitus       Meds/Allergies   current meds:   No current facility-administered medications for this visit  Allergies   Allergen Reactions    Ace Inhibitors      [facility] - [id number]: short of breath    Aspirin        Objective   [unfilled]  Body mass index is 40 86 kg/m²      Invasive Devices:        PHYSICAL EXAM:  /64 (BP Location: Right arm, Patient Position: Sitting, Cuff Size: Standard)   Pulse 80   Ht 5' 2" (1 575 m)   Wt 101 kg (223 lb 6 4 oz)   BMI 40 86 kg/m²     Physical Exam   Constitutional: She is oriented to person, place, and time  No distress  HENT:   Head: Atraumatic  Mouth/Throat: Oropharynx is clear and moist    Eyes: Conjunctivae and EOM are normal  No scleral icterus  Different eye color  Neck: Neck supple  No JVD present  Cardiovascular: Normal rate and regular rhythm  Exam reveals no gallop and no friction rub  Mild edema  Pulmonary/Chest: Effort normal  No respiratory distress  She has wheezes  She has no rales  End expiratory wheezing   Abdominal: Soft  Bowel sounds are normal  She exhibits no distension and no mass  There is no tenderness  Neurological: She is alert and oriented to person, place, and time  Gait normal with walker  Nonfocal motor exam upper lower extremity  Excellent memory  Skin: She is not diaphoretic  Psychiatric: She has a normal mood and affect           Current Weight: Weight - Scale: 101 kg (223 lb 6 4 oz)  First Weight: Weight - Scale: 101 kg (223 lb 6 4 oz)    Lab Results:              Invalid input(s): LABGLOM        Invalid input(s): LABALBU

## 2020-02-11 NOTE — LETTER
February 11, 2020     [facility] [place], [address] [facility] [address]  [address]    Patient: Alli Au   YOB: 1935   Date of Visit: 2/11/2020       Dear Dr Terri Johnson:    Thank you for referring Meryl Samaniego to me for evaluation  Below are my notes for this consultation  If you have questions, please do not hesitate to call me  I look forward to following your patient along with you  Sincerely,        Clinton Wolf MD        CC: Derek Stevenson MD  2/11/2020 10:26 AM  Sign at close encounter  Consultation - Nephrology   Alli Au 82 y o  female MRN: [id number]  Unit/Bed#:  Encounter: [id number]      Assessment/Plan     Assessment / Plan:  1  Renal    The patient appears to have chronic kidney disease and I looked back as far back as 6 years ago she had a creatinine 1 7  Her latest creatinine is 2 3 and that is been around the top end of her baseline range for a while  Looking back even though she is a diabetic her urine microalbumin screens have always been negative so that would tell me it is not related to diabetic nephropathy and most aggressive kidney disease is have significant proteinuria so that rules out a lot of those issues  The patient has an excellent memory and she remembers that she had kidney issues back at 1 time when she was hospitalized 1975 when she was very ill  I suspect that her creatinine is elevated due to glomerulosclerosis and I suspect also that the fluctuations and slight elevation could be due to her loop diuretic use  She has good cardiac function no history of true edematous state so the diuretic so used to help avoid lymphedema and blister development  It may make her little volume depleted  At this point I would continue all her medications the same I am going to repeat labs to make sure things have remained stable over the last 4 months  And we will continue to monitor      No changes in medications for now  BMP in urine microalbumin screen  Results will come to me and I will evaluate them to make sure things remain stable    If things are stable continue with glycemic control blood pressure control lipid control and I will see her back in about 4 months with repeat labs  If there is any problems or concerns please call me before the next visit  History of Present Illness   Physician Requesting Consult: No att  providers found  Reason for Consult / Principal Problem:  Chronic kidney disease  Hx and PE limited by:   HPI: Shahzad Davis is a 82y o  year old female who presents to the office for 1st evaluation  The patient has been living in a care facility and states that she recently saw the doctor who comes through 0 and likely was referred for that reason  She has was unaware of the appointment today and has no specific complaints  When I look back she has a history of chronic kidney disease and going back to 2014 she had a creatinine of 1 7  History obtained from chart review and the patient  Consults    Review of Systems   Constitutional: Negative for activity change, appetite change, chills, fatigue and fever  HENT: Negative  Eyes: Negative for photophobia, discharge and itching  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   Respiratory: Positive for cough  Negative for choking, chest tightness, shortness of breath and wheezing  Chronic nonproductive cough  Cardiovascular: Positive for leg swelling  Negative for chest pain and palpitations  Legs were swollen wrapped but improved and at her usual baseline  Gastrointestinal: Negative  Negative for abdominal distention, diarrhea, nausea and vomiting  Genitourinary: Negative for difficulty urinating, dysuria, flank pain and hematuria  Skin: Negative for rash  Occasional blisters on lower extremities  No pruritus  Neurological: Negative for dizziness, tremors, syncope, facial asymmetry and light-headedness  Psychiatric/Behavioral: Negative          Historical Information   Patient Active Problem List   Diagnosis    [facility]per[facility] disease) [facility])    Type 2 diabetes mellitus with proliferative retinopathy ([facility] )    Hypothyroidism    Familial combined hyperlipidemia    Anxiety    Morbid obesity with BMI of 40 0-44 9, adult ([facility] )    Anemia    Chronic venous insufficiency    Diabetes mellitus (HCC)    Leukocytosis    Hypertension    Chronic cough    Chronic congestive heart failure (HCC)    Acute bronchitis    Community acquired pneumonia of left lower lobe of lung ([facility] )    Gastroesophageal reflux disease without esophagitis    Urinary incontinence    Localized edema    Nephrosclerosis arteriolar, 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 disease     Past Medical History:   Diagnosis Date    Adenoma of large intestine     Allergy     last assessed: 10/18/2013    Cataract     Constipation, chronic     Diabetes mellitus ([facility])     anemia vitamin d deficiency glaucoma hypertension hypothyroidism; last assessed: 2012    Disease of thyroid gland     Diverticulosis     Fecal incontinence     Female stress incontinence     Glaucoma     Gout     History of cystocele     Intrinsic sphincter deficiency     Mixed incontinence     Nocturia     Osteoarthritis, chronic     Renal disorder     Senile atrophic vaginitis     Urinary frequency      Past Surgical History:   Procedure Laterality Date    ANKLE SURGERY Bilateral     CATARACT EXTRACTION      CHOLECYSTECTOMY      TUBAL LIGATION       Social History   Social History     Substance and Sexual Activity   Alcohol Use Never    Frequency: Never    Binge frequency: Never     Social History     Substance and Sexual Activity   Drug Use No     Social History     Tobacco Use   Smoking Status Former Smoker    Last attempt to quit: 1989    Years since quittin 8   Smokeless Tobacco Never Used     Family History   Problem Relation Age of Onset    Stomach cancer Mother     Diabetes Sister         mellitus       Meds/Allergies   current meds:   No current facility-administered medications for this visit  Allergies   Allergen Reactions    Ace Inhibitors      Annotation - [id number]: short of breath    Aspirin        Objective   [unfilled]  Body mass index is 40 86 kg/m²  Invasive Devices:        PHYSICAL EXAM:  /64 (BP Location: Right arm, Patient Position: Sitting, Cuff Size: Standard)   Pulse 80   Ht 5' 2" (1 575 m)   Wt 101 kg (223 lb 6 4 oz)   BMI 40 86 kg/m²      Physical Exam   Constitutional: She is oriented to person, place, and time  No distress  HENT:   Head: Atraumatic  Mouth/Throat: Oropharynx is clear and moist    Eyes: Conjunctivae and EOM are normal  No scleral icterus  Different eye color  Neck: Neck supple  No JVD present  Cardiovascular: Normal rate and regular rhythm  Exam reveals no gallop and no friction rub  Mild edema  Pulmonary/Chest: Effort normal  No respiratory distress  She has wheezes  She has no rales  End expiratory wheezing   Abdominal: Soft  Bowel sounds are normal  She exhibits no distension and no mass  There is no tenderness  Neurological: She is alert and oriented to person, place, and time  Gait normal with walker  Nonfocal motor exam upper lower extremity  Excellent memory  Skin: She is not diaphoretic  Psychiatric: She has a normal mood and affect           Current Weight: Weight - Scale: 101 kg (223 lb 6 4 oz)  First Weight: Weight - Scale: 101 kg (223 lb 6 4 oz)    Lab Results:              Invalid input(s): LABGLOM        Invalid input(s): LABALBU

## 2020-02-14 ENCOUNTER — APPOINTMENT (OUTPATIENT)
Dept: WOUND CARE | Facility: HOSPITAL | Age: 85
End: 2020-02-14
Payer: MEDICARE

## 2020-02-14 PROCEDURE — 99213 OFFICE O/P EST LOW 20 MIN: CPT

## 2020-02-18 ENCOUNTER — TELEPHONE (OUTPATIENT)
Dept: NEPHROLOGY | Facility: CLINIC | Age: 85
End: 2020-02-18

## 2020-02-18 NOTE — TELEPHONE ENCOUNTER
----- Message from Ezio Leo MD sent at 2/18/2020  3:19 PM EST -----  Please call the patient tell her that her kidney function blood test was stable in her baseline range so will continue with the same plan and follow-up as scheduled    ----- Message -----  From: Interface, Transcription Incoming  Sent: 2/18/2020  12:50 PM EST  To: Ezio Leo MD

## 2020-02-18 NOTE — TELEPHONE ENCOUNTER
Spoke with Loly Huynh from [facility] and they aware of the patient's lab test results and that there are no changes at this time        Sara Manuel MA

## 2020-02-21 ENCOUNTER — APPOINTMENT (OUTPATIENT)
Dept: WOUND CARE | Facility: HOSPITAL | Age: 85
End: 2020-02-21
Payer: MEDICARE

## 2020-02-21 PROCEDURE — 97597 DBRDMT OPN WND 1ST 20 CM/<: CPT

## 2020-03-06 ENCOUNTER — APPOINTMENT (OUTPATIENT)
Dept: WOUND CARE | Facility: HOSPITAL | Age: 85
End: 2020-03-06
Payer: MEDICARE

## 2020-03-06 PROCEDURE — 99214 OFFICE O/P EST MOD 30 MIN: CPT

## 2020-03-06 PROCEDURE — 10140 I&D HMTMA SEROMA/FLUID COLLJ: CPT

## 2020-03-18 ENCOUNTER — APPOINTMENT (OUTPATIENT)
Dept: WOUND CARE | Facility: HOSPITAL | Age: 85
End: 2020-03-18
Payer: MEDICARE

## 2020-03-18 PROCEDURE — 99213 OFFICE O/P EST LOW 20 MIN: CPT

## 2020-04-03 ENCOUNTER — APPOINTMENT (OUTPATIENT)
Dept: WOUND CARE | Facility: HOSPITAL | Age: 85
End: 2020-04-03
Payer: MEDICARE

## 2020-04-03 PROCEDURE — 99214 OFFICE O/P EST MOD 30 MIN: CPT

## 2020-04-17 ENCOUNTER — APPOINTMENT (OUTPATIENT)
Dept: WOUND CARE | Facility: HOSPITAL | Age: 85
End: 2020-04-17
Payer: MEDICARE

## 2020-04-17 PROCEDURE — 99214 OFFICE O/P EST MOD 30 MIN: CPT

## 2020-06-17 ENCOUNTER — APPOINTMENT (OUTPATIENT)
Dept: WOUND CARE | Facility: HOSPITAL | Age: 85
End: 2020-06-17
Payer: MEDICARE

## 2020-06-17 PROCEDURE — 10140 I&D HMTMA SEROMA/FLUID COLLJ: CPT

## 2020-06-17 PROCEDURE — 99214 OFFICE O/P EST MOD 30 MIN: CPT

## 2020-07-08 ENCOUNTER — APPOINTMENT (OUTPATIENT)
Dept: WOUND CARE | Facility: HOSPITAL | Age: 85
End: 2020-07-08
Payer: MEDICARE

## 2020-07-08 PROCEDURE — 97597 DBRDMT OPN WND 1ST 20 CM/<: CPT

## 2020-07-08 PROCEDURE — 99214 OFFICE O/P EST MOD 30 MIN: CPT

## 2020-07-08 PROCEDURE — 97598 DBRDMT OPN WND ADDL 20CM/<: CPT

## 2020-07-24 ENCOUNTER — APPOINTMENT (OUTPATIENT)
Dept: WOUND CARE | Facility: HOSPITAL | Age: 85
End: 2020-07-24
Payer: MEDICARE

## 2020-07-24 PROCEDURE — 99215 OFFICE O/P EST HI 40 MIN: CPT

## 2020-07-27 ENCOUNTER — OFFICE VISIT (OUTPATIENT)
Dept: NEPHROLOGY | Facility: CLINIC | Age: 85
End: 2020-07-27
Payer: MEDICARE

## 2020-07-27 ENCOUNTER — TELEPHONE (OUTPATIENT)
Dept: NEPHROLOGY | Facility: CLINIC | Age: 85
End: 2020-07-27

## 2020-07-27 VITALS
SYSTOLIC BLOOD PRESSURE: 122 MMHG | HEART RATE: 70 BPM | TEMPERATURE: 98.2 F | DIASTOLIC BLOOD PRESSURE: 78 MMHG | HEIGHT: 62 IN | BODY MASS INDEX: 40.86 KG/M2

## 2020-07-27 DIAGNOSIS — N18.9 CHRONIC KIDNEY DISEASE, UNSPECIFIED CKD STAGE: Primary | ICD-10-CM

## 2020-07-27 DIAGNOSIS — I12.9 NEPHROSCLEROSIS ARTERIOLAR, STAGE 1-4 OR UNSPECIFIED CHRONIC KIDNEY DISEASE: ICD-10-CM

## 2020-07-27 PROCEDURE — 3078F DIAST BP <80 MM HG: CPT | Performed by: INTERNAL MEDICINE

## 2020-07-27 PROCEDURE — 3066F NEPHROPATHY DOC TX: CPT | Performed by: INTERNAL MEDICINE

## 2020-07-27 PROCEDURE — 1036F TOBACCO NON-USER: CPT | Performed by: INTERNAL MEDICINE

## 2020-07-27 PROCEDURE — 3074F SYST BP LT 130 MM HG: CPT | Performed by: INTERNAL MEDICINE

## 2020-07-27 PROCEDURE — 4040F PNEUMOC VAC/ADMIN/RCVD: CPT | Performed by: INTERNAL MEDICINE

## 2020-07-27 PROCEDURE — 99214 OFFICE O/P EST MOD 30 MIN: CPT | Performed by: INTERNAL MEDICINE

## 2020-07-27 PROCEDURE — 4010F ACE/ARB THERAPY RXD/TAKEN: CPT | Performed by: INTERNAL MEDICINE

## 2020-07-27 PROCEDURE — 1160F RVW MEDS BY RX/DR IN RCRD: CPT | Performed by: INTERNAL MEDICINE

## 2020-07-27 RX ORDER — AMLODIPINE BESYLATE 2.5 MG/1
7.5 TABLET ORAL DAILY
COMMUNITY

## 2020-07-27 NOTE — PROGRESS NOTES
NEPHROLOGY PROGRESS NOTE    Marium Said 82 y o  female MRN: [id number]  Unit/Bed#:  Encounter: [id number]  Reason for Consult:  Chronic kidney disease    Patient is here for follow-up  She is doing well she did remind me that she is blind in the right eye and has poor vision in her left  Reviewing her chart she was admitted in April of 2020 with COVID-19 infection then readmitted in April with low blood sugar  She states she was very sick but does not really remember the event at the present time is doing well with no complaints for me  ASSESSMENT/PLAN:    1  Renal    The patient is chronic kidney disease likely due to nephrosclerosis given lack of proteinuria  She did recently have urinalysis and it dipped negative for protein so that will go against intrinsic disease such as diabetic nephropathy  Creatinine is stable at 2 5 which is her baseline as when I saw her in February that was her reported baseline at that time  She recalled when she was younger she had issues with her kidneys but can not remember any every detail  At this point renal function stable continue current medications blood pressure is well controlled  Labs and follow-up as scheduled  No changes in medications    2  Lymphedema    Patient had Ace bandages on her lower extremity she states that they will weak but times and have sores but that is being cared for by visiting nurses  SUBJECTIVE:  Review of Systems   Constitution: Negative for chills, diaphoresis, fever and malaise/fatigue  HENT: Negative  Eyes: Positive for vision loss in left eye and vision loss in right eye  Negative for pain and photophobia  Cardiovascular: Negative for chest pain, dyspnea on exertion, orthopnea and palpitations  Legs are wrapped as above  Respiratory: Negative  Negative for cough, shortness of breath, sputum production and wheezing  Gastrointestinal: Negative  Negative for abdominal pain, diarrhea, nausea and vomiting  Genitourinary: Negative  Negative for dysuria, flank pain, hematuria and incomplete emptying  Neurological: Negative  Ambulates with walker  Psychiatric/Behavioral: Negative for altered mental status, depression, hallucinations and hypervigilance  OBJECTIVE:  Current Weight: Weight - Scale: (Wheelchair)  [email or web address] Consult com: Temperature: 98 2 °F (36 8 °C)   Blood pressure 122/78, pulse 70, temperature 98 2 °F (36 8 °C), temperature source Oral, height 5' 2" (1 575 m)  , Body mass index is 40 86 kg/m²  [unfilled]    Physical Exam: /78   Pulse 70   Temp 98 2 °F (36 8 °C) (Oral)   Ht 5' 2" (1 575 m)   BMI 40 86 kg/m²   Physical Exam   Constitutional: She is oriented to person, place, and time  No distress  HENT:   Head: Atraumatic  Mouth/Throat: Oropharynx is clear and moist    Eyes: EOM are normal  No scleral icterus  Neck: Neck supple  No JVD present  Cardiovascular: Normal rate and regular rhythm  Exam reveals no gallop and no friction rub  Legs wrapped with Ace bandage  Pulmonary/Chest: Effort normal and breath sounds normal  No respiratory distress  She has no wheezes  She has no rales  Abdominal: Soft  Bowel sounds are normal  She exhibits no distension  There is no tenderness  There is no rebound  Neurological: She is alert and oriented to person, place, and time  Gait not assessed  Skin: She is not diaphoretic  Psychiatric: She has a normal mood and affect   Her behavior is normal  Thought content normal        Medications:    Current Outpatient Medications:     acetaminophen (TYLENOL) 325 mg tablet, Take 650 mg by mouth every 6 (six) hours as needed for mild pain, Disp: , Rfl:     albuterol (2 5 mg/3 mL) 0 083 % nebulizer solution, Take 2 5 mg by nebulization every 6 (six) hours as needed for wheezing or shortness of breath, Disp: , Rfl:     albuterol (VENTOLIN HFA) 90 mcg/act inhaler, Inhale 2 puffs every 6 (six) hours as needed for wheezing, Disp: , Rfl:     amLODIPine (NORVASC) 2 5 mg tablet, Take 7 5 mg by mouth daily, Disp: , Rfl:     atorvastatin (LIPITOR) 10 mg tablet, Take 1 tablet (10 mg total) by mouth daily, Disp: 90 tablet, Rfl: 1    budesonide-formoterol (SYMBICORT) 160-4 5 mcg/act inhaler, Inhale 2 puffs 2 (two) times a day Rinse mouth after use , Disp: , Rfl:     dorzolamide-timolol (COSOPT) 22 3-6 8 MG/ML ophthalmic solution, Administer 1 drop to both eyes 2 (two) times a day, Disp: , Rfl:     furosemide (LASIX) 80 mg tablet, Take 1 tablet (80 mg total) by mouth daily, Disp: 90 tablet, Rfl: 1    Glucosamine-Chondroitin (GLUCOSAMINE CHONDR COMPLEX) 500-400 MG CAPS, Take 1 capsule by mouth 2 (two) times a day, Disp: , Rfl:     guaiFENesin (ROBITUSSIN) 100 MG/5ML oral liquid, Take 200 mg by mouth 3 (three) times a day as needed for cough, Disp: , Rfl:     insulin aspart (NOVOLOG) 100 units/mL injection, 20 units BID at Breakfast and supper   Hold for blood sugar under 150 , Disp: 45 mL, Rfl: 5    insulin glargine (LANTUS SOLOSTAR) 100 units/mL injection pen, Inject 21 Units under the skin daily At HS, Disp: 5 pen, Rfl: 3    ipratropium (ATROVENT) 0 02 % nebulizer solution, Take 0 5 mg by nebulization 4 (four) times a day, Disp: , Rfl:     levothyroxine 75 mcg tablet, TAKE ONE TABLET BY MOUTH ONCE DAILY AS DIRECTED, Disp: 90 tablet, Rfl: 3    linaGLIPtin (TRADJENTA) 5 MG TABS, Take 5 mg by mouth daily, Disp: , Rfl:     montelukast (SINGULAIR) 10 mg tablet, Take 10 mg by mouth daily at bedtime, Disp: , Rfl:     pantoprazole (PROTONIX) 40 mg tablet, Take 1 tablet (40 mg total) by mouth daily before breakfast, Disp: 30 tablet, Rfl: 0    potassium chloride (KLOR-CON M20) 20 mEq tablet, Take 1 tablet (20 mEq total) by mouth daily, Disp: 90 tablet, Rfl: 1    tiotropium (SPIRIVA RESPIMAT) 1 25 MCG/ACT AERS inhaler, Inhale 2 puffs daily, Disp: , Rfl:     doxazosin (CARDURA) 2 mg tablet, Take 1 tablet (2 mg total) by mouth daily (Patient not taking: Reported on 7/27/2020), Disp: 90 tablet, Rfl: 1    fexofenadine (ALLEGRA) 180 MG tablet, Take by mouth, Disp: , Rfl:     Incontinence Supply Disposable (CERTAINTY ADJ UNDERWEAR LARGE) MISC, by Does not apply route daily at bedtime (Patient not taking: Reported on 7/27/2020), Disp: 30 each, Rfl: 5    losartan (COZAAR) 100 MG tablet, TAKE ONE TABLET BY MOUTH ONCE DAILY (Patient not taking: Reported on 7/27/2020), Disp: 90 tablet, Rfl: 3    RELION PEN NEEDLE 31G/8MM 31G X 8 MM MISC, USE AS DIRECTED THREE TIMES DAILY (Patient not taking: Reported on 7/27/2020), Disp: 300 each, Rfl: 3    Laboratory Results:  Lab Results   Component Value Date    WBC 14 44 (H) 07/22/2019    HGB 9 9 (L) 07/22/2019    HCT 31 4 (L) 07/22/2019    MCV 91 07/22/2019     07/22/2019     Lab Results   Component Value Date    SODIUM 136 07/21/2019    K 4 9 07/21/2019     07/21/2019    CO2 30 07/21/2019     (H) 07/21/2019    CREATININE 2 34 (H) 07/21/2019    GLUC 332 (H) 07/21/2019    CALCIUM 9 2 07/21/2019     Lab Results   Component Value Date    CALCIUM 9 2 07/21/2019     No results found for: LABPROT

## 2020-07-27 NOTE — TELEPHONE ENCOUNTER
----- Message from Nhung Arana MD sent at 7/27/2020 12:03 PM EDT -----  Could you please fax or send a copy of today's office visit note to her nursing home  Thank you

## 2020-07-27 NOTE — PATIENT INSTRUCTIONS
You are here for follow-up to monitor your kidney function is I initially saw you back in February and the creatinine which is the blood test for the kidney function is around 2 5 and her most recent 1 is stable at that level so there is no worsening  There was no protein in the urine so that tells me you do not have an aggressive kidney disease and hopefully the renal function will just remained stable or only slowly get worse as you get older  You have no symptoms related to this  Reviewing your recent hospitalization you were in the hospital in April for [place] 19 infection and have recovered from that thank Goodness  Continue with your physical therapy no changes in medications and I will send a letter to her doctors

## 2020-07-31 ENCOUNTER — APPOINTMENT (OUTPATIENT)
Dept: WOUND CARE | Facility: HOSPITAL | Age: 85
End: 2020-07-31
Payer: MEDICARE

## 2020-07-31 PROCEDURE — 99215 OFFICE O/P EST HI 40 MIN: CPT

## 2020-08-21 ENCOUNTER — OFFICE VISIT (OUTPATIENT)
Dept: WOUND CARE | Facility: HOSPITAL | Age: 85
End: 2020-08-21
Payer: MEDICARE

## 2020-08-21 VITALS
DIASTOLIC BLOOD PRESSURE: 70 MMHG | SYSTOLIC BLOOD PRESSURE: 108 MMHG | HEART RATE: 72 BPM | TEMPERATURE: 96.1 F | RESPIRATION RATE: 20 BRPM

## 2020-08-21 DIAGNOSIS — I87.311 IDIOPATHIC CHRONIC VENOUS HYPERTENSION OF RIGHT LEG WITH ULCER (HCC): Primary | ICD-10-CM

## 2020-08-21 DIAGNOSIS — L97.919 IDIOPATHIC CHRONIC VENOUS HYPERTENSION OF RIGHT LEG WITH ULCER (HCC): Primary | ICD-10-CM

## 2020-08-21 DIAGNOSIS — L97.929 IDIOPATHIC CHRONIC VENOUS HYPERTENSION OF LOWER EXTREMITY WITH ULCER, LEFT (HCC): ICD-10-CM

## 2020-08-21 DIAGNOSIS — I87.312 IDIOPATHIC CHRONIC VENOUS HYPERTENSION OF LOWER EXTREMITY WITH ULCER, LEFT (HCC): ICD-10-CM

## 2020-08-21 DIAGNOSIS — L89.322 STAGE II PRESSURE ULCER OF LEFT BUTTOCK (HCC): ICD-10-CM

## 2020-08-21 DIAGNOSIS — L89.312 STAGE II PRESSURE ULCER OF RIGHT BUTTOCK (HCC): ICD-10-CM

## 2020-08-21 PROCEDURE — 99214 OFFICE O/P EST MOD 30 MIN: CPT | Performed by: FAMILY MEDICINE

## 2020-08-21 PROCEDURE — 99213 OFFICE O/P EST LOW 20 MIN: CPT | Performed by: FAMILY MEDICINE

## 2020-08-21 RX ORDER — LIDOCAINE HYDROCHLORIDE 40 MG/ML
5 SOLUTION TOPICAL ONCE
Status: COMPLETED | OUTPATIENT
Start: 2020-08-21 | End: 2020-08-21

## 2020-08-21 RX ORDER — AMMONIUM LACTATE 12 G/100G
CREAM TOPICAL ONCE
Status: COMPLETED | OUTPATIENT
Start: 2020-08-21 | End: 2020-08-21

## 2020-08-21 RX ADMIN — LIDOCAINE HYDROCHLORIDE 5 ML: 40 SOLUTION TOPICAL at 13:51

## 2020-08-21 RX ADMIN — AMMONIUM LACTATE: 12 CREAM TOPICAL at 13:50

## 2020-08-21 NOTE — PATIENT INSTRUCTIONS
Orders Placed This Encounter   Procedures    Wound cleansing and dressings     All Lower leg wounds   Wash your hands with soap and water  Remove old dressing, discard into plastic bag and place in trash  Cleanse the wound with normal saline or soap and water prior to applying a clean dressing  Do not use tissue or cotton balls  Do not scrub the wound  Pat dry using gauze  Shower yes do not get dressing wet though  May remove for shower  Apply Lac Hydrin once the foam dressing are in place to skin surrounding wound  Apply bordered foam to the all the leg wounds  Change dressing three times a week    Left and Right Buttock wounds  Wash your hands with soap and water  Remove old dressing, discard into plastic bag and place in trash  Cleanse the wound with soap and water prior to applying a clean dressing  Do not use tissue or cotton balls  Do not scrub the wound  Pat dry using gauze  Shower yes   Apply triad paste to skin surrounding wound and the wound  Apply three times a day and as needed for soiling    Same treatment applied to all the wounds today in the wound care center    [facility] for the legs to moisturize them     Standing Status:   Future     Standing Expiration Date:   8/21/2021    Wound compression and edema control     Elastic Tubular Stocking    Tubular elastic bandage: Apply from base of toes to behind the knee  Apply in AM, may remove for sleep  Avoid prolonged standing in one place  Elevate leg(s) above the level of the heart when sitting or as much as possible  Standing Status:   Future     Standing Expiration Date:   8/21/2021    Wound home care     Continue visiting nurses three times a week as ordered for dressing changes if staff is not able to do them       Standing Status:   Future     Standing Expiration Date:   8/21/2021

## 2020-08-21 NOTE — PROGRESS NOTES
Patient ID: Brock Villanueva is a 82 y o  female Date of Birth 1935       Chief Complaint   Patient presents with    Follow Up Wound Care Visit     Follow up for multiple wounds to legs and buttocks       Allergies  Ace inhibitors and Aspirin    Assessment & Plan:     1  Idiopathic chronic venous hypertension of right leg with ulcer (HCC)  -     ammonium lactate (LAC-HYDRIN) 12 % cream  -     lidocaine (XYLOCAINE) 4 % topical solution 5 mL  -     Wound cleansing and dressings; Future  -     Wound compression and edema control; Future  -     Wound home care; Future    2  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, left (HCC)  -     ammonium lactate (LAC-HYDRIN) 12 % cream  -     lidocaine (XYLOCAINE) 4 % topical solution 5 mL  -     Wound cleansing and dressings; Future  -     Wound compression and edema control; Future  -     Wound home care; Future    3  Stage II pressure ulcer of right buttock (HCC)  -     ammonium lactate (LAC-HYDRIN) 12 % cream  -     lidocaine (XYLOCAINE) 4 % topical solution 5 mL  -     Wound cleansing and dressings; Future  -     Wound compression and edema control; Future  -     Wound home care; Future    4  Stage II pressure ulcer of left buttock (HCC)  -     ammonium lactate (LAC-HYDRIN) 12 % cream  -     lidocaine (XYLOCAINE) 4 % topical solution 5 mL  -     Wound cleansing and dressings; Future  -     Wound compression and edema control; Future  -     Wound home care; Future       Venous stasis ulcers on both legs lower extremities  Improving  Venous stasis dermatitis with dry scaly skin  Pressure ulcers stage II right and left buttocks improved  Subjective:     Patient is here for followup of venous stasis ulcers of the lower extremities as well as stage II pressure ulcers of both buttocks  Patient again states that the Triad paste has not been applied to her buttocks as frequently as she believes  She denies any pain  Her legs are improving  She states that in of swollen is a usually are  There itchy  She denies any fever, chills or cough  The following portions of the patient's history were reviewed and updated as appropriate: She  has a past medical history of Adenoma of large intestine, Allergy, Cataract, Constipation, chronic, Diabetes mellitus ([facility] ), Disease of thyroid gland, Diverticulosis, Fecal incontinence, Female stress incontinence, Glaucoma, Gout, History of cystocele, Intrinsic sphincter deficiency, Mixed incontinence, Nocturia, Osteoarthritis, chronic, Renal disorder, Senile atrophic vaginitis, and Urinary frequency  She   Patient Active Problem List    Diagnosis Date Noted    Nephrosclerosis arteriolar, 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 disease 02/11/2020    Urinary incontinence 01/30/2020    Localized edema 01/30/2020    Gastroesophageal reflux disease without esophagitis 10/25/2019    Community acquired pneumonia of left lower lobe of lung 07/18/2019    Leukocytosis 07/17/2019    Hypertension 07/17/2019    Chronic cough 07/17/2019    Chronic congestive heart failure ([place] [address] ) 07/17/2019    Acute bronchitis 07/17/2019    Diabetes mellitus ([place] [address] )     Chronic venous insufficiency 06/27/2019    Anemia 02/16/2019    Morbid obesity with BMI of 40 0-44 9, adult ([place] [address] ) 02/12/2019    PAD (peripheral artery disease) ([place] [address] ) 01/18/2017    Anxiety 12/13/2013    Familial combined hyperlipidemia 12/07/2012    Type 2 diabetes mellitus with proliferative retinopathy ([place] [address] ) 05/07/2012    Hypothyroidism 05/07/2012     She  has a past surgical history that includes Ankle surgery (Bilateral); Cataract extraction; Cholecystectomy; and Tubal ligation  Her family history includes Diabetes in her sister; Stomach cancer in her mother  She  reports that she quit smoking about 31 years ago  She has never used smokeless tobacco  She reports that she does not drink alcohol or use drugs    Current Outpatient Medications   Medication Sig Dispense Refill    acetaminophen (TYLENOL) 325 mg tablet Take 650 mg by mouth every 6 (six) hours as needed for mild pain      albuterol (2 5 mg/3 mL) 0 083 % nebulizer solution Take 2 5 mg by nebulization every 6 (six) hours as needed for wheezing or shortness of breath      albuterol (VENTOLIN HFA) 90 mcg/act inhaler Inhale 2 puffs every 6 (six) hours as needed for wheezing      amLODIPine (NORVASC) 2 5 mg tablet Take 7 5 mg by mouth daily      atorvastatin (LIPITOR) 10 mg tablet Take 1 tablet (10 mg total) by mouth daily 90 tablet 1    budesonide-formoterol (SYMBICORT) 160-4 5 mcg/act inhaler Inhale 2 puffs 2 (two) times a day Rinse mouth after use   dorzolamide-timolol (COSOPT) 22 3-6 8 MG/ML ophthalmic solution Administer 1 drop to both eyes 2 (two) times a day      doxazosin (CARDURA) 2 mg tablet Take 1 tablet (2 mg total) by mouth daily (Patient not taking: Reported on 7/27/2020) 90 tablet 1    fexofenadine (ALLEGRA) 180 MG tablet Take by mouth      furosemide (LASIX) 80 mg tablet Take 1 tablet (80 mg total) by mouth daily 90 tablet 1    Glucosamine-Chondroitin (GLUCOSAMINE CHONDR COMPLEX) 500-400 MG CAPS Take 1 capsule by mouth 2 (two) times a day      guaiFENesin (ROBITUSSIN) 100 MG/5ML oral liquid Take 200 mg by mouth 3 (three) times a day as needed for cough      Incontinence Supply Disposable (CERTAINTY ADJ UNDERWEAR LARGE) MISC by Does not apply route daily at bedtime (Patient not taking: Reported on 7/27/2020) 30 each 5    insulin aspart (NOVOLOG) 100 units/mL injection 20 units BID at Breakfast and supper   Hold for blood sugar under 150  45 mL 5    insulin glargine (LANTUS SOLOSTAR) 100 units/mL injection pen Inject 21 Units under the skin daily At HS 5 pen 3    ipratropium (ATROVENT) 0 02 % nebulizer solution Take 0 5 mg by nebulization 4 (four) times a day      levothyroxine 75 mcg tablet TAKE ONE TABLET BY MOUTH ONCE DAILY AS DIRECTED 90 tablet 3    linaGLIPtin (TRADJENTA) 5 MG TABS Take 5 mg by mouth daily      losartan (COZAAR) 100 MG tablet TAKE ONE TABLET BY MOUTH ONCE DAILY (Patient not taking: Reported on 7/27/2020) 90 tablet 3    montelukast (SINGULAIR) 10 mg tablet Take 10 mg by mouth daily at bedtime      pantoprazole (PROTONIX) 40 mg tablet Take 1 tablet (40 mg total) by mouth daily before breakfast 30 tablet 0    potassium chloride (KLOR-CON M20) 20 mEq tablet Take 1 tablet (20 mEq total) by mouth daily 90 tablet 1    RELION PEN NEEDLE 31G/8MM 31G X 8 MM MISC USE AS DIRECTED THREE TIMES DAILY (Patient not taking: Reported on 7/27/2020) 300 each 3    tiotropium (SPIRIVA RESPIMAT) 1 25 MCG/ACT AERS inhaler Inhale 2 puffs daily       No current facility-administered medications for this visit       Review of Systems   Constitutional: Negative for activity change, appetite change, chills, fatigue and fever  HENT: Negative for congestion, hearing loss, postnasal drip and sore throat  Eyes: Negative for visual disturbance  Respiratory: Negative for cough, chest tightness and shortness of breath  Cardiovascular: Negative for chest pain, palpitations and leg swelling  Gastrointestinal: Negative for abdominal pain, blood in stool, constipation, diarrhea, nausea and vomiting  Genitourinary: Negative for difficulty urinating, dysuria and urgency  Musculoskeletal: Negative for back pain, gait problem and joint swelling  Skin: Positive for wound (Both buttocks and both lower extremities)  Neurological: Negative for seizures, weakness, numbness and headaches  Hematological: Bruises/bleeds easily  Psychiatric/Behavioral: Negative for dysphoric mood           Objective:                           Pressure Ulcer 08/21/20 Buttocks Right (Active)   Pressure Injury Stage Stage 2 08/21/20 1356   Wound Description Granulation tissue 08/21/20 1356   Blanka-wound Assessment Intact;Dry 08/21/20 1356   Wound Length (cm) 0 2 cm 08/21/20 1356   Wound Width (cm) 0 2 cm 08/21/20 1356   Wound Depth (cm) 0 1 cm 08/21/20 1356   Wound Surface Area (cm^2) 0 04 cm^2 08/21/20 1356   Calculated Wound Volume (cm^3) 0 cm^3 08/21/20 1356   Drainage Amount Small 08/21/20 1356   Drainage Description Serosanguineous 08/21/20 1356   Treatment Cleansed 08/21/20 1356   Dressing Changed Changed 08/21/20 1356   Patient Tolerance Tolerated well 08/21/20 1356   Dressing Status Removed 08/21/20 1356   Wound Image Images linked 08/21/20 1332       Wound Leg Right; Anterior; Lower (Active)   Wound Image Images linked 08/21/20 1321   Wound Description Granulation tissue 08/21/20 1355   Blanka-wound Assessment Dry; Intact; Hyperpigmented 08/21/20 1355   Wound Length (cm) 6 cm 08/21/20 1355   Wound Width (cm) 3 cm 08/21/20 1355   Wound Depth (cm) 0 1 cm 08/21/20 1355   Wound Surface Area (cm^2) 18 cm^2 08/21/20 1355   Wound Volume (cm^3) 1 8 cm^3 08/21/20 1355   Calculated Wound Volume (cm^3) 1 8 cm^3 08/21/20 1355   Drainage Amount Small 08/21/20 1355   Drainage Description Serosanguineous 08/21/20 1355   Non-staged Wound Description Partial thickness 08/21/20 1355   Treatments Cleansed 08/21/20 1355   Dressing Changed Changed 08/21/20 1355   Patient Tolerance Tolerated well 08/21/20 1355   Dressing Status Removed 08/21/20 1355       Wound Leg Right;Lateral;Lower (Active)   Wound Image Images linked 08/21/20 1322   Wound Description Epithelialization;Granulation tissue 08/21/20 1354   Blanka-wound Assessment Dry; Intact; Hyperpigmented 08/21/20 1354   Wound Length (cm) 2 5 cm 08/21/20 1354   Wound Width (cm) 3 cm 08/21/20 1354   Wound Depth (cm) 0 1 cm 08/21/20 1354   Wound Surface Area (cm^2) 7 5 cm^2 08/21/20 1354   Wound Volume (cm^3) 0 75 cm^3 08/21/20 1354   Calculated Wound Volume (cm^3) 0 75 cm^3 08/21/20 1354   Drainage Amount Small 08/21/20 1354   Drainage Description Serosanguineous 08/21/20 1354   Non-staged Wound Description Partial thickness 08/21/20 1354   Treatments Cleansed 08/21/20 1354   Dressing Changed Changed 08/21/20 1354   Patient Tolerance Tolerated well 08/21/20 1354   Dressing Status Removed 08/21/20 1354       Wound Leg Left; Anterior; Lower (Active)   Wound Image Images linked 08/21/20 1321   Wound Description Edema;Epithelialization;Granulation tissue 08/21/20 1354   Blanka-wound Assessment Intact;Dry;Hyperpigmented 08/21/20 1354   Wound Length (cm) 2 cm 08/21/20 1354   Wound Width (cm) 3 cm 08/21/20 1354   Wound Depth (cm) 0 1 cm 08/21/20 1354   Wound Surface Area (cm^2) 6 cm^2 08/21/20 1354   Wound Volume (cm^3) 0 6 cm^3 08/21/20 1354   Calculated Wound Volume (cm^3) 0 6 cm^3 08/21/20 1354   Drainage Amount Small 08/21/20 1354   Drainage Description Serosanguineous 08/21/20 1354   Non-staged Wound Description Partial thickness 08/21/20 1354   Treatments Cleansed 08/21/20 1354   Dressing Changed Changed 08/21/20 1354   Patient Tolerance Tolerated well 08/21/20 1354   Dressing Status Removed 08/21/20 1354       Wound Buttocks Left (Active)   Wound Image Images linked 08/21/20 1329   Wound Description Granulation tissue 08/21/20 1353   Pressure Injury Stage Stage 2 08/21/20 1353   Blanka-wound Assessment Dry; Intact 08/21/20 1353   Wound Length (cm) 0 2 cm 08/21/20 1353   Wound Width (cm) 0 2 cm 08/21/20 1353   Wound Depth (cm) 0 1 cm 08/21/20 1353   Wound Surface Area (cm^2) 0 04 cm^2 08/21/20 1353   Wound Volume (cm^3) 0 cm^3 08/21/20 1353   Calculated Wound Volume (cm^3) 0 cm^3 08/21/20 1353   Drainage Amount Small 08/21/20 1353   Drainage Description Serosanguineous 08/21/20 1353   Non-staged Wound Description Partial thickness 08/21/20 1353   Treatments Cleansed 08/21/20 1353   Dressing Changed Changed 08/21/20 1353   Patient Tolerance Tolerated well 08/21/20 1353   Dressing Status Removed 08/21/20 1353       /70 (BP Location: Right arm, Patient Position: Prone, Cuff Size: Adult)   Pulse 72   Temp (!) 96 1 °F (35 6 °C)   Resp 20     Physical Exam  Vitals signs and nursing note reviewed  Constitutional:       Appearance: Normal appearance  She is normal weight  HENT:      Head: Normocephalic and atraumatic  Skin:     Findings: Wound present  Comments: The ulcers on the lower extremities are clean with granular tissue  The buttock ulcers are also clean, smaller with granulation tissue  No signs of infection  Neurological:      Mental Status: She is alert and oriented to person, place, and time  Psychiatric:         Mood and Affect: Mood and affect normal          Cognition and Memory: Cognition normal            Procedures         Wound Instructions:  Orders Placed This Encounter   Procedures    Wound cleansing and dressings     All Lower leg wounds   Wash your hands with soap and water  Remove old dressing, discard into plastic bag and place in trash  Cleanse the wound with normal saline or soap and water prior to applying a clean dressing  Do not use tissue or cotton balls  Do not scrub the wound  Pat dry using gauze  Shower yes do not get dressing wet though  May remove for shower  Apply Lac Hydrin once the foam dressing are in place to skin surrounding wound  Apply bordered foam to the all the leg wounds  Change dressing three times a week    Left and Right Buttock wounds  Wash your hands with soap and water  Remove old dressing, discard into plastic bag and place in trash  Cleanse the wound with soap and water prior to applying a clean dressing  Do not use tissue or cotton balls  Do not scrub the wound  Pat dry using gauze    Shower yes   Apply triad paste to skin surrounding wound and the wound  Apply three times a day and as needed for soiling    Same treatment applied to all the wounds today in the wound care center    [address] [place] [address] for the legs to moisturize them     Standing Status:   Future     Standing Expiration Date:   8/21/2021    Wound compression and edema control     Elastic Tubular Stocking    Tubular elastic bandage: Apply from base of toes to behind the knee  Apply in AM, may remove for sleep  Avoid prolonged standing in one place  Elevate leg(s) above the level of the heart when sitting or as much as possible  Standing Status:   Future     Standing Expiration Date:   8/21/2021    Wound home care     Continue visiting nurses three times a week as ordered for dressing changes if staff is not able to do them       Standing Status:   Future     Standing Expiration Date:   8/21/2021         Raúl Love MD

## 2020-09-04 ENCOUNTER — OFFICE VISIT (OUTPATIENT)
Dept: WOUND CARE | Facility: HOSPITAL | Age: 85
End: 2020-09-04
Payer: MEDICARE

## 2020-09-04 VITALS
SYSTOLIC BLOOD PRESSURE: 124 MMHG | TEMPERATURE: 97.8 F | HEART RATE: 70 BPM | DIASTOLIC BLOOD PRESSURE: 66 MMHG | RESPIRATION RATE: 20 BRPM

## 2020-09-04 DIAGNOSIS — I87.311 IDIOPATHIC CHRONIC VENOUS HYPERTENSION OF RIGHT LEG WITH ULCER (HCC): ICD-10-CM

## 2020-09-04 DIAGNOSIS — I87.312 IDIOPATHIC CHRONIC VENOUS HYPERTENSION OF LOWER EXTREMITY WITH ULCER, LEFT (HCC): ICD-10-CM

## 2020-09-04 DIAGNOSIS — L97.929 IDIOPATHIC CHRONIC VENOUS HYPERTENSION OF LOWER EXTREMITY WITH ULCER, LEFT (HCC): ICD-10-CM

## 2020-09-04 DIAGNOSIS — L89.322 STAGE II PRESSURE ULCER OF LEFT BUTTOCK (HCC): ICD-10-CM

## 2020-09-04 DIAGNOSIS — L97.919 IDIOPATHIC CHRONIC VENOUS HYPERTENSION OF RIGHT LEG WITH ULCER (HCC): ICD-10-CM

## 2020-09-04 DIAGNOSIS — L89.312 STAGE II PRESSURE ULCER OF RIGHT BUTTOCK (HCC): Primary | ICD-10-CM

## 2020-09-04 PROCEDURE — 97597 DBRDMT OPN WND 1ST 20 CM/<: CPT | Performed by: FAMILY MEDICINE

## 2020-09-04 RX ORDER — LIDOCAINE HYDROCHLORIDE 40 MG/ML
5 SOLUTION TOPICAL ONCE
Status: COMPLETED | OUTPATIENT
Start: 2020-09-04 | End: 2020-09-04

## 2020-09-04 RX ADMIN — LIDOCAINE HYDROCHLORIDE 5 ML: 40 SOLUTION TOPICAL at 13:45

## 2020-09-04 NOTE — PATIENT INSTRUCTIONS
Orders Placed This Encounter   Procedures    Wound compression and edema control     Elastic Tubular Stocking     Tubular elastic bandage: Apply from base of toes to behind the knee  Apply in AM, may remove for sleep      Avoid prolonged standing in one place      Elevate leg(s) above the level of the heart when sitting or as much as possible     Standing Status:   Future     Standing Expiration Date:   9/4/2021    Wound home care     Continue home care  Standing Status:   Future     Standing Expiration Date:   9/4/2021    Wound miscellaneous orders     Please make sure patient is sent in correct properly functioning wheelchair  Brakes are not working at all on wheelchair patient was brought in  Follow up in 2 weeks  Standing Status:   Future     Standing Expiration Date:   9/4/2021    Wound cleansing and dressings     Right lower leg wound       Wash your hands with soap and water  Remove old dressing, discard into plastic bag and place in trash  Cleanse the wound with normal saline or soap and water prior to applying a clean dressing  Do not use tissue or cotton balls  Do not scrub the wound  Pat dry using gauze  Shower yes do not get dressing wet though  May remove for shower  Apply Lac Hydrin once the foam dressing are in place to skin surrounding wound  Apply bordered foam to the all the leg wounds  Change dressing three times a week     Left and Right Buttock wounds  Wash your hands with soap and water  Remove old dressing, discard into plastic bag and place in trash  Cleanse the wound with soap and water prior to applying a clean dressing  Do not use tissue or cotton balls  Do not scrub the wound  Pat dry using gauze  Shower yes   Apply skin prep to periwound, then apply hydrocolloid dressing  Cover with tegaderm  Apply powder over buttock area    Change every 3 days      Same treatment applied to all the wounds today in the wound care center     Obtain [address] for the legs to moisturize them       Standing Status:   Future     Standing Expiration Date:   9/4/2021
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
## 2020-09-18 ENCOUNTER — OFFICE VISIT (OUTPATIENT)
Dept: WOUND CARE | Facility: HOSPITAL | Age: 85
End: 2020-09-18
Payer: MEDICARE

## 2020-09-18 VITALS
RESPIRATION RATE: 18 BRPM | DIASTOLIC BLOOD PRESSURE: 78 MMHG | HEART RATE: 70 BPM | TEMPERATURE: 97.1 F | SYSTOLIC BLOOD PRESSURE: 126 MMHG

## 2020-09-18 DIAGNOSIS — L97.919 IDIOPATHIC CHRONIC VENOUS HYPERTENSION OF RIGHT LEG WITH ULCER (HCC): ICD-10-CM

## 2020-09-18 DIAGNOSIS — L89.322 STAGE II PRESSURE ULCER OF LEFT BUTTOCK (HCC): ICD-10-CM

## 2020-09-18 DIAGNOSIS — I87.311 IDIOPATHIC CHRONIC VENOUS HYPERTENSION OF RIGHT LEG WITH ULCER (HCC): ICD-10-CM

## 2020-09-18 DIAGNOSIS — L97.929 IDIOPATHIC CHRONIC VENOUS HYPERTENSION OF LOWER EXTREMITY WITH ULCER, LEFT (HCC): Primary | ICD-10-CM

## 2020-09-18 DIAGNOSIS — I87.312 IDIOPATHIC CHRONIC VENOUS HYPERTENSION OF LOWER EXTREMITY WITH ULCER, LEFT (HCC): Primary | ICD-10-CM

## 2020-09-18 DIAGNOSIS — L89.312 STAGE II PRESSURE ULCER OF RIGHT BUTTOCK (HCC): ICD-10-CM

## 2020-09-18 PROCEDURE — 99214 OFFICE O/P EST MOD 30 MIN: CPT | Performed by: FAMILY MEDICINE

## 2020-09-18 PROCEDURE — 99213 OFFICE O/P EST LOW 20 MIN: CPT | Performed by: FAMILY MEDICINE

## 2020-09-18 NOTE — PROGRESS NOTES
Patient ID: Tabby Navarro is a 82 y o  female Date of Birth 1935       Chief Complaint   Patient presents with    Follow Up Wound Care Visit     Bilateral LE and bilateral Buttocks wounds       Allergies  Ace inhibitors and Aspirin    Assessment & Plan:  1  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, left (HCC)  -     Wound cleansing and dressings; Future    2  Stage II pressure ulcer of right buttock (HCC)  -     Wound cleansing and dressings; Future    3  Stage II pressure ulcer of left buttock (HCC)  -     Wound cleansing and dressings; Future    4  Idiopathic chronic venous hypertension of right leg with ulcer (HCC)  -     Wound cleansing and dressings; Future      Stage II pressure ulcer of the right buttock closed  Stage II pressure ulcer of the left buttock improved  Very small venous stasis ulcers on the left lateral calf  And one venous stasis ulcer of the right lateral calf  This is also improved  See orders  Subjective: Followup venous stasis ulcers of both lower extremities  At last visit the left ulcers had closed and the right was very small  She has no complaints regarding these  She also has stage II pressure ulcers of both buttocks  Pain is much less when she sits  She denies any fever, chills or cough  The following portions of the patient's history were reviewed and updated as appropriate: allergies, current medications, past family history, past medical history, past social history, past surgical history, and problem list     Review of Systems   Constitutional: Negative for activity change, appetite change, chills, fatigue and fever  HENT: Negative for congestion, hearing loss, postnasal drip and sore throat  Eyes: Negative for visual disturbance  Respiratory: Negative for cough, chest tightness and shortness of breath  Cardiovascular: Negative for chest pain, palpitations and leg swelling     Gastrointestinal: Positive for diarrhea  Negative for abdominal pain, blood in stool, constipation, nausea and vomiting  Genitourinary: Negative for difficulty urinating, dysuria and urgency  Musculoskeletal: Positive for back pain and gait problem  Negative for joint swelling  Skin: Positive for wound (Buttocks and lower extremities)  Neurological: Negative for seizures, weakness, numbness and headaches  Hematological: Does not bruise/bleed easily  Psychiatric/Behavioral: Negative for dysphoric mood  Objective:  /78 (BP Location: Left arm, Patient Position: Sitting, Cuff Size: Adult)   Pulse 70   Temp (!) 97 1 °F (36 2 °C) (Temporal)   Resp 18     Physical Exam  Vitals signs and nursing note reviewed  Constitutional:       Appearance: Normal appearance  She is normal weight  HENT:      Head: Normocephalic and atraumatic  Skin:     Findings: Wound present  No erythema  Comments: Two very small partial thickness ulcers on the left calf and one very small ulcer on the right distal calf  The right buttock ulcer is closed  The left is smaller  Only scant drainage on the lower extremities and the left buttock  Neurological:      Mental Status: She is alert and oriented to person, place, and time  Psychiatric:         Mood and Affect: Mood and affect normal          Cognition and Memory: Cognition normal                                      Pressure Ulcer 08/21/20 Buttocks Right (Active)   Pressure Injury Stage Stage 2 09/18/20 1506   Wound Description Epithelialization 09/18/20 1506   Blanka-wound Assessment Intact 09/18/20 1506   Wound Length (cm) 0 cm 09/18/20 1506   Wound Width (cm) 0 cm 09/18/20 1506   Wound Depth (cm) 0 cm 09/18/20 1506   Wound Surface Area (cm^2) 0 cm^2 09/18/20 1506   Calculated Wound Volume (cm^3) 0 cm^3 09/18/20 1506   Drainage Amount None 09/18/20 1506   Dressing Status Dry;Clean; Intact 09/18/20 1506                   Wound Instructions:  Orders Placed This Encounter Procedures    Wound cleansing and dressings     Right and Left lateral leg wounds:  Wash your hands with soap and water  Remove old dressing, discard into plastic bag and place in trash  Cleanse the wound with mild soap and water and rinse well with clear water prior to applying a clean dressing  Do not use tissue or cotton balls  Do not scrub the wound  Pat dry using gauze  Shower yes; may remove dressings prior to shower and replace afterwards   Moisturize lower legs with lac hydrin lotion  Apply bordered foam dressing to the wound  Secure with Tubigrip F  Change dressing 2 x per week    Right buttocks and left Buttocks wounds:  Wash your hands with soap and water  Remove old dressing, discard into plastic bag and place in trash  Cleanse the wound with mild soap and water and rinse well with clear water prior to applying a clean dressing  Do not use tissue or cotton balls  Do not scrub the wound  Pat dry using gauze  Shower yes; may remove dressings prior to shower and replace afterwards   Apply hydrocolloid to the wound  Cover with tegaderm  Change dressing 2 x per week      Today's Wound treatment Note:  All wounds dressed today as ordered     Standing Status:   Future     Standing Expiration Date:   9/18/2021         Yamilex Carter MD, CHT, CWS       Portions of the record may have been created with voice recognition software  Occasional wrong word or "sound a like" substitutions may have occurred due to the inherent limitations of voice recognition software  Read the chart carefully and recognize, using context, where substitutions have occurred

## 2020-09-18 NOTE — PATIENT INSTRUCTIONS
Orders Placed This Encounter   Procedures    Wound cleansing and dressings     Right and Left lateral leg wounds:  Wash your hands with soap and water  Remove old dressing, discard into plastic bag and place in trash  Cleanse the wound with mild soap and water and rinse well with clear water prior to applying a clean dressing  Do not use tissue or cotton balls  Do not scrub the wound  Pat dry using gauze  Shower yes; may remove dressings prior to shower and replace afterwards   Moisturize lower legs with lac hydrin lotion  Apply bordered foam dressing to the wound  Secure with Tubigrip F  Change dressing 2 x per week    Right buttocks and left Buttocks wounds:  Wash your hands with soap and water  Remove old dressing, discard into plastic bag and place in trash  Cleanse the wound with mild soap and water and rinse well with clear water prior to applying a clean dressing  Do not use tissue or cotton balls  Do not scrub the wound  Pat dry using gauze  Shower yes; may remove dressings prior to shower and replace afterwards   Apply hydrocolloid to the wound    Cover with tegaderm  Change dressing 2 x per week      Today's Wound treatment Note:  All wounds dressed today as ordered     Standing Status:   Future     Standing Expiration Date:   9/18/2021

## 2020-10-09 ENCOUNTER — OFFICE VISIT (OUTPATIENT)
Dept: WOUND CARE | Facility: HOSPITAL | Age: 85
End: 2020-10-09
Payer: MEDICARE

## 2020-10-09 VITALS
HEART RATE: 82 BPM | SYSTOLIC BLOOD PRESSURE: 122 MMHG | RESPIRATION RATE: 20 BRPM | DIASTOLIC BLOOD PRESSURE: 62 MMHG | TEMPERATURE: 97.2 F

## 2020-10-09 DIAGNOSIS — R23.8 SKIN BULLA: ICD-10-CM

## 2020-10-09 DIAGNOSIS — L89.312 STAGE II PRESSURE ULCER OF RIGHT BUTTOCK (HCC): Primary | ICD-10-CM

## 2020-10-09 DIAGNOSIS — L89.322 STAGE II PRESSURE ULCER OF LEFT BUTTOCK (HCC): ICD-10-CM

## 2020-10-09 DIAGNOSIS — L97.929 IDIOPATHIC CHRONIC VENOUS HYPERTENSION OF LOWER EXTREMITY WITH ULCER, LEFT (HCC): ICD-10-CM

## 2020-10-09 DIAGNOSIS — L97.919 IDIOPATHIC CHRONIC VENOUS HYPERTENSION OF RIGHT LEG WITH ULCER (HCC): ICD-10-CM

## 2020-10-09 DIAGNOSIS — I87.312 IDIOPATHIC CHRONIC VENOUS HYPERTENSION OF LOWER EXTREMITY WITH ULCER, LEFT (HCC): ICD-10-CM

## 2020-10-09 DIAGNOSIS — I87.311 IDIOPATHIC CHRONIC VENOUS HYPERTENSION OF RIGHT LEG WITH ULCER (HCC): ICD-10-CM

## 2020-10-09 PROCEDURE — 99213 OFFICE O/P EST LOW 20 MIN: CPT | Performed by: FAMILY MEDICINE

## 2020-10-09 PROCEDURE — NC001 PR NO CHARGE: Performed by: FAMILY MEDICINE

## 2020-10-09 PROCEDURE — 10140 I&D HMTMA SEROMA/FLUID COLLJ: CPT | Performed by: FAMILY MEDICINE

## 2020-10-09 RX ORDER — LIDOCAINE 40 MG/G
CREAM TOPICAL ONCE
Status: COMPLETED | OUTPATIENT
Start: 2020-10-09 | End: 2020-10-09

## 2020-10-09 RX ADMIN — LIDOCAINE 1 APPLICATION: 40 CREAM TOPICAL at 13:39

## 2020-10-23 ENCOUNTER — OFFICE VISIT (OUTPATIENT)
Dept: WOUND CARE | Facility: HOSPITAL | Age: 85
End: 2020-10-23
Payer: MEDICARE

## 2020-10-23 VITALS
DIASTOLIC BLOOD PRESSURE: 64 MMHG | SYSTOLIC BLOOD PRESSURE: 118 MMHG | RESPIRATION RATE: 18 BRPM | TEMPERATURE: 97.5 F | HEART RATE: 80 BPM

## 2020-10-23 DIAGNOSIS — L97.929 IDIOPATHIC CHRONIC VENOUS HYPERTENSION OF LOWER EXTREMITY WITH ULCER, LEFT (HCC): ICD-10-CM

## 2020-10-23 DIAGNOSIS — I87.311 IDIOPATHIC CHRONIC VENOUS HYPERTENSION OF RIGHT LEG WITH ULCER (HCC): ICD-10-CM

## 2020-10-23 DIAGNOSIS — L97.919 IDIOPATHIC CHRONIC VENOUS HYPERTENSION OF RIGHT LEG WITH ULCER (HCC): ICD-10-CM

## 2020-10-23 DIAGNOSIS — I87.312 IDIOPATHIC CHRONIC VENOUS HYPERTENSION OF LOWER EXTREMITY WITH ULCER, LEFT (HCC): ICD-10-CM

## 2020-10-23 DIAGNOSIS — L89.322 STAGE II PRESSURE ULCER OF LEFT BUTTOCK (HCC): ICD-10-CM

## 2020-10-23 DIAGNOSIS — L89.312 STAGE II PRESSURE ULCER OF RIGHT BUTTOCK (HCC): Primary | ICD-10-CM

## 2020-10-23 PROCEDURE — 99213 OFFICE O/P EST LOW 20 MIN: CPT | Performed by: FAMILY MEDICINE

## 2020-10-23 PROCEDURE — 99214 OFFICE O/P EST MOD 30 MIN: CPT | Performed by: FAMILY MEDICINE

## 2020-11-06 ENCOUNTER — OFFICE VISIT (OUTPATIENT)
Dept: WOUND CARE | Facility: HOSPITAL | Age: 85
End: 2020-11-06
Payer: MEDICARE

## 2020-11-06 VITALS
RESPIRATION RATE: 18 BRPM | TEMPERATURE: 97.3 F | HEART RATE: 76 BPM | SYSTOLIC BLOOD PRESSURE: 118 MMHG | DIASTOLIC BLOOD PRESSURE: 76 MMHG

## 2020-11-06 DIAGNOSIS — I87.311 IDIOPATHIC CHRONIC VENOUS HYPERTENSION OF RIGHT LEG WITH ULCER (HCC): Primary | ICD-10-CM

## 2020-11-06 DIAGNOSIS — L89.312 STAGE II PRESSURE ULCER OF RIGHT BUTTOCK (HCC): ICD-10-CM

## 2020-11-06 DIAGNOSIS — I87.2 VENOUS STASIS DERMATITIS OF RIGHT LOWER EXTREMITY: ICD-10-CM

## 2020-11-06 DIAGNOSIS — I87.312 IDIOPATHIC CHRONIC VENOUS HYPERTENSION OF LOWER EXTREMITY WITH ULCER, LEFT (HCC): ICD-10-CM

## 2020-11-06 DIAGNOSIS — L97.919 IDIOPATHIC CHRONIC VENOUS HYPERTENSION OF RIGHT LEG WITH ULCER (HCC): Primary | ICD-10-CM

## 2020-11-06 DIAGNOSIS — L97.929 IDIOPATHIC CHRONIC VENOUS HYPERTENSION OF LOWER EXTREMITY WITH ULCER, LEFT (HCC): ICD-10-CM

## 2020-11-06 PROCEDURE — 99213 OFFICE O/P EST LOW 20 MIN: CPT | Performed by: FAMILY MEDICINE

## 2020-12-03 ENCOUNTER — OFFICE VISIT (OUTPATIENT)
Dept: WOUND CARE | Facility: HOSPITAL | Age: 85
End: 2020-12-03
Payer: MEDICARE

## 2020-12-03 VITALS
SYSTOLIC BLOOD PRESSURE: 142 MMHG | HEART RATE: 88 BPM | DIASTOLIC BLOOD PRESSURE: 68 MMHG | TEMPERATURE: 97.5 F | RESPIRATION RATE: 18 BRPM

## 2020-12-03 DIAGNOSIS — L97.919 IDIOPATHIC CHRONIC VENOUS HYPERTENSION OF RIGHT LEG WITH ULCER (HCC): ICD-10-CM

## 2020-12-03 DIAGNOSIS — S81.811A SKIN TEAR OF RIGHT LOWER LEG WITHOUT COMPLICATION, INITIAL ENCOUNTER: ICD-10-CM

## 2020-12-03 DIAGNOSIS — L89.322 STAGE II PRESSURE ULCER OF LEFT BUTTOCK (HCC): ICD-10-CM

## 2020-12-03 DIAGNOSIS — L97.929 CHRONIC VENOUS HYPERTENSION (IDIOPATHIC) WITH ULCER AND INFLAMMATION OF LEFT LOWER EXTREMITY (HCC): Primary | ICD-10-CM

## 2020-12-03 DIAGNOSIS — I87.332 CHRONIC VENOUS HYPERTENSION (IDIOPATHIC) WITH ULCER AND INFLAMMATION OF LEFT LOWER EXTREMITY (HCC): Primary | ICD-10-CM

## 2020-12-03 DIAGNOSIS — I87.311 IDIOPATHIC CHRONIC VENOUS HYPERTENSION OF RIGHT LEG WITH ULCER (HCC): ICD-10-CM

## 2020-12-03 PROCEDURE — 99213 OFFICE O/P EST LOW 20 MIN: CPT | Performed by: FAMILY MEDICINE

## 2020-12-03 PROCEDURE — 17250 CHEM CAUT OF GRANLTJ TISSUE: CPT | Performed by: FAMILY MEDICINE

## 2020-12-03 RX ORDER — LIDOCAINE 40 MG/G
CREAM TOPICAL ONCE
Status: COMPLETED | OUTPATIENT
Start: 2020-12-03 | End: 2020-12-03

## 2020-12-03 RX ADMIN — LIDOCAINE: 40 CREAM TOPICAL at 11:35

## 2020-12-15 ENCOUNTER — TELEMEDICINE (OUTPATIENT)
Dept: NEPHROLOGY | Facility: CLINIC | Age: 85
End: 2020-12-15
Payer: MEDICARE

## 2020-12-15 VITALS — DIASTOLIC BLOOD PRESSURE: 74 MMHG | SYSTOLIC BLOOD PRESSURE: 124 MMHG

## 2020-12-15 DIAGNOSIS — I12.9 NEPHROSCLEROSIS ARTERIOLAR, STAGE 1-4 OR UNSPECIFIED CHRONIC KIDNEY DISEASE: ICD-10-CM

## 2020-12-15 DIAGNOSIS — N18.9 CHRONIC KIDNEY DISEASE, UNSPECIFIED CKD STAGE: Primary | ICD-10-CM

## 2020-12-15 PROCEDURE — 99214 OFFICE O/P EST MOD 30 MIN: CPT | Performed by: INTERNAL MEDICINE

## 2020-12-30 ENCOUNTER — OFFICE VISIT (OUTPATIENT)
Dept: WOUND CARE | Facility: HOSPITAL | Age: 85
End: 2020-12-30
Payer: MEDICARE

## 2020-12-30 VITALS
HEART RATE: 80 BPM | RESPIRATION RATE: 18 BRPM | TEMPERATURE: 97.5 F | DIASTOLIC BLOOD PRESSURE: 64 MMHG | SYSTOLIC BLOOD PRESSURE: 132 MMHG

## 2020-12-30 DIAGNOSIS — L97.919 IDIOPATHIC CHRONIC VENOUS HYPERTENSION OF RIGHT LEG WITH ULCER (HCC): Primary | ICD-10-CM

## 2020-12-30 DIAGNOSIS — I87.311 IDIOPATHIC CHRONIC VENOUS HYPERTENSION OF RIGHT LEG WITH ULCER (HCC): Primary | ICD-10-CM

## 2020-12-30 PROCEDURE — 99213 OFFICE O/P EST LOW 20 MIN: CPT | Performed by: FAMILY MEDICINE

## 2020-12-30 PROCEDURE — 99212 OFFICE O/P EST SF 10 MIN: CPT | Performed by: FAMILY MEDICINE

## 2021-02-12 DIAGNOSIS — Z23 ENCOUNTER FOR IMMUNIZATION: ICD-10-CM

## 2021-02-26 ENCOUNTER — HOSPITAL ENCOUNTER (OUTPATIENT)
Facility: HOSPITAL | Age: 86
Setting detail: OBSERVATION
Discharge: HOME | End: 2021-03-01
Attending: EMERGENCY MEDICINE | Admitting: INTERNAL MEDICINE
Payer: MEDICARE

## 2021-02-26 DIAGNOSIS — E11.8 TYPE 2 DIABETES MELLITUS WITH COMPLICATION, WITH LONG-TERM CURRENT USE OF INSULIN (HCC): ICD-10-CM

## 2021-02-26 DIAGNOSIS — E11.3553 TYPE 2 DIABETES MELLITUS WITH STABLE PROLIFERATIVE RETINOPATHY OF BOTH EYES, WITH LONG-TERM CURRENT USE OF INSULIN (HCC): ICD-10-CM

## 2021-02-26 DIAGNOSIS — R41.89 UNRESPONSIVE EPISODE: Primary | ICD-10-CM

## 2021-02-26 DIAGNOSIS — Z79.4 TYPE 2 DIABETES MELLITUS WITH COMPLICATION, WITH LONG-TERM CURRENT USE OF INSULIN (HCC): ICD-10-CM

## 2021-02-26 DIAGNOSIS — E16.2 HYPOGLYCEMIA: ICD-10-CM

## 2021-02-26 DIAGNOSIS — R00.1 BRADYCARDIA: ICD-10-CM

## 2021-02-26 DIAGNOSIS — Z79.4 TYPE 2 DIABETES MELLITUS WITH STABLE PROLIFERATIVE RETINOPATHY OF BOTH EYES, WITH LONG-TERM CURRENT USE OF INSULIN (HCC): ICD-10-CM

## 2021-02-26 DIAGNOSIS — E11.9 DIABETES MELLITUS (HCC): ICD-10-CM

## 2021-02-26 PROBLEM — D63.1 ANEMIA IN STAGE 4 CHRONIC KIDNEY DISEASE (HCC): Chronic | Status: ACTIVE | Noted: 2019-02-16

## 2021-02-26 PROBLEM — N18.4 ANEMIA IN STAGE 4 CHRONIC KIDNEY DISEASE (HCC): Chronic | Status: ACTIVE | Noted: 2019-02-16

## 2021-02-26 LAB
ALBUMIN SERPL BCP-MCNC: 2.8 G/DL (ref 3.5–5)
ALP SERPL-CCNC: 101 U/L (ref 46–116)
ALT SERPL W P-5'-P-CCNC: 14 U/L (ref 12–78)
ANION GAP SERPL CALCULATED.3IONS-SCNC: 4 MMOL/L (ref 4–13)
AST SERPL W P-5'-P-CCNC: 11 U/L (ref 5–45)
ATRIAL RATE: 49 BPM
BASOPHILS # BLD AUTO: 0.07 THOUSANDS/ΜL (ref 0–0.1)
BASOPHILS NFR BLD AUTO: 1 % (ref 0–1)
BILIRUB SERPL-MCNC: 0.23 MG/DL (ref 0.2–1)
BUN SERPL-MCNC: 39 MG/DL (ref 5–25)
CALCIUM ALBUM COR SERPL-MCNC: 9.3 MG/DL (ref 8.3–10.1)
CALCIUM SERPL-MCNC: 8.3 MG/DL (ref 8.3–10.1)
CHLORIDE SERPL-SCNC: 103 MMOL/L (ref 100–108)
CO2 SERPL-SCNC: 33 MMOL/L (ref 21–32)
CREAT SERPL-MCNC: 2.28 MG/DL (ref 0.6–1.3)
EOSINOPHIL # BLD AUTO: 0.21 THOUSAND/ΜL (ref 0–0.61)
EOSINOPHIL NFR BLD AUTO: 2 % (ref 0–6)
ERYTHROCYTE [DISTWIDTH] IN BLOOD BY AUTOMATED COUNT: 17 % (ref 11.6–15.1)
GFR SERPL CREATININE-BSD FRML MDRD: 19 ML/MIN/1.73SQ M
GLUCOSE SERPL-MCNC: 103 MG/DL (ref 65–140)
GLUCOSE SERPL-MCNC: 104 MG/DL (ref 65–140)
GLUCOSE SERPL-MCNC: 152 MG/DL (ref 65–140)
GLUCOSE SERPL-MCNC: 220 MG/DL (ref 65–140)
GLUCOSE SERPL-MCNC: 233 MG/DL (ref 65–140)
GLUCOSE SERPL-MCNC: 302 MG/DL (ref 65–140)
GLUCOSE SERPL-MCNC: 306 MG/DL (ref 65–140)
GLUCOSE SERPL-MCNC: 77 MG/DL (ref 65–140)
GLUCOSE SERPL-MCNC: 83 MG/DL (ref 65–140)
GLUCOSE SERPL-MCNC: 90 MG/DL (ref 65–140)
HCT VFR BLD AUTO: 30.4 % (ref 34.8–46.1)
HGB BLD-MCNC: 9.2 G/DL (ref 11.5–15.4)
IMM GRANULOCYTES # BLD AUTO: 0.09 THOUSAND/UL (ref 0–0.2)
IMM GRANULOCYTES NFR BLD AUTO: 1 % (ref 0–2)
LYMPHOCYTES # BLD AUTO: 0.9 THOUSANDS/ΜL (ref 0.6–4.47)
LYMPHOCYTES NFR BLD AUTO: 7 % (ref 14–44)
MCH RBC QN AUTO: 27.1 PG (ref 26.8–34.3)
MCHC RBC AUTO-ENTMCNC: 30.3 G/DL (ref 31.4–37.4)
MCV RBC AUTO: 90 FL (ref 82–98)
MONOCYTES # BLD AUTO: 1.15 THOUSAND/ΜL (ref 0.17–1.22)
MONOCYTES NFR BLD AUTO: 9 % (ref 4–12)
NEUTROPHILS # BLD AUTO: 10.84 THOUSANDS/ΜL (ref 1.85–7.62)
NEUTS SEG NFR BLD AUTO: 80 % (ref 43–75)
NRBC BLD AUTO-RTO: 0 /100 WBCS
P AXIS: 53 DEGREES
PLATELET # BLD AUTO: 297 THOUSANDS/UL (ref 149–390)
PMV BLD AUTO: 10.1 FL (ref 8.9–12.7)
POTASSIUM SERPL-SCNC: 3.7 MMOL/L (ref 3.5–5.3)
PR INTERVAL: 192 MS
PROT SERPL-MCNC: 7 G/DL (ref 6.4–8.2)
QRS AXIS: 31 DEGREES
QRSD INTERVAL: 76 MS
QT INTERVAL: 524 MS
QTC INTERVAL: 473 MS
RBC # BLD AUTO: 3.39 MILLION/UL (ref 3.81–5.12)
SODIUM SERPL-SCNC: 140 MMOL/L (ref 136–145)
T WAVE AXIS: 68 DEGREES
TROPONIN I SERPL-MCNC: <0.02 NG/ML
TSH SERPL DL<=0.05 MIU/L-ACNC: 2.2 UIU/ML (ref 0.36–3.74)
VENTRICULAR RATE: 49 BPM
WBC # BLD AUTO: 13.26 THOUSAND/UL (ref 4.31–10.16)

## 2021-02-26 PROCEDURE — 84484 ASSAY OF TROPONIN QUANT: CPT | Performed by: NURSE PRACTITIONER

## 2021-02-26 PROCEDURE — 99220 PR INITIAL OBSERVATION CARE/DAY 70 MINUTES: CPT | Performed by: GENERAL PRACTICE

## 2021-02-26 PROCEDURE — 82948 REAGENT STRIP/BLOOD GLUCOSE: CPT

## 2021-02-26 PROCEDURE — 85025 COMPLETE CBC W/AUTO DIFF WBC: CPT | Performed by: NURSE PRACTITIONER

## 2021-02-26 PROCEDURE — 84443 ASSAY THYROID STIM HORMONE: CPT | Performed by: NURSE PRACTITIONER

## 2021-02-26 PROCEDURE — 99204 OFFICE O/P NEW MOD 45 MIN: CPT

## 2021-02-26 PROCEDURE — 36415 COLL VENOUS BLD VENIPUNCTURE: CPT | Performed by: NURSE PRACTITIONER

## 2021-02-26 PROCEDURE — 1123F ACP DISCUSS/DSCN MKR DOCD: CPT | Performed by: INTERNAL MEDICINE

## 2021-02-26 PROCEDURE — 99204 OFFICE O/P NEW MOD 45 MIN: CPT | Performed by: INTERNAL MEDICINE

## 2021-02-26 PROCEDURE — 99217 PR OBSERVATION CARE DISCHARGE MANAGEMENT: CPT | Performed by: HOSPITALIST

## 2021-02-26 PROCEDURE — 99285 EMERGENCY DEPT VISIT HI MDM: CPT | Performed by: NURSE PRACTITIONER

## 2021-02-26 PROCEDURE — 99285 EMERGENCY DEPT VISIT HI MDM: CPT

## 2021-02-26 PROCEDURE — 93005 ELECTROCARDIOGRAM TRACING: CPT

## 2021-02-26 PROCEDURE — 93010 ELECTROCARDIOGRAM REPORT: CPT | Performed by: INTERNAL MEDICINE

## 2021-02-26 PROCEDURE — 80053 COMPREHEN METABOLIC PANEL: CPT | Performed by: NURSE PRACTITIONER

## 2021-02-26 RX ORDER — METOCLOPRAMIDE HYDROCHLORIDE 5 MG/ML
10 INJECTION INTRAMUSCULAR; INTRAVENOUS EVERY 6 HOURS PRN
Status: DISCONTINUED | OUTPATIENT
Start: 2021-02-26 | End: 2021-03-01 | Stop reason: HOSPADM

## 2021-02-26 RX ORDER — FUROSEMIDE 80 MG
80 TABLET ORAL DAILY
Status: DISCONTINUED | OUTPATIENT
Start: 2021-02-26 | End: 2021-03-01 | Stop reason: HOSPADM

## 2021-02-26 RX ORDER — HEPARIN SODIUM 5000 [USP'U]/ML
5000 INJECTION, SOLUTION INTRAVENOUS; SUBCUTANEOUS EVERY 8 HOURS SCHEDULED
Status: DISCONTINUED | OUTPATIENT
Start: 2021-02-26 | End: 2021-03-01 | Stop reason: HOSPADM

## 2021-02-26 RX ORDER — ALBUTEROL SULFATE 90 UG/1
2 AEROSOL, METERED RESPIRATORY (INHALATION) EVERY 6 HOURS PRN
Status: DISCONTINUED | OUTPATIENT
Start: 2021-02-26 | End: 2021-03-01 | Stop reason: HOSPADM

## 2021-02-26 RX ORDER — ATORVASTATIN CALCIUM 10 MG/1
10 TABLET, FILM COATED ORAL
Status: DISCONTINUED | OUTPATIENT
Start: 2021-02-26 | End: 2021-03-01 | Stop reason: HOSPADM

## 2021-02-26 RX ORDER — BUDESONIDE AND FORMOTEROL FUMARATE DIHYDRATE 160; 4.5 UG/1; UG/1
2 AEROSOL RESPIRATORY (INHALATION) 2 TIMES DAILY
Status: DISCONTINUED | OUTPATIENT
Start: 2021-02-26 | End: 2021-03-01 | Stop reason: HOSPADM

## 2021-02-26 RX ORDER — MONTELUKAST SODIUM 10 MG/1
10 TABLET ORAL
Status: DISCONTINUED | OUTPATIENT
Start: 2021-02-26 | End: 2021-03-01 | Stop reason: HOSPADM

## 2021-02-26 RX ORDER — ACETAMINOPHEN 325 MG/1
650 TABLET ORAL EVERY 6 HOURS PRN
Status: DISCONTINUED | OUTPATIENT
Start: 2021-02-26 | End: 2021-03-01 | Stop reason: HOSPADM

## 2021-02-26 RX ORDER — INSULIN GLARGINE 100 [IU]/ML
35 INJECTION, SOLUTION SUBCUTANEOUS
Status: DISCONTINUED | OUTPATIENT
Start: 2021-02-26 | End: 2021-02-28

## 2021-02-26 RX ORDER — POTASSIUM CHLORIDE 20 MEQ/1
20 TABLET, EXTENDED RELEASE ORAL DAILY
Status: DISCONTINUED | OUTPATIENT
Start: 2021-02-26 | End: 2021-03-01 | Stop reason: HOSPADM

## 2021-02-26 RX ORDER — DEXTROSE MONOHYDRATE 25 G/50ML
25 INJECTION, SOLUTION INTRAVENOUS ONCE
Status: DISCONTINUED | OUTPATIENT
Start: 2021-02-26 | End: 2021-03-01 | Stop reason: HOSPADM

## 2021-02-26 RX ORDER — DORZOLAMIDE HYDROCHLORIDE AND TIMOLOL MALEATE 20; 5 MG/ML; MG/ML
1 SOLUTION/ DROPS OPHTHALMIC 2 TIMES DAILY
Status: DISCONTINUED | OUTPATIENT
Start: 2021-02-26 | End: 2021-03-01 | Stop reason: HOSPADM

## 2021-02-26 RX ORDER — PANTOPRAZOLE SODIUM 40 MG/1
40 TABLET, DELAYED RELEASE ORAL
Status: DISCONTINUED | OUTPATIENT
Start: 2021-02-26 | End: 2021-03-01 | Stop reason: HOSPADM

## 2021-02-26 RX ORDER — LEVOTHYROXINE SODIUM 0.07 MG/1
75 TABLET ORAL
Status: DISCONTINUED | OUTPATIENT
Start: 2021-02-26 | End: 2021-03-01 | Stop reason: HOSPADM

## 2021-02-26 RX ADMIN — DORZOLAMIDE HYDROCHLORIDE AND TIMOLOL MALEATE 1 DROP: 20; 5 SOLUTION/ DROPS OPHTHALMIC at 09:40

## 2021-02-26 RX ADMIN — BUDESONIDE AND FORMOTEROL FUMARATE DIHYDRATE 2 PUFF: 160; 4.5 AEROSOL RESPIRATORY (INHALATION) at 17:39

## 2021-02-26 RX ADMIN — HEPARIN SODIUM 5000 UNITS: 5000 INJECTION INTRAVENOUS; SUBCUTANEOUS at 13:37

## 2021-02-26 RX ADMIN — FUROSEMIDE 80 MG: 80 TABLET ORAL at 09:40

## 2021-02-26 RX ADMIN — DORZOLAMIDE HYDROCHLORIDE AND TIMOLOL MALEATE 1 DROP: 20; 5 SOLUTION/ DROPS OPHTHALMIC at 17:39

## 2021-02-26 RX ADMIN — POTASSIUM CHLORIDE 20 MEQ: 1500 TABLET, EXTENDED RELEASE ORAL at 09:40

## 2021-02-26 RX ADMIN — PANTOPRAZOLE SODIUM 40 MG: 40 TABLET, DELAYED RELEASE ORAL at 08:11

## 2021-02-26 RX ADMIN — ATORVASTATIN CALCIUM 10 MG: 10 TABLET, FILM COATED ORAL at 17:39

## 2021-02-26 RX ADMIN — TIOTROPIUM BROMIDE 18 MCG: 18 CAPSULE ORAL; RESPIRATORY (INHALATION) at 09:40

## 2021-02-26 RX ADMIN — INSULIN GLARGINE 35 UNITS: 100 INJECTION, SOLUTION SUBCUTANEOUS at 21:23

## 2021-02-26 RX ADMIN — MONTELUKAST 10 MG: 10 TABLET, FILM COATED ORAL at 21:22

## 2021-02-26 RX ADMIN — LEVOTHYROXINE SODIUM 75 MCG: 75 TABLET ORAL at 08:11

## 2021-02-26 RX ADMIN — ACETAMINOPHEN 650 MG: 325 TABLET ORAL at 08:31

## 2021-02-26 RX ADMIN — BUDESONIDE AND FORMOTEROL FUMARATE DIHYDRATE 2 PUFF: 160; 4.5 AEROSOL RESPIRATORY (INHALATION) at 09:38

## 2021-02-26 RX ADMIN — HEPARIN SODIUM 5000 UNITS: 5000 INJECTION INTRAVENOUS; SUBCUTANEOUS at 21:22

## 2021-02-26 NOTE — ED NOTES
Pt's AM meds requested for full profile and to send to ED as necessary, when not available in ED        Mildred Hartman, RN  02/26/21 2748

## 2021-02-26 NOTE — ED NOTES
Spoke with pt's daughter, Koko Jewell, via phone to give update on pt's care        Chantell Landeros RN  02/26/21 [id number]

## 2021-02-26 NOTE — ASSESSMENT & PLAN NOTE
· Found unresponsive with BG 40 s/p IV dextrose  Improved to 104  · Hospitalized at [address] [place] [address] [address] May 2019 for hypoglycemia, BG  34  Consider discontinuing insulin  · HgA1c 7 8 on 2/23/21  · Hold hypoglycemics  · Monitor Accu-Cheks    Will hold off sliding scale coverage for now

## 2021-02-26 NOTE — ASSESSMENT & PLAN NOTE
· EKG: Marked sinus bradycardia, rate 49  Denies any complaints    · Rule out sinus node dysfunction  · Hold amlodipine, monitor for improvement  · Monitor on telemetry  · TSH ordered   · Avoid AV lulu blocking agents  · Cardiology consult

## 2021-02-26 NOTE — ASSESSMENT & PLAN NOTE
Wt Readings from Last 3 Encounters:   02/11/20 101 kg (223 lb 6 4 oz)   07/22/19 97 7 kg (215 lb 6 2 oz)   06/28/19 99 8 kg (220 lb)     · LVEF 39%, vigorous systolic function, no RWMA  · Maintained on furosemide 80 mg daily, continue  · Monitor daily weight  · Low Na diet, 1500 mL fluid restriction

## 2021-02-26 NOTE — ASSESSMENT & PLAN NOTE
Lab Results   Component Value Date    EGFR 19 02/26/2021    EGFR 19 07/21/2019    EGFR 18 07/20/2019    CREATININE 2 28 (H) 02/26/2021    CREATININE 2 34 (H) 07/21/2019    CREATININE 2 48 (H) 07/20/2019     · Creatinine at baseline  · Avoid nephrotoxins  · Followed outpatient by Nephrology

## 2021-02-26 NOTE — CONSULTS
Consultation - Renny Thompson 82 y o  female MRN: [id number]    Unit/Bed#: [facility] Encounter: [id number]      Assessment/Plan     Assessment:   82yof with T2DM with hypoglycemia in the setting of CKD  Plan:     1  T2DM, uncontrolled with hypoglycemia: She was surprised that her Lantus dose was ordered at 50units  I think this is dose is too high considering her late stage CKD  I recommend lowering this to 35units once daily and also stopping the Milus Mery listed as I do not this is needed  She can continue the Novolog 14/14/12 that appears to be ordered  If correctional scale is ordered, please use algorthim 1 for now  History of Present Illness      HPI: Renny Thompson is a 82y o  year old female who seen In consultation for hypoglycemia  She has a hx of T2DM, and also CKD for which she sees Dr Crystal Hart  Per his note, eGFR is about 20 which is stable for her  She was found to be difficult to arouse at her assisted living facility and reportedly had a BG of 40  She was treated with dextrose in EMS  She recalls eating dinner and watching TV, but does not recall having a dose of Novolog with dinner as the bg was "below the limit " Ms Michael Johnson herself denies that her Bgs are lowest in the morning, and feels that if any isnulin doses are missed, then it is her 3rd dose of Novolog  Her medication list from her facility reviewed today lists her regimen as: Lantus 50units once daily at night, Tradjenta 5mg once daily, and Novolog 14units bf, 14units lunch and 12units dinner  There is an order to hold Novolog if pre-meal Bgs are <120  There are no Bg records from her facility for review  She reports eating all her meals  She has been at her current facility since July 2019  Of note, she did have an admission for hypoglycemia in in April 2020, which occurred after A covid admission   She is upset that she didn't "catch it " She does get shaky when her BG drops, and when she starts sweating, then she knows it's "really low "  She is feeling better now and eating well  Fhx: sister with Type 1 diabetes dx at age 7yrs (, age 42)    Inpatient consult to Endocrinology  Consult performed by: Andres Gibson MD  Consult ordered by: Derick Boogie MD          Review of Systems   Constitutional: Negative for unexpected weight change  HENT: Negative for trouble swallowing  Eyes: Positive for visual disturbance (reduced vision left eye, none in right)  Respiratory: Negative for shortness of breath  Gastrointestinal: Negative for nausea and vomiting  Neurological: Negative for tremors  Psychiatric/Behavioral: The patient is not nervous/anxious      see HPI    Historical Information   Past Medical History:   Diagnosis Date    Adenoma of large intestine     Allergy     last assessed: 10/18/2013    Cataract     Constipation, chronic     Diabetes mellitus ([address] )     anemia vitamin d deficiency glaucoma hypertension hypothyroidism; last assessed: 2012    Disease of thyroid gland     Diverticulosis     Fecal incontinence     Female stress incontinence     Glaucoma     Gout     History of cystocele     Intrinsic sphincter deficiency     Mixed incontinence     Nocturia     Osteoarthritis, chronic     Renal disorder     Senile atrophic vaginitis     Urinary frequency      Past Surgical History:   Procedure Laterality Date    ANKLE SURGERY Bilateral     CATARACT EXTRACTION      CHOLECYSTECTOMY      TUBAL LIGATION       Social History   Social History     Substance and Sexual Activity   Alcohol Use Never    Frequency: Never    Binge frequency: Never     Social History     Substance and Sexual Activity   Drug Use No     Social History     Tobacco Use   Smoking Status Former Smoker    Quit date: 1989    Years since quittin 8   Smokeless Tobacco Never Used     E-Cigarette/Vaping     E-Cigarette/Vaping Substances      Family History:   Family History   Problem Relation Age of Onset    Stomach cancer Mother     Diabetes Sister         mellitus       Meds/Allergies   current meds:   Current Facility-Administered Medications   Medication Dose Route Frequency    acetaminophen (TYLENOL) tablet 650 mg  650 mg Oral Q6H PRN    albuterol (PROVENTIL HFA,VENTOLIN HFA) inhaler 2 puff  2 puff Inhalation Q6H PRN    atorvastatin (LIPITOR) tablet 10 mg  10 mg Oral Daily With Dinner    budesonide-formoterol (SYMBICORT) 160-4 5 mcg/act inhaler 2 puff  2 puff Inhalation BID    dextrose 50 % IV solution 25 mL  25 mL Intravenous Once    dorzolamide-timolol (COSOPT) 22 3-6 8 MG/ML ophthalmic solution 1 drop  1 drop Both Eyes BID    furosemide (LASIX) tablet 80 mg  80 mg Oral Daily    heparin (porcine) subcutaneous injection 5,000 Units  5,000 Units Subcutaneous Q8H [facility]    levothyroxine tablet 75 mcg  75 mcg Oral Early Morning    metoclopramide (REGLAN) injection 10 mg  10 mg Intravenous Q6H PRN    montelukast (SINGULAIR) tablet 10 mg  10 mg Oral HS    pantoprazole (PROTONIX) EC tablet 40 mg  40 mg Oral Daily Before Breakfast    potassium chloride (K-DUR,KLOR-CON) CR tablet 20 mEq  20 mEq Oral Daily    tiotropium (SPIRIVA) capsule for inhaler 18 mcg  18 mcg Inhalation Daily     Allergies   Allergen Reactions    Ace Inhibitors      Annotation - [id number]: short of breath    Aspirin        Objective   Vitals: Blood pressure 155/62, pulse 63, temperature 98 4 °F (36 9 °C), temperature source Oral, resp  rate 18, height 5' 3" (1 6 m), weight 95 3 kg (210 lb 1 6 oz), SpO2 95 %  No intake or output data in the 24 hours ending 02/26/21 1514  Invasive Devices     Peripheral Intravenous Line            Peripheral IV 02/26/21 Right Antecubital less than 1 day                Physical Exam     Physical Exam   Gen: appears well-developed and well-nourished  No apparent distress  Head: Normocephalic and atraumatic     Eyes: no stare or proptosis, no periorbital edema  E/N/M nl facies, hearing decreased  Neck: range of motion nl  Pulmonary/Chest: breathing  comfortably, no accessory muscle use, effort normal    Musculoskeletal: moves all 4 extremities  Neurological: alert and oriented to person, place, and time  No upper ext tremor appreciated  Skin: does not appear diaphoretic, no facial plethora  Psychiatric: normal mood and affect; behavior is normal; no gross lapses in memory, answer questions appropriately    Lab Results: I have personally reviewed pertinent reports       Lab Results   Component Value Date    HGBA1C 7 8 (H) 02/23/2021     Lab Results   Component Value Date    SODIUM 140 02/26/2021    K 3 7 02/26/2021     02/26/2021    CO2 33 (H) 02/26/2021    BUN 39 (H) 02/26/2021    CREATININE 2 28 (H) 02/26/2021    GLUC 77 02/26/2021    CALCIUM 8 3 02/26/2021       Imaging Studies:   EKG, Pathology, and Other Studies:   VTE Prophylaxis:     Code Status: Level 3 - DNAR and DNI  Advance Directive and Living Will:      Power of :    POLST:      Counseling / Coordination of Care

## 2021-02-26 NOTE — PLAN OF CARE
Problem: Potential for Falls  Goal: Patient will remain free of falls  Description: INTERVENTIONS:  - Assess patient frequently for physical needs  -  Identify cognitive and physical deficits and behaviors that affect risk of falls    -  [place] fall precautions as indicated by assessment   - Educate patient/family on patient safety including physical limitations  - Instruct patient to call for assistance with activity based on assessment  - Modify environment to reduce risk of injury  - Consider OT/PT consult to assist with strengthening/mobility  Outcome: Progressing     Problem: Prexisting or High Potential for Compromised Skin Integrity  Goal: Skin integrity is maintained or improved  Description: INTERVENTIONS:  - Identify patients at risk for skin breakdown  - Assess and monitor skin integrity  - Assess and monitor nutrition and hydration status  - Monitor labs   - Assess for incontinence   - Turn and reposition patient  - Assist with mobility/ambulation  - Relieve pressure over bony prominences  - Avoid friction and shearing  - Provide appropriate hygiene as needed including keeping skin clean and dry  - Evaluate need for skin moisturizer/barrier cream  - Collaborate with interdisciplinary team   - Patient/family teaching  - Consider wound care consult   Outcome: Progressing     Problem: CARDIOVASCULAR - ADULT  Goal: Maintains optimal cardiac output and hemodynamic stability  Description: INTERVENTIONS:  - Monitor I/O, vital signs and rhythm  - Monitor for S/S and trends of decreased cardiac output  - Administer and titrate ordered vasoactive medications to optimize hemodynamic stability  - Assess quality of pulses, skin color and temperature  - Assess for signs of decreased coronary artery perfusion  - Instruct patient to report change in severity of symptoms  Outcome: Progressing  Goal: Absence of cardiac dysrhythmias or at baseline rhythm  Description: INTERVENTIONS:  - Continuous cardiac monitoring, vital signs, obtain 12 lead EKG if ordered  - Administer antiarrhythmic and heart rate control medications as ordered  - Monitor electrolytes and administer replacement therapy as ordered  Outcome: Progressing     Problem: METABOLIC, FLUID AND ELECTROLYTES - ADULT  Goal: Electrolytes maintained within normal limits  Description: INTERVENTIONS:  - Monitor labs and assess patient for signs and symptoms of electrolyte imbalances  - Administer electrolyte replacement as ordered  - Monitor response to electrolyte replacements, including repeat lab results as appropriate  - Instruct patient on fluid and nutrition as appropriate  Outcome: Progressing  Goal: Fluid balance maintained  Description: INTERVENTIONS:  - Monitor labs   - Monitor I/O and WT  - Instruct patient on fluid and nutrition as appropriate  - Assess for signs & symptoms of volume excess or deficit  Outcome: Progressing  Goal: Glucose maintained within target range  Description: INTERVENTIONS:  - Monitor Blood Glucose as ordered  - Assess for signs and symptoms of hyperglycemia and hypoglycemia  - Administer ordered medications to maintain glucose within target range  - Assess nutritional intake and initiate nutrition service referral as needed  Outcome: Progressing     Problem: PAIN - ADULT  Goal: Verbalizes/displays adequate comfort level or baseline comfort level  Description: Interventions:  - Encourage patient to monitor pain and request assistance  - Assess pain using appropriate pain scale  - Administer analgesics based on type and severity of pain and evaluate response  - Implement non-pharmacological measures as appropriate and evaluate response  - Consider cultural and social influences on pain and pain management  - Notify physician/advanced practitioner if interventions unsuccessful or patient reports new pain  Outcome: Progressing     Problem: SAFETY ADULT  Goal: Patient will remain free of falls  Description: INTERVENTIONS:  - Assess patient frequently for physical needs  -  Identify cognitive and physical deficits and behaviors that affect risk of falls    -  [place] fall precautions as indicated by assessment   - Educate patient/family on patient safety including physical limitations  - Instruct patient to call for assistance with activity based on assessment  - Modify environment to reduce risk of injury  - Consider OT/PT consult to assist with strengthening/mobility  Outcome: Progressing  Goal: Maintain or return to baseline ADL function  Description: INTERVENTIONS:  -  Assess patient's ability to carry out ADLs; assess patient's baseline for ADL function and identify physical deficits which impact ability to perform ADLs (bathing, care of mouth/teeth, toileting, grooming, dressing, etc )  - Assess/evaluate cause of self-care deficits   - Assess range of motion  - Assess patient's mobility; develop plan if impaired  - Assess patient's need for assistive devices and provide as appropriate  - Encourage maximum independence but intervene and supervise when necessary  - Involve family in performance of ADLs  - Assess for home care needs following discharge   - Consider OT consult to assist with ADL evaluation and planning for discharge  - Provide patient education as appropriate  Outcome: Progressing  Goal: Maintain or return mobility status to optimal level  Description: INTERVENTIONS:  - Assess patient's baseline mobility status (ambulation, transfers, stairs, etc )    - Identify cognitive and physical deficits and behaviors that affect mobility  - Identify mobility aids required to assist with transfers and/or ambulation (gait belt, sit-to-stand, lift, walker, cane, etc )  - [place] fall precautions as indicated by assessment  - Record patient progress and toleration of activity level on Mobility SBAR; progress patient to next Phase/Stage  - Instruct patient to call for assistance with activity based on assessment  - Consider rehabilitation consult to assist with strengthening/weightbearing, etc   Outcome: Progressing     Problem: DISCHARGE PLANNING  Goal: Discharge to home or other facility with appropriate resources  Description: INTERVENTIONS:  - Identify barriers to discharge w/patient and caregiver  - Arrange for needed discharge resources and transportation as appropriate  - Identify discharge learning needs (meds, wound care, etc )  - Arrange for interpretive services to assist at discharge as needed  - Refer to Case Management Department for coordinating discharge planning if the patient needs post-hospital services based on physician/advanced practitioner order or complex needs related to functional status, cognitive ability, or social support system  Outcome: Progressing     Problem: Knowledge Deficit  Goal: Patient/family/caregiver demonstrates understanding of disease process, treatment plan, medications, and discharge instructions  Description: Complete learning assessment and assess knowledge base    Interventions:  - Provide teaching at level of understanding  - Provide teaching via preferred learning methods  Outcome: Progressing

## 2021-02-26 NOTE — ASSESSMENT & PLAN NOTE
· Found unresponsive with BG 40 s/p IV dextrose  Improved to 104  · Hospitalized at [facility] May 2019 for hypoglycemia, BG  34  Consider discontinuing insulin  · HgA1c 7 8 on 2/23/21  · Hold hypoglycemics  · Monitor Accu-Cheks    Will hold off sliding scale coverage for now  · Endocrine consulted

## 2021-02-26 NOTE — DISCHARGE SUMMARY
Discharge- Rodger Stone 1935, 82 y o  female MRN: [id number]    Unit/Bed#: ED 17 Encounter: [id number]    Primary Care Provider: No primary care provider on file  Date and time admitted to hospital: 2/26/2021  3:15 AM                                                      Date of discharge 02/27/2021        Hypoglycemia  Assessment & Plan  · Found unresponsive with BG 40 s/p IV dextrose  Improved to 104  · Hospitalized at [facility] May 2019 for hypoglycemia, BG  34     · Endocrine input appreciated  · Medication list from the facility  lists regimen as Lantus 50 units once daily at night       Tradjenta 5 mg once daily and NovoLog 14 units before breakfast 14 units before lunch and 12 units before dinner    Endocrine recommends  Reducing Lantus to 35 units once daily  Stopping Tradjenta  Continue NovoLog 14/14/12 ( hold if blood sugars less than 120)  Blood sugars currently ranging between 120 and 160 with this regimen    Stage 4 chronic kidney disease [facility])  Assessment & Plan  Lab Results   Component Value Date    EGFR 19 02/26/2021    EGFR 19 07/21/2019    EGFR 18 07/20/2019    CREATININE 2 28 (H) 02/26/2021    CREATININE 2 34 (H) 07/21/2019    CREATININE 2 48 (H) 07/20/2019     · Creatinine at baseline  · Avoid nephrotoxins  · Followed outpatient by Nephrology    Gastroesophageal reflux disease without esophagitis  Assessment & Plan  · Continue PPI    Chronic congestive heart failure (HCC)  Assessment & Plan  Wt Readings from Last 3 Encounters:   02/26/21 95 3 kg (210 lb 1 6 oz)   02/11/20 101 kg (223 lb 6 4 oz)   07/22/19 97 7 kg (215 lb 6 2 oz)     · LVEF 86%, vigorous systolic function, no RWMA  · Maintained on furosemide 80 mg daily, continue  · Monitor daily weight  · Low Na diet, 1500 mL fluid restriction    Leukocytosis  Assessment & Plan  · Chronic leukocytosis  · Outpatient follow-up with Hematology    Chronic venous insufficiency  Assessment & Plan  · Chronic venous insufficiency with prior history of R medial malleolar venous ulcer  Last seen by vascular surgery Jun 2019  · Continue statin  · Not on ASA due to allergy    Anemia in stage 4 chronic kidney disease ([facility] [address] )  Assessment & Plan  · Hg/Hct at baseline  Monitor CBC    Acquired hypothyroidism  Assessment & Plan  · Continue levothyroxine  tsh wnl    * Sinus bradycardia  Assessment & Plan  · Patient admitted with heart rate the around 50  Avoid AV lulu blocking agents  · Cardiology consult appreciated-asymptomatic bradycardia and not cause of unresponsive episode          Discharging Physician / Practitioner: Mauricio Silva MD  PCP: No primary care provider on file  Admission Date:   Admission Orders (From admission, onward)     Ordered        02/26/21 0553  Place in Observation  Once                   Discharge Date: 02/27/21    Resolved Problems  Date Reviewed: 2/26/2021    None          Consultations During Hospital Stay:  · Endocrinology, Cardiology    Procedures Performed:   ·     Significant Findings / Test Results:   · Hypoglycemia    Incidental Findings:   · Asymptomatic bradycardia     Test Results Pending at Discharge (will require follow up):   ·      Outpatient Tests Requested:  ·     Complications:      Reason for Admission:  Unresponsiveness, hyperglycemia    Hospital Course:     Miguel iGbson is a 82 y o  female patient who originally presented to the hospital on 2/26/2021 due to low blood sugar  Miguel Gibson is a 82 y o  female who presents with c/o hypoglycemia  Patient was found unresponsive, blood glucose in the 40s, EMS was called s/p Dextrose infusion  Patient denies any complaints, fair historian  · Admitted at [address] [place] [address] [address] May 2019 for hypoglycemia, blood glucose in the 30's    She was evaluated by endocrinology hold felt that her regimen of Lantus 50 units daily at night, Tradjenta 5 mg once daily was too high  Lantus was reduced to 35 units daily and Tradjenta discontinued    NovoLog was recommended in the current dose but to hold it for any blood sugar less than 120  Blood sugars holding between 120 and 160 at this regimen  Patient is stable for discharge back to facility  Please see above list of diagnoses and related plan for additional information  Condition at Discharge: stable     Discharge Day Visit / Exam:     Subjective:  No c/o-feels better, says she has had hypoglycemic episodes since having COVID  Vitals: Blood Pressure: 154/67 (02/26/21 1208)  Pulse: 57 (02/26/21 1208)  Temperature: 98 4 °F (36 9 °C) (02/26/21 0319)  Temp Source: Oral (02/26/21 0319)  Respirations: 18 (02/26/21 1208)  Height: 5' 3" (160 cm) (02/26/21 0830)  Weight - Scale: 95 3 kg (210 lb 1 6 oz) (02/26/21 0830)  SpO2: 95 % (02/26/21 1208)  Exam:   Physical Exam  Constitutional:       Appearance: She is obese  HENT:      Head: Normocephalic and atraumatic  Mouth/Throat:      Mouth: Mucous membranes are moist    Eyes:      Pupils: Pupils are equal, round, and reactive to light  Neck:      Musculoskeletal: Neck supple  Cardiovascular:      Rate and Rhythm: Normal rate  Pulmonary:      Effort: Pulmonary effort is normal       Breath sounds: Normal breath sounds  Abdominal:      General: Abdomen is flat  There is no distension  Palpations: Abdomen is soft  Musculoskeletal:         General: No swelling  Skin:     General: Skin is warm and dry  Neurological:      General: No focal deficit present  Mental Status: She is alert and oriented to person, place, and time  Psychiatric:         Mood and Affect: Mood normal          Behavior: Behavior normal          Discussion with Family:     Discharge instructions/Information to patient and family:   See after visit summary for information provided to patient and family  Provisions for Follow-Up Care:  See after visit summary for information related to follow-up care and any pertinent home health orders        Disposition:     Other Skilled Nursing Facility at prior SNF    For Discharges to [facility] SNF:   · Not Applicable to this Patient - Not Applicable to this Patient    Planned Readmission:      Discharge Statement:  I spent 40 minutes discharging the patient  This time was spent on the day of discharge  I had direct contact with the patient on the day of discharge  Greater than 50% of the total time was spent examining patient, answering all patient questions, arranging and discussing plan of care with patient as well as directly providing post-discharge instructions  I also spoke with case management and the patient's daughter  Additional time then spent on discharge activities  Discharge Medications:  See after visit summary for reconciled discharge medications provided to patient and family        ** Please Note: This note has been constructed using a voice recognition system **

## 2021-02-26 NOTE — ED PROVIDER NOTES
History  Chief Complaint   Patient presents with    Hypoglycemia - Symptomatic     Pt found unresponsive at [facility]  NH tried pouring sugar packets in mouth after checking BS in the 40s  Pt given 10 dextrose EMS  EMS states improvement in mental status  This is an 82year old female who is Type 2 DM insulin dependent who was found on the floor at the [facility] home unresponsive and her BS was 40  Staff attempted to give her oral sugar under the tongue  EMS gave 250 ml of D10            History provided by:  Medical records and patient   used: No    Hypoglycemia - Symptomatic  Severity:  Severe  Onset quality:  Sudden  Diabetic status:  Controlled with insulin  Relieved by:  IV glucose  Associated symptoms: altered mental status    Risk factors: kidney disease        Prior to Admission Medications   Prescriptions Last Dose Informant Patient Reported? Taking?    Glucosamine-Chondroitin (GLUCOSAMINE CHONDR COMPLEX) 500-400 MG CAPS   Yes No   Sig: Take 1 capsule by mouth 2 (two) times a day   Incontinence Supply Disposable (CERTAINTY ADJ UNDERWEAR LARGE) MISC   No No   Sig: by Does not apply route daily at bedtime   Patient not taking: Reported on 7/27/2020   RELION PEN NEEDLE 31G/8MM 31G X 8 MM MISC   No No   Sig: USE AS DIRECTED THREE TIMES DAILY   acetaminophen (TYLENOL) 325 mg tablet   Yes No   Sig: Take 650 mg by mouth every 6 (six) hours as needed for mild pain   albuterol (2 5 mg/3 mL) 0 083 % nebulizer solution   Yes No   Sig: Take 2 5 mg by nebulization every 6 (six) hours as needed for wheezing or shortness of breath   albuterol (VENTOLIN HFA) 90 mcg/act inhaler   Yes No   Sig: Inhale 2 puffs every 6 (six) hours as needed for wheezing   amLODIPine (NORVASC) 2 5 mg tablet   Yes No   Sig: Take 7 5 mg by mouth daily   atorvastatin (LIPITOR) 10 mg tablet   No No   Sig: Take 1 tablet (10 mg total) by mouth daily   budesonide-formoterol (SYMBICORT) 160-4 5 mcg/act inhaler   Yes No   Sig: Inhale 2 puffs 2 (two) times a day Rinse mouth after use  dorzolamide-timolol (COSOPT) 22 3-6 8 MG/ML ophthalmic solution  Outside Facility (Specify) Yes No   Sig: Administer 1 drop to both eyes 2 (two) times a day   fexofenadine (ALLEGRA) 180 MG tablet  Self Yes No   Sig: Take by mouth   furosemide (LASIX) 80 mg tablet   No No   Sig: Take 1 tablet (80 mg total) by mouth daily   guaiFENesin (ROBITUSSIN) 100 MG/5ML oral liquid   Yes No   Sig: Take 200 mg by mouth 3 (three) times a day as needed for cough   insulin aspart (NOVOLOG) 100 units/mL injection  Outside Facility (Specify) No No   Si units BID at Breakfast and supper  Hold for blood sugar under 150  Patient taking differently: 14 units at breakfast and lunch  12 units at dinner    Hold for blood sugar under 120   insulin glargine (LANTUS SOLOSTAR) 100 units/mL injection pen  Outside Facility (Specify) No No   Sig: Inject 21 Units under the skin daily At HS   Patient taking differently: Inject 50 Units under the skin daily At HS   ipratropium (ATROVENT) 0 02 % nebulizer solution   Yes No   Sig: Take 0 5 mg by nebulization 4 (four) times a day   levothyroxine 75 mcg tablet   No No   Sig: TAKE ONE TABLET BY MOUTH ONCE DAILY AS DIRECTED   linaGLIPtin (TRADJENTA) 5 MG TABS   Yes No   Sig: Take 5 mg by mouth daily   montelukast (SINGULAIR) 10 mg tablet   Yes No   Sig: Take 10 mg by mouth daily at bedtime   pantoprazole (PROTONIX) 40 mg tablet   No No   Sig: Take 1 tablet (40 mg total) by mouth daily before breakfast   potassium chloride (KLOR-CON M20) 20 mEq tablet   No No   Sig: Take 1 tablet (20 mEq total) by mouth daily   tiotropium (SPIRIVA RESPIMAT) 1 25 MCG/ACT AERS inhaler   Yes No   Sig: Inhale 2 puffs daily      Facility-Administered Medications: None       Past Medical History:   Diagnosis Date    Adenoma of large intestine     Allergy     last assessed: 10/18/2013    Cataract     Constipation, chronic     Diabetes mellitus (HCC)     anemia vitamin d deficiency glaucoma hypertension hypothyroidism; last assessed: 2012    Disease of thyroid gland     Diverticulosis     Fecal incontinence     Female stress incontinence     Glaucoma     Gout     History of cystocele     Intrinsic sphincter deficiency     Mixed incontinence     Nocturia     Osteoarthritis, chronic     Renal disorder     Senile atrophic vaginitis     Urinary frequency        Past Surgical History:   Procedure Laterality Date    ANKLE SURGERY Bilateral     CATARACT EXTRACTION      CHOLECYSTECTOMY      TUBAL LIGATION         Family History   Problem Relation Age of Onset    Stomach cancer Mother     Diabetes Sister         mellitus     I have reviewed and agree with the history as documented  E-Cigarette/Vaping     E-Cigarette/Vaping Substances     Social History     Tobacco Use    Smoking status: Former Smoker     Quit date: 1989     Years since quittin 8    Smokeless tobacco: Never Used   Substance Use Topics    Alcohol use: Never     Frequency: Never     Binge frequency: Never    Drug use: No       Review of Systems   Constitutional: Negative  HENT: Negative  Eyes: Negative  Respiratory: Negative  Cardiovascular: Negative  Gastrointestinal: Negative  Endocrine: Negative  Genitourinary: Negative  Musculoskeletal: Negative  Skin: Negative  Allergic/Immunologic: Negative  Neurological:        Unresponsive episode due to hypoglycemia    Hematological: Negative  Physical Exam  Physical Exam  Vitals signs and nursing note reviewed  Constitutional:       General: She is not in acute distress  Appearance: Normal appearance  She is obese  She is not ill-appearing, toxic-appearing or diaphoretic  HENT:      Head: Normocephalic and atraumatic  Eyes:      Extraocular Movements: Extraocular movements intact  Pupils: Pupils are equal, round, and reactive to light     Neck:      Musculoskeletal: Normal range of motion and neck supple  Cardiovascular:      Rate and Rhythm: Regular rhythm  Bradycardia present  Pulses: Normal pulses  Heart sounds: Normal heart sounds  Pulmonary:      Effort: Pulmonary effort is normal       Breath sounds: Normal breath sounds  Abdominal:      General: There is no distension  Tenderness: There is no abdominal tenderness  Musculoskeletal: Normal range of motion  Skin:     General: Skin is warm and dry  Capillary Refill: Capillary refill takes less than 2 seconds  Comments: B/L Lower legs are pink and shiny    Neurological:      General: No focal deficit present  Mental Status: She is alert and oriented to person, place, and time  GCS: GCS eye subscore is 4  GCS verbal subscore is 5  GCS motor subscore is 6  Cranial Nerves: Cranial nerves are intact  Sensory: Sensation is intact  Psychiatric:         Mood and Affect: Mood normal          Behavior: Behavior normal          Thought Content:  Thought content normal          Judgment: Judgment normal          Vital Signs  ED Triage Vitals [02/26/21 0319]   Temperature Pulse Respirations Blood Pressure SpO2   98 4 °F (36 9 °C) (!) 54 20 143/69 96 %      Temp Source Heart Rate Source Patient Position - Orthostatic VS BP Location FiO2 (%)   Oral Monitor Sitting Right arm --      Pain Score       --           Vitals:    02/26/21 0319 02/26/21 0431 02/26/21 0534   BP: 143/69 107/53 118/57   Pulse: (!) 54 (!) 47 (!) 48   Patient Position - Orthostatic VS: Sitting Lying Lying         Visual Acuity      ED Medications  Medications   dextrose 50 % IV solution 25 mL (0 mL Intravenous Hold 2/26/21 0505)       Diagnostic Studies  Results Reviewed     Procedure Component Value Units Date/Time    Fingerstick Glucose (POCT) [[id number]]  (Normal) Collected: 02/26/21 0541    Lab Status: Final result Updated: 02/26/21 0542     POC Glucose 103 mg/dl     Troponin I [[id number]]  (Normal) Collected: 02/26/21 5146    Lab Status: Final result Specimen: Blood from Arm, Right Updated: 02/26/21 0459     Troponin I <0 02 ng/mL     Fingerstick Glucose (POCT) [[id number]]  (Normal) Collected: 02/26/21 0454    Lab Status: Final result Updated: 02/26/21 0456     POC Glucose 90 mg/dl     Comprehensive metabolic panel [[id number]]  (Abnormal) Collected: 02/26/21 0401    Lab Status: Final result Specimen: Blood from Arm, Right Updated: 02/26/21 0431     Sodium 140 mmol/L      Potassium 3 7 mmol/L      Chloride 103 mmol/L      CO2 33 mmol/L      ANION GAP 4 mmol/L      BUN 39 mg/dL      Creatinine 2 28 mg/dL      Glucose 77 mg/dL      Calcium 8 3 mg/dL      Corrected Calcium 9 3 mg/dL      AST 11 U/L      ALT 14 U/L      Alkaline Phosphatase 101 U/L      Total Protein 7 0 g/dL      Albumin 2 8 g/dL      Total Bilirubin 0 23 mg/dL      eGFR 19 ml/min/1 73sq m     Narrative:      National Kidney Disease Foundation guidelines for Chronic Kidney Disease (CKD):     Stage 1 with normal or high GFR (GFR > 90 mL/min/1 73 square meters)    Stage 2 Mild CKD (GFR = 60-89 mL/min/1 73 square meters)    Stage 3A Moderate CKD (GFR = 45-59 mL/min/1 73 square meters)    Stage 3B Moderate CKD (GFR = 30-44 mL/min/1 73 square meters)    Stage 4 Severe CKD (GFR = 15-29 mL/min/1 73 square meters)    Stage 5 End Stage CKD (GFR <15 mL/min/1 73 square meters)  Note: GFR calculation is accurate only with a steady state creatinine    Fingerstick Glucose (POCT) [[id number]]  (Normal) Collected: 02/26/21 0429    Lab Status: Final result Updated: 02/26/21 0429     POC Glucose 83 mg/dl     CBC and differential [[id number]]  (Abnormal) Collected: 02/26/21 0401    Lab Status: Final result Specimen: Blood from Arm, Right Updated: 02/26/21 0411     WBC 13 26 Thousand/uL      RBC 3 39 Million/uL      Hemoglobin 9 2 g/dL      Hematocrit 30 4 %      MCV 90 fL      MCH 27 1 pg      MCHC 30 3 g/dL      RDW 17 0 %      MPV 10 1 fL      Platelets 460 Thousands/uL      nRBC 0 /100 WBCs Neutrophils Relative 80 %      Immat GRANS % 1 %      Lymphocytes Relative 7 %      Monocytes Relative 9 %      Eosinophils Relative 2 %      Basophils Relative 1 %      Neutrophils Absolute 10 84 Thousands/µL      Immature Grans Absolute 0 09 Thousand/uL      Lymphocytes Absolute 0 90 Thousands/µL      Monocytes Absolute 1 15 Thousand/µL      Eosinophils Absolute 0 21 Thousand/µL      Basophils Absolute 0 07 Thousands/µL     Fingerstick Glucose (POCT) [[id number]]  (Normal) Collected: 02/26/21 0318    Lab Status: Final result Updated: 02/26/21 0320     POC Glucose 104 mg/dl                  No orders to display              Procedures  ECG 12 Lead Documentation Only    Date/Time: 2/26/2021 4:02 AM  Performed by: HEDY Perales  Authorized by: HEDY Perales     Indications / Diagnosis:  Hypoglycemic unresponsieve   ECG reviewed by me, the ED Provider: yes (Dr Xander Orr )    Patient location:  ED  Previous ECG:     Previous ECG:  Compared to current    Similarity:  Changes noted    Comparison to cardiac monitor: Yes    Interpretation:     Interpretation: abnormal    Rate:     ECG rate:  49    ECG rate assessment: bradycardic    Rhythm:     Rhythm: sinus bradycardia    Ectopy:     Ectopy: none    QRS:     QRS axis:  Normal    QRS intervals:  Normal  Conduction:     Conduction: normal    ST segments:     ST segments:  Normal  T waves:     T waves: inverted      Inverted:  AVL             ED Course  ED Course as of Feb 26 0625 Fri Feb 26, 2021   0412 WBC 13 26 - pt has chronic leukocytosis  8129 It is noted in EMR notes that HR is generally 88 = today HR 49  Concern cardiac involvement  Will order trop  0427 Pt drank orange juice - she states she is sleepy  Will recheck BS         0428 HR seen as low as 45         0431 BS 83  Will give 25 ml D50        0442 Cr 2 28 - baseline  hx of CKD  Glucose serum 77     Hgb 9 2 baseline    Creatinine(!): 2 28   0500 Trop < 0 02  EKG SB   POC glucose 90        0505 Echo done 7/2019 HR 74 with EF 69%  HR seen 45 in ED  Recent office visits 80-88  Concern regarding patricia cardia  No cardiology consults noted  Pt is in timolol eye drops for glaucoma ? Systemic induced to cause bradycardia  Will discuss case with [facility] for admission and cardiology consult  BS fluctuating  0546 Glucose 103       0553 [facility] will accept for admission/observation and have cardiology consult         /57 (BP Location: Right arm)   Pulse (!) 48   Temp 98 4 °F (36 9 °C) (Oral)   Resp 20   SpO2 91% Comment: Pt sleeping                            SBIRT 22yo+      Most Recent Value   SBIRT (25 yo +)   In order to provide better care to our patients, we are screening all of our patients for alcohol and drug use  Would it be okay to ask you these screening questions? Yes Filed at: 02/26/2021 1481   Initial Alcohol Screen: US AUDIT-C    1  How often do you have a drink containing alcohol?  0 Filed at: 02/26/2021 0334   2  How many drinks containing alcohol do you have on a typical day you are drinking? 0 Filed at: 02/26/2021 0334   3b  FEMALE Any Age, or MALE 65+: How often do you have 4 or more drinks on one occassion? 0 Filed at: 02/26/2021 0334   Audit-C Score  0 Filed at: 02/26/2021 3840   TOMA: How many times in the past year have you    Used an illegal drug or used a prescription medication for non-medical reasons? Never Filed at: 02/26/2021 0334                    MDM  Number of Diagnoses or Management Options  Diagnosis management comments: Episode of hypoglycemia with unresponsiveness    Plan  Feed patient  Labs  Monitor reassess  EKG  Check FS     Pt is not on a sulfonurea - pt should be able to be fed, monitored and discharged home  Pt is on humalog- linagliptin and lantus              Amount and/or Complexity of Data Reviewed  Clinical lab tests: ordered and reviewed  Review and summarize past medical records: yes        Disposition  Final diagnoses: Hypoglycemia   [facility]   Bradycardia   Unresponsive episode     Time reflects when diagnosis was documented in both MDM as applicable and the Disposition within this note     Time User Action Codes Description Comment    2/26/2021  4:28 AM Jessenia Clan Add [E16 2] Hypoglycemia     2/26/2021  4:29 AM Jessenia Clan Add [E11 9] Diabetes mellitus ([facility] )     2/26/2021  4:29 AM [place] Clan Add [R00 1] Bradycardia     2/26/2021  4:29 AM [place] Clan Add [R41 89] Unresponsive episode     2/26/2021  5:40 AM Jessenia Clan Modify [E16 2] Hypoglycemia     2/26/2021  5:40 AM Jessenia Clan Modify [R00 1] Bradycardia     2/26/2021  5:40 AM [place] Clan Modify [E16 2] Hypoglycemia     2/26/2021  5:40 AM Jessenia Clan Modify [R00 1] Bradycardia     2/26/2021  5:51 AM Jessenia Clan Modify [E16 2] Hypoglycemia     2/26/2021  5:51 AM [place] Clan Modify [R41 89] Unresponsive episode       ED Disposition     ED Disposition Condition Date/Time Comment    Admit Stable Fri Feb 26, 2021  5:51 AM Case was discussed with KYLIE and the patient's admission status was agreed to be Admission Status: observation status to the service of Dr Radha Gomes   Follow-up Information    None         Patient's Medications   Discharge Prescriptions    No medications on file     No discharge procedures on file      PDMP Review     None          ED Provider  Electronically Signed by           Liliana Fleming  02/26/21 9405

## 2021-02-26 NOTE — ASSESSMENT & PLAN NOTE
· Chronic venous insufficiency with prior history of R medial malleolar venous ulcer    Last seen by vascular surgery Jun 2019  · Continue statin  · Not on ASA due to allergy

## 2021-02-26 NOTE — ASSESSMENT & PLAN NOTE
Wt Readings from Last 3 Encounters:   02/26/21 95 3 kg (210 lb 1 6 oz)   02/11/20 101 kg (223 lb 6 4 oz)   07/22/19 97 7 kg (215 lb 6 2 oz)     · LVEF 30%, vigorous systolic function, no RWMA  · Maintained on furosemide 80 mg daily, continue  · Monitor daily weight  · Low Na diet, 1500 mL fluid restriction

## 2021-02-26 NOTE — CONSULTS
Consult - Cardiology   Amilcar Murrell 82 y o  female MRN: [id number]  Unit/Bed#: ED 17 Encounter: [id number]        Reason For Consult:  Bradycardia                 Assessment:  Diabetes mellitus - hypoglycemia (POA and reason for presentation)  Asymptomatic sinus bradycardia (POA):  Now resolved  Hypertension:   O/p Rx:  Norvasc 7 5 mg daily  Dyslipidemia:   O/p Rx:  Lipitor 10 mg  Hx Heart failure with preserved ejection fraction: currently seems compensated   Details of Dx unclear by looks to have been a concomitant diagnosis when hospitalized with COVID-19 pneumonia  Hypertension:  Dyslipidemia:  Hypothyroidism   Current TSH normal at 2 20  CKD 4   Baseline creatinine approximately 2 2-2 4  COPD  Hx COVID-19 pneumonia (4/2020)    Discussion / Plan:  #Sinus bradycardia with ventricular rate in the upper 40s present on arrival  #The patient is currently awake and lucid her ventricular rate is trending in the mid 60s which she indicates is akin to values observed during vital sign checks at the nursing facility where she lives and looks to be corroborated by my personal review of vital signs obtained during outpatient medical visits  #Patient denies symptoms to suggest symptomatic bradycardia or AV block    · Continue telemetry and if unremarkable no further rhythm monitoring is advised  · Avoid use of AV blocking agents in treatment of hypertension  · Consider evaluation for obstructive sleep apnea which may be contributing the patient's sinus bradycardia or place her at risk for other AV block while asleep        History Of Present Illness:  Amilcar Murrell is a 82year old without routine care by cardiologist and medical history highlighted by those things listed in the assessment above  Patient previously been care for remote [facility] but now receives PCP care via the nursing facility where she resides    She denies any prior care by a cardiologist   It does not appear this patient has had prior ischemic testing but she did have an echocardiogram 2019 demonstrating normal EF with no wall motion abnormalities and only mild regurgitant valvulopathy  Early this morning the patient was found unresponsive at the nursing facility where she resides  She was assessed and found to have a blood sugar in the 40s  Sugar was placed on her tongue prior to EMS arrival who then gave her a bolus of D10 fluid with improvement in her glucose  Patient's initial ECG and telemetry in the emergency department showed her to be bradycardic with ventricular rate around 50 beats per minute which prompted our consultation request   Personal review of her data does show her initial ECG showing a ventricular rate approximately 50 beats per minute with a top-normal PA interval but otherwise unremarkable  During the 1st few hours of the patient's hospitalization her ventricular rate had been trending in the upper 40s to mid 50s though this is more recently been trending in the low to mid 60s which is where it is at the time of my conversation with the patient  The patient denies any symptoms of palpitation or perceived cardiac ectopy  She states she has had previous altered mental status related to hypoglycemia but otherwise has no history of syncope nor endorsement of transient lightheadedness, presyncope, or similar  She denies any history of chest pain and she tells me that monitoring when her vital signs are Check the facility where she resides her SBP is usually 120-140 with pulse rate in the 60s to low 70s        Past Medical History:        Past Medical History:   Diagnosis Date    Adenoma of large intestine     Allergy     last assessed: 10/18/2013    Cataract     Constipation, chronic     Diabetes mellitus (HCC)     anemia vitamin d deficiency glaucoma hypertension hypothyroidism; last assessed: 5/7/2012    Disease of thyroid gland     Diverticulosis     Fecal incontinence     Female stress incontinence     Glaucoma     Gout     History of cystocele     Intrinsic sphincter deficiency     Mixed incontinence     Nocturia     Osteoarthritis, chronic     Renal disorder     Senile atrophic vaginitis     Urinary frequency       Past Surgical History:   Procedure Laterality Date    ANKLE SURGERY Bilateral     CATARACT EXTRACTION      CHOLECYSTECTOMY      TUBAL LIGATION          Allergy:        Allergies   Allergen Reactions    Ace Inhibitors      [facility] [id number]: short of breath    Aspirin        Medications:       Prior to Admission medications    Medication Sig Start Date End Date Taking? Authorizing Provider   acetaminophen (TYLENOL) 325 mg tablet Take 650 mg by mouth every 6 (six) hours as needed for mild pain    Historical Provider, MD   albuterol (2 5 mg/3 mL) 0 083 % nebulizer solution Take 2 5 mg by nebulization every 6 (six) hours as needed for wheezing or shortness of breath    Historical Provider, MD   albuterol (VENTOLIN HFA) 90 mcg/act inhaler Inhale 2 puffs every 6 (six) hours as needed for wheezing    Historical Provider, MD   amLODIPine (NORVASC) 2 5 mg tablet Take 7 5 mg by mouth daily    Historical Provider, MD   atorvastatin (LIPITOR) 10 mg tablet Take 1 tablet (10 mg total) by mouth daily 2/19/19   Milo Michaud DO   budesonide-formoterol (SYMBICORT) 160-4 5 mcg/act inhaler Inhale 2 puffs 2 (two) times a day Rinse mouth after use      Historical Provider, MD   dorzolamide-timolol (COSOPT) 22 3-6 8 MG/ML ophthalmic solution Administer 1 drop to both eyes 2 (two) times a day    Billy Mir MD   fexofenadine (ALLEGRA) 180 MG tablet Take by mouth    Historical Provider, MD   furosemide (LASIX) 80 mg tablet Take 1 tablet (80 mg total) by mouth daily 1/23/19   Milo Michaud DO   Glucosamine-Chondroitin (GLUCOSAMINE CHONDR COMPLEX) 500-400 MG CAPS Take 1 capsule by mouth 2 (two) times a day    Historical Provider, MD   guaiFENesin (ROBITUSSIN) 100 MG/5ML oral liquid Take 200 mg by mouth 3 (three) times a day as needed for cough    Historical Provider, MD   Incontinence Supply Disposable (CERTAINTY ADJ UNDERWEAR LARGE) MISC by Does not apply route daily at bedtime  Patient not taking: Reported on 7/27/2020 10/15/18   Nan Seen, DO   insulin aspart (NOVOLOG) 100 units/mL injection 20 units BID at Breakfast and supper  Hold for blood sugar under 150  Patient taking differently: 14 units at breakfast and lunch  12 units at dinner   Hold for blood sugar under 120 7/4/19   Angela Locket, DO   insulin glargine (LANTUS SOLOSTAR) 100 units/mL injection pen Inject 21 Units under the skin daily At [facility]  Patient taking differently: Inject 50 Units under the skin daily At [facility] 7/4/19   Angela Lockyaniv, DO   ipratropium (ATROVENT) 0 02 % nebulizer solution Take 0 5 mg by nebulization 4 (four) times a day    Historical Provider, MD   levothyroxine 75 mcg tablet TAKE ONE TABLET BY MOUTH ONCE DAILY AS DIRECTED 2/16/19   Nan Seen, DO   linaGLIPtin (TRADJENTA) 5 MG TABS Take 5 mg by mouth daily    Historical Provider, MD   montelukast (SINGULAIR) 10 mg tablet Take 10 mg by mouth daily at bedtime    Historical Provider, MD   pantoprazole (PROTONIX) 40 mg tablet Take 1 tablet (40 mg total) by mouth daily before breakfast 7/21/19   Christin Lang MD   potassium chloride (KLOR-CON M20) 20 mEq tablet Take 1 tablet (20 mEq total) by mouth daily 6/28/19   Nan Seen, DO   RELION PEN NEEDLE 31G/8MM 31G X 8 MM MISC USE AS DIRECTED THREE TIMES DAILY 2/2/19   Nan Seen, DO   tiotropium (SPIRIVA RESPIMAT) 1 25 MCG/ACT AERS inhaler Inhale 2 puffs daily    Historical Provider, MD       Family History:     Family History   Problem Relation Age of Onset    Stomach cancer Mother     Diabetes Sister         mellitus        Social History:       Social History     Socioeconomic History    Marital status:       Spouse name: None    Number of children: None    Years of education: None    Highest education level: None Occupational History    None   Social Needs    Financial resource strain: None    Food insecurity     Worry: None     Inability: None    Transportation needs     Medical: None     Non-medical: None   Tobacco Use    Smoking status: Former Smoker     Quit date: 1989     Years since quittin 8    Smokeless tobacco: Never Used   Substance and Sexual Activity    Alcohol use: Never     Frequency: Never     Binge frequency: Never    Drug use: No    Sexual activity: None   Lifestyle    Physical activity     Days per week: None     Minutes per session: None    Stress: None   Relationships    Social connections     Talks on phone: None     Gets together: None     Attends Nondenominational service: None     Active member of club or organization: None     Attends meetings of clubs or organizations: None     Relationship status: None    Intimate partner violence     Fear of current or ex partner: None     Emotionally abused: None     Physically abused: None     Forced sexual activity: None   Other Topics Concern    None   Social History Narrative    None       ROS:  Symptoms per HPI  Intermittently using a walker to assist with ambulation  Decreased vision bilaterally  Decreased hearing  The remainder of the review of systems is negative    Exam:  General:  Alert, normally conversant, comfortable appearing elderly woman who visually looks to have a BMI of obesity  Head: Normocephalic, atraumatic  Eyes:  EOMI  Pupils - equal, round, reactive to accomodation  No icterus  Normal Conjunctiva  Oropharynx: Moist without lesion  Neck:  No gross bruit, JVD, thyromegaly, or lymphadenopathy  Heart:  Regular with controlled rate  Soft WAYNE at low left sternal border  Lungs:  Clear without rales/rhonchi/wheeze  Abdomen:  Soft and nontender with normal bowel sounds   No organomegaly or mass  Lower Limbs:  No edema  Pulses[de-identified]  RLE - DP:  1-2+                 LLE - DP:  1-2+  Musculoskeletal: Independent movement of limbs observed, Formal ROM and strength eval not performed  Neurologic:    Oriented to: person, place, situation  Cranial Nerves: grossly intact - vision, smell, taste, and hearing were not tested  Motor function: grossly normal, symmetric   Sensation: Was not tested    Vitals:    02/26/21 0534 02/26/21 0709 02/26/21 0756 02/26/21 0830   BP: 118/57 132/62 (!) 175/77    BP Location: Right arm Right arm Right arm    Pulse: (!) 48 (!) 51 55    Resp: 20 20 20    Temp:       TempSrc:       SpO2: 91% 96% 96%    Weight:    95 3 kg (210 lb 1 6 oz)   Height:    5' 3" (1 6 m)           DATA:      ECG:   sinus bradycardia - HR 50 bpm  MI interval top normal  Low voltage QRS  Septal infarct (cited on or before 17-JUL-2019)  Abnormal ECG  When compared with ECG of 17-JUL-2019 20:14,  Vent  rate has decreased  BPM                   -----------------------------------------------------------------------------------------------------------------------------------------------  Telemetry:   Normal sinus rhythm without ectopy  Earlier in the hospitalization and during the overnight hours the patient's ventricular rate had been trending in the mid 50s but is currently consistently in the low to mid 60s with rates is seen as high as 72           -----------------------------------------------------------------------------------------------------------------------------------------------  Echocardiogram:         TTE 7/18/2019-[facility]   SUMMARY     LEFT VENTRICLE:  Systolic function was vigorous  Ejection fraction was estimated to be 69 %  There were no regional wall motion abnormalities  Wall thickness was mildly increased      LEFT ATRIUM:  The atrium was mildly dilated      MITRAL VALVE:  There was moderate to marked annular calcification    There was mild regurgitation      TRICUSPID VALVE:  There was mild regurgitation       -----------------------------------------------------------------------------------------------------------------------------------------------  Weights: Wt Readings from Last 20 Encounters:   02/26/21 95 3 kg (210 lb 1 6 oz)   02/11/20 101 kg (223 lb 6 4 oz)   07/22/19 97 7 kg (215 lb 6 2 oz)   06/28/19 99 8 kg (220 lb)   04/12/19 99 8 kg (220 lb)   02/18/19 98 kg (216 lb)   10/15/18 98 8 kg (217 lb 14 4 oz)   06/14/18 102 kg (225 lb 3 2 oz)   05/10/18 104 kg (229 lb 4 8 oz)   09/30/17 103 kg (226 lb 4 9 oz)   05/27/17 104 kg (228 lb 8 oz)   01/23/17 103 kg (226 lb 4 oz)   12/22/16 104 kg (230 lb 6 oz)   09/17/16 104 kg (229 lb)   07/22/16 103 kg (226 lb)   05/14/16 103 kg (226 lb)   01/09/16 103 kg (227 lb 2 oz)   09/05/15 102 kg (224 lb 2 oz)   04/25/15 101 kg (223 lb 0 8 oz)   12/06/14 101 kg (222 lb)   , Body mass index is 37 22 kg/m²           Lab Studies:    Results from last 7 days   Lab Units 02/26/21  0418   TROPONIN I ng/mL <0 02            Results from last 7 days   Lab Units 02/26/21  0401   WBC Thousand/uL 13 26*   HEMOGLOBIN g/dL 9 2*   HEMATOCRIT % 30 4*   PLATELETS Thousands/uL 297   ,   Results from last 7 days   Lab Units 02/26/21  0401   POTASSIUM mmol/L 3 7   CHLORIDE mmol/L 103   CO2 mmol/L 33*   BUN mg/dL 39*   CREATININE mg/dL 2 28*   CALCIUM mg/dL 8 3   ALK PHOS U/L 101   ALT U/L 14   AST U/L 11

## 2021-02-26 NOTE — ASSESSMENT & PLAN NOTE
· EKG: Marked sinus bradycardia, rate 49  Denies any complaints    · Rule out sinus node dysfunction  · Hold amlodipine, monitor for improvement  · Monitor on telemetry  · TSH normal   · Avoid AV lulu blocking agents  · Cardiology consult appreciated-asymptomatic bradycardia and not cause of unresponsive episode

## 2021-02-26 NOTE — H&P
H&P- Ermalene Law 1935, 82 y o  female MRN: [id number]    Unit/Bed#: ED 17 Encounter: [id number]    Primary Care Provider: No primary care provider on file  Date and time admitted to hospital: 2/26/2021  3:15 AM      * Sinus bradycardia  Assessment & Plan  · EKG: Marked sinus bradycardia, rate 49  Denies any complaints  · Rule out sinus node dysfunction  · Hold amlodipine, monitor for improvement  · Monitor on telemetry  · TSH ordered   · Avoid AV lulu blocking agents  · Cardiology consult    Hypoglycemia  Assessment & Plan  · Found unresponsive with BG 40 s/p IV dextrose  Improved to 104  · Hospitalized at [address] [place] [address] [address] May 2019 for hypoglycemia, BG  34  Consider discontinuing insulin  · HgA1c 7 8 on 2/23/21  · Hold hypoglycemics  · Monitor Accu-Cheks  Will hold off sliding scale coverage for now    Stage 4 chronic kidney disease [facility])  Assessment & Plan  Lab Results   Component Value Date    EGFR 19 02/26/2021    EGFR 19 07/21/2019    EGFR 18 07/20/2019    CREATININE 2 28 (H) 02/26/2021    CREATININE 2 34 (H) 07/21/2019    CREATININE 2 48 (H) 07/20/2019     · Creatinine at baseline  · Avoid nephrotoxins  · Followed outpatient by Nephrology    Chronic congestive heart failure (HCC)  Assessment & Plan  Wt Readings from Last 3 Encounters:   02/11/20 101 kg (223 lb 6 4 oz)   07/22/19 97 7 kg (215 lb 6 2 oz)   06/28/19 99 8 kg (220 lb)     · LVEF 81%, vigorous systolic function, no RWMA  · Maintained on furosemide 80 mg daily, continue  · Monitor daily weight  · Low Na diet, 1500 mL fluid restriction    Acquired hypothyroidism  Assessment & Plan  · Continue levothyroxine    Will check TSH    Gastroesophageal reflux disease without esophagitis  Assessment & Plan  · Continue PPI    Leukocytosis  Assessment & Plan  · Chronic leukocytosis  · Outpatient follow-up with Hematology    Chronic venous insufficiency  Assessment & Plan  · Chronic venous insufficiency with prior history of R medial malleolar venous ulcer  Last seen by vascular surgery Jun 2019  · Continue statin  · Not on ASA due to allergy    Anemia in stage 4 chronic kidney disease ([facility] [address] )  Assessment & Plan  · Hg/Hct at baseline  Monitor CBC      VTE Prophylaxis: Enoxaparin (Lovenox)  / sequential compression device   Code Status: DNR/I  POLST: POLST is not applicable to this patient  Discussion with family:     Anticipated Length of Stay:  Patient will be admitted on an Observation basis with an anticipated length of stay of  > 2 midnights  Justification for Hospital Stay:  Marked sinus bradycardia    Total Time for Visit, including Counseling / Coordination of Care: 1 hour  Greater than 50% of this total time spent on direct patient counseling and coordination of care  Chief Complaint:   Low blood glucose    History of Present Illness:    Colten Brito is a 82 y o  female who presents with c/o hypoglycemia  Patient was found unresponsive, blood glucose in the 40s, EMS was called s/p Dextrose infusion  Patient denies any complaints, fair historian  · Admitted at [address] [place] [address] [address] May 2019 for hypoglycemia, blood glucose in the 30's  Review of Systems:    Review of Systems   Constitutional: Negative  HENT: Negative  Respiratory: Negative  Cardiovascular: Negative  Gastrointestinal: Negative  Genitourinary: Negative  Musculoskeletal: Negative  Skin: Negative  Neurological: Negative  Psychiatric/Behavioral: Negative          Past Medical and Surgical History:     Past Medical History:   Diagnosis Date    Adenoma of large intestine     Allergy     last assessed: 10/18/2013    Cataract     Constipation, chronic     Diabetes mellitus (HCC)     anemia vitamin d deficiency glaucoma hypertension hypothyroidism; last assessed: 5/7/2012    Disease of thyroid gland     Diverticulosis     Fecal incontinence     Female stress incontinence     Glaucoma     Gout     History of cystocele     Intrinsic sphincter deficiency     Mixed incontinence     Nocturia     Osteoarthritis, chronic     Renal disorder     Senile atrophic vaginitis     Urinary frequency        Past Surgical History:   Procedure Laterality Date    ANKLE SURGERY Bilateral     CATARACT EXTRACTION      CHOLECYSTECTOMY      TUBAL LIGATION         Meds/Allergies:    Prior to Admission medications    Medication Sig Start Date End Date Taking? Authorizing Provider   acetaminophen (TYLENOL) 325 mg tablet Take 650 mg by mouth every 6 (six) hours as needed for mild pain    Historical Provider, MD   albuterol (2 5 mg/3 mL) 0 083 % nebulizer solution Take 2 5 mg by nebulization every 6 (six) hours as needed for wheezing or shortness of breath    Historical Provider, MD   albuterol (VENTOLIN HFA) 90 mcg/act inhaler Inhale 2 puffs every 6 (six) hours as needed for wheezing    Historical Provider, MD   amLODIPine (NORVASC) 2 5 mg tablet Take 7 5 mg by mouth daily    Historical Provider, MD   atorvastatin (LIPITOR) 10 mg tablet Take 1 tablet (10 mg total) by mouth daily 2/19/19   Nan Seen, DO   budesonide-formoterol (SYMBICORT) 160-4 5 mcg/act inhaler Inhale 2 puffs 2 (two) times a day Rinse mouth after use      Historical Provider, MD   dorzolamide-timolol (COSOPT) 22 3-6 8 MG/ML ophthalmic solution Administer 1 drop to both eyes 2 (two) times a day    Lara Tsang MD   fexofenadine (ALLEGRA) 180 MG tablet Take by mouth    Historical Provider, MD   furosemide (LASIX) 80 mg tablet Take 1 tablet (80 mg total) by mouth daily 1/23/19   Nan Seen, DO   Glucosamine-Chondroitin (GLUCOSAMINE CHONDR COMPLEX) 500-400 MG CAPS Take 1 capsule by mouth 2 (two) times a day    Historical Provider, MD   guaiFENesin (ROBITUSSIN) 100 MG/5ML oral liquid Take 200 mg by mouth 3 (three) times a day as needed for cough    Historical Provider, MD   Incontinence Supply Disposable (CERTAINTY ADJ UNDERWEAR LARGE) MISC by Does not apply route daily at bedtime  Patient not taking: Reported on 7/27/2020 10/15/18   Donna Arm, DO   insulin aspart (NOVOLOG) 100 units/mL injection 20 units BID at Breakfast and supper  Hold for blood sugar under 150  Patient taking differently: 14 units at breakfast and lunch  12 units at dinner   Hold for blood sugar under 120 7/4/19   Rene Squires DO   insulin glargine (LANTUS SOLOSTAR) 100 units/mL injection pen Inject 21 Units under the skin daily At [facility]  Patient taking differently: Inject 50 Units under the skin daily At [facility] 7/4/19   Rene Squires DO   ipratropium (ATROVENT) 0 02 % nebulizer solution Take 0 5 mg by nebulization 4 (four) times a day    Historical Provider, MD   levothyroxine 75 mcg tablet TAKE ONE TABLET BY MOUTH ONCE DAILY AS DIRECTED 2/16/19   Donna Arm, DO   linaGLIPtin (TRADJENTA) 5 MG TABS Take 5 mg by mouth daily    Historical Provider, MD   montelukast (SINGULAIR) 10 mg tablet Take 10 mg by mouth daily at bedtime    Historical Provider, MD   pantoprazole (PROTONIX) 40 mg tablet Take 1 tablet (40 mg total) by mouth daily before breakfast 7/21/19   Jaycee Napier, MD   potassium chloride (KLOR-CON M20) 20 mEq tablet Take 1 tablet (20 mEq total) by mouth daily 6/28/19   Donna Arm, DO   RELION PEN NEEDLE 31G/8MM 31G X 8 MM MISC USE AS DIRECTED THREE TIMES DAILY 2/2/19   Donna Arm, DO   tiotropium (SPIRIVA RESPIMAT) 1 25 MCG/ACT AERS inhaler Inhale 2 puffs daily    Historical Provider, MD     I have reviewed home medications using allscripts  Allergies: Allergies   Allergen Reactions    Ace Inhibitors      Annotation - [id number]: short of breath    Aspirin        Social History:     Marital Status:     Occupation: retired  Patient Pre-hospital Living Situation: nursing facility  Patient Pre-hospital Level of Mobility: walker  Patient Pre-hospital Diet Restrictions:   Substance Use History:   Social History     Substance and Sexual Activity   Alcohol Use Never    Frequency: Never    Binge frequency: Never     Social History     Tobacco Use Smoking Status Former Smoker    Quit date: 1989    Years since quittin 8   Smokeless Tobacco Never Used     Social History     Substance and Sexual Activity   Drug Use No       Family History:    Family History   Problem Relation Age of Onset    Stomach cancer Mother     Diabetes Sister         mellitus       Physical Exam:     Vitals:   Blood Pressure: 118/57 (21)  Pulse: (!) 48 (21)  Temperature: 98 4 °F (36 9 °C) (21)  Temp Source: Oral (21)  Respirations: 20 (21)  SpO2: 91 %(Pt sleeping) (21)    Physical Exam  Constitutional:       General: She is not in acute distress  Appearance: Normal appearance  She is obese  She is not ill-appearing, toxic-appearing or diaphoretic  HENT:      Head: Normocephalic and atraumatic  Nose: No congestion or rhinorrhea  Mouth/Throat:      Mouth: Mucous membranes are moist    Eyes:      Conjunctiva/sclera: Conjunctivae normal    Neck:      Musculoskeletal: Neck supple  Cardiovascular:      Rate and Rhythm: Regular rhythm  Bradycardia present  Heart sounds: Normal heart sounds  Pulmonary:      Effort: Pulmonary effort is normal       Breath sounds: Rales present  Comments: Mild rales  Abdominal:      General: Bowel sounds are normal       Palpations: Abdomen is soft  Musculoskeletal:      Right lower leg: Edema present  Left lower leg: Edema present  Comments: Chronic PVD with skin changes, erythema , thin shiny skin   Skin:     General: Skin is warm  Coloration: Skin is not jaundiced or pale  Neurological:      General: No focal deficit present  Mental Status: She is alert and oriented to person, place, and time  Psychiatric:         Behavior: Behavior normal          Thought Content: Thought content normal          Judgment: Judgment normal        Additional Data:     Lab Results: I have personally reviewed pertinent reports        Results from last 7 days   Lab Units 02/26/21  0401   WBC Thousand/uL 13 26*   HEMOGLOBIN g/dL 9 2*   HEMATOCRIT % 30 4*   PLATELETS Thousands/uL 297   NEUTROS PCT % 80*   LYMPHS PCT % 7*   MONOS PCT % 9   EOS PCT % 2     Results from last 7 days   Lab Units 02/26/21  0401   SODIUM mmol/L 140   POTASSIUM mmol/L 3 7   CHLORIDE mmol/L 103   CO2 mmol/L 33*   BUN mg/dL 39*   CREATININE mg/dL 2 28*   ANION GAP mmol/L 4   CALCIUM mg/dL 8 3   ALBUMIN g/dL 2 8*   TOTAL BILIRUBIN mg/dL 0 23   ALK PHOS U/L 101   ALT U/L 14   AST U/L 11   GLUCOSE RANDOM mg/dL 77         Results from last 7 days   Lab Units 02/26/21  0541 02/26/21  0454 02/26/21  0429 02/26/21  0318   POC GLUCOSE mg/dl 103 90 83 104     Results from last 7 days   Lab Units 02/23/21  0652   HEMOGLOBIN A1C % 7 8*           Imaging: I have personally reviewed pertinent reports  No orders to display       EKG, Pathology, and Other Studies Reviewed on Admission:   · EKG echo    Allscripts / Epic Records Reviewed: Yes     ** Please Note: This note has been constructed using a voice recognition system   **

## 2021-02-26 NOTE — UTILIZATION REVIEW
Initial Clinical Review    Admission: Date/Time/Statement:   Admission Orders (From admission, onward)     Ordered        02/26/21 0553  Place in Observation  Once                   Orders Placed This Encounter   Procedures    Place in Observation     Standing Status:   Standing     Number of Occurrences:   1     Order Specific Question:   Level of Care     Answer:   Med Surg [16]     ED Arrival Information     Expected Arrival Acuity Means of Arrival Escorted By Service Admission Type    - 2/26/2021 03:15 Urgent Ambulance [facility] ([address]) Hospitalist Urgent    Arrival Complaint    low blood sugar        Chief Complaint   Patient presents with    Hypoglycemia - Symptomatic     Pt found unresponsive at [facility]  NH tried pouring sugar packets in mouth after checking BS in the 40s  Pt given 10 dextrose EMS  EMS states improvement in mental status  Assessment/Plan: Sinus bradycardia  Assessment & Plan  · EKG: Marked sinus bradycardia, rate 49  Denies any complaints  · Rule out sinus node dysfunction  · Hold amlodipine, monitor for improvement  · Monitor on telemetry  · TSH ordered   · Avoid AV lulu blocking agents  · Cardiology consult     Hypoglycemia  Assessment & Plan  · Found unresponsive with BG 40 s/p IV dextrose  Improved to 104  · Hospitalized at [address] [place] [address] [address] May 2019 for hypoglycemia, BG  34  Consider discontinuing insulin  · HgA1c 7 8 on 2/23/21  · Hold hypoglycemics  · Monitor Accu-Cheks    Will hold off sliding scale coverage for now     Stage 4 chronic kidney disease [facility])  Assessment & Plan        Lab Results   Component Value Date     EGFR 19 02/26/2021     EGFR 19 07/21/2019     EGFR 18 07/20/2019     CREATININE 2 28 (H) 02/26/2021     CREATININE 2 34 (H) 07/21/2019     CREATININE 2 48 (H) 07/20/2019   Chronic congestive heart failure ([address] )  Assessment & Plan      Wt Readings from Last 3 Encounters:   02/11/20 101 kg (223 lb 6 4 oz)   07/22/19 97 7 kg (215 lb 6 2 oz)   06/28/19 99 8 kg (220 lb)      · LVEF 52%, vigorous systolic function, no RWMA  · Maintained on furosemide 80 mg daily, continue  · Monitor daily weight  · Low Na diet, 1500 mL fluid restriction     Acquired hypothyroidism  Assessment & Plan  · Continue levothyroxine  Will check TSH     Gastroesophageal reflux disease without esophagitis  Assessment & Plan  · Continue PPI     Leukocytosis  Assessment & Plan  · Chronic leukocytosis  · Outpatient follow-up with Hematology  Chronic venous insufficiency  Assessment & Plan  · Chronic venous insufficiency with prior history of R medial malleolar venous ulcer  Last seen by vascular surgery Jun 2019  · Continue statin  · Not on ASA due to allergy     Anemia in stage 4 chronic kidney disease ([address] )  Assessment & Plan  · Hg/Hct at baseline  Monitor CBC   Anticipated Length of Stay:  Patient will be admitted on an Observation basis with an anticipated length of stay of  > 2 midnights  Justification for Hospital Stay:  Marked sinus bradycardia      Riky Noriega is a 82 y o  female who presents with c/o hypoglycemia  Patient was found unresponsive, blood glucose in the 40s, EMS was called s/p Dextrose infusion  Patient denies any complaints, fair historian    · Admitted at [facility] May 2019 for hypoglycemia, blood glucose in the 30's            ED Triage Vitals   Temperature Pulse Respirations Blood Pressure SpO2   02/26/21 0319 02/26/21 0319 02/26/21 0319 02/26/21 0319 02/26/21 0319   98 4 °F (36 9 °C) (!) 54 20 143/69 96 %      Temp Source Heart Rate Source Patient Position - Orthostatic VS BP Location FiO2 (%)   02/26/21 0319 02/26/21 0319 02/26/21 0319 02/26/21 0319 --   Oral Monitor Sitting Right arm       Pain Score       02/26/21 0659       No Pain          Wt Readings from Last 1 Encounters:   02/26/21 95 3 kg (210 lb 1 6 oz)     Additional Vital Signs:   02/26/21 0756  --  55  20  175/77Abnormal   96 %  None (Room air)  Lying   02/26/21 0709  --  51Abnormal   20  132/62  96 %  None (Room air)  Lying   02/26/21 0534  --  48Abnormal   20  118/57  91 %   None (Room air)  Lying   SpO2: Pt sleeping at 02/26/21 0534   02/26/21 0431  --  47Abnormal   20  107/53  97 %  None (Room air)  Lying   02/26/21 0319  98 4 °F (36 9 °C)  54Abnormal   20  143/69  96 %  None (Room air)  Sitting         Pertinent Labs/Diagnostic Test Results:   EKG  ( 2/26)     SB     Normal QRS     T waves inverted  AVL      Results from last 7 days   Lab Units 02/26/21  0401   WBC Thousand/uL 13 26*   HEMOGLOBIN g/dL 9 2*   HEMATOCRIT % 30 4*   PLATELETS Thousands/uL 297   NEUTROS ABS Thousands/µL 10 84*         Results from last 7 days   Lab Units 02/26/21  0401   SODIUM mmol/L 140   POTASSIUM mmol/L 3 7   CHLORIDE mmol/L 103   CO2 mmol/L 33*   ANION GAP mmol/L 4   BUN mg/dL 39*   CREATININE mg/dL 2 28*   EGFR ml/min/1 73sq m 19   CALCIUM mg/dL 8 3     Results from last 7 days   Lab Units 02/26/21  0401   AST U/L 11   ALT U/L 14   ALK PHOS U/L 101   TOTAL PROTEIN g/dL 7 0   ALBUMIN g/dL 2 8*   TOTAL BILIRUBIN mg/dL 0 23     Results from last 7 days   Lab Units 02/26/21  0821 02/26/21  0541 02/26/21  0454 02/26/21  0429 02/26/21  0318   POC GLUCOSE mg/dl 152* 103 90 83 104     Results from last 7 days   Lab Units 02/26/21  0401   GLUCOSE RANDOM mg/dL 77         Results from last 7 days   Lab Units 02/23/21  0652   HEMOGLOBIN A1C % 7 8*   EAG mg/dL 177*       Results from last 7 days   Lab Units 02/26/21  0418   TROPONIN I ng/mL <0 02             Results from last 7 days   Lab Units 02/26/21  0401   TSH 3RD GENERATON uIU/mL 2 204         ED Treatment:   Medication Administration from 02/26/2021 0315 to 02/26/2021 0920       Date/Time Order Dose Route Action Action by Comments     02/26/2021 0505 dextrose 50 % IV solution 25 mL 0 mL Intravenous Hold Yaquelin Evans, SOY Pts BS WNL discussed with provider   Will continue to monitor     02/26/2021 0811 levothyroxine tablet 75 mcg 75 mcg Oral Given Dago Duffy RN      02/26/2021 9125 pantoprazole (PROTONIX) EC tablet 40 mg 40 mg Oral Given Daryl Urbina RN      02/26/2021 0831 acetaminophen (TYLENOL) tablet 650 mg 650 mg Oral Given Daryl Urbina RN         Past Medical History:   Diagnosis Date    Adenoma of large intestine     Allergy     last assessed: 10/18/2013    Cataract     Constipation, chronic     Diabetes mellitus ([address] )     anemia vitamin d deficiency glaucoma hypertension hypothyroidism; last assessed: 5/7/2012    Disease of thyroid gland     Diverticulosis     Fecal incontinence     Female stress incontinence     Glaucoma     Gout     History of cystocele     Intrinsic sphincter deficiency     Mixed incontinence     Nocturia     Osteoarthritis, chronic     Renal disorder     Senile atrophic vaginitis     Urinary frequency      Present on Admission:   Sinus bradycardia   Hypoglycemia   Gastroesophageal reflux disease without esophagitis   Chronic congestive heart failure (HCC)   Acquired hypothyroidism   Leukocytosis   Anemia in stage 4 chronic kidney disease (HCC)   Chronic venous insufficiency      Admitting Diagnosis: No admission diagnoses are documented for this encounter    Age/Sex: 82 y o  female  Admission Orders:  Scheduled Medications:  atorvastatin, 10 mg, Oral, Daily With Dinner  budesonide-formoterol, 2 puff, Inhalation, BID  dextrose, 25 mL, Intravenous, Once  dorzolamide-timolol, 1 drop, Both Eyes, BID  enoxaparin, 30 mg, Subcutaneous, Daily  furosemide, 80 mg, Oral, Daily  levothyroxine, 75 mcg, Oral, Early Morning  montelukast, 10 mg, Oral, HS  pantoprazole, 40 mg, Oral, Daily Before Breakfast  potassium chloride, 20 mEq, Oral, Daily  tiotropium, 18 mcg, Inhalation, Daily      Continuous IV Infusions:     PRN Meds:  acetaminophen, 650 mg, Oral, Q6H PRN  albuterol, 2 puff, Inhalation, Q6H PRN  metoclopramide, 10 mg, Intravenous, Q6H PRN        IP CONSULT TO CARDIOLOGY  IP CONSULT TO CASE MANAGEMENT    Network Utilization Review Department  ATTENTION: Please call with any questions or concerns to [phone] and carefully listen to the prompts so that you are directed to the right person  All voicemails are confidential   Gladys Morgan all requests for admission clinical reviews, approved or denied determinations and any other requests to dedicated fax number below belonging to the campus where the patient is receiving treatment   List of dedicated fax numbers for the Facilities:  [address] DENIALS (Administrative/Medical Necessity) [phone]   [address] (Maternity/NICU/Pediatrics) [phone]   [address] Leslie Mckeon 4500 (  Shae Bach "Erma" 103) [address] [address] Jake Gaona 1481 [address] [place]) [address] [place] [address] [phone]

## 2021-02-27 LAB
ANION GAP SERPL CALCULATED.3IONS-SCNC: 6 MMOL/L (ref 4–13)
BUN SERPL-MCNC: 39 MG/DL (ref 5–25)
CALCIUM SERPL-MCNC: 8.4 MG/DL (ref 8.3–10.1)
CHLORIDE SERPL-SCNC: 103 MMOL/L (ref 100–108)
CO2 SERPL-SCNC: 31 MMOL/L (ref 21–32)
CREAT SERPL-MCNC: 2.06 MG/DL (ref 0.6–1.3)
ERYTHROCYTE [DISTWIDTH] IN BLOOD BY AUTOMATED COUNT: 16.9 % (ref 11.6–15.1)
EST. AVERAGE GLUCOSE BLD GHB EST-MCNC: 169 MG/DL
GFR SERPL CREATININE-BSD FRML MDRD: 22 ML/MIN/1.73SQ M
GLUCOSE P FAST SERPL-MCNC: 126 MG/DL (ref 65–99)
GLUCOSE SERPL-MCNC: 119 MG/DL (ref 65–140)
GLUCOSE SERPL-MCNC: 126 MG/DL (ref 65–140)
GLUCOSE SERPL-MCNC: 155 MG/DL (ref 65–140)
GLUCOSE SERPL-MCNC: 173 MG/DL (ref 65–140)
GLUCOSE SERPL-MCNC: 186 MG/DL (ref 65–140)
HBA1C MFR BLD: 7.5 %
HCT VFR BLD AUTO: 31.6 % (ref 34.8–46.1)
HGB BLD-MCNC: 9.5 G/DL (ref 11.5–15.4)
MCH RBC QN AUTO: 26.9 PG (ref 26.8–34.3)
MCHC RBC AUTO-ENTMCNC: 30.1 G/DL (ref 31.4–37.4)
MCV RBC AUTO: 90 FL (ref 82–98)
PLATELET # BLD AUTO: 304 THOUSANDS/UL (ref 149–390)
PMV BLD AUTO: 10.5 FL (ref 8.9–12.7)
POTASSIUM SERPL-SCNC: 4.5 MMOL/L (ref 3.5–5.3)
RBC # BLD AUTO: 3.53 MILLION/UL (ref 3.81–5.12)
SODIUM SERPL-SCNC: 140 MMOL/L (ref 136–145)
WBC # BLD AUTO: 10.11 THOUSAND/UL (ref 4.31–10.16)

## 2021-02-27 PROCEDURE — 80048 BASIC METABOLIC PNL TOTAL CA: CPT | Performed by: NURSE PRACTITIONER

## 2021-02-27 PROCEDURE — 82948 REAGENT STRIP/BLOOD GLUCOSE: CPT

## 2021-02-27 PROCEDURE — 85027 COMPLETE CBC AUTOMATED: CPT | Performed by: NURSE PRACTITIONER

## 2021-02-27 PROCEDURE — 83036 HEMOGLOBIN GLYCOSYLATED A1C: CPT | Performed by: NURSE PRACTITIONER

## 2021-02-27 RX ORDER — INSULIN GLARGINE 100 [IU]/ML
35 INJECTION, SOLUTION SUBCUTANEOUS
Qty: 1 ML | Refills: 0
Start: 2021-02-27 | End: 2021-03-01

## 2021-02-27 RX ADMIN — TIOTROPIUM BROMIDE 18 MCG: 18 CAPSULE ORAL; RESPIRATORY (INHALATION) at 10:06

## 2021-02-27 RX ADMIN — DORZOLAMIDE HYDROCHLORIDE AND TIMOLOL MALEATE 1 DROP: 20; 5 SOLUTION/ DROPS OPHTHALMIC at 17:47

## 2021-02-27 RX ADMIN — HEPARIN SODIUM 5000 UNITS: 5000 INJECTION INTRAVENOUS; SUBCUTANEOUS at 14:22

## 2021-02-27 RX ADMIN — LEVOTHYROXINE SODIUM 75 MCG: 75 TABLET ORAL at 05:35

## 2021-02-27 RX ADMIN — INSULIN GLARGINE 35 UNITS: 100 INJECTION, SOLUTION SUBCUTANEOUS at 21:33

## 2021-02-27 RX ADMIN — BUDESONIDE AND FORMOTEROL FUMARATE DIHYDRATE 2 PUFF: 160; 4.5 AEROSOL RESPIRATORY (INHALATION) at 17:47

## 2021-02-27 RX ADMIN — ATORVASTATIN CALCIUM 10 MG: 10 TABLET, FILM COATED ORAL at 17:47

## 2021-02-27 RX ADMIN — BUDESONIDE AND FORMOTEROL FUMARATE DIHYDRATE 2 PUFF: 160; 4.5 AEROSOL RESPIRATORY (INHALATION) at 10:06

## 2021-02-27 RX ADMIN — INSULIN LISPRO 1 UNITS: 100 INJECTION, SOLUTION INTRAVENOUS; SUBCUTANEOUS at 17:47

## 2021-02-27 RX ADMIN — MONTELUKAST 10 MG: 10 TABLET, FILM COATED ORAL at 21:32

## 2021-02-27 RX ADMIN — HEPARIN SODIUM 5000 UNITS: 5000 INJECTION INTRAVENOUS; SUBCUTANEOUS at 21:32

## 2021-02-27 RX ADMIN — HEPARIN SODIUM 5000 UNITS: 5000 INJECTION INTRAVENOUS; SUBCUTANEOUS at 05:35

## 2021-02-27 RX ADMIN — INSULIN LISPRO 1 UNITS: 100 INJECTION, SOLUTION INTRAVENOUS; SUBCUTANEOUS at 11:56

## 2021-02-27 RX ADMIN — PANTOPRAZOLE SODIUM 40 MG: 40 TABLET, DELAYED RELEASE ORAL at 05:35

## 2021-02-27 RX ADMIN — FUROSEMIDE 80 MG: 80 TABLET ORAL at 10:06

## 2021-02-27 RX ADMIN — INSULIN LISPRO 1 UNITS: 100 INJECTION, SOLUTION INTRAVENOUS; SUBCUTANEOUS at 21:33

## 2021-02-27 RX ADMIN — POTASSIUM CHLORIDE 20 MEQ: 1500 TABLET, EXTENDED RELEASE ORAL at 10:07

## 2021-02-27 RX ADMIN — DORZOLAMIDE HYDROCHLORIDE AND TIMOLOL MALEATE 1 DROP: 20; 5 SOLUTION/ DROPS OPHTHALMIC at 10:06

## 2021-02-27 NOTE — PLAN OF CARE
Problem: Potential for Falls  Goal: Patient will remain free of falls  Description: INTERVENTIONS:  - Assess patient frequently for physical needs  -  Identify cognitive and physical deficits and behaviors that affect risk of falls    -  [place] fall precautions as indicated by assessment   - Educate patient/family on patient safety including physical limitations  - Instruct patient to call for assistance with activity based on assessment  - Modify environment to reduce risk of injury  - Consider OT/PT consult to assist with strengthening/mobility  Outcome: Progressing

## 2021-02-27 NOTE — SOCIAL WORK
LSW covering on Saturday  Pt is obs and notice was given  MD would like to discharge pt back to [facility]  TC to [facility] at [phone] and they can not accept pt back on over the weekend  Will need COVID testing to return and [facility] fax prior  MD is aware of this

## 2021-02-27 NOTE — UTILIZATION REVIEW
Continued Stay Review    Date: 2/27/21                          Current Patient Class: observation  Current Level of Care: med surg    HPI:85 y o  female initially admitted on 2/26/21 due to hypoglycemia, sinus patricia, altered LOC  Assessment/Plan:   2/26 PM attending note: Patient seen and examined on rounds  Cardiology consult appreciated, asymptomatic bradycardia TSH within normal limits  Will place endocrine consult for hypoglycemia with recurrences  2/26 per cardio #Sinus bradycardia with ventricular rate in the upper 40s present on arrival  #The patient is currently awake and lucid her ventricular rate is trending in the mid 62s which she indicates is akin to values observed during vital sign checks at the nursing facility where she lives and looks to be corroborated by my personal review of vital signs obtained during outpatient medical visits  #Patient denies symptoms to suggest symptomatic bradycardia or AV block     · Continue telemetry and if unremarkable no further rhythm monitoring is advised  · Avoid use of AV blocking agents in treatment of hypertension  Consider evaluation for obstructive sleep apnea which may be contributing the patient's sinus bradycardia or place her at risk for other AV block while asleep    2/26 per endocrine:   T2DM, uncontrolled with hypoglycemia: She was surprised that her Lantus dose was ordered at 50units  I think this is dose is too high considering her late stage CKD  I recommend lowering this to 35units once daily and also stopping the Sherlyn Ephrata listed as I do not this is needed  She can continue the Novolog 14/14/12 that appears to be ordered  If correctional scale is ordered, please use algorthim 1 for now         Pertinent Labs/Diagnostic Results:       Results from last 7 days   Lab Units 02/27/21  0512 02/26/21  0401   WBC Thousand/uL 10 11 13 26*   HEMOGLOBIN g/dL 9 5* 9 2*   HEMATOCRIT % 31 6* 30 4*   PLATELETS Thousands/uL 304 297   NEUTROS ABS Thousands/µL  --  10 84*         Results from last 7 days   Lab Units 02/27/21  0512 02/26/21  0401   SODIUM mmol/L 140 140   POTASSIUM mmol/L 4 5 3 7   CHLORIDE mmol/L 103 103   CO2 mmol/L 31 33*   ANION GAP mmol/L 6 4   BUN mg/dL 39* 39*   CREATININE mg/dL 2 06* 2 28*   EGFR ml/min/1 73sq m 22 19   CALCIUM mg/dL 8 4 8 3     Results from last 7 days   Lab Units 02/26/21  0401   AST U/L 11   ALT U/L 14   ALK PHOS U/L 101   TOTAL PROTEIN g/dL 7 0   ALBUMIN g/dL 2 8*   TOTAL BILIRUBIN mg/dL 0 23     Results from last 7 days   Lab Units 02/27/21  0747 02/26/21  2042 02/26/21  1530 02/26/21  1416 02/26/21  1114 02/26/21  0821 02/26/21  0541 02/26/21  0454 02/26/21  0429 02/26/21  0318   POC GLUCOSE mg/dl 119 220* 306* 233* 302* 152* 103 90 83 104     Results from last 7 days   Lab Units 02/27/21  0512 02/26/21  0401   GLUCOSE RANDOM mg/dL 126 77         Results from last 7 days   Lab Units 02/23/21  0652   HEMOGLOBIN A1C % 7 8*   EAG mg/dL 177*     No results found for: BETA-HYDROXYBUTYRATE                   Results from last 7 days   Lab Units 02/26/21  0418   TROPONIN I ng/mL <0 02             Results from last 7 days   Lab Units 02/26/21  0401   TSH 3RD GENERATON uIU/mL 2 204                                                                                           Vital Signs:   Date/Time  Temp  Pulse  Resp  BP  MAP (mmHg)  SpO2  O2 Device  Patient Position - Orthostatic VS   02/27/21 0745  97 5 °F (36 4 °C)  69  18  120/60  --  93 %  None (Room air)  Lying   02/26/21 2339  98 1 °F (36 7 °C)  65  18  135/64  92  97 %  --  Lying   02/26/21 2338  98 1 °F (36 7 °C)  68  18  --  --  --  --  --   02/26/21 2000  97 3 °F (36 3 °C)Abnormal   66  18  133/73  --  97 %  None (Room air)  Lying   02/26/21 1530  97 6 °F (36 4 °C)  67  20  174/64Abnormal   --  96 %  None (Room air)  Lying   02/26/21 1430  --  63  18  155/62  89  95 %  None (Room air)  Lying   02/26/21 1208  --  57  18  154/67  --  95 %  None (Room air)  Lying 02/26/21 0756  --  55  20  175/77Abnormal   --  96 %  None (Room air)  Lying   02/26/21 0709  --  51Abnormal   20  132/62  --  96 %  None (Room air)  Lying   02/26/21 0534  --  48Abnormal   20  118/57  --  91 %   None (Room air)  Lying           Medications:   Scheduled Medications:  atorvastatin, 10 mg, Oral, Daily With Dinner  budesonide-formoterol, 2 puff, Inhalation, BID  dextrose, 25 mL, Intravenous, Once  dorzolamide-timolol, 1 drop, Both Eyes, BID  furosemide, 80 mg, Oral, Daily  heparin (porcine), 5,000 Units, Subcutaneous, Q8H HILDA  insulin glargine, 35 Units, Subcutaneous, HS  insulin lispro, 1-5 Units, Subcutaneous, TID AC  insulin lispro, 1-5 Units, Subcutaneous, HS  levothyroxine, 75 mcg, Oral, Early Morning  montelukast, 10 mg, Oral, HS  pantoprazole, 40 mg, Oral, Daily Before Breakfast  potassium chloride, 20 mEq, Oral, Daily  tiotropium, 18 mcg, Inhalation, Daily      Continuous IV Infusions:     PRN Meds:  acetaminophen, 650 mg, Oral, Q6H PRN  albuterol, 2 puff, Inhalation, Q6H PRN  metoclopramide, 10 mg, Intravenous, Q6H PRN        Discharge Plan: [facility]    Network Utilization Review Department  ATTENTION: Please call with any questions or concerns to [phone] and carefully listen to the prompts so that you are directed to the right person  All voicemails are confidential   Mallory Smith all requests for admission clinical reviews, approved or denied determinations and any other requests to dedicated fax number below belonging to the campus where the patient is receiving treatment   List of dedicated fax numbers for the Facilities:  [address] [place] [address] DENIALS (Administrative/Medical Necessity) [phone]   [address] (Maternity/NICU/Pediatrics) [phone] [address] [place] [phone]     [address] ([address]) [address] [place] [address] Yasmin Abarca [address] [phone]   [address]51 [phone]

## 2021-02-27 NOTE — PHYSICIAN ADVISOR
Current patient class: Observation  The patient is currently on Hospital Day: 2 at [address] [place] [address]        The patient was admitted to the hospital  on N/A at N/A for the following diagnosis:  Diabetes mellitus ([address] ) [E11 9]  Bradycardia [R00 1]  Hypoglycemia [E16 2]  Unresponsive episode [R41 89]     After review of the relevant documentation, labs, vital signs and test results, the patient is most appropriate for OBSERVATION STATUS  Rationale is as follows: The patient is a 82 yrs   Female who presented to the ED at 2/26/2021  3:15 AM with a chief complaint of Hypoglycemia - Symptomatic (Pt found unresponsive at [facility]  NH tried pouring sugar packets in mouth after checking BS in the 40s  Pt given 10 dextrose EMS  EMS states improvement in mental status )     The patient was admitted with sinus bradycardia and hypoglycemia  The initial plan of care included telemetry monitoring, cardiology and endocrine consultations, accu-checks and glucose monitoring  The patient was seen by Endocrine and insulin dose decreased  The patient is stable and not able to be discharged until Monday  This patient is appropriate for OBSERVATION       The patients vitals on arrival were   ED Triage Vitals   Temperature Pulse Respirations Blood Pressure SpO2   02/26/21 0319 02/26/21 0319 02/26/21 0319 02/26/21 0319 02/26/21 0319   98 4 °F (36 9 °C) (!) 54 20 143/69 96 %      Temp Source Heart Rate Source Patient Position - Orthostatic VS BP Location FiO2 (%)   02/26/21 0319 02/26/21 0319 02/26/21 0319 02/26/21 0319 --   Oral Monitor Sitting Right arm       Pain Score       02/26/21 0659       No Pain           Past Medical History:   Diagnosis Date    Adenoma of large intestine     Allergy     last assessed: 10/18/2013    Cataract     Constipation, chronic     Diabetes mellitus ([facility] [address] )     anemia vitamin d deficiency glaucoma hypertension hypothyroidism; last assessed: 5/7/2012    Disease of thyroid gland  Diverticulosis     Fecal incontinence     Female stress incontinence     Glaucoma     Gout     History of cystocele     Intrinsic sphincter deficiency     Mixed incontinence     Nocturia     Osteoarthritis, chronic     Renal disorder     Senile atrophic vaginitis     Urinary frequency      Past Surgical History:   Procedure Laterality Date    ANKLE SURGERY Bilateral     CATARACT EXTRACTION      CHOLECYSTECTOMY      TUBAL LIGATION             Consults have been placed to:   IP CONSULT TO CARDIOLOGY  IP CONSULT TO CASE MANAGEMENT  IP CONSULT TO ENDOCRINOLOGY    Vitals:    02/26/21 2339 02/27/21 0600 02/27/21 0745 02/27/21 1500   BP: 135/64  120/60 151/68   BP Location: Left arm  Left arm Left arm   Pulse: 65  69 68   Resp: 18  18 18   Temp: 98 1 °F (36 7 °C)  97 5 °F (36 4 °C) (!) 97 1 °F (36 2 °C)   TempSrc: Temporal  Temporal Temporal   SpO2: 97%  93% 94%   Weight:  95 1 kg (209 lb 10 5 oz)     Height:           Most recent labs:    Recent Labs     02/26/21  0401 02/26/21  0418 02/27/21  0512   WBC 13 26*  --  10 11   HGB 9 2*  --  9 5*   HCT 30 4*  --  31 6*     --  304   K 3 7  --  4 5   CALCIUM 8 3  --  8 4   BUN 39*  --  39*   CREATININE 2 28*  --  2 06*   TROPONINI  --  <0 02  --    AST 11  --   --    ALT 14  --   --    ALKPHOS 101  --   --        Scheduled Meds:  Current Facility-Administered Medications   Medication Dose Route Frequency Provider Last Rate    acetaminophen  650 mg Oral Q6H PRN Murtis Franco, CRNP      albuterol  2 puff Inhalation Q6H PRN Murtis Franco, CRNP      atorvastatin  10 mg Oral Daily With [employer], CRNP      budesonide-formoterol  2 puff Inhalation BID Murtis Franco, CRNP      dextrose  25 mL Intravenous Once Jett Mayor, CRNP      dorzolamide-timolol  1 drop Both Eyes BID Murtis Franco, CRNP      furosemide  80 mg Oral Daily Murtis Franco, CRNP      heparin (porcine)  5,000 Units Subcutaneous [facility] Jackson Schmidt MD      insulin glargine  35 Units Subcutaneous HS Jackson Schmidt MD      insulin lispro  1-5 Units Subcutaneous TID AC Landy Schmidt MD      insulin lispro  1-5 Units Subcutaneous HS Shilpa Raman MD      levothyroxine  75 mcg Oral Early Morning Shirlie Tiki, CRNP      metoclopramide  10 mg Intravenous Q6H PRN Shirlie Tiki, CRNP      montelukast  10 mg Oral HS Shirlie Tiki, CRNP      pantoprazole  40 mg Oral Daily Before Breakfast Shirlie Tiki, CRNP      potassium chloride  20 mEq Oral Daily Shirlie Tiki, CRNP      tiotropium  18 mcg Inhalation Daily Shirlie Tiki, CRNP       Continuous Infusions:   PRN Meds:   acetaminophen    albuterol    metoclopramide    Surgical procedures (if appropriate):

## 2021-02-28 LAB
GLUCOSE SERPL-MCNC: 102 MG/DL (ref 65–140)
GLUCOSE SERPL-MCNC: 124 MG/DL (ref 65–140)
GLUCOSE SERPL-MCNC: 182 MG/DL (ref 65–140)
GLUCOSE SERPL-MCNC: 227 MG/DL (ref 65–140)
GLUCOSE SERPL-MCNC: 61 MG/DL (ref 65–140)

## 2021-02-28 PROCEDURE — 82948 REAGENT STRIP/BLOOD GLUCOSE: CPT

## 2021-02-28 PROCEDURE — 99225 PR SBSQ OBSERVATION CARE/DAY 25 MINUTES: CPT | Performed by: INTERNAL MEDICINE

## 2021-02-28 RX ORDER — INSULIN GLARGINE 100 [IU]/ML
32 INJECTION, SOLUTION SUBCUTANEOUS
Status: DISCONTINUED | OUTPATIENT
Start: 2021-02-28 | End: 2021-03-01 | Stop reason: HOSPADM

## 2021-02-28 RX ADMIN — TIOTROPIUM BROMIDE 18 MCG: 18 CAPSULE ORAL; RESPIRATORY (INHALATION) at 08:47

## 2021-02-28 RX ADMIN — INSULIN LISPRO 2 UNITS: 100 INJECTION, SOLUTION INTRAVENOUS; SUBCUTANEOUS at 21:28

## 2021-02-28 RX ADMIN — POTASSIUM CHLORIDE 20 MEQ: 1500 TABLET, EXTENDED RELEASE ORAL at 08:45

## 2021-02-28 RX ADMIN — PANTOPRAZOLE SODIUM 40 MG: 40 TABLET, DELAYED RELEASE ORAL at 05:59

## 2021-02-28 RX ADMIN — BUDESONIDE AND FORMOTEROL FUMARATE DIHYDRATE 2 PUFF: 160; 4.5 AEROSOL RESPIRATORY (INHALATION) at 08:45

## 2021-02-28 RX ADMIN — HEPARIN SODIUM 5000 UNITS: 5000 INJECTION INTRAVENOUS; SUBCUTANEOUS at 21:27

## 2021-02-28 RX ADMIN — HEPARIN SODIUM 5000 UNITS: 5000 INJECTION INTRAVENOUS; SUBCUTANEOUS at 14:13

## 2021-02-28 RX ADMIN — LEVOTHYROXINE SODIUM 75 MCG: 75 TABLET ORAL at 05:59

## 2021-02-28 RX ADMIN — HEPARIN SODIUM 5000 UNITS: 5000 INJECTION INTRAVENOUS; SUBCUTANEOUS at 05:59

## 2021-02-28 RX ADMIN — MONTELUKAST 10 MG: 10 TABLET, FILM COATED ORAL at 21:24

## 2021-02-28 RX ADMIN — BUDESONIDE AND FORMOTEROL FUMARATE DIHYDRATE 2 PUFF: 160; 4.5 AEROSOL RESPIRATORY (INHALATION) at 17:33

## 2021-02-28 RX ADMIN — ATORVASTATIN CALCIUM 10 MG: 10 TABLET, FILM COATED ORAL at 17:32

## 2021-02-28 RX ADMIN — DORZOLAMIDE HYDROCHLORIDE AND TIMOLOL MALEATE 1 DROP: 20; 5 SOLUTION/ DROPS OPHTHALMIC at 17:32

## 2021-02-28 RX ADMIN — INSULIN GLARGINE 32 UNITS: 100 INJECTION, SOLUTION SUBCUTANEOUS at 21:27

## 2021-02-28 RX ADMIN — FUROSEMIDE 80 MG: 80 TABLET ORAL at 08:45

## 2021-02-28 RX ADMIN — DORZOLAMIDE HYDROCHLORIDE AND TIMOLOL MALEATE 1 DROP: 20; 5 SOLUTION/ DROPS OPHTHALMIC at 08:45

## 2021-02-28 RX ADMIN — INSULIN LISPRO 1 UNITS: 100 INJECTION, SOLUTION INTRAVENOUS; SUBCUTANEOUS at 17:33

## 2021-02-28 NOTE — ASSESSMENT & PLAN NOTE
· Asymptomatic bradycardia   · Seen by Cardiology   · No evidence for high grade block   · Avoid all AV lulu blocking agents   · outpatient eval for ERICA  · No further inpatient cardiac workup

## 2021-02-28 NOTE — PROGRESS NOTES
Progress Note - Amilcar Murrell 1935, 82 y o  female MRN: [id number]  Unit/Bed#: E4 -01 Encounter: [id number]  Primary Care Provider: No primary care provider on file  Date and time admitted to hospital: 2/26/2021  3:15 AM    * Sinus bradycardia  Assessment & Plan  · Asymptomatic bradycardia   · Seen by Cardiology   · No evidence for high grade block   · Avoid all AV lulu blocking agents   · outpatient eval for ERICA  · No further inpatient cardiac workup     Discharge tomorrow back to [facility]     Hypoglycemia  Assessment & Plan  · Found unresponsive with BG 40 s/p IV dextrose  · She was on high basal/bolus regimen and tradjenta   · Stop tradjenta   · A1c 7 5%   · Seen by Endocrinology   · Recommended decreased lantus 35 units qHS and continuing Novolog 14/14/12   · She has not been on meal time insulin here   · Would hold Novolog for now and continue to hold for blood glucose less than 120   · AM glucose still low but improved on repeat   · Monitor Accuchecks      Anemia in stage 4 chronic kidney disease (HCC)  Assessment & Plan  · Baseline hgb per chart review 9-10   · hgb stable within baseline       Acquired hypothyroidism  Assessment & Plan  · Continue levothyroxine  tsh wnl    Chronic venous insufficiency  Assessment & Plan  · Chronic venous insufficiency with prior history of R medial malleolar venous ulcer    Last seen by vascular surgery Jun 2019  · Continue statin  · Not on ASA due to allergy    Chronic congestive heart failure (HCC)  Assessment & Plan  Wt Readings from Last 3 Encounters:   02/28/21 96 6 kg (212 lb 15 4 oz)   02/11/20 101 kg (223 lb 6 4 oz)   07/22/19 97 7 kg (215 lb 6 2 oz)     · LVEF 57%, vigorous systolic function, no RWMA  · Maintained on furosemide 80 mg daily, continue      Gastroesophageal reflux disease without esophagitis  Assessment & Plan  · Continue PPI    Stage 4 chronic kidney disease [facility])  Assessment & Plan  Lab Results   Component Value Date    EGFR 22 2021    EGFR 19 2021    EGFR 19 2019    CREATININE 2 06 (H) 2021    CREATININE 2 28 (H) 2021    CREATININE 2 34 (H) 2019     · Creatinine at baseline  · Avoid nephrotoxins  · Followed outpatient by Nephrology      VTE Pharmacologic Prophylaxis:   Pharmacologic: Heparin  Mechanical VTE Prophylaxis in Place: Yes    Patient Centered Rounds: I have performed bedside rounds with nursing staff today  Discussions with Specialists or Other Care Team Provider:     Education and Discussions with Family / Patient: patient at the bedside, spoke with her daughter Sadia Alegria on the phone and updated on plan of care    Time Spent for Care: 20 minutes  More than 50% of total time spent on counseling and coordination of care as described above  Current Length of Stay: 0 day(s)    Current Patient Status: Observation   Certification Statement: The patient will continue to require additional inpatient hospital stay due to [facility] will not accept on the weekend     Discharge Plan: back to [facility] tomorrow     Code Status: Level 3 - DNAR and DNI      Subjective:    Doing well  No acute complaints  Objective:     Vitals:   Temp (24hrs), Av 6 °F (36 4 °C), Min:97 1 °F (36 2 °C), Max:97 9 °F (36 6 °C)    Temp:  [97 1 °F (36 2 °C)-97 9 °F (36 6 °C)] 97 9 °F (36 6 °C)  HR:  [68-78] 78  Resp:  [18-19] 18  BP: (145-159)/(66-72) 145/66  SpO2:  [92 %-94 %] 93 %  Body mass index is 41 59 kg/m²  Input and Output Summary (last 24 hours):     No intake or output data in the 24 hours ending 21 0906    Physical Exam:     Physical Exam  Vitals signs and nursing note reviewed  Constitutional:       General: She is not in acute distress  Appearance: She is not ill-appearing  HENT:      Head: Normocephalic  Eyes:      Conjunctiva/sclera: Conjunctivae normal    Cardiovascular:      Rate and Rhythm: Normal rate and regular rhythm     Pulmonary:      Effort: Pulmonary effort is normal       Breath sounds: Normal breath sounds  Abdominal:      General: Bowel sounds are normal       Palpations: Abdomen is soft  Musculoskeletal:      Right lower leg: No edema  Left lower leg: No edema  Neurological:      Mental Status: She is alert and oriented to person, place, and time  Mental status is at baseline  Psychiatric:         Mood and Affect: Mood normal            Additional Data:     Labs:    Results from last 7 days   Lab Units 02/27/21  0512 02/26/21  0401   WBC Thousand/uL 10 11 13 26*   HEMOGLOBIN g/dL 9 5* 9 2*   HEMATOCRIT % 31 6* 30 4*   PLATELETS Thousands/uL 304 297   NEUTROS PCT %  --  80*   LYMPHS PCT %  --  7*   MONOS PCT %  --  9   EOS PCT %  --  2     Results from last 7 days   Lab Units 02/27/21  0512 02/26/21  0401   SODIUM mmol/L 140 140   POTASSIUM mmol/L 4 5 3 7   CHLORIDE mmol/L 103 103   CO2 mmol/L 31 33*   BUN mg/dL 39* 39*   CREATININE mg/dL 2 06* 2 28*   ANION GAP mmol/L 6 4   CALCIUM mg/dL 8 4 8 3   ALBUMIN g/dL  --  2 8*   TOTAL BILIRUBIN mg/dL  --  0 23   ALK PHOS U/L  --  101   ALT U/L  --  14   AST U/L  --  11   GLUCOSE RANDOM mg/dL 126 77         Results from last 7 days   Lab Units 02/28/21  0832 02/28/21  0804 02/27/21  2105 02/27/21  1627 02/27/21  1132 02/27/21  0747 02/26/21  2042 02/26/21  1530 02/26/21  1416 02/26/21  1114 02/26/21  0821 02/26/21  0541   POC GLUCOSE mg/dl 102 61* 186* 155* 173* 119 220* 306* 233* 302* 152* 103     Results from last 7 days   Lab Units 02/27/21  0512 02/23/21  0652   HEMOGLOBIN A1C % 7 5* 7 8*               * I Have Reviewed All Lab Data Listed Above    * Additional Pertinent Lab Tests Reviewed: No New Labs Available For Today    Imaging:    Imaging Reports Reviewed Today Include:   Imaging Personally Reviewed by Myself Includes:      Recent Cultures (last 7 days):           Last 24 Hours Medication List:   Current Facility-Administered Medications   Medication Dose Route Frequency Provider Last Rate    acetaminophen  650 mg Oral Q6H PRN HEDY Arciniega      albuterol  2 puff Inhalation Q6H PRN HEDY Arciniega      atorvastatin  10 mg Oral Daily With [address] [facility], HEDY      budesonide-formoterol  2 puff Inhalation BID HEDY Arciniega      dextrose  25 mL Intravenous Once HEDY Day      dorzolamide-timolol  1 drop Both Eyes BID HEDY Arciniega      furosemide  80 mg Oral Daily HEDY Arciniega      heparin (porcine)  5,000 Units Subcutaneous Q8H [address] Kelle Schmidt MD      insulin glargine  35 Units Subcutaneous HS Kelle Schmidt MD      insulin lispro  1-5 Units Subcutaneous TID AC Landy Schmidt MD      insulin lispro  1-5 Units Subcutaneous HS Jesus Salinas MD      levothyroxine  75 mcg Oral Early Morning HEDY Arciniega      metoclopramide  10 mg Intravenous Q6H PRN HEDY Arciniega      montelukast  10 mg Oral HS HEDY Arciniega      pantoprazole  40 mg Oral Daily Before Breakfast HEDY Arciniega      potassium chloride  20 mEq Oral Daily HEDY Arciniega      tiotropium  18 mcg Inhalation Daily HEDY Arciniega          Today, Patient Was Seen By: Inez Murcia PA-C    ** Please Note: Dictation voice to text software may have been used in the creation of this document   **

## 2021-02-28 NOTE — ASSESSMENT & PLAN NOTE
Wt Readings from Last 3 Encounters:   02/28/21 96 6 kg (212 lb 15 4 oz)   02/11/20 101 kg (223 lb 6 4 oz)   07/22/19 97 7 kg (215 lb 6 2 oz)     · LVEF 77%, vigorous systolic function, no RWMA  · Maintained on furosemide 80 mg daily, continue

## 2021-02-28 NOTE — UTILIZATION REVIEW
Continued Stay Review    Date: 2/28/21                          Current Patient Class: observation  Current Level of Care: med surg    HPI:85 y o  female initially admitted on 2/26/21 under obs  Was to be DC on 2/27 but facility would not accept pt back until Monday  Assessment/Plan: 2/28: doing well, has no complaints  plan to discharge back to facility tomorrow  COVID and flu tests ordered for tomorrow      Pertinent Labs/Diagnostic Results:       Results from last 7 days   Lab Units 02/27/21  0512 02/26/21  0401   WBC Thousand/uL 10 11 13 26*   HEMOGLOBIN g/dL 9 5* 9 2*   HEMATOCRIT % 31 6* 30 4*   PLATELETS Thousands/uL 304 297   NEUTROS ABS Thousands/µL  --  10 84*         Results from last 7 days   Lab Units 02/27/21  0512 02/26/21  0401   SODIUM mmol/L 140 140   POTASSIUM mmol/L 4 5 3 7   CHLORIDE mmol/L 103 103   CO2 mmol/L 31 33*   ANION GAP mmol/L 6 4   BUN mg/dL 39* 39*   CREATININE mg/dL 2 06* 2 28*   EGFR ml/min/1 73sq m 22 19   CALCIUM mg/dL 8 4 8 3     Results from last 7 days   Lab Units 02/26/21  0401   AST U/L 11   ALT U/L 14   ALK PHOS U/L 101   TOTAL PROTEIN g/dL 7 0   ALBUMIN g/dL 2 8*   TOTAL BILIRUBIN mg/dL 0 23     Results from last 7 days   Lab Units 02/28/21  0832 02/28/21  0804 02/27/21  2105 02/27/21  1627 02/27/21  1132 02/27/21  0747 02/26/21  2042 02/26/21  1530 02/26/21  1416 02/26/21  1114 02/26/21  0821 02/26/21  0541   POC GLUCOSE mg/dl 102 61* 186* 155* 173* 119 220* 306* 233* 302* 152* 103     Results from last 7 days   Lab Units 02/27/21  0512 02/26/21  0401   GLUCOSE RANDOM mg/dL 126 77         Results from last 7 days   Lab Units 02/27/21  0512 02/23/21  0652   HEMOGLOBIN A1C % 7 5* 7 8*   EAG mg/dl 169 177*     No results found for: BETA-HYDROXYBUTYRATE                   Results from last 7 days   Lab Units 02/26/21  0418   TROPONIN I ng/mL <0 02             Results from last 7 days   Lab Units 02/26/21  0401   TSH 3RD GENERATON uIU/mL 2 204 Vital Signs:   MAP (mmHg)  SpO2  O2 Device  Patient Position - Orthostatic VS             02/28/21 0700  97 9 °F (36 6 °C)  78  18  145/66  --  93 %  None (Room air)  Lying   02/27/21 2341  97 8 °F (36 6 °C)  74  19  159/72  --  92 %  None (Room air)  Lying   02/27/21 1945  --  --  --  --  --  --  None (Room air)  --   02/27/21 1500  97 1 °F (36 2 °C)Abnormal   68  18  151/68  98  94 %  None (Room air)  Lying         Medications:   Scheduled Medications:  atorvastatin, 10 mg, Oral, Daily With Dinner  budesonide-formoterol, 2 puff, Inhalation, BID  dextrose, 25 mL, Intravenous, Once  dorzolamide-timolol, 1 drop, Both Eyes, BID  furosemide, 80 mg, Oral, Daily  heparin (porcine), 5,000 Units, Subcutaneous, Q8H HILDA  insulin glargine, 32 Units, Subcutaneous, HS  insulin lispro, 1-5 Units, Subcutaneous, TID AC  insulin lispro, 1-5 Units, Subcutaneous, HS  levothyroxine, 75 mcg, Oral, Early Morning  montelukast, 10 mg, Oral, HS  pantoprazole, 40 mg, Oral, Daily Before Breakfast  potassium chloride, 20 mEq, Oral, Daily  tiotropium, 18 mcg, Inhalation, Daily      Continuous IV Infusions:     PRN Meds:  acetaminophen, 650 mg, Oral, Q6H PRN  albuterol, 2 puff, Inhalation, Q6H PRN  metoclopramide, 10 mg, Intravenous, Q6H PRN        Discharge Plan: D    Network Utilization Review Department  ATTENTION: Please call with any questions or concerns to [phone] and carefully listen to the prompts so that you are directed to the right person  All voicemails are confidential   Kerry Madrid all requests for admission clinical reviews, approved or denied determinations and any other requests to dedicated fax number below belonging to the campus where the patient is receiving treatment   List of dedicated fax numbers for the Facilities:  FACILITY NAME UR FAX NUMBER   ADMISSION DENIALS (Administrative/Medical Necessity) [phone]   [address] (Maternity/NICU/Pediatrics) [facility],[address] Leslie Mckeon [address] ([address]) [address] [address] [place] [address] [address] [place] [address] [phone]

## 2021-02-28 NOTE — ASSESSMENT & PLAN NOTE
· Found unresponsive with BG 40 s/p IV dextrose    · She was on high basal/bolus regimen and tradjenta   · Stop tradjenta   · A1c 7 5%   · Seen by Endocrinology   · Recommended decreased lantus 35 units qHS and continuing Novolog 14/14/12   · She has not been on meal time insulin here   · continue to hold Novolog for blood glucose less than 120   · Monitor Accuchecks at [facility]   · Outpatient follow up with Endocrinology

## 2021-02-28 NOTE — ASSESSMENT & PLAN NOTE
Wt Readings from Last 3 Encounters:   02/28/21 96 6 kg (212 lb 15 4 oz)   02/11/20 101 kg (223 lb 6 4 oz)   07/22/19 97 7 kg (215 lb 6 2 oz)     · LVEF 26%, vigorous systolic function, no RWMA  · Maintained on furosemide 80 mg daily, continue

## 2021-02-28 NOTE — ASSESSMENT & PLAN NOTE
Lab Results   Component Value Date    EGFR 22 02/27/2021    EGFR 19 02/26/2021    EGFR 19 07/21/2019    CREATININE 2 06 (H) 02/27/2021    CREATININE 2 28 (H) 02/26/2021    CREATININE 2 34 (H) 07/21/2019     · Creatinine at baseline  · Avoid nephrotoxins  · Followed outpatient by Nephrology

## 2021-02-28 NOTE — PLAN OF CARE
Problem: Potential for Falls  Goal: Patient will remain free of falls  Description: INTERVENTIONS:  - Assess patient frequently for physical needs  -  Identify cognitive and physical deficits and behaviors that affect risk of falls    -  [place] fall precautions as indicated by assessment   - Educate patient/family on patient safety including physical limitations  - Instruct patient to call for assistance with activity based on assessment  - Modify environment to reduce risk of injury  - Consider OT/PT consult to assist with strengthening/mobility  Outcome: Progressing

## 2021-02-28 NOTE — ASSESSMENT & PLAN NOTE
· Found unresponsive with BG 40 s/p IV dextrose    · She was on high basal/bolus regimen and tradjenta   · Stop tradjenta   · A1c 7 5%   · Seen by Endocrinology   · Recommended decreased lantus 35 units qHS and continuing Novolog 14/14/12   · She has not been on meal time insulin here   · Would hold Novolog for now and continue to hold for blood glucose less than 120   · AM glucose still low but improved on repeat   · Monitor Accuchecks

## 2021-03-01 ENCOUNTER — TELEPHONE (OUTPATIENT)
Dept: NEPHROLOGY | Facility: CLINIC | Age: 86
End: 2021-03-01

## 2021-03-01 VITALS
HEIGHT: 60 IN | BODY MASS INDEX: 40.25 KG/M2 | TEMPERATURE: 98 F | HEART RATE: 80 BPM | DIASTOLIC BLOOD PRESSURE: 69 MMHG | SYSTOLIC BLOOD PRESSURE: 120 MMHG | RESPIRATION RATE: 18 BRPM | WEIGHT: 205.03 LBS | OXYGEN SATURATION: 94 %

## 2021-03-01 PROBLEM — E16.2 HYPOGLYCEMIA: Status: RESOLVED | Noted: 2021-02-26 | Resolved: 2021-03-01

## 2021-03-01 LAB
FLUAV RNA RESP QL NAA+PROBE: NEGATIVE
FLUBV RNA RESP QL NAA+PROBE: NEGATIVE
GLUCOSE SERPL-MCNC: 108 MG/DL (ref 65–140)
GLUCOSE SERPL-MCNC: 190 MG/DL (ref 65–140)
RSV RNA RESP QL NAA+PROBE: NEGATIVE
SARS-COV-2 RNA RESP QL NAA+PROBE: NEGATIVE

## 2021-03-01 PROCEDURE — 97163 PT EVAL HIGH COMPLEX 45 MIN: CPT

## 2021-03-01 PROCEDURE — 82948 REAGENT STRIP/BLOOD GLUCOSE: CPT

## 2021-03-01 PROCEDURE — 99217 PR OBSERVATION CARE DISCHARGE MANAGEMENT: CPT | Performed by: INTERNAL MEDICINE

## 2021-03-01 PROCEDURE — 0241U HB NFCT DS VIR RESP RNA 4 TRGT: CPT | Performed by: PHYSICIAN ASSISTANT

## 2021-03-01 RX ORDER — INSULIN GLARGINE 100 [IU]/ML
35 INJECTION, SOLUTION SUBCUTANEOUS
Qty: 1 ML | Refills: 0 | Status: SHIPPED | OUTPATIENT
Start: 2021-03-01 | End: 2021-08-23 | Stop reason: SDUPTHER

## 2021-03-01 RX ADMIN — TIOTROPIUM BROMIDE 18 MCG: 18 CAPSULE ORAL; RESPIRATORY (INHALATION) at 08:38

## 2021-03-01 RX ADMIN — DORZOLAMIDE HYDROCHLORIDE AND TIMOLOL MALEATE 1 DROP: 20; 5 SOLUTION/ DROPS OPHTHALMIC at 08:39

## 2021-03-01 RX ADMIN — FUROSEMIDE 80 MG: 80 TABLET ORAL at 08:38

## 2021-03-01 RX ADMIN — LEVOTHYROXINE SODIUM 75 MCG: 75 TABLET ORAL at 05:19

## 2021-03-01 RX ADMIN — POTASSIUM CHLORIDE 20 MEQ: 1500 TABLET, EXTENDED RELEASE ORAL at 08:38

## 2021-03-01 RX ADMIN — BUDESONIDE AND FORMOTEROL FUMARATE DIHYDRATE 2 PUFF: 160; 4.5 AEROSOL RESPIRATORY (INHALATION) at 08:38

## 2021-03-01 RX ADMIN — INSULIN LISPRO 1 UNITS: 100 INJECTION, SOLUTION INTRAVENOUS; SUBCUTANEOUS at 12:56

## 2021-03-01 RX ADMIN — PANTOPRAZOLE SODIUM 40 MG: 40 TABLET, DELAYED RELEASE ORAL at 05:19

## 2021-03-01 RX ADMIN — HEPARIN SODIUM 5000 UNITS: 5000 INJECTION INTRAVENOUS; SUBCUTANEOUS at 05:21

## 2021-03-01 NOTE — PHYSICAL THERAPY NOTE
PT EVALUATION    Pt  Name: Colten Brito  Pt  Age: 82 y o    MRN: [id number]  LENGTH OF STAY: 0    Patient Active Problem List   Diagnosis    PAD (peripheral artery disease) (HCC)    [facility] mellitus [facility] proliferative retinopathy [facility]    Acquired hypothyroidism    Familial combined hyperlipidemia    Anxiety    Morbid obesity with BMI of 40 0-44 9, adult (HCC)    Anemia in stage 4 chronic kidney disease (HCC)    Chronic venous insufficiency    Diabetes mellitus (HCC)    Leukocytosis    Hypertension    Chronic cough    Chronic congestive heart failure (HCC)    Acute bronchitis    Community acquired pneumonia of left lower lobe of lung    Gastroesophageal reflux disease without esophagitis    Stage 4 chronic kidney disease (HCC)    Urinary incontinence    Localized edema    Nephrosclerosis arteriolar, 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 disease    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, left (HCC)    Sinus bradycardia       Admitting Diagnoses:   Diabetes mellitus ([facility] ) [E11 9]  Bradycardia [R00 1]  Hypoglycemia [E16 2]  Unresponsive episode [R41 89]    Past Medical History:   Diagnosis Date    Adenoma of large intestine     Allergy     last assessed: 10/18/2013    Cataract     Constipation, chronic     Diabetes mellitus ([facility] )     anemia vitamin d deficiency glaucoma hypertension hypothyroidism; last assessed: 5/7/2012    Disease of thyroid gland     Diverticulosis     Fecal incontinence     Female stress incontinence     Glaucoma     Gout     History of cystocele     Intrinsic sphincter deficiency     Mixed incontinence     Nocturia     Osteoarthritis, chronic     Renal disorder     Senile atrophic vaginitis     Urinary frequency        Past Surgical History:   Procedure Laterality Date    ANKLE SURGERY Bilateral     CATARACT EXTRACTION      CHOLECYSTECTOMY      TUBAL LIGATION         Imaging Studies:  No orders to display        03/01/21 1107   PT Last Visit   PT Visit Date 03/01/21   Note Type   Note type Evaluation   Pain Assessment   Pain Assessment Tool Pain Assessment not indicated - pt denies pain   Pain Score No Pain   Home Living   Type of Home Assisted living  ([facility])   Home Layout One level;Performs ADLs on one level   Bathroom Shower/Tub Walk-in shower   Bathroom Toilet Standard   Bathroom Equipment Grab bars in shower;Built-in shower seat;Grab bars around toilet   [address] Other (Comment)  (Rollator)   Prior Function   Level of [place] Needs assistance with ADLs and functional mobility   Lives With Alone; Facility staff   Receives Help From Personal care attendant   ADL Assistance Needs assistance   Falls in the last 6 months 1 to 4  (1x in Dec 2020)   Vocational Retired   Comments (-)    Restrictions/Precautions   Wells Radha Bearing Precautions Per Order No   Other Precautions Fall Risk;Visual impairment   General   Additional Pertinent History 35% vision in L eye; 0% in R eye   Family/Caregiver Present No   Cognition   Overall Cognitive Status Impaired   Arousal/Participation Alert   Orientation Level Oriented X4   Following Commands Follows one step commands without difficulty   Comments Cooperative and pleasant   RUE Assessment   RUE Assessment WFL  (5/5 grossly )   LUE Assessment   LUE Assessment WFL  (5/5 grossly )   RLE Assessment   RLE Assessment WFL  (4/5 grossly )   LLE Assessment   LLE Assessment WFL  (4/5 grossly )   Coordination   Sensation WFL   Bed Mobility   Supine to Sit Unable to assess   Sit to Supine Unable to assess   Additional Comments Pt OOB in chair pre and post session   Transfers   Sit to Stand 5  Supervision   Additional items Armrests; Increased time required;Verbal cues   Stand to Sit 5  Supervision   Additional items Armrests; Increased time required;Verbal cues   Toilet transfer 5  Supervision   Additional items Increased time required;Armrests;Standard toilet   Additional Comments cues for technique and safety   Ambulation/Elevation   Gait pattern Decreased foot clearance; Short stride; Excessively slow   Gait Assistance 4  Minimal assist   Additional items Assist x 1;Verbal cues   Assistive Device Rolling walker   Distance 55'x1; 20'x1  (1st trial amb in room to bathroom; 2nd bathroom to chair)   Balance   Static Sitting Normal   Dynamic Sitting Good   Static Standing Fair +  (w/ RW)   Dynamic Standing Fair -  (w/ RW)   Ambulatory Fair -  (w/ RW)   Endurance Deficit   Endurance Deficit Yes   Endurance Deficit Description fatigue    Activity Tolerance   Activity Tolerance Patient limited by fatigue   Nurse Made Aware SOY Joseph   Assessment   Prognosis Good   Problem List Decreased strength;Decreased endurance; Impaired balance;Decreased mobility; Impaired vision;Obesity; Impaired judgement   Assessment Pt  85 y  o female presents with hypoglycemia  Past medical hx includes T2DM, CKD, COVID  Pt admitted for Sinus bradycardia w/ Diabetes mellitus, and Hypoglycemia  Pt referred to PT for functional mobility evaluation & D/C planning w/ orders of up w/ assistance  PTA, pt reports being independent w/ Rollator at [facility]  Pt reports visual impairment and only has 35% vision in her L eye and 0% in her R eye  During evaluation, deficits included dec mobility, balance, ambulation  Pt demonstrated dec endurance and tolerance to activity  Evaluation of functional mobility required S for transfers; minAx1 for amb  Pt was able to ambulate 55' around the room and to the bathroom w/ RW  Pt amb a second trial 20' w/ RW from the bathroom to a chair  Gait deviations as above, slow but no gross LOB noted  Denies reports of dizziness or SOB t/o session   Nsg staff most recent vital signs as follows: /69 (BP Location: Left arm)   Pulse 80   Temp 98 °F (36 7 °C) (Temporal)   Resp 18   Ht 5' (1 524 m)   Wt 93 kg (205 lb 0 4 oz)   SpO2 94%   BMI 40 04 kg/m²   At end of session, pt OOB in chair in stable condition, call bell & phone in reach  Pt was educated on fall precautions and reinforced w/ good understanding  Pt would benefit from continued PT to address deficits as defined above and maximize level of independence with functional mobility and safety  The patient's AM-PAC Basic Mobility Inpatient Short Form Raw Score is 18, Standardized Score is 41 05  A standardized score less than 42 9 suggests the patient may benefit from discharge to post-acute rehabilitation services  Please also refer to the recommendation of the Physical Therapist for safe discharge planning  Despite AM-PAC score, from PT/mobility standpoint recommendation for D/C to [facility] w/ HHPT and inc social support, when medically cleared  CM to follow  Nsg staff to continue to mobilized pt (OOB in chair for all meals & ambulate in room/unit) as tolerated to prevent further decline in function  Nsg notified  Goals   Patient Goals to go back home    STG Expiration Date 03/11/21   Short Term Goal #1 1) Inc overall LE strength by 1/2 MMT grade to improve functional mobility; 2) Pt will perform bed mobility evaluation; 3) Pt will demonstrate improved transfers w/ mod I for inc safety; 4) Pt will be able to amb w/ S >150' w/ RW for household distances to inc safety and dec caregiver burden; 5) Improve general balance by 1 grade to inc safety; 6) PT for ongoing patient and caregiver education    PT Treatment Day 0   Plan   Treatment/Interventions Functional transfer training;LE strengthening/ROM; Therapeutic exercise; Endurance training;Bed mobility;Gait training;Patient/family training;Spoke to nursing   PT Frequency Other (Comment)  (3-5x/wk)   Recommendation   PT Discharge Recommendation Home with skilled therapy; Return to previous environment with social support  ([facility] w/ HHPT and inc social support)   Equipment Recommended Walker  (Pt owns Rollator)   Yasmin Jerome 007 Turning in Bed Without Bedrails 3   Lying on Back to Sitting on Edge of Flat Bed 3   Moving Bed to Chair 3   Standing Up From Chair 3   Walk in Room 3   Climb 3-5 Stairs 3   Basic Mobility Inpatient Raw Score 18   Basic Mobility Standardized Score 41 05   Hx/personal factors: co-morbidities, advanced age, use of AD, dec cognition, h/o of falls, fall risk, assist w/ ADL's and obesity  Examination: dec mobility, dec balance, dec endurance, dec amb, risk for falls, dec cognition  Clinical: unpredictable (ongoing medical status, abnormal lab values and risk for falls)  Complexity: high     [place]

## 2021-03-01 NOTE — PLAN OF CARE
Problem: Potential for Falls  Goal: Patient will remain free of falls  Description: INTERVENTIONS:  - Assess patient frequently for physical needs  -  Identify cognitive and physical deficits and behaviors that affect risk of falls    -  [place] fall precautions as indicated by assessment   - Educate patient/family on patient safety including physical limitations  - Instruct patient to call for assistance with activity based on assessment  - Modify environment to reduce risk of injury  - Consider OT/PT consult to assist with strengthening/mobility  Outcome: Progressing

## 2021-03-01 NOTE — PLAN OF CARE
Problem: Potential for Falls  Goal: Patient will remain free of falls  Description: INTERVENTIONS:  - Assess patient frequently for physical needs  -  Identify cognitive and physical deficits and behaviors that affect risk of falls    -  [place] fall precautions as indicated by assessment   - Educate patient/family on patient safety including physical limitations  - Instruct patient to call for assistance with activity based on assessment  - Modify environment to reduce risk of injury  - Consider OT/PT consult to assist with strengthening/mobility  Outcome: Adequate for Discharge     Problem: Prexisting or High Potential for Compromised Skin Integrity  Goal: Skin integrity is maintained or improved  Description: INTERVENTIONS:  - Identify patients at risk for skin breakdown  - Assess and monitor skin integrity  - Assess and monitor nutrition and hydration status  - Monitor labs   - Assess for incontinence   - Turn and reposition patient  - Assist with mobility/ambulation  - Relieve pressure over bony prominences  - Avoid friction and shearing  - Provide appropriate hygiene as needed including keeping skin clean and dry  - Evaluate need for skin moisturizer/barrier cream  - Collaborate with interdisciplinary team   - Patient/family teaching  - Consider wound care consult   Outcome: Adequate for Discharge     Problem: CARDIOVASCULAR - ADULT  Goal: Maintains optimal cardiac output and hemodynamic stability  Description: INTERVENTIONS:  - Monitor I/O, vital signs and rhythm  - Monitor for S/S and trends of decreased cardiac output  - Administer and titrate ordered vasoactive medications to optimize hemodynamic stability  - Assess quality of pulses, skin color and temperature  - Assess for signs of decreased coronary artery perfusion  - Instruct patient to report change in severity of symptoms  Outcome: Adequate for Discharge  Goal: Absence of cardiac dysrhythmias or at baseline rhythm  Description: INTERVENTIONS:  - Continuous cardiac monitoring, vital signs, obtain 12 lead EKG if ordered  - Administer antiarrhythmic and heart rate control medications as ordered  - Monitor electrolytes and administer replacement therapy as ordered  Outcome: Adequate for Discharge     Problem: METABOLIC, FLUID AND ELECTROLYTES - ADULT  Goal: Electrolytes maintained within normal limits  Description: INTERVENTIONS:  - Monitor labs and assess patient for signs and symptoms of electrolyte imbalances  - Administer electrolyte replacement as ordered  - Monitor response to electrolyte replacements, including repeat lab results as appropriate  - Instruct patient on fluid and nutrition as appropriate  Outcome: Adequate for Discharge  Goal: Fluid balance maintained  Description: INTERVENTIONS:  - Monitor labs   - Monitor I/O and WT  - Instruct patient on fluid and nutrition as appropriate  - Assess for signs & symptoms of volume excess or deficit  Outcome: Adequate for Discharge  Goal: Glucose maintained within target range  Description: INTERVENTIONS:  - Monitor Blood Glucose as ordered  - Assess for signs and symptoms of hyperglycemia and hypoglycemia  - Administer ordered medications to maintain glucose within target range  - Assess nutritional intake and initiate nutrition service referral as needed  Outcome: Adequate for Discharge     Problem: PAIN - ADULT  Goal: Verbalizes/displays adequate comfort level or baseline comfort level  Description: Interventions:  - Encourage patient to monitor pain and request assistance  - Assess pain using appropriate pain scale  - Administer analgesics based on type and severity of pain and evaluate response  - Implement non-pharmacological measures as appropriate and evaluate response  - Consider cultural and social influences on pain and pain management  - Notify physician/advanced practitioner if interventions unsuccessful or patient reports new pain  Outcome: Adequate for Discharge     Problem: SAFETY ADULT  Goal: Patient will remain free of falls  Description: INTERVENTIONS:  - Assess patient frequently for physical needs  -  Identify cognitive and physical deficits and behaviors that affect risk of falls    -  [place] fall precautions as indicated by assessment   - Educate patient/family on patient safety including physical limitations  - Instruct patient to call for assistance with activity based on assessment  - Modify environment to reduce risk of injury  - Consider OT/PT consult to assist with strengthening/mobility  Outcome: Adequate for Discharge  Goal: Maintain or return to baseline ADL function  Description: INTERVENTIONS:  -  Assess patient's ability to carry out ADLs; assess patient's baseline for ADL function and identify physical deficits which impact ability to perform ADLs (bathing, care of mouth/teeth, toileting, grooming, dressing, etc )  - Assess/evaluate cause of self-care deficits   - Assess range of motion  - Assess patient's mobility; develop plan if impaired  - Assess patient's need for assistive devices and provide as appropriate  - Encourage maximum independence but intervene and supervise when necessary  - Involve family in performance of ADLs  - Assess for home care needs following discharge   - Consider OT consult to assist with ADL evaluation and planning for discharge  - Provide patient education as appropriate  Outcome: Adequate for Discharge  Goal: Maintain or return mobility status to optimal level  Description: INTERVENTIONS:  - Assess patient's baseline mobility status (ambulation, transfers, stairs, etc )    - Identify cognitive and physical deficits and behaviors that affect mobility  - Identify mobility aids required to assist with transfers and/or ambulation (gait belt, sit-to-stand, lift, walker, cane, etc )  - [place] fall precautions as indicated by assessment  - Record patient progress and toleration of activity level on Mobility SBAR; progress patient to next Phase/Stage  - Instruct patient to call for assistance with activity based on assessment  - Consider rehabilitation consult to assist with strengthening/weightbearing, etc   Outcome: Adequate for Discharge     Problem: DISCHARGE PLANNING  Goal: Discharge to home or other facility with appropriate resources  Description: INTERVENTIONS:  - Identify barriers to discharge w/patient and caregiver  - Arrange for needed discharge resources and transportation as appropriate  - Identify discharge learning needs (meds, wound care, etc )  - Arrange for interpretive services to assist at discharge as needed  - Refer to Case Management Department for coordinating discharge planning if the patient needs post-hospital services based on physician/advanced practitioner order or complex needs related to functional status, cognitive ability, or social support system  Outcome: Adequate for Discharge     Problem: Knowledge Deficit  Goal: Patient/family/caregiver demonstrates understanding of disease process, treatment plan, medications, and discharge instructions  Description: Complete learning assessment and assess knowledge base    Interventions:  - Provide teaching at level of understanding  - Provide teaching via preferred learning methods  Outcome: Adequate for Discharge

## 2021-03-01 NOTE — PLAN OF CARE
Problem: PHYSICAL THERAPY ADULT  Goal: Performs mobility at highest level of function for planned discharge setting  See evaluation for individualized goals  Description: Treatment/Interventions: Functional transfer training, LE strengthening/ROM, Therapeutic exercise, Endurance training, Bed mobility, Gait training, Patient/family training, Spoke to nursing  Equipment Recommended: Walker(Pt owns Rollator)       See flowsheet documentation for full assessment, interventions and recommendations  Note: Prognosis: Good  Problem List: Decreased strength, Decreased endurance, Impaired balance, Decreased mobility, Impaired vision, Obesity, Impaired judgement  Assessment: Pt  82 y  o female presents with hypoglycemia  Past medical hx includes T2DM, CKD, COVID  Pt admitted for Sinus bradycardia w/ Diabetes mellitus, and Hypoglycemia  Pt referred to PT for functional mobility evaluation & D/C planning w/ orders of up w/ assistance  PTA, pt reports being independent w/ [facility] at [facility]  Pt reports visual impairment and only has 35% vision in her L eye and 0% in her R eye  During evaluation, deficits included dec mobility, balance, ambulation  Pt demonstrated dec endurance and tolerance to activity  Evaluation of functional mobility required S for transfers; minAx1 for amb  Pt was able to ambulate 55' around the room and to the bathroom w/ RW  Pt amb a second trial 20' w/ RW from the bathroom to a chair  Gait deviations as above, slow but no gross LOB noted  Denies reports of dizziness or SOB t/o session  Nsg staff most recent vital signs as follows: /69 (BP Location: Left arm)   Pulse 80   Temp 98 °F (36 7 °C) (Temporal)   Resp 18   Ht 5' (1 524 m)   Wt 93 kg (205 lb 0 4 oz)   SpO2 94%   BMI 40 04 kg/m²   At end of session, pt OOB in chair in stable condition, call bell & phone in reach  Pt was educated on fall precautions and reinforced w/ good understanding   Pt would benefit from continued PT to address deficits as defined above and maximize level of independence with functional mobility and safety  The patient's AM-PAC Basic Mobility Inpatient Short Form Raw Score is 18, Standardized Score is 41 05  A standardized score less than 42 9 suggests the patient may benefit from discharge to post-acute rehabilitation services  Please also refer to the recommendation of the Physical Therapist for safe discharge planning  Despite AM-PAC score, from PT/mobility standpoint recommendation for D/C to senior care w/ HHPT and inc social support, when medically cleared  CM to follow  Nsg staff to continue to mobilized pt (OOB in chair for all meals & ambulate in room/unit) as tolerated to prevent further decline in function  Nsg notified  PT Discharge Recommendation: Home with skilled therapy, Return to previous environment with social support([facility] w/ HHPT and inc social support)          See flowsheet documentation for full assessment

## 2021-03-01 NOTE — DISCHARGE SUMMARY
Discharge- Chelsea Marlen 1935, 82 y o  female MRN: [id number]  Unit/Bed#: E4 -01 Encounter: [id number]  Primary Care Provider: No primary care provider on file  Date and time admitted to hospital: 2/26/2021  3:15 AM    * Sinus bradycardia  Assessment & Plan  · Asymptomatic bradycardia   · Seen by Cardiology   · No evidence for high grade block   · Avoid all AV lulu blocking agents   · outpatient eval for ERICA  · No further inpatient cardiac workup     Hypoglycemia  Assessment & Plan  · Found unresponsive with BG 40 s/p IV dextrose  · She was on high basal/bolus regimen and tradjenta   · Stop tradjenta   · A1c 7 5%   · Seen by Endocrinology   · Recommended decreased lantus 35 units qHS and continuing Novolog 14/14/12   · She has not been on meal time insulin here   · continue to hold Novolog for blood glucose less than 120   · Monitor Accuchecks at [facility]   · Outpatient follow up with Endocrinology       Anemia in stage 4 chronic kidney disease ([facility] [address] )  Assessment & Plan  · Baseline hgb per chart review 9-10   · hgb stable within baseline       Acquired hypothyroidism  Assessment & Plan  · Continue levothyroxine  tsh wnl    Chronic venous insufficiency  Assessment & Plan  · Chronic venous insufficiency with prior history of R medial malleolar venous ulcer    Last seen by vascular surgery Jun 2019  · Continue statin  · Not on ASA due to allergy    Chronic congestive heart failure (HCC)  Assessment & Plan  Wt Readings from Last 3 Encounters:   02/28/21 96 6 kg (212 lb 15 4 oz)   02/11/20 101 kg (223 lb 6 4 oz)   07/22/19 97 7 kg (215 lb 6 2 oz)     · LVEF 39%, vigorous systolic function, no RWMA  · Maintained on furosemide 80 mg daily, continue      Gastroesophageal reflux disease without esophagitis  Assessment & Plan  · Continue PPI    Stage 4 chronic kidney disease [facility])  Assessment & Plan  Lab Results   Component Value Date    EGFR 22 02/27/2021    EGFR 19 02/26/2021    EGFR 19 07/21/2019 CREATININE 2 06 (H) 02/27/2021    CREATININE 2 28 (H) 02/26/2021    CREATININE 2 34 (H) 07/21/2019     · Creatinine at baseline  · Avoid nephrotoxins  · Followed outpatient by Nephrology      Discharging Physician / Practitioner: Digna Haque PA-C  PCP: No primary care provider on file  Admission Date:   Admission Orders (From admission, onward)     Ordered        02/26/21 0553  Place in Observation  Once                   Discharge Date: 03/01/21    Resolved Problems  Date Reviewed: 3/1/2021    None          Consultations During Hospital Stay:  · Cardiology   · Endocrinology     Procedures Performed:   · none    Significant Findings / Test Results:   · none     Incidental Findings:   · none     Test Results Pending at Discharge (will require follow up):   · none     Outpatient Tests Requested:  · Endocrinology   · PCP within 1 week     Complications:      Reason for Admission: sinus bradycardia, hypoglycemia    Hospital Course:     Lavetta Nageotte is a 82 y o  female patient who originally presented to the hospital on 2/26/2021 due to unresponsiveness at [facility] and was found to be hypoglycemia  She was seen in consultation with Endocrinology  Her lantus regimen was reduced to 25 units qHS and Luciano Bonds will be discontinued  Novolog was recommended at the current dose at 14/14/12 however this should be held for blood glucose less than 120  She should follow up with Endocrinology outpatient  Patient was also seen by Cardiology for sinus bradycardia that remained asymptomatic  There was no evidence of high grade block  The recommendation is to avoid all AV lulu blocking agents  She should consider outpatient evaluation for ERICA  There was no further inpatient cardiac workup warranted   Please see above list of diagnoses and related plan for additional information  Condition at Discharge: stable     Discharge Day Visit / Exam:     Subjective:  Patient is doing well today   No acute complaints  No events overnight  Vitals: Blood Pressure: 137/61 (02/28/21 2310)  Pulse: 65 (02/28/21 2310)  Temperature: (!) 97 4 °F (36 3 °C) (02/28/21 2310)  Temp Source: Temporal (02/28/21 2310)  Respirations: 18 (02/28/21 2310)  Height: 5' (152 4 cm) (02/26/21 1530)  Weight - Scale: 93 kg (205 lb 0 4 oz) (03/01/21 0600)  SpO2: 92 % (02/28/21 2310)  Exam:   Physical Exam  Vitals signs and nursing note reviewed  Constitutional:       General: She is not in acute distress  Appearance: She is not ill-appearing  HENT:      Head: Normocephalic  Eyes:      Conjunctiva/sclera: Conjunctivae normal    Cardiovascular:      Rate and Rhythm: Normal rate and regular rhythm  Pulmonary:      Effort: Pulmonary effort is normal       Breath sounds: Normal breath sounds  Abdominal:      General: Bowel sounds are normal       Palpations: Abdomen is soft  Musculoskeletal:      Right lower leg: No edema  Left lower leg: No edema  Skin:     General: Skin is warm  Neurological:      Mental Status: She is alert  Mental status is at baseline  Psychiatric:         Mood and Affect: Mood normal          Discussion with Family: spoke with daughter Ming Lindsey on the phone     Discharge instructions/Information to patient and family:   See after visit summary for information provided to patient and family  Provisions for Follow-Up Care:  See after visit summary for information related to follow-up care and any pertinent home health orders  Disposition:     Other: [facility]     For Discharges to [address] [facility]:   · Not Applicable to this Patient - Not Applicable to this Patient    Planned Readmission: none     Discharge Statement:  I spent 32 minutes discharging the patient  This time was spent on the day of discharge  I had direct contact with the patient on the day of discharge   Greater than 50% of the total time was spent examining patient, answering all patient questions, arranging and discussing plan of care with patient as well as directly providing post-discharge instructions  Additional time then spent on discharge activities  Discharge Medications:  See after visit summary for reconciled discharge medications provided to patient and family        ** Please Note: This note has been constructed using a voice recognition system **

## 2021-03-01 NOTE — CASE MANAGEMENT
Cm reviewed pt care coordination rounds with Dr James Kate  Pt is medically cleared for d/c today to [facility]  AVS and new Scripts were faxed to [phone] and cm also called [facility] to inform of discharge  Cm explain IMM letter with daughter over the phone  Pt will need [facility] transport for d/c discuss cost with daughter and she is agreeable  [facility] was called for a [facility] transport  Awaiting for a confirmed time

## 2021-03-01 NOTE — NURSING NOTE
IV removed prior to patients discharge  Report given to receiving facility RN  Patient transported via CM set up transport via wheelchair  Discharge instructions handed to transporter

## 2021-05-06 ENCOUNTER — CONSULT (OUTPATIENT)
Dept: ENDOCRINOLOGY | Facility: CLINIC | Age: 86
End: 2021-05-06
Payer: MEDICARE

## 2021-05-06 VITALS — HEART RATE: 76 BPM | DIASTOLIC BLOOD PRESSURE: 68 MMHG | SYSTOLIC BLOOD PRESSURE: 114 MMHG

## 2021-05-06 DIAGNOSIS — Z79.4 TYPE 2 DIABETES MELLITUS WITH STABLE PROLIFERATIVE RETINOPATHY OF BOTH EYES, WITH LONG-TERM CURRENT USE OF INSULIN (HCC): ICD-10-CM

## 2021-05-06 DIAGNOSIS — E11.3553 TYPE 2 DIABETES MELLITUS WITH STABLE PROLIFERATIVE RETINOPATHY OF BOTH EYES, WITH LONG-TERM CURRENT USE OF INSULIN (HCC): ICD-10-CM

## 2021-05-06 DIAGNOSIS — Z79.4 TYPE 2 DIABETES MELLITUS WITH HYPOGLYCEMIA WITHOUT COMA, WITH LONG-TERM CURRENT USE OF INSULIN (HCC): Primary | ICD-10-CM

## 2021-05-06 DIAGNOSIS — N18.4 STAGE 4 CHRONIC KIDNEY DISEASE (HCC): ICD-10-CM

## 2021-05-06 DIAGNOSIS — E11.649 TYPE 2 DIABETES MELLITUS WITH HYPOGLYCEMIA WITHOUT COMA, WITH LONG-TERM CURRENT USE OF INSULIN (HCC): Primary | ICD-10-CM

## 2021-05-06 PROCEDURE — 99214 OFFICE O/P EST MOD 30 MIN: CPT | Performed by: INTERNAL MEDICINE

## 2021-05-06 RX ORDER — LOSARTAN POTASSIUM 25 MG/1
25 TABLET ORAL EVERY MORNING
COMMUNITY
Start: 2021-03-15

## 2021-05-06 RX ORDER — TRIAMCINOLONE ACETONIDE 1 MG/G
CREAM TOPICAL
COMMUNITY
Start: 2021-03-22

## 2021-05-06 RX ORDER — FLUTICASONE PROPIONATE 50 MCG
2 SPRAY, SUSPENSION (ML) NASAL DAILY
COMMUNITY
Start: 2021-03-22

## 2021-05-06 NOTE — PATIENT INSTRUCTIONS
Hypoglycemia in a Person with Diabetes   WHAT YOU NEED TO KNOW:   Hypoglycemia is a serious condition that happens when your blood glucose (sugar) level drops too low  The blood sugar level is usually too high in a person with diabetes, but the level can also drop too low  It is important to follow your diabetes management plan to keep your blood sugar level steady  DISCHARGE INSTRUCTIONS:   You or someone close to you needs to call the local emergency number (911 in the [address] [facility]) if:   · You have a seizure or pass out  · Your blood sugar is less than 50 mg/dL and does not respond to treatment  · You feel you are going to pass out  · You have trouble thinking clearly  Call your diabetes care team if:   · You have had symptoms of low blood sugar several times  · You have questions about the amount of insulin or diabetes medicine you are taking  · You have questions or concerns about your condition or care  Medicines:   · Insulin or diabetes medicine  help to keep your blood sugar under control  · Glucagon  may be needed if you have severe hypoglycemia  · Take your medicine as directed  Contact your healthcare provider if you think your medicine is not helping or if you have side effects  Tell him or her if you are allergic to any medicine  Keep a list of the medicines, vitamins, and herbs you take  Include the amounts, and when and why you take them  Bring the list or the pill bottles to follow-up visits  Carry your medicine list with you in case of an emergency  Manage hypoglycemia:   · Check your blood sugar level right away if you have symptoms of hypoglycemia  Hypoglycemia is usually 70 mg/dL or below  Ask your diabetes care team what blood sugar level is too low for you  · If your blood sugar level is too low, eat or drink 15 grams of fast-acting carbohydrate  Examples of this amount of fast-acting carbohydrate are 4 ounces (½ cup) of fruit juice or 4 ounces of regular soda  Other examples are 2 tablespoons of raisins or 1 tube of glucose gel  Check your blood sugar level 15 minutes later  Sit still as you wait  If the level is still low (less than 100 mg/dL), have another 15 grams of carbohydrate  When the level returns to 100 mg/dL, eat a meal if it is time  If your meal time is more than 1 hour away, eat a snack  The snack should contain carbohydrates, such as the following:     ? 3/4 cup of cereal    ? 1 cup of skim or low fat milk    ? 6 soda crackers    ? 1/2 of a turkey sandwich    ? 15 fat-free chips  This will help prevent another drop in blood sugar  Always carefully follow your diabetes care team's instructions on how to treat low blood sugar levels  · Always carry a source of fast-acting carbohydrate  If you have symptoms of hypoglycemia and you do not have a blood glucose meter, have a source of fast-acting carbohydrate anyway  Avoid carbohydrate foods that are high in fat  The fat content may make the carbohydrate take longer to increase your blood sugar level  Ask your diabetes care team if you should carry a glucagon kit  Glucagon is a medicine that is injected when you develop severe hypoglycemia and become unconscious  Check the expiration date every month and replace it before it expires  · Teach others how to help you if you have symptoms of hypoglycemia  Tell them about the symptoms of hypoglycemia  Ask them to give you a source of fast-acting carbohydrate if you cannot get it yourself  Ask them to give you a glucagon injection if you have signs of hypoglycemia and you become unconscious or have a seizure  Ask them to call the local emergency number (911 in the [address] [facility])   This is an emergency  Tell them never to try to make you swallow anything if you faint or have a seizure  · Wear medical alert jewelry  or carry a card that says you have diabetes  Ask where to get these items  Prevent hypoglycemia:   · Take diabetes medicine as directed    Take your medicine at the right time and in the right amount  Do not  double the amount of medicine you take unless instructed by your diabetes care team      · Eat regular meals and snacks  Talk to your dietitian or diabetes care team about a meal plan that is right for you  Do not skip meals  · Check your blood sugar level as directed  Ask your diabetes care team what your blood sugar levels should be before and after you eat  Ask when and how often to check your blood sugar level  You may need to check at least 3 times each day  Record your blood sugar level results and take the record with you when you see your care team  Changes may need to be made to your medicine, food, or exercise schedules using the record  · Check your blood sugar level before you exercise  Physical activity, such as exercise, can decrease your blood sugar level  If your blood sugar level is less than 100 mg/dL, have a carbohydrate snack  Examples are 4 to 6 crackers, ½ banana, 8 ounces (1 cup) of nonfat or 1% milk, or 4 ounces (½ cup) of juice  If you will be active for more than 1 hour, you may need to check your blood sugar level every 30 minutes  Your diabetes care team may also recommend that you check your blood sugar level after your activity  · Know the risks if you choose to drink alcohol  Alcohol can cause your blood sugar levels to be low if you use insulin  Alcohol can cause high blood sugar levels and weight gain if you drink too much  Women 21 years or older and men 72 years or older should limit alcohol to 1 drink a day  Men aged 24 to 59 years should limit alcohol to 2 drinks a day  A drink of alcohol is 12 ounces of beer, 5 ounces of wine, or 1½ ounces of liquor  Follow up with your diabetes care team or specialist as directed: You may need dose changes to your insulin or oral diabetes medicine if you have hypoglycemia  Write down your questions so you remember to ask them during your visits     © Copyright [employer] [facility] Information is for [employer] use only and may not be sold, redistributed or otherwise used for commercial purposes  All illustrations and images included in CareNotes® are the copyrighted property of A D A M [employer] Inc  or Sandy Duncan   The above information is an  only  It is not intended as medical advice for individual conditions or treatments  Talk to your doctor, nurse or pharmacist before following any medical regimen to see if it is safe and effective for you

## 2021-05-06 NOTE — ASSESSMENT & PLAN NOTE
Lab Results   Component Value Date    EGFR 22 02/27/2021    EGFR 19 02/26/2021    EGFR 19 07/21/2019    CREATININE 2 06 (H) 02/27/2021    CREATININE 2 28 (H) 02/26/2021    CREATININE 2 34 (H) 07/21/2019     Continue follow-up with nephrologist

## 2021-05-06 NOTE — PROGRESS NOTES
Jad Brush 82 y o  female MRN: [id number]    Encounter: [id number]      Assessment/Plan     Problem List Items Addressed This Visit        Endocrine    Type 2 diabetes mellitus with hypoglycemia without coma, with long-term current use of insulin ([facility] ) - Primary       Lab Results   Component Value Date    HGBA1C 7 4 (H) 04/29/2021   Does not appear to be having any significant hypoglycemia since discharge however fasting glucose on [facility] was 75  For now I will continue insulin at current dose  I have given her a log sheet and instructions to her caregiver with her  Fingersticks should be checked before meals and bedtime and NovoLog is to be given just before she eats  Send over fingerstick log in 2 weeks          Relevant Orders    Comprehensive metabolic panel Lab Collect    HEMOGLOBIN A1C W/ EAG ESTIMATION Lab Collect       Genitourinary    Stage 4 chronic kidney disease ([facility] )     Lab Results   Component Value Date    EGFR 22 02/27/2021    EGFR 19 02/26/2021    EGFR 19 07/21/2019    CREATININE 2 06 (H) 02/27/2021    CREATININE 2 28 (H) 02/26/2021    CREATININE 2 34 (H) 07/21/2019     Continue follow-up with nephrologist             CC: Diabetes    History of Present Illness     HPI:  82-year-old female with type 2 diabetes on long-term insulin therapy was hospitalized in February for severe hypoglycemia  Her insulin dose was adjusted during the hospitalization and current regimen is   lantus 35 units at bedtime  novolog 14-14-12-0  To be held if f s less than 120    For past 3 days - fasting [phone]  prelunch- 700,024,240  predinner 456,667,882  Bedtime 237,162    It does not appear that she has had any significant hypoglycemia after discharge    However fasting glucose on [facility] from April 29th was 76  From the log of blood sugars it appears that fingersticks are being checked at 7:00 a m , 11 am  3:00 p m  and 7:00 p m  however she states her dinner is around 4-5 p m  and she is getting insulin mostly after eating  She has fair appetite, denies any polyuria, polydipsia  Complains of blurry vision    Denies any numbness or tingling in her feet    Review of Systems    Historical Information   Past Medical History:   Diagnosis Date    Adenoma of large intestine     Allergy     last assessed: 10/18/2013    Cataract     Constipation, chronic     Diabetes mellitus (HCC)     anemia vitamin d deficiency glaucoma hypertension hypothyroidism; last assessed: 2012    Disease of thyroid gland     Diverticulosis     Fecal incontinence     Female stress incontinence     Glaucoma     Gout     History of cystocele     Intrinsic sphincter deficiency     Mixed incontinence     Nocturia     Osteoarthritis, chronic     Renal disorder     Senile atrophic vaginitis     Urinary frequency      Past Surgical History:   Procedure Laterality Date    ANKLE SURGERY Bilateral     CATARACT EXTRACTION      CHOLECYSTECTOMY      TUBAL LIGATION       Social History   Social History     Substance and Sexual Activity   Alcohol Use Never    Frequency: Never    Binge frequency: Never     Social History     Substance and Sexual Activity   Drug Use No     Social History     Tobacco Use   Smoking Status Former Smoker    Quit date: 1989    Years since quittin 0   Smokeless Tobacco Never Used     Family History:   Family History   Problem Relation Age of Onset    Stomach cancer Mother     Diabetes Sister         mellitus       Meds/Allergies   Current Outpatient Medications   Medication Sig Dispense Refill    acetaminophen (TYLENOL) 325 mg tablet Take 650 mg by mouth every 6 (six) hours as needed for mild pain      albuterol (2 5 mg/3 mL) 0 083 % nebulizer solution Take 2 5 mg by nebulization every 6 (six) hours as needed for wheezing or shortness of breath      albuterol (VENTOLIN HFA) 90 mcg/act inhaler Inhale 2 puffs every 6 (six) hours as needed for wheezing      atorvastatin (LIPITOR) 10 mg tablet Take 1 tablet (10 mg total) by mouth daily 90 tablet 1    budesonide-formoterol (SYMBICORT) 160-4 5 mcg/act inhaler Inhale 2 puffs 2 (two) times a day Rinse mouth after use   dorzolamide-timolol (COSOPT) 22 3-6 8 MG/ML ophthalmic solution Administer 1 drop to both eyes 2 (two) times a day      fluticasone (FLONASE) 50 mcg/act nasal spray       furosemide (LASIX) 80 mg tablet Take 1 tablet (80 mg total) by mouth daily 90 tablet 1    Glucosamine-Chondroitin (GLUCOSAMINE CHONDR COMPLEX) 500-400 MG CAPS Take 1 capsule by mouth 2 (two) times a day      insulin aspart (NovoLOG) 100 units/mL injection 14 units at breakfast and lunch  12 units at dinner    Hold for blood sugar under 120 45 mL 0    insulin glargine (LANTUS) 100 units/mL subcutaneous injection Inject 35 Units under the skin daily at bedtime 1 mL 0    levothyroxine 75 mcg tablet TAKE ONE TABLET BY MOUTH ONCE DAILY AS DIRECTED 90 tablet 3    losartan (COZAAR) 25 mg tablet       montelukast (SINGULAIR) 10 mg tablet Take 10 mg by mouth daily at bedtime      pantoprazole (PROTONIX) 40 mg tablet Take 1 tablet (40 mg total) by mouth daily before breakfast 30 tablet 0    potassium chloride (KLOR-CON M20) 20 mEq tablet Take 1 tablet (20 mEq total) by mouth daily 90 tablet 1    RELION PEN NEEDLE 31G/8MM 31G X 8 MM MISC USE AS DIRECTED THREE TIMES DAILY 300 each 3    tiotropium (SPIRIVA RESPIMAT) 1 25 MCG/ACT AERS inhaler Inhale 2 puffs daily      triamcinolone (KENALOG) 0 1 % cream       amLODIPine (NORVASC) 2 5 mg tablet Take 7 5 mg by mouth daily      fexofenadine (ALLEGRA) 180 MG tablet Take by mouth      guaiFENesin (ROBITUSSIN) 100 MG/5ML oral liquid Take 200 mg by mouth 3 (three) times a day as needed for cough      Incontinence Supply Disposable (CERTAINTY ADJ UNDERWEAR LARGE) MISC by Does not apply route daily at bedtime (Patient not taking: Reported on 7/27/2020) 30 each 5    ipratropium (ATROVENT) 0 02 % nebulizer solution Take 0 5 mg by nebulization 4 (four) times a day       No current facility-administered medications for this visit  Allergies   Allergen Reactions    Ace Inhibitors      Annotation - [id number]: short of breath    Aspirin        Objective   Vitals: Blood pressure 114/68, pulse 76  Physical Exam  Vitals signs reviewed  Constitutional:       Appearance: Normal appearance  She is obese  She is not ill-appearing or diaphoretic  HENT:      Head: Normocephalic and atraumatic  Eyes:      General: No scleral icterus  Extraocular Movements: Extraocular movements intact  Neck:      Musculoskeletal: Neck supple  Cardiovascular:      Rate and Rhythm: Normal rate and regular rhythm  Heart sounds: Normal heart sounds  No murmur  Pulmonary:      Effort: No respiratory distress  Breath sounds: Normal breath sounds  No wheezing or rales  Abdominal:      General: There is no distension  Palpations: Abdomen is soft  Tenderness: There is no abdominal tenderness  Musculoskeletal:      Right lower leg: Edema present  Left lower leg: Edema present  Lymphadenopathy:      Cervical: No cervical adenopathy  Skin:     General: Skin is warm and dry  Neurological:      General: No focal deficit present  Mental Status: She is alert  Psychiatric:         Behavior: Behavior normal          The history was obtained from the review of the chart, patient      Lab Results:   Lab Results   Component Value Date/Time    Hemoglobin A1C 7 4 (H) 04/29/2021 05:02 AM    Hemoglobin A1C 7 5 (H) 02/27/2021 05:12 AM    Hemoglobin A1C 7 8 (H) 02/23/2021 06:52 AM    Hemoglobin A1C 7 7 (H) 12/10/2020 06:35 AM    WBC 10 11 02/27/2021 05:12 AM    WBC 13 26 (H) 02/26/2021 04:01 AM    Hemoglobin 9 5 (L) 02/27/2021 05:12 AM    Hemoglobin 9 2 (L) 02/26/2021 04:01 AM    Hematocrit 31 6 (L) 02/27/2021 05:12 AM    Hematocrit 30 4 (L) 02/26/2021 04:01 AM    MCV 90 02/27/2021 05:12 AM    MCV 90 02/26/2021 04:01 AM Platelets 962 92/90/5004 05:12 AM    Platelets 391 81/56/9939 04:01 AM    BUN 39 (H) 02/27/2021 05:12 AM    BUN 39 (H) 02/26/2021 04:01 AM    Potassium 4 5 02/27/2021 05:12 AM    Potassium 3 7 02/26/2021 04:01 AM    Chloride 103 02/27/2021 05:12 AM    Chloride 103 02/26/2021 04:01 AM    CO2 31 02/27/2021 05:12 AM    CO2 33 (H) 02/26/2021 04:01 AM    Creatinine 2 06 (H) 02/27/2021 05:12 AM    Creatinine 2 28 (H) 02/26/2021 04:01 AM    AST 11 02/26/2021 04:01 AM    ALT 14 02/26/2021 04:01 AM    Albumin 2 8 (L) 02/26/2021 04:01 AM               Portions of the record may have been created with voice recognition software  Occasional wrong word or "sound a like" substitutions may have occurred due to the inherent limitations of voice recognition software  Read the chart carefully and recognize, using context, where substitutions have occurred  Alert-The patient is alert, awake and responds to voice. The patient is oriented to time, place, and person. The triage nurse is able to obtain subjective information.

## 2021-05-06 NOTE — ASSESSMENT & PLAN NOTE
Lab Results   Component Value Date    HGBA1C 7 4 (H) 04/29/2021   Does not appear to be having any significant hypoglycemia since discharge however fasting glucose on [facility] was 75  For now I will continue insulin at current dose  I have given her a log sheet and instructions to her caregiver with her  Fingersticks should be checked before meals and bedtime and NovoLog is to be given just before she eats    Send over fingerstick log in 2 weeks

## 2021-05-11 ENCOUNTER — TELEPHONE (OUTPATIENT)
Dept: NEPHROLOGY | Facility: CLINIC | Age: 86
End: 2021-05-11

## 2021-05-11 NOTE — TELEPHONE ENCOUNTER
Spoke with [facility] nurse to remind them that patient needs BMP completed for 5/17 follow up       Script faxed to [phone]

## 2021-05-17 ENCOUNTER — OFFICE VISIT (OUTPATIENT)
Dept: NEPHROLOGY | Facility: CLINIC | Age: 86
End: 2021-05-17
Payer: MEDICARE

## 2021-05-17 VITALS
WEIGHT: 212 LBS | RESPIRATION RATE: 16 BRPM | SYSTOLIC BLOOD PRESSURE: 118 MMHG | HEIGHT: 60 IN | BODY MASS INDEX: 41.62 KG/M2 | DIASTOLIC BLOOD PRESSURE: 64 MMHG | HEART RATE: 80 BPM

## 2021-05-17 DIAGNOSIS — N18.9 CHRONIC KIDNEY DISEASE, UNSPECIFIED CKD STAGE: Primary | ICD-10-CM

## 2021-05-17 DIAGNOSIS — I12.9 NEPHROSCLEROSIS ARTERIOLAR, STAGE 1-4 OR UNSPECIFIED CHRONIC KIDNEY DISEASE: ICD-10-CM

## 2021-05-17 PROBLEM — N18.4 STAGE 4 CHRONIC KIDNEY DISEASE (HCC): Status: RESOLVED | Noted: 2019-10-25 | Resolved: 2021-05-17

## 2021-05-17 PROCEDURE — 99214 OFFICE O/P EST MOD 30 MIN: CPT | Performed by: INTERNAL MEDICINE

## 2021-05-17 NOTE — PATIENT INSTRUCTIONS
You are here for follow-up thank you for reviewing her history that you underwent COVID infection and then have had hospitalization for low blood sugar but things sound like they are doing well now  You seem very strong you do not really have complaints for me so thank goodness you are doing better  With respect your kidney function the creatinine level which is the blood test is 2 2 and looking back a year and half ago was little higher so there has been no progression  You do not have diabetic kidney disease and you do not have an aggressive kidney disease cause there was no protein in the urine which is a good thing  You likely have nephrosclerosis or aging  Renal function is stable continue with good sugar control and blood pressure control  No changes in medications

## 2021-05-17 NOTE — PROGRESS NOTES
NEPHROLOGY PROGRESS NOTE    Nahed Wisdom 82 y o  female MRN: [id number]  Unit/Bed#:  Encounter: [id number]  Reason for Consult:  Chronic renal insufficiency    The patient is here for routine follow-up  The patient states she is getting stronger and feeling good her memory is returned and she tells me this because she had severe COVID infection and states that then she went to rehab and really lost a few months time in terms of her memory  She states she is in good spirits now tolerating her medications and had no complaints for me  ASSESSMENT/PLAN:  1  Renal    Patient has chronic renal insufficiency with no proteinuria and she actually did have a recent urinalysis in March of this year that showed no protein and no blood  I bring this up because without significant proteinuria she does not have diabetic kidney disease and she does not have a significant intrinsic process given the lack of protein in the urine as well  Her latest creatinine was 2 2 and looking back over a year ago it is stable at her baseline with no significant progression  Blood pressure is well controlled on current medication  If the blood pressure was low it would be okay to hold the losartan but for now this will continue  The patient has underlying nephrosclerosis things remain stable and she has no volume overload  No changes in medications  Labs and follow-up as scheduled  Encouraged to continue with good glycemic control to avoid diabetic complications in the future  SUBJECTIVE:  Review of Systems   Constitution: Negative for chills, decreased appetite, fever, malaise/fatigue and night sweats  HENT: Negative  Eyes: Negative  Cardiovascular: Negative  Negative for chest pain, dyspnea on exertion, leg swelling and orthopnea  Respiratory: Negative  Negative for cough, shortness of breath, sputum production and wheezing  Gastrointestinal: Negative    Negative for abdominal pain, diarrhea, nausea and vomiting  Genitourinary: Negative for dysuria, flank pain, hematuria and incomplete emptying  Neurological: Negative for dizziness, focal weakness, headaches and light-headedness  Psychiatric/Behavioral: Negative for altered mental status, depression, hallucinations and hypervigilance  OBJECTIVE:  Current Weight: Weight - Scale: 96 2 kg (212 lb)  [email or web address]:     Blood pressure 118/64, pulse 80, resp  rate 16, height 5' (1 524 m), weight 96 2 kg (212 lb)  , Body mass index is 41 4 kg/m²  [unfilled]    Physical Exam: /64 (BP Location: Right arm, Patient Position: Sitting, Cuff Size: Standard)   Pulse 80   Resp 16   Ht 5' (1 524 m)   Wt 96 2 kg (212 lb)   BMI 41 40 kg/m²   Physical Exam  Constitutional:       General: She is not in acute distress  Appearance: She is not ill-appearing or diaphoretic  HENT:      Head: Normocephalic and atraumatic  Nose:      Comments: Wearing mask     Mouth/Throat:      Comments: Wearing mask  Eyes:      General: No scleral icterus  Extraocular Movements: Extraocular movements intact  Neck:      Musculoskeletal: Normal range of motion and neck supple  Cardiovascular:      Rate and Rhythm: Normal rate and regular rhythm  Heart sounds: No friction rub  No gallop  Comments: No significant edema  Pulmonary:      Effort: Pulmonary effort is normal  No respiratory distress  Breath sounds: Normal breath sounds  No wheezing, rhonchi or rales  Abdominal:      General: Bowel sounds are normal  There is no distension  Palpations: Abdomen is soft  Tenderness: There is no abdominal tenderness  There is no rebound  Neurological:      General: No focal deficit present  Mental Status: She is alert and oriented to person, place, and time  Mental status is at baseline  Psychiatric:         Mood and Affect: Mood normal          Behavior: Behavior normal          Thought Content:  Thought content normal  Judgment: Judgment normal          Medications:    Current Outpatient Medications:     acetaminophen (TYLENOL) 325 mg tablet, Take 650 mg by mouth every 6 (six) hours as needed for mild pain, Disp: , Rfl:     albuterol (2 5 mg/3 mL) 0 083 % nebulizer solution, Take 2 5 mg by nebulization every 6 (six) hours as needed for wheezing or shortness of breath, Disp: , Rfl:     albuterol (VENTOLIN HFA) 90 mcg/act inhaler, Inhale 2 puffs every 6 (six) hours as needed for wheezing, Disp: , Rfl:     atorvastatin (LIPITOR) 10 mg tablet, Take 1 tablet (10 mg total) by mouth daily, Disp: 90 tablet, Rfl: 1    budesonide-formoterol (SYMBICORT) 160-4 5 mcg/act inhaler, Inhale 2 puffs 2 (two) times a day Rinse mouth after use , Disp: , Rfl:     dorzolamide-timolol (COSOPT) 22 3-6 8 MG/ML ophthalmic solution, Administer 1 drop to both eyes 2 (two) times a day, Disp: , Rfl:     fexofenadine (ALLEGRA) 180 MG tablet, Take by mouth, Disp: , Rfl:     fluticasone (FLONASE) 50 mcg/act nasal spray, , Disp: , Rfl:     furosemide (LASIX) 80 mg tablet, Take 1 tablet (80 mg total) by mouth daily, Disp: 90 tablet, Rfl: 1    Glucosamine-Chondroitin (GLUCOSAMINE CHONDR COMPLEX) 500-400 MG CAPS, Take 1 capsule by mouth 2 (two) times a day, Disp: , Rfl:     insulin aspart (NovoLOG) 100 units/mL injection, 14 units at breakfast and lunch  12 units at dinner    Hold for blood sugar under 120, Disp: 45 mL, Rfl: 0    insulin glargine (LANTUS) 100 units/mL subcutaneous injection, Inject 35 Units under the skin daily at bedtime, Disp: 1 mL, Rfl: 0    ipratropium (ATROVENT) 0 02 % nebulizer solution, Take 0 5 mg by nebulization 4 (four) times a day, Disp: , Rfl:     levothyroxine 75 mcg tablet, TAKE ONE TABLET BY MOUTH ONCE DAILY AS DIRECTED, Disp: 90 tablet, Rfl: 3    losartan (COZAAR) 25 mg tablet, , Disp: , Rfl:     montelukast (SINGULAIR) 10 mg tablet, Take 10 mg by mouth daily at bedtime, Disp: , Rfl:     pantoprazole (PROTONIX) 40 mg tablet, Take 1 tablet (40 mg total) by mouth daily before breakfast, Disp: 30 tablet, Rfl: 0    potassium chloride (KLOR-CON M20) 20 mEq tablet, Take 1 tablet (20 mEq total) by mouth daily (Patient taking differently: Take 10 mEq by mouth daily ), Disp: 90 tablet, Rfl: 1    RELION PEN NEEDLE 31G/8MM 31G X 8 MM MISC, USE AS DIRECTED THREE TIMES DAILY, Disp: 300 each, Rfl: 3    tiotropium (SPIRIVA RESPIMAT) 1 25 MCG/ACT AERS inhaler, Inhale 2 puffs daily, Disp: , Rfl:     triamcinolone (KENALOG) 0 1 % cream, , Disp: , Rfl:     amLODIPine (NORVASC) 2 5 mg tablet, Take 7 5 mg by mouth daily, Disp: , Rfl:     guaiFENesin (ROBITUSSIN) 100 MG/5ML oral liquid, Take 200 mg by mouth 3 (three) times a day as needed for cough, Disp: , Rfl:     Incontinence Supply Disposable (CERTAINTY ADJ UNDERWEAR LARGE) MISC, by Does not apply route daily at bedtime (Patient not taking: Reported on 7/27/2020), Disp: 30 each, Rfl: 5    Laboratory Results:  Lab Results   Component Value Date    WBC 10 11 02/27/2021    HGB 9 5 (L) 02/27/2021    HCT 31 6 (L) 02/27/2021    MCV 90 02/27/2021     02/27/2021     Lab Results   Component Value Date    SODIUM 140 02/27/2021    K 4 5 02/27/2021     02/27/2021    CO2 31 02/27/2021    BUN 39 (H) 02/27/2021    CREATININE 2 06 (H) 02/27/2021    GLUC 126 02/27/2021    CALCIUM 8 4 02/27/2021     Lab Results   Component Value Date    CALCIUM 8 4 02/27/2021     No results found for: LABPROT

## 2021-08-09 ENCOUNTER — TELEPHONE (OUTPATIENT)
Dept: ENDOCRINOLOGY | Facility: CLINIC | Age: 86
End: 2021-08-09

## 2021-08-09 NOTE — TELEPHONE ENCOUNTER
----- Message from Alejandra Cerda MD sent at 8/8/2021  1:41 PM EDT -----  Please call the patient regarding labs - A1C 8 4 at goal for age

## 2021-08-23 ENCOUNTER — OFFICE VISIT (OUTPATIENT)
Dept: ENDOCRINOLOGY | Facility: CLINIC | Age: 86
End: 2021-08-23
Payer: MEDICARE

## 2021-08-23 VITALS
HEIGHT: 60 IN | SYSTOLIC BLOOD PRESSURE: 144 MMHG | DIASTOLIC BLOOD PRESSURE: 76 MMHG | BODY MASS INDEX: 41.62 KG/M2 | WEIGHT: 212 LBS | HEART RATE: 89 BPM

## 2021-08-23 DIAGNOSIS — E11.3553 TYPE 2 DIABETES MELLITUS WITH STABLE PROLIFERATIVE RETINOPATHY OF BOTH EYES, WITH LONG-TERM CURRENT USE OF INSULIN (HCC): Primary | ICD-10-CM

## 2021-08-23 DIAGNOSIS — I10 ESSENTIAL HYPERTENSION: ICD-10-CM

## 2021-08-23 DIAGNOSIS — Z79.4 TYPE 2 DIABETES MELLITUS WITH COMPLICATION, WITH LONG-TERM CURRENT USE OF INSULIN (HCC): ICD-10-CM

## 2021-08-23 DIAGNOSIS — E11.8 TYPE 2 DIABETES MELLITUS WITH COMPLICATION, WITH LONG-TERM CURRENT USE OF INSULIN (HCC): ICD-10-CM

## 2021-08-23 DIAGNOSIS — Z79.4 TYPE 2 DIABETES MELLITUS WITH STABLE PROLIFERATIVE RETINOPATHY OF BOTH EYES, WITH LONG-TERM CURRENT USE OF INSULIN (HCC): Primary | ICD-10-CM

## 2021-08-23 DIAGNOSIS — E03.9 ACQUIRED HYPOTHYROIDISM: ICD-10-CM

## 2021-08-23 PROCEDURE — 99215 OFFICE O/P EST HI 40 MIN: CPT | Performed by: PHYSICIAN ASSISTANT

## 2021-08-23 RX ORDER — INSULIN GLARGINE 100 [IU]/ML
INJECTION, SOLUTION SUBCUTANEOUS
Qty: 1 ML | Refills: 0
Start: 2021-08-23

## 2021-08-23 NOTE — ASSESSMENT & PLAN NOTE
She is having frequent hyperglycemia  At last visit she was taking Novolog 12-14 units before meals plus sliding scale and now on sliding scale only with reduce dose of lantus now in the morning  For now will add Novolog 4 units with each meal and continue sliding scale however start sliding scale at 200  (no additional insulin for blood sugar 150-200)  Please send bLood sugar readings in 1 weeks for review or ASAP if problems     Lab Results   Component Value Date    HGBA1C 8 4 (H) 08/05/2021

## 2021-08-23 NOTE — PROGRESS NOTES
Established Patient Progress Note      Chief Complaint   Patient presents with    Diabetes Type 2        History of Present Illness:   Mckenna Villeda is a 82 y o  female with a history of type 2 diabetes with long term use of insulin since many years ago  Reports complications of CKD and retinopathy  Denies recent illness or hospitalizations  Denies recent severe hypoglycemic or severe hyperglycemic episodes  Denies any issues with her current regimen  home glucose monitoring: are performed regularly 4x per day at her assisted living facility  Blood sugar log reviewed and most of the readings are in the 200s  Occasionally in the 100s  No hypoglycemia since last visit  She does have history of severe hypoglycemia (in hospital 2/2021) in the past but none since last visit  According to notes was given higher dose of lantus prior that episode (50 unit)  At last visit she was taking Lantus 35 units at bedtime and Novolog 14-14-12 before meals  Now she is taking Lantus 30 units in the morning plus sliding scale 2 units per 50 above 150  She reports appetite is good  Home blood glucose readings:   Before breakfast: Average 229, range 104-345  Before lunch: Average 229, range 127-342  Before dinner: 202, range 125-345  Bedtime: avererage 235, range 155 to 309     Current regimen:   Lantus 30 units daily in AM  Novolog sliding scale 3x per day with meals 150-200: 2 units, up to max of 451-500: 14 units    Last Eye Exam: Poor vision, overdue for exam due to COVID19 (Macular degeneration and Retinopathy)  Last Foot Exam: UTD q 3 months with podiatry       Has hypertension: Taking losartan  Follows with nephrology for CKD  Has hyperlipidemia: Taking atorvastain    Thyroid disorders: hypothyroidism- takes levothyroxine    Patient Active Problem List   Diagnosis    [facility] [address] [facility]    Type 2 diabetes mellitus with proliferative retinopathy ([facility] [address] [facility]    Acquired hypothyroidism    Familial combined hyperlipidemia    Anxiety    Morbid obesity with BMI of 40 0-44 9, adult (HCC)    Anemia in stage 4 chronic kidney disease (HCC)    Chronic venous insufficiency    Type 2 diabetes mellitus with hypoglycemia without coma, with long-term current use of insulin (HCC)    Leukocytosis    Hypertension    Chronic cough    Chronic congestive heart failure (HCC)    Acute bronchitis    Community acquired pneumonia of left lower lobe of lung    Gastroesophageal reflux disease without esophagitis    Urinary incontinence    Localized edema    Nephrosclerosis arteriolar, 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 disease    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, left (HCC)    Sinus bradycardia      Past Medical History:   Diagnosis Date    Adenoma of large intestine     Allergy     last assessed: 10/18/2013    Cataract     Constipation, chronic     Diabetes mellitus ([facility] [address] )     anemia vitamin d deficiency glaucoma hypertension hypothyroidism; last assessed: 2012    Disease of thyroid gland     Diverticulosis     Fecal incontinence     Female stress incontinence     Glaucoma     Gout     History of cystocele     Intrinsic sphincter deficiency     Mixed incontinence     Nocturia     Osteoarthritis, chronic     Renal disorder     Senile atrophic vaginitis     Urinary frequency       Past Surgical History:   Procedure Laterality Date    ANKLE SURGERY Bilateral     CATARACT EXTRACTION      CHOLECYSTECTOMY      TUBAL LIGATION        Family History   Problem Relation Age of Onset    Stomach cancer Mother     Diabetes Sister         mellitus     Social History     Tobacco Use    Smoking status: Former Smoker     Quit date: 1989     Years since quittin 3    Smokeless tobacco: Never Used   Substance Use Topics    Alcohol use: Never     Allergies   Allergen Reactions    Ace Inhibitors      Annotation - [id number]: short of breath    Aspirin Current Outpatient Medications:     acetaminophen (TYLENOL) 325 mg tablet, Take 650 mg by mouth every 6 (six) hours as needed for mild pain, Disp: , Rfl:     albuterol (VENTOLIN HFA) 90 mcg/act inhaler, Inhale 2 puffs every 6 (six) hours as needed for wheezing, Disp: , Rfl:     atorvastatin (LIPITOR) 10 mg tablet, Take 1 tablet (10 mg total) by mouth daily, Disp: 90 tablet, Rfl: 1    budesonide-formoterol (SYMBICORT) 160-4 5 mcg/act inhaler, Inhale 2 puffs 2 (two) times a day Rinse mouth after use , Disp: , Rfl:     dorzolamide-timolol (COSOPT) 22 3-6 8 MG/ML ophthalmic solution, Administer 1 drop to both eyes 2 (two) times a day, Disp: , Rfl:     fexofenadine (ALLEGRA) 180 MG tablet, Take by mouth, Disp: , Rfl:     fluticasone (FLONASE) 50 mcg/act nasal spray, 2 sprays into each nostril daily , Disp: , Rfl:     furosemide (LASIX) 80 mg tablet, Take 1 tablet (80 mg total) by mouth daily, Disp: 90 tablet, Rfl: 1    Glucosamine-Chondroitin (GLUCOSAMINE CHONDR COMPLEX) 500-400 MG CAPS, Take 1 capsule by mouth 2 (two) times a day, Disp: , Rfl:     guaiFENesin (ROBITUSSIN) 100 MG/5ML oral liquid, Take 200 mg by mouth 3 (three) times a day as needed for cough, Disp: , Rfl:     insulin glargine (LANTUS) 100 units/mL subcutaneous injection, 30 units daily in AM , Disp: 1 mL, Rfl: 0    ipratropium (ATROVENT) 0 02 % nebulizer solution, Take 0 5 mg by nebulization 4 (four) times a day, Disp: , Rfl:     levothyroxine 75 mcg tablet, TAKE ONE TABLET BY MOUTH ONCE DAILY AS DIRECTED, Disp: 90 tablet, Rfl: 3    losartan (COZAAR) 25 mg tablet, Take 25 mg by mouth every morning , Disp: , Rfl:     montelukast (SINGULAIR) 10 mg tablet, Take 10 mg by mouth every morning , Disp: , Rfl:     pantoprazole (PROTONIX) 40 mg tablet, Take 1 tablet (40 mg total) by mouth daily before breakfast, Disp: 30 tablet, Rfl: 0    potassium chloride (KLOR-CON M20) 20 mEq tablet, Take 1 tablet (20 mEq total) by mouth daily (Patient taking differently: Take 10 mEq by mouth daily ), Disp: 90 tablet, Rfl: 1    RELION PEN NEEDLE 31G/8MM 31G X 8 MM MISC, USE AS DIRECTED THREE TIMES DAILY, Disp: 300 each, Rfl: 3    albuterol (2 5 mg/3 mL) 0 083 % nebulizer solution, Take 2 5 mg by nebulization every 6 (six) hours as needed for wheezing or shortness of breath, Disp: , Rfl:     amLODIPine (NORVASC) 2 5 mg tablet, Take 7 5 mg by mouth daily (Patient not taking: Reported on 8/23/2021), Disp: , Rfl:     Incontinence Supply Disposable (CERTAINTY ADJ UNDERWEAR LARGE) MISC, by Does not apply route daily at bedtime (Patient not taking: Reported on 7/27/2020), Disp: 30 each, Rfl: 5    insulin aspart (NovoLOG) 100 units/mL injection, Novolog 4 units before each meal plus sliding scale for blood sugars over 200 , Disp: 45 mL, Rfl: 0    tiotropium (SPIRIVA RESPIMAT) 1 25 MCG/ACT AERS inhaler, Inhale 2 puffs daily, Disp: , Rfl:     triamcinolone (KENALOG) 0 1 % cream, , Disp: , Rfl:     Review of Systems   Constitutional: Negative for activity change, appetite change, chills, diaphoresis, fatigue, fever and unexpected weight change  HENT: Negative for trouble swallowing and voice change  Eyes: Positive for visual disturbance  Respiratory: Negative for shortness of breath  Cardiovascular: Negative for chest pain and palpitations  Gastrointestinal: Negative for abdominal pain, constipation and diarrhea  Endocrine: Negative for cold intolerance, heat intolerance, polydipsia, polyphagia and polyuria  Genitourinary: Negative for frequency and menstrual problem  Musculoskeletal: Positive for gait problem  Negative for arthralgias and myalgias  Skin: Negative for rash  Allergic/Immunologic: Negative for food allergies  Neurological: Negative for dizziness and tremors  Hematological: Negative for adenopathy  Psychiatric/Behavioral: Negative for sleep disturbance  All other systems reviewed and are negative        Physical Exam:  Body mass index is 41 4 kg/m²  /76   Pulse 89   Ht 5' (1 524 m)   Wt 96 2 kg (212 lb)   BMI 41 40 kg/m²    Wt Readings from Last 3 Encounters:   08/23/21 96 2 kg (212 lb)   05/17/21 96 2 kg (212 lb)   03/01/21 93 kg (205 lb 0 4 oz)       Physical Exam  Vitals reviewed  Constitutional:       General: She is not in acute distress  Appearance: She is well-developed  HENT:      Head: Normocephalic and atraumatic  Eyes:      Conjunctiva/sclera: Conjunctivae normal       Pupils: Pupils are equal, round, and reactive to light  Neck:      Thyroid: No thyromegaly  Cardiovascular:      Rate and Rhythm: Normal rate and regular rhythm  Heart sounds: Normal heart sounds  Pulmonary:      Effort: Pulmonary effort is normal  No respiratory distress  Breath sounds: Normal breath sounds  No wheezing or rales  Abdominal:      General: Bowel sounds are normal  There is no distension  Palpations: Abdomen is soft  Tenderness: There is no abdominal tenderness  Musculoskeletal:         General: Normal range of motion  Cervical back: Normal range of motion and neck supple  Skin:     General: Skin is warm and dry  Neurological:      Mental Status: She is alert and oriented to person, place, and time           Labs:   Lab Results   Component Value Date    HGBA1C 8 4 (H) 08/05/2021    HGBA1C 8 4 08/05/2021    HGBA1C 7 4 (H) 04/29/2021     Lab Results   Component Value Date    CREATININE 2 06 (H) 02/27/2021    CREATININE 2 28 (H) 02/26/2021    CREATININE 2 34 (H) 07/21/2019    BUN 39 (H) 02/27/2021     04/25/2015    K 4 5 02/27/2021     02/27/2021    CO2 31 02/27/2021     eGFR   Date Value Ref Range Status   02/27/2021 22 ml/min/1 73sq m Final     Lab Results   Component Value Date    CHOL 162 04/25/2015    HDL 40 06/21/2019    TRIG 87 06/21/2019     Lab Results   Component Value Date    ALT 14 02/26/2021    AST 11 02/26/2021    ALKPHOS 101 02/26/2021    BILITOT 0 28 04/25/2015 Lab Results   Component Value Date    [id number] 2 204 02/26/2021    [id number] 3 320 06/21/2019    DKF2ZORFPXYF 3 630 02/18/2019     Lab Results   Component Value Date    FREET4 0 9 04/25/2015       Impression & Plan:    Problem List Items Addressed This Visit        Endocrine    Type 2 diabetes mellitus with proliferative retinopathy ([facility] [address] ) - Primary     She is having frequent hyperglycemia  At last visit she was taking Novolog 12-14 units before meals plus sliding scale and now on sliding scale only with reduce dose of lantus now in the morning  For now will add Novolog 4 units with each meal and continue sliding scale however start sliding scale at 200  (no additional insulin for blood sugar 150-200)  Please send bLood sugar readings in 1 weeks for review or ASAP if problems  Lab Results   Component Value Date    HGBA1C 8 4 (H) 08/05/2021            Relevant Medications    insulin aspart (NovoLOG) 100 units/mL injection    insulin glargine (LANTUS) 100 units/mL subcutaneous injection    Other Relevant Orders    Hemoglobin F5E    Basic metabolic panel    Acquired hypothyroidism     Stable continue levothyroxine  Check TSH/Free T4 before next visit  Relevant Orders    TSH, 3rd generation    T4, free       Cardiovascular and Mediastinum    Hypertension     Stable on current regimen  Other Visit Diagnoses     Type 2 diabetes mellitus with complication, with long-term current use of insulin (HCC)        Relevant Medications    insulin aspart (NovoLOG) 100 units/mL injection    insulin glargine (LANTUS) 100 units/mL subcutaneous injection          Orders Placed This Encounter   Procedures    Hemoglobin A1C     Standing Status:   Future     Standing Expiration Date:   8/23/2022    TSH, 3rd generation     This is a patient instruction: This test is non-fasting  Please drink two glasses of water morning of bloodwork          Standing Status:   Future     Standing Expiration Date: 8/23/2022    T4, free     Standing Status:   Future     Standing Expiration Date:   8/23/2022    Basic metabolic panel     This is a patient instruction: Patient fasting for 8 hours or longer recommended  Standing Status:   Future     Standing Expiration Date:   8/23/2022       There are no Patient Instructions on file for this visit  Discussed with the patient and all questioned fully answered  She will call me if any problems arise  Follow-up appointment in 3 months       Counseled patient on diagnostic results, prognosis, risk and benefit of treatment options, instruction for management, importance of treatment compliance, Risk  factor reduction and impressions    Rahat Yanez PA-C

## 2021-08-31 DIAGNOSIS — E11.8 TYPE 2 DIABETES MELLITUS WITH COMPLICATION, WITH LONG-TERM CURRENT USE OF INSULIN (HCC): ICD-10-CM

## 2021-08-31 DIAGNOSIS — Z79.4 TYPE 2 DIABETES MELLITUS WITH COMPLICATION, WITH LONG-TERM CURRENT USE OF INSULIN (HCC): ICD-10-CM

## 2021-09-02 ENCOUNTER — TELEPHONE (OUTPATIENT)
Dept: ENDOCRINOLOGY | Facility: CLINIC | Age: 86
End: 2021-09-02

## 2021-09-15 ENCOUNTER — HOSPITAL ENCOUNTER (OUTPATIENT)
Dept: NON INVASIVE DIAGNOSTICS | Facility: CLINIC | Age: 86
Discharge: HOME | End: 2021-09-15
Payer: MEDICARE

## 2021-09-15 DIAGNOSIS — I73.9 PAD (PERIPHERAL ARTERY DISEASE) (HCC): ICD-10-CM

## 2021-09-15 PROCEDURE — 93923 UPR/LXTR ART STDY 3+ LVLS: CPT

## 2021-09-15 PROCEDURE — 93925 LOWER EXTREMITY STUDY: CPT

## 2021-09-15 PROCEDURE — 93925 LOWER EXTREMITY STUDY: CPT | Performed by: SURGERY

## 2021-09-15 PROCEDURE — 93922 UPR/L XTREMITY ART 2 LEVELS: CPT | Performed by: SURGERY

## 2021-09-22 ENCOUNTER — OFFICE VISIT (OUTPATIENT)
Dept: VASCULAR SURGERY | Facility: CLINIC | Age: 86
End: 2021-09-22
Payer: MEDICARE

## 2021-09-22 VITALS
DIASTOLIC BLOOD PRESSURE: 82 MMHG | BODY MASS INDEX: 41.82 KG/M2 | SYSTOLIC BLOOD PRESSURE: 132 MMHG | HEART RATE: 69 BPM | WEIGHT: 213 LBS | HEIGHT: 60 IN | TEMPERATURE: 98.2 F

## 2021-09-22 DIAGNOSIS — I10 ESSENTIAL HYPERTENSION: ICD-10-CM

## 2021-09-22 DIAGNOSIS — E78.49 FAMILIAL COMBINED HYPERLIPIDEMIA: ICD-10-CM

## 2021-09-22 DIAGNOSIS — I87.2 CHRONIC VENOUS INSUFFICIENCY: ICD-10-CM

## 2021-09-22 DIAGNOSIS — I73.9 PAD (PERIPHERAL ARTERY DISEASE) (HCC): Primary | ICD-10-CM

## 2021-09-22 PROCEDURE — 99214 OFFICE O/P EST MOD 30 MIN: CPT | Performed by: PHYSICIAN ASSISTANT

## 2021-09-22 NOTE — ASSESSMENT & PLAN NOTE
Longstanding BLE chronic venous insufficiency with prior history of R medial malleolar venous ulcer  -no recurrent wounds  -continue conservative therapy to include daily compression, elevation, low-sodium diet, weight management, aerobic activity and skin moisturization

## 2021-09-22 NOTE — PROGRESS NOTES
Assessment/Plan:    PAD (peripheral artery disease) [facility])  72-year-old female former smoker w/HTN, HLD, hypothyroidism, GERD, obesity, chronic venous insufficiency with prior venous ulcerations, DM2, CKD 4 (baseline creat 2 0-2 3), anemia chronic disease and diffuse PAD with noncompressible ABIs and MTP/GTP well above healing presents for 2 year follow-up and review of surveillance imaging  -KINJAL 9/15/2021 reviewed and unchanged with diffuse bilateral femoral popliteal and tibioperoneal disease, R MAGNOLIA NC/150/105, L MAGNOLIA NC/150/91  -KINJAL unchanged for over 4 years  -+palpable pedal pulses bilaterally  -asymptomatic  No claudication, rest pain or tissue loss  -recommend continuing medical management  -return to office as needed  -continue statin therapy  -aspirin contraindicated secondary to allergy  -instructed to contact the office with any questions, concerns, new symptoms or new nonhealing wounds  Hypertension  -BP well controlled  -continue current medical regimen  -management per PCP    Familial combined hyperlipidemia  -stable  -continue statin therapy  -management per PCP    Chronic venous insufficiency  Longstanding BLE chronic venous insufficiency with prior history of R medial malleolar venous ulcer  -no recurrent wounds  -continue conservative therapy to include daily compression, elevation, low-sodium diet, weight management, aerobic activity and skin moisturization       Diagnoses and all orders for this visit:    PAD (peripheral artery disease) ([facility] [address] )    Essential hypertension    Familial combined hyperlipidemia    Chronic venous insufficiency          Subjective:      Patient ID: Mikey Farrar is a 82 y o  female  Patient presents to rev KINJAL from 9/15/2021  Pt reports bilateral LE swelling, discoloration on both ankles and cramping when she stands up  Pt is taking Atorvastatin and is a former smoker      72-year-old female former smoker w/HTN, HLD, hypothyroidism, GERD, obesity, chronic venous insufficiency with prior venous ulcerations, DM2, CKD 4 (baseline creat 2 0-2 3), anemia chronic disease and diffuse PAD with noncompressible ABIs and MTP/GTP well above healing presents for 2 year follow-up and review of surveillance imaging  Patient last seen in the office on 6/27/2019  Recent KINJAL 9/15/2021 reviewed and unchanged with diffuse bilateral femoropopliteal and tibioperoneal disease, R MAGNOLIA NC/150/105, L MAGNOLIA NC/150/91  KINJAL unchanged for over 4 years  +palpable pedal pulses bilaterally  Pt remains asymptomatic  No claudication, rest pain or tissue loss  Patient on statin  The following portions of the patient's history were reviewed and updated as appropriate: allergies, current medications, past family history, past medical history, past social history, past surgical history and problem list     Review of Systems   Constitutional: Negative  HENT: Negative  Eyes: Negative  Respiratory: Negative  Cardiovascular: Negative  Gastrointestinal: Negative  Endocrine: Negative  Genitourinary: Negative  Musculoskeletal: Negative  Skin: Positive for color change (Redness bilateral ankles)  Allergic/Immunologic: Negative  Neurological: Negative  Hematological: Negative  Psychiatric/Behavioral: Negative  I have reviewed and made appropriate changes to the review of systems input by the medical assistant      Vitals:    09/22/21 1153   BP: 132/82   BP Location: Left arm   Patient Position: Sitting   Cuff Size: Large   Pulse: 69   Temp: 98 2 °F (36 8 °C)   TempSrc: Tympanic   Weight: 96 6 kg (213 lb)   Height: 5' (1 524 m)       Patient Active Problem List   Diagnosis    PAD (peripheral artery disease) (HCC)    Type 2 diabetes mellitus with proliferative retinopathy ([facility] [address] )    Acquired hypothyroidism    Familial combined hyperlipidemia    Anxiety    Morbid obesity with BMI of 40 0-44 9, adult (HCC)    Anemia in stage 4 chronic kidney disease ([address] )    Chronic venous insufficiency    Type 2 diabetes mellitus with hypoglycemia without coma, with long-term current use of insulin (HCC)    Leukocytosis    Hypertension    Chronic cough    Chronic congestive heart failure (HCC)    Acute bronchitis    Community acquired pneumonia of left lower lobe of lung    Gastroesophageal reflux disease without esophagitis    Urinary incontinence    Localized edema    Nephrosclerosis arteriolar, 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 disease    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, left (HCC)    Sinus bradycardia       Past Surgical History:   Procedure Laterality Date    ANKLE SURGERY Bilateral     CATARACT EXTRACTION      CHOLECYSTECTOMY      TUBAL LIGATION         Family History   Problem Relation Age of Onset    Stomach cancer Mother     Diabetes Sister         mellitus       Social History     Socioeconomic History    Marital status:      Spouse name: Not on file    Number of children: Not on file    Years of education: Not on file    Highest education level: Not on file   Occupational History    Not on file   Tobacco Use    Smoking status: Former Smoker     Quit date: 1989     Years since quittin 4    Smokeless tobacco: Never Used   Vaping Use    Vaping Use: Never used   Substance and Sexual Activity    Alcohol use: Never    Drug use: No    Sexual activity: Not on file   Other Topics Concern    Not on file   Social History Narrative    Not on file     Social Determinants of Health     Financial Resource Strain:     Difficulty of Paying Living Expenses:    Food Insecurity:     Worried About Running Out of Food in the Last Year:     [address] in the Last Year:    Transportation Needs:     Lack of Transportation (Medical):      Lack of Transportation (Non-Medical):    Physical Activity:     Days of Exercise per Week:     Minutes of Exercise per Session:    Stress:     Feeling of Stress : Social Connections:     Frequency of Communication with Friends and Family:     Frequency of Social Gatherings with Friends and Family:     Attends Jew Services:     Active Member of Clubs or Organizations:     Attends Club or Organization Meetings:     Marital Status:    Intimate Partner Violence:     Fear of Current or Ex-Partner:     Emotionally Abused:     Physically Abused:     Sexually Abused:         Allergies   Allergen Reactions    Ace Inhibitors      Annotation - [id number]: short of breath    Aspirin          Current Outpatient Medications:     acetaminophen (TYLENOL) 325 mg tablet, Take 650 mg by mouth every 6 (six) hours as needed for mild pain, Disp: , Rfl:     albuterol (VENTOLIN HFA) 90 mcg/act inhaler, Inhale 2 puffs every 6 (six) hours as needed for wheezing, Disp: , Rfl:     atorvastatin (LIPITOR) 10 mg tablet, Take 1 tablet (10 mg total) by mouth daily, Disp: 90 tablet, Rfl: 1    budesonide-formoterol (SYMBICORT) 160-4 5 mcg/act inhaler, Inhale 2 puffs 2 (two) times a day Rinse mouth after use , Disp: , Rfl:     dorzolamide-timolol (COSOPT) 22 3-6 8 MG/ML ophthalmic solution, Administer 1 drop to both eyes 2 (two) times a day, Disp: , Rfl:     fluticasone (FLONASE) 50 mcg/act nasal spray, 2 sprays into each nostril daily , Disp: , Rfl:     furosemide (LASIX) 80 mg tablet, Take 1 tablet (80 mg total) by mouth daily, Disp: 90 tablet, Rfl: 1    Glucosamine-Chondroitin (GLUCOSAMINE CHONDR COMPLEX) 500-400 MG CAPS, Take 1 capsule by mouth 2 (two) times a day, Disp: , Rfl:     guaiFENesin (ROBITUSSIN) 100 MG/5ML oral liquid, Take 200 mg by mouth 3 (three) times a day as needed for cough, Disp: , Rfl:     Incontinence Supply Disposable (CERTAINTY ADJ UNDERWEAR LARGE) MISC, by Does not apply route daily at bedtime, Disp: 30 each, Rfl: 5    insulin aspart (NovoLOG) 100 units/mL injection, INJECT 4 UNITS THREE TIMES A DAY WITH MEALS  IN ADDITION TO SLIDING SCALE;SUBCUTANEOUSLY AS PER SLIDING SCALE: THREE TIMES A DAY WITH MEALS 201-250= 4 UNITS 251-300= 6 UNITS 301-350= 8 UNITS 351-400= 10 UNITS 401-450= 12 UNITS 451-500= 14 UNITS, Disp: 10 mL, Rfl: 10    insulin glargine (LANTUS) 100 units/mL subcutaneous injection, 30 units daily in AM , Disp: 1 mL, Rfl: 0    levothyroxine 75 mcg tablet, TAKE ONE TABLET BY MOUTH ONCE DAILY AS DIRECTED, Disp: 90 tablet, Rfl: 3    losartan (COZAAR) 25 mg tablet, Take 25 mg by mouth every morning , Disp: , Rfl:     montelukast (SINGULAIR) 10 mg tablet, Take 10 mg by mouth every morning , Disp: , Rfl:     pantoprazole (PROTONIX) 40 mg tablet, Take 1 tablet (40 mg total) by mouth daily before breakfast, Disp: 30 tablet, Rfl: 0    potassium chloride (KLOR-CON M20) 20 mEq tablet, Take 1 tablet (20 mEq total) by mouth daily (Patient taking differently: Take 10 mEq by mouth daily ), Disp: 90 tablet, Rfl: 1    triamcinolone (KENALOG) 0 1 % cream, , Disp: , Rfl:     albuterol (2 5 mg/3 mL) 0 083 % nebulizer solution, Take 2 5 mg by nebulization every 6 (six) hours as needed for wheezing or shortness of breath (Patient not taking: Reported on 9/22/2021), Disp: , Rfl:     amLODIPine (NORVASC) 2 5 mg tablet, Take 7 5 mg by mouth daily (Patient not taking: Reported on 8/23/2021), Disp: , Rfl:     fexofenadine (ALLEGRA) 180 MG tablet, Take by mouth (Patient not taking: Reported on 9/22/2021), Disp: , Rfl:     ipratropium (ATROVENT) 0 02 % nebulizer solution, Take 0 5 mg by nebulization 4 (four) times a day (Patient not taking: Reported on 9/22/2021), Disp: , Rfl:     RELION PEN NEEDLE 31G/8MM 31G X 8 MM MISC, USE AS DIRECTED THREE TIMES DAILY (Patient not taking: Reported on 9/22/2021), Disp: 300 each, Rfl: 3    tiotropium (SPIRIVA RESPIMAT) 1 25 MCG/ACT AERS inhaler, Inhale 2 puffs daily (Patient not taking: Reported on 9/22/2021), Disp: , Rfl:     Objective:  Imaging study:  KINJAL 9/15/2021:   Imaging study reviewed and as described above    See full report below:   Segment                Right            Left                                            PSV (cm/s)  EDV  PSV (cm/s)  EDV    Common Femoral Artery         207    0         139   23    Prox Profunda                 179    0          89    6    Prox SFA                      187    8         190   13    Mid SFA                       171    3         189   11    Dist SFA                      135    3         170    5    Proximal Pop                  103    3         126    0    Distal Pop                    107    0         105    5    Dist Post Tibial              135    4         144    4    Dist  Ant  Tibial             125   11         133    0             CONCLUSION:     Impression:  RIGHT LOWER LIMB:  Evaluation shows diffuse atherosclerotic plaque throughout the femoro-popliteal  and tibioperoneal arteries  Ankle/Brachial index: unobtainable due to non-compressible tibial vessels  Prior: unreliable  PVR/ PPG tracings are mildly dampened  Metatarsal pressure of 150mmHg  Prior 130 mmHg  Great toe pressure of 105 mmHg, within the healing range  Prior 104mmHg  LEFT LOWER LIMB:  Evaluation shows diffuse atherosclerotic plaque throughout the femoro-popliteal  and tibioperoneal arteries  Ankle/Brachial index: unobtainable due to non-compressible tibial vessels  Prior: unreliable  PVR/ PPG tracings are mildly dampened  Metatarsal pressure of 151mmHg  142 mmHg  Great toe pressure of 91 mmHg, within the healing range  Prior 83mmHg  Compared to previous study on 06/26/2019, there is no significant interval  change in the disease process    /82 (BP Location: Left arm, Patient Position: Sitting, Cuff Size: Large)   Pulse 69   Temp 98 2 °F (36 8 °C) (Tympanic)   Ht 5' (1 524 m)   Wt 96 6 kg (213 lb) Comment: per pt, pt in wheelchair  BMI 41 60 kg/m²          Physical Exam  Vitals and nursing note reviewed  Constitutional:       General: She is not in acute distress       Appearance: She is well-developed  She is obese  HENT:      Head: Normocephalic and atraumatic  Eyes:      General: No scleral icterus  Conjunctiva/sclera: Conjunctivae normal       Pupils: Pupils are equal, round, and reactive to light  Neck:      Thyroid: No thyromegaly  Vascular: No carotid bruit or JVD  Trachea: No tracheal deviation  Cardiovascular:      Rate and Rhythm: Normal rate and regular rhythm  Pulses:           Popliteal pulses are 1+ on the right side and 1+ on the left side  Dorsalis pedis pulses are 2+ on the right side and 2+ on the left side  Heart sounds: S1 normal and S2 normal  No murmur heard  No friction rub  No gallop  No S3 sounds  Comments: Chronic stable venous stasis changes bilateral lower legs without venous ulcers or tissue loss  Bilateral lower extremities warm, pink, motor and sensory intact and well perfused with readily palpable pedal pulses  Pulmonary:      Effort: No respiratory distress  Breath sounds: Normal breath sounds  No stridor  No wheezing, rhonchi or rales  Abdominal:      General: Bowel sounds are normal  There is no distension or abdominal bruit  Palpations: Abdomen is soft  There is no mass or pulsatile mass  Tenderness: There is no abdominal tenderness  There is no rebound  Musculoskeletal:         General: Normal range of motion  Cervical back: Normal range of motion and neck supple  Right lower leg: No edema  Left lower leg: No edema  Skin:     General: Skin is warm and dry  Coloration: Skin is not pale  Findings: No lesion  Neurological:      Mental Status: She is alert and oriented to person, place, and time     Psychiatric:         Mood and Affect: Mood normal

## 2021-09-22 NOTE — PATIENT INSTRUCTIONS
Peripheral Artery Disease   WHAT YOU NEED TO KNOW:   What is peripheral artery disease? Peripheral artery disease (PAD) is narrow, weak, or blocked arteries  It may affect any arteries outside of your heart and brain  PAD is usually the result of a buildup of fat and cholesterol, also called plaque, along your artery walls  Inflammation, a blood clot, or abnormal cell growth could also block your arteries  PAD prevents normal blood flow to your legs and arms  You are at risk of an amputation if poor blood flow keeps wounds from healing or causes gangrene (tissue death)  Without treatment, PAD can also cause a heart attack or stroke  What increases my risk for PAD? · Smoking cigarettes     · Diabetes     · High blood pressure     · High cholesterol     · Age older than 40 years    · Heart disease or a family history of heart disease  What are the signs and symptoms of PAD? Mild PAD usually does not cause symptoms  As the disease worsens over time, you may have the following:  · Pain or cramps in your leg or hip while you walk     · A numb, weak, or heavy feeling in your legs     · Dry, scaly, red, or pale skin on your legs     · Thick or brittle nails, or hair loss on your arms and legs     · Foot sores that will not heal     · Burning or aching in your feet and toes while resting (this may be worse when you lie down)  How is PAD diagnosed? · Angiography  is a test that shows pictures of the arteries in your arms and legs  You will be given contrast liquid to help the arteries show up better on the pictures  The pictures will be taken with an MRI or CT scan  Tell the healthcare provider if you have ever had an allergic reaction to contrast liquid  Do not enter the MRI room with anything metal  Metal can cause serious injury  Tell a healthcare provider if you have any metal in or on your body      · Doppler ankle brachial index (MAGNOLIA)  is a test that compares blood pressure in your ankles to blood pressure in your arms  This tells your healthcare provider how well blood is flowing through the arteries in your legs  How is PAD treated? Treatment can help reduce your risk of a heart attack, stroke, or amputation  You may need more than one of the following:  · Medicines  may be given  Your healthcare provider may give you any of the following:     ¨ Antiplatelet medicine,  such as aspirin, helps prevent blood clots and reduces the risk of a heart attack or stroke  ¨ Statin medicine  helps lower your cholesterol and prevents your PAD from getting worse  · A supervised exercise program  helps you stay active in normal daily activities and may prevent disability  Healthcare providers will help you safely walk or do strength training exercises 3 times a week for 30 to 60 minutes  You will do this for several months, then transition to walking on your own  · Angioplasty  is a procedure to open your artery so blood can flow through normally  A thin tube called a catheter is used to insert a small balloon into your artery  The balloon is inflated to open your blocked artery, and then removed  A tube called a stent may be placed in your artery to hold it open  · Bypass surgery  is used to make a new connection to your artery with a vein from another part of your body, or an artificial graft  The vein or graft is attached to your artery above and below your blockage  This allows blood to flow around the blocked portion of your artery  How can I manage PAD? · Do not smoke  Nicotine and other chemicals in cigarettes and cigars can worsen PAD  They can also increase your risk for a heart attack or stroke  Ask your healthcare provider for information if you currently smoke and need help to quit  E-cigarettes or smokeless tobacco still contain nicotine  Talk to your healthcare provider before you use these products  · Manage other health conditions  Take your medicines as directed   Follow your healthcare provider's instructions if you have high blood pressure or high cholesterol  Perform foot care and check your blood sugar levels as directed if you have diabetes  · Eat heart healthy foods  Eat whole grains, fruits, and vegetables every day  Limit salt and high-fat foods  Ask your healthcare provider for more information on a heart healthy diet  Ask if you need to lose weight  Your healthcare provider can help you create a healthy weight-loss plan  Call 911 for the following:   · You have any of the following signs of a heart attack:      ¨ Squeezing, pressure, or pain in your chest that lasts longer than 5 minutes or returns    ¨ Discomfort or pain in your back, neck, jaw, stomach, or arm     ¨ Trouble breathing    ¨ Nausea or vomiting    ¨ Lightheadedness or a sudden cold sweat, especially with chest pain or trouble breathing    · You have any of the following signs of a stroke:      ¨ Numbness or drooping on one side of your face     ¨ Weakness in an arm or leg    ¨ Confusion or difficulty speaking    ¨ Dizziness, a severe headache, or vision loss  When should I seek immediate care? · You have sores or wounds that will not heal      · You notice black or discolored skin on your arm or leg  · Your skin is cool to the touch  When should I contact my healthcare provider? · You have leg pain when you walk 1/8 mile (200 meters) or less, even with treatment  · Your legs are red, dry, or pale, even with treatment  · You have questions or concerns about your condition or care  CARE AGREEMENT:   You have the right to help plan your care  Learn about your health condition and how it may be treated  Discuss treatment options with your caregivers to decide what care you want to receive  You always have the right to refuse treatment  The above information is an  only  It is not intended as medical advice for individual conditions or treatments   Talk to your doctor, nurse or pharmacist before following any medical regimen to see if it is safe and effective for you  © 2017 [employer] Information is for End User's use only and may not be sold, redistributed or otherwise used for commercial purposes  All illustrations and images included in CareNotes® are the copyrighted property of [employer]  or [employer]  -your recent arterial ultrasound is unchanged and shows stability of your known diffuse mild peripheral arterial disease     Unchanged for over 4 years    You're getting adequate blood flow to her foot to heal any wounds  -continue atorvastatin  - follow-up in the office as needed unless new symptoms or new nonhealing wounds  -please contact the office with any questions, concerns, new symptoms or new wound

## 2021-09-22 NOTE — ASSESSMENT & PLAN NOTE
51-year-old female former smoker w/HTN, HLD, hypothyroidism, GERD, obesity, chronic venous insufficiency with prior venous ulcerations, DM2, CKD 4 (baseline creat 2 0-2 3), anemia chronic disease and diffuse PAD with noncompressible ABIs and MTP/GTP well above healing presents for 2 year follow-up and review of surveillance imaging  -KINJAL 9/15/2021 reviewed and unchanged with diffuse bilateral femoral popliteal and tibioperoneal disease, R MAGNOLIA NC/150/105, L MAGNOLIA NC/150/91  -KINJAL unchanged for over 4 years  -+palpable pedal pulses bilaterally  -asymptomatic  No claudication, rest pain or tissue loss  -recommend continuing medical management  -return to office as needed  -continue statin therapy  -aspirin contraindicated secondary to allergy  -instructed to contact the office with any questions, concerns, new symptoms or new nonhealing wounds

## 2021-10-28 ENCOUNTER — OFFICE VISIT (OUTPATIENT)
Dept: NEPHROLOGY | Facility: CLINIC | Age: 86
End: 2021-10-28
Payer: MEDICARE

## 2021-10-28 VITALS
HEART RATE: 80 BPM | SYSTOLIC BLOOD PRESSURE: 132 MMHG | WEIGHT: 213 LBS | DIASTOLIC BLOOD PRESSURE: 78 MMHG | BODY MASS INDEX: 41.82 KG/M2 | HEIGHT: 60 IN

## 2021-10-28 DIAGNOSIS — I12.9 NEPHROSCLEROSIS ARTERIOLAR, STAGE 1-4 OR UNSPECIFIED CHRONIC KIDNEY DISEASE: Primary | ICD-10-CM

## 2021-10-28 PROCEDURE — 99214 OFFICE O/P EST MOD 30 MIN: CPT | Performed by: INTERNAL MEDICINE

## 2021-11-30 ENCOUNTER — TELEPHONE (OUTPATIENT)
Dept: ENDOCRINOLOGY | Facility: CLINIC | Age: 86
End: 2021-11-30

## 2021-12-09 ENCOUNTER — OFFICE VISIT (OUTPATIENT)
Dept: ENDOCRINOLOGY | Facility: CLINIC | Age: 86
End: 2021-12-09
Payer: MEDICARE

## 2021-12-09 VITALS
BODY MASS INDEX: 41.79 KG/M2 | HEART RATE: 78 BPM | SYSTOLIC BLOOD PRESSURE: 130 MMHG | WEIGHT: 214 LBS | DIASTOLIC BLOOD PRESSURE: 78 MMHG

## 2021-12-09 DIAGNOSIS — M85.89 OTHER SPECIFIED DISORDERS OF BONE DENSITY AND STRUCTURE, MULTIPLE SITES: ICD-10-CM

## 2021-12-09 DIAGNOSIS — E11.649 TYPE 2 DIABETES MELLITUS WITH HYPOGLYCEMIA WITHOUT COMA, WITH LONG-TERM CURRENT USE OF INSULIN (HCC): Primary | ICD-10-CM

## 2021-12-09 DIAGNOSIS — M85.80 OSTEOPENIA, UNSPECIFIED LOCATION: ICD-10-CM

## 2021-12-09 DIAGNOSIS — E28.39 ESTROGEN DEFICIENCY: ICD-10-CM

## 2021-12-09 DIAGNOSIS — Z87.81 HISTORY OF FRACTURE: ICD-10-CM

## 2021-12-09 DIAGNOSIS — E03.9 ACQUIRED HYPOTHYROIDISM: ICD-10-CM

## 2021-12-09 DIAGNOSIS — Z13.820 SCREENING FOR OSTEOPOROSIS: ICD-10-CM

## 2021-12-09 DIAGNOSIS — E78.49 FAMILIAL COMBINED HYPERLIPIDEMIA: ICD-10-CM

## 2021-12-09 DIAGNOSIS — I10 PRIMARY HYPERTENSION: ICD-10-CM

## 2021-12-09 DIAGNOSIS — Z79.4 TYPE 2 DIABETES MELLITUS WITH HYPOGLYCEMIA WITHOUT COMA, WITH LONG-TERM CURRENT USE OF INSULIN (HCC): Primary | ICD-10-CM

## 2021-12-09 PROCEDURE — 99214 OFFICE O/P EST MOD 30 MIN: CPT | Performed by: PHYSICIAN ASSISTANT

## 2021-12-09 RX ORDER — ACETAMINOPHEN 160 MG
2000 TABLET,DISINTEGRATING ORAL DAILY
Qty: 30 CAPSULE | Refills: 5 | Status: SHIPPED | OUTPATIENT
Start: 2021-12-09

## 2021-12-09 RX ORDER — PHENOL 1.4 %
AEROSOL, SPRAY (ML) MUCOUS MEMBRANE
Qty: 60 TABLET | Refills: 6 | Status: SHIPPED | OUTPATIENT
Start: 2021-12-09

## 2022-01-01 ENCOUNTER — APPOINTMENT (INPATIENT)
Dept: NON INVASIVE DIAGNOSTICS | Facility: HOSPITAL | Age: 87
End: 2022-01-01

## 2022-01-01 ENCOUNTER — APPOINTMENT (EMERGENCY)
Dept: CT IMAGING | Facility: HOSPITAL | Age: 87
End: 2022-01-01

## 2022-01-01 ENCOUNTER — HOSPITAL ENCOUNTER (INPATIENT)
Facility: HOSPITAL | Age: 87
LOS: 3 days | End: 2022-12-28
Attending: EMERGENCY MEDICINE | Admitting: INTERNAL MEDICINE

## 2022-01-01 ENCOUNTER — HOME CARE VISIT (OUTPATIENT)
Dept: HOME HEALTH SERVICES | Facility: HOME HEALTHCARE | Age: 87
End: 2022-01-01

## 2022-01-01 ENCOUNTER — TELEPHONE (OUTPATIENT)
Dept: RADIOLOGY | Facility: HOSPITAL | Age: 87
End: 2022-01-01

## 2022-01-01 VITALS
BODY MASS INDEX: 43.19 KG/M2 | TEMPERATURE: 98.1 F | HEART RATE: 55 BPM | SYSTOLIC BLOOD PRESSURE: 104 MMHG | WEIGHT: 220 LBS | HEIGHT: 60 IN | DIASTOLIC BLOOD PRESSURE: 50 MMHG | RESPIRATION RATE: 20 BRPM | OXYGEN SATURATION: 95 %

## 2022-01-01 DIAGNOSIS — Z79.4 TYPE 2 DIABETES MELLITUS WITH HYPERGLYCEMIA, WITH LONG-TERM CURRENT USE OF INSULIN (HCC): ICD-10-CM

## 2022-01-01 DIAGNOSIS — J96.90 RESPIRATORY FAILURE (HCC): ICD-10-CM

## 2022-01-01 DIAGNOSIS — Z79.4 TYPE 2 DIABETES MELLITUS WITH HYPOGLYCEMIA WITHOUT COMA, WITH LONG-TERM CURRENT USE OF INSULIN (HCC): ICD-10-CM

## 2022-01-01 DIAGNOSIS — E11.65 TYPE 2 DIABETES MELLITUS WITH HYPERGLYCEMIA, WITH LONG-TERM CURRENT USE OF INSULIN (HCC): ICD-10-CM

## 2022-01-01 DIAGNOSIS — J11.1 INFLUENZA: Primary | ICD-10-CM

## 2022-01-01 DIAGNOSIS — J96.01 ACUTE RESPIRATORY FAILURE WITH HYPOXIA (HCC): ICD-10-CM

## 2022-01-01 DIAGNOSIS — E11.649 TYPE 2 DIABETES MELLITUS WITH HYPOGLYCEMIA WITHOUT COMA, WITH LONG-TERM CURRENT USE OF INSULIN (HCC): ICD-10-CM

## 2022-01-01 DIAGNOSIS — N17.9 AKI (ACUTE KIDNEY INJURY) (HCC): ICD-10-CM

## 2022-01-01 DIAGNOSIS — R41.82 ALTERED MENTAL STATUS: ICD-10-CM

## 2022-01-01 DIAGNOSIS — R77.8 ELEVATED TROPONIN: ICD-10-CM

## 2022-01-01 DIAGNOSIS — R07.9 CHEST PAIN: ICD-10-CM

## 2022-01-01 LAB
2HR DELTA HS TROPONIN: 181 NG/L
4HR DELTA HS TROPONIN: 244 NG/L
ALBUMIN SERPL BCP-MCNC: 2.6 G/DL (ref 3.5–5)
ALBUMIN SERPL BCP-MCNC: 3 G/DL (ref 3.5–5)
ALP SERPL-CCNC: 85 U/L (ref 46–116)
ALP SERPL-CCNC: 99 U/L (ref 46–116)
ALT SERPL W P-5'-P-CCNC: 16 U/L (ref 12–78)
ALT SERPL W P-5'-P-CCNC: 23 U/L (ref 12–78)
ANION GAP SERPL CALCULATED.3IONS-SCNC: 6 MMOL/L (ref 4–13)
ANION GAP SERPL CALCULATED.3IONS-SCNC: 8 MMOL/L (ref 4–13)
ANION GAP SERPL CALCULATED.3IONS-SCNC: 8 MMOL/L (ref 4–13)
AORTIC ROOT: 2.9 CM
APICAL FOUR CHAMBER EJECTION FRACTION: 66 %
ARTERIAL PATENCY WRIST A: NO
ASCENDING AORTA: 3.1 CM
AST SERPL W P-5'-P-CCNC: 18 U/L (ref 5–45)
AST SERPL W P-5'-P-CCNC: 22 U/L (ref 5–45)
ATRIAL RATE: 111 BPM
ATRIAL RATE: 62 BPM
ATRIAL RATE: 96 BPM
BASE EX.OXY STD BLDV CALC-SCNC: 74 % (ref 60–80)
BASE EXCESS BLDA CALC-SCNC: -1.2 MMOL/L
BASE EXCESS BLDA CALC-SCNC: 1.3 MMOL/L
BASE EXCESS BLDV CALC-SCNC: 1.3 MMOL/L
BASOPHILS # BLD AUTO: 0.02 THOUSANDS/ÂΜL (ref 0–0.1)
BASOPHILS # BLD AUTO: 0.03 THOUSANDS/ÂΜL (ref 0–0.1)
BASOPHILS # BLD AUTO: 0.03 THOUSANDS/ÂΜL (ref 0–0.1)
BASOPHILS NFR BLD AUTO: 0 % (ref 0–1)
BILIRUB SERPL-MCNC: 0.16 MG/DL (ref 0.2–1)
BILIRUB SERPL-MCNC: 0.2 MG/DL (ref 0.2–1)
BUN SERPL-MCNC: 48 MG/DL (ref 5–25)
BUN SERPL-MCNC: 68 MG/DL (ref 5–25)
BUN SERPL-MCNC: 88 MG/DL (ref 5–25)
CALCIUM ALBUM COR SERPL-MCNC: 8.8 MG/DL (ref 8.3–10.1)
CALCIUM ALBUM COR SERPL-MCNC: 9.2 MG/DL (ref 8.3–10.1)
CALCIUM SERPL-MCNC: 7.3 MG/DL (ref 8.3–10.1)
CALCIUM SERPL-MCNC: 7.7 MG/DL (ref 8.3–10.1)
CALCIUM SERPL-MCNC: 8.4 MG/DL (ref 8.3–10.1)
CARDIAC TROPONIN I PNL SERPL HS: 151 NG/L
CARDIAC TROPONIN I PNL SERPL HS: 180 NG/L (ref 8–18)
CARDIAC TROPONIN I PNL SERPL HS: 332 NG/L
CARDIAC TROPONIN I PNL SERPL HS: 395 NG/L
CHLORIDE SERPL-SCNC: 101 MMOL/L (ref 96–108)
CHLORIDE SERPL-SCNC: 102 MMOL/L (ref 96–108)
CHLORIDE SERPL-SCNC: 104 MMOL/L (ref 96–108)
CHOLEST SERPL-MCNC: 91 MG/DL
CO2 SERPL-SCNC: 30 MMOL/L (ref 21–32)
CO2 SERPL-SCNC: 32 MMOL/L (ref 21–32)
CO2 SERPL-SCNC: 33 MMOL/L (ref 21–32)
CREAT SERPL-MCNC: 2.27 MG/DL (ref 0.6–1.3)
CREAT SERPL-MCNC: 3.98 MG/DL (ref 0.6–1.3)
CREAT SERPL-MCNC: 5.32 MG/DL (ref 0.6–1.3)
E WAVE DECELERATION TIME: 275 MS
EOSINOPHIL # BLD AUTO: 0 THOUSAND/ÂΜL (ref 0–0.61)
EOSINOPHIL # BLD AUTO: 0.01 THOUSAND/ÂΜL (ref 0–0.61)
EOSINOPHIL # BLD AUTO: 0.01 THOUSAND/ÂΜL (ref 0–0.61)
EOSINOPHIL NFR BLD AUTO: 0 % (ref 0–6)
ERYTHROCYTE [DISTWIDTH] IN BLOOD BY AUTOMATED COUNT: 15.3 % (ref 11.6–15.1)
ERYTHROCYTE [DISTWIDTH] IN BLOOD BY AUTOMATED COUNT: 15.3 % (ref 11.6–15.1)
ERYTHROCYTE [DISTWIDTH] IN BLOOD BY AUTOMATED COUNT: 15.4 % (ref 11.6–15.1)
EST. AVERAGE GLUCOSE BLD GHB EST-MCNC: 229 MG/DL
FRACTIONAL SHORTENING: 38 (ref 28–44)
GFR SERPL CREATININE-BSD FRML MDRD: 19 ML/MIN/1.73SQ M
GFR SERPL CREATININE-BSD FRML MDRD: 6 ML/MIN/1.73SQ M
GFR SERPL CREATININE-BSD FRML MDRD: 9 ML/MIN/1.73SQ M
GLUCOSE SERPL-MCNC: 113 MG/DL (ref 65–140)
GLUCOSE SERPL-MCNC: 114 MG/DL (ref 65–140)
GLUCOSE SERPL-MCNC: 117 MG/DL (ref 65–140)
GLUCOSE SERPL-MCNC: 118 MG/DL (ref 65–140)
GLUCOSE SERPL-MCNC: 126 MG/DL (ref 65–140)
GLUCOSE SERPL-MCNC: 128 MG/DL (ref 65–140)
GLUCOSE SERPL-MCNC: 137 MG/DL (ref 65–140)
GLUCOSE SERPL-MCNC: 209 MG/DL (ref 65–140)
GLUCOSE SERPL-MCNC: 263 MG/DL (ref 65–140)
GLUCOSE SERPL-MCNC: 305 MG/DL (ref 65–140)
GLUCOSE SERPL-MCNC: 367 MG/DL (ref 65–140)
GLUCOSE SERPL-MCNC: 77 MG/DL (ref 65–140)
GLUCOSE SERPL-MCNC: 84 MG/DL (ref 65–140)
GLUCOSE SERPL-MCNC: 85 MG/DL (ref 65–140)
GLUCOSE SERPL-MCNC: 93 MG/DL (ref 65–140)
HBA1C MFR BLD: 9.6 %
HCO3 BLDA-SCNC: 26.8 MMOL/L (ref 22–28)
HCO3 BLDA-SCNC: 31.4 MMOL/L (ref 22–28)
HCO3 BLDV-SCNC: 32 MMOL/L (ref 24–30)
HCT VFR BLD AUTO: 36.4 % (ref 34.8–46.1)
HCT VFR BLD AUTO: 37.3 % (ref 34.8–46.1)
HCT VFR BLD AUTO: 39.2 % (ref 34.8–46.1)
HDLC SERPL-MCNC: 49 MG/DL
HGB BLD-MCNC: 10.4 G/DL (ref 11.5–15.4)
HGB BLD-MCNC: 10.6 G/DL (ref 11.5–15.4)
HGB BLD-MCNC: 11.7 G/DL (ref 11.5–15.4)
IMM GRANULOCYTES # BLD AUTO: 0.09 THOUSAND/UL (ref 0–0.2)
IMM GRANULOCYTES # BLD AUTO: 0.09 THOUSAND/UL (ref 0–0.2)
IMM GRANULOCYTES # BLD AUTO: 0.13 THOUSAND/UL (ref 0–0.2)
IMM GRANULOCYTES NFR BLD AUTO: 1 % (ref 0–2)
INTERVENTRICULAR SEPTUM IN DIASTOLE (PARASTERNAL SHORT AXIS VIEW): 0.9 CM
INTERVENTRICULAR SEPTUM: 0.9 CM (ref 0.6–1.1)
IPAP: 18
IPAP: 18
LAAS-AP2: 21.7 CM2
LAAS-AP4: 20.7 CM2
LDLC SERPL CALC-MCNC: 27 MG/DL (ref 0–100)
LEFT ATRIUM SIZE: 4 CM
LEFT INTERNAL DIMENSION IN SYSTOLE: 2.8 CM (ref 2.1–4)
LEFT VENTRICULAR INTERNAL DIMENSION IN DIASTOLE: 4.5 CM (ref 3.5–6)
LEFT VENTRICULAR POSTERIOR WALL IN END DIASTOLE: 1 CM
LEFT VENTRICULAR STROKE VOLUME: 64 ML
LVSV (TEICH): 64 ML
LYMPHOCYTES # BLD AUTO: 1.13 THOUSANDS/ÂΜL (ref 0.6–4.47)
LYMPHOCYTES # BLD AUTO: 1.47 THOUSANDS/ÂΜL (ref 0.6–4.47)
LYMPHOCYTES # BLD AUTO: 1.73 THOUSANDS/ÂΜL (ref 0.6–4.47)
LYMPHOCYTES NFR BLD AUTO: 10 % (ref 14–44)
LYMPHOCYTES NFR BLD AUTO: 13 % (ref 14–44)
LYMPHOCYTES NFR BLD AUTO: 15 % (ref 14–44)
MAGNESIUM SERPL-MCNC: 2.2 MG/DL (ref 1.6–2.6)
MCH RBC QN AUTO: 27 PG (ref 26.8–34.3)
MCH RBC QN AUTO: 27.2 PG (ref 26.8–34.3)
MCH RBC QN AUTO: 27.6 PG (ref 26.8–34.3)
MCHC RBC AUTO-ENTMCNC: 28.4 G/DL (ref 31.4–37.4)
MCHC RBC AUTO-ENTMCNC: 28.6 G/DL (ref 31.4–37.4)
MCHC RBC AUTO-ENTMCNC: 29.8 G/DL (ref 31.4–37.4)
MCV RBC AUTO: 93 FL (ref 82–98)
MCV RBC AUTO: 95 FL (ref 82–98)
MCV RBC AUTO: 96 FL (ref 82–98)
MONOCYTES # BLD AUTO: 0.69 THOUSAND/ÂΜL (ref 0.17–1.22)
MONOCYTES # BLD AUTO: 0.82 THOUSAND/ÂΜL (ref 0.17–1.22)
MONOCYTES # BLD AUTO: 1.05 THOUSAND/ÂΜL (ref 0.17–1.22)
MONOCYTES NFR BLD AUTO: 6 % (ref 4–12)
MONOCYTES NFR BLD AUTO: 7 % (ref 4–12)
MONOCYTES NFR BLD AUTO: 9 % (ref 4–12)
MRSA NOSE QL CULT: NORMAL
MV E'TISSUE VEL-SEP: 6 CM/S
MV PEAK A VEL: 1.46 M/S
MV PEAK E VEL: 132 CM/S
MV STENOSIS PRESSURE HALF TIME: 80 MS
MV VALVE AREA P 1/2 METHOD: 2.75
NEUTROPHILS # BLD AUTO: 8.94 THOUSANDS/ÂΜL (ref 1.85–7.62)
NEUTROPHILS # BLD AUTO: 9.17 THOUSANDS/ÂΜL (ref 1.85–7.62)
NEUTROPHILS # BLD AUTO: 9.24 THOUSANDS/ÂΜL (ref 1.85–7.62)
NEUTS SEG NFR BLD AUTO: 77 % (ref 43–75)
NEUTS SEG NFR BLD AUTO: 78 % (ref 43–75)
NEUTS SEG NFR BLD AUTO: 82 % (ref 43–75)
NON VENT- BIPAP: ABNORMAL
NON VENT- BIPAP: ABNORMAL
NRBC BLD AUTO-RTO: 0 /100 WBCS
NT-PROBNP SERPL-MCNC: 1837 PG/ML
O2 CT BLDA-SCNC: 14.5 ML/DL (ref 16–23)
O2 CT BLDA-SCNC: 15.8 ML/DL (ref 16–23)
O2 CT BLDV-SCNC: 12.2 ML/DL
OXYHGB MFR BLDA: 95.5 % (ref 94–97)
OXYHGB MFR BLDA: 96 % (ref 94–97)
P AXIS: 31 DEGREES
P AXIS: 41 DEGREES
P AXIS: 47 DEGREES
PCO2 BLDA: 62.1 MM HG (ref 36–44)
PCO2 BLDA: 83.6 MM HG (ref 36–44)
PCO2 BLDV: 90.2 MM HG (ref 42–50)
PEEP MAX SETTING VENT: 6 CM[H2O]
PEEP MAX SETTING VENT: 6 CM[H2O]
PH BLDA: 7.19 [PH] (ref 7.35–7.45)
PH BLDA: 7.25 [PH] (ref 7.35–7.45)
PH BLDV: 7.17 [PH] (ref 7.3–7.4)
PHOSPHATE SERPL-MCNC: 6.6 MG/DL (ref 2.3–4.1)
PLATELET # BLD AUTO: 222 THOUSANDS/UL (ref 149–390)
PLATELET # BLD AUTO: 231 THOUSANDS/UL (ref 149–390)
PLATELET # BLD AUTO: 237 THOUSANDS/UL (ref 149–390)
PMV BLD AUTO: 10.7 FL (ref 8.9–12.7)
PMV BLD AUTO: 10.7 FL (ref 8.9–12.7)
PMV BLD AUTO: 9.5 FL (ref 8.9–12.7)
PO2 BLDA: 90.3 MM HG (ref 75–129)
PO2 BLDA: 96.3 MM HG (ref 75–129)
PO2 BLDV: 40.9 MM HG (ref 35–45)
POTASSIUM SERPL-SCNC: 4.4 MMOL/L (ref 3.5–5.3)
POTASSIUM SERPL-SCNC: 5.1 MMOL/L (ref 3.5–5.3)
POTASSIUM SERPL-SCNC: 5.5 MMOL/L (ref 3.5–5.3)
PR INTERVAL: 166 MS
PR INTERVAL: 174 MS
PR INTERVAL: 196 MS
PROCALCITONIN SERPL-MCNC: 2.53 NG/ML
PROCALCITONIN SERPL-MCNC: 6.31 NG/ML
PROT SERPL-MCNC: 7.5 G/DL (ref 6.4–8.4)
PROT SERPL-MCNC: 8.2 G/DL (ref 6.4–8.4)
QRS AXIS: -26 DEGREES
QRS AXIS: -29 DEGREES
QRS AXIS: -51 DEGREES
QRSD INTERVAL: 60 MS
QRSD INTERVAL: 60 MS
QRSD INTERVAL: 64 MS
QT INTERVAL: 306 MS
QT INTERVAL: 396 MS
QT INTERVAL: 402 MS
QTC INTERVAL: 408 MS
QTC INTERVAL: 416 MS
QTC INTERVAL: 421 MS
RBC # BLD AUTO: 3.85 MILLION/UL (ref 3.81–5.12)
RBC # BLD AUTO: 3.9 MILLION/UL (ref 3.81–5.12)
RBC # BLD AUTO: 4.24 MILLION/UL (ref 3.81–5.12)
RIGHT ATRIAL 2D VOLUME: 41 ML
RIGHT ATRIUM AREA SYSTOLE A4C: 16.5 CM2
RIGHT VENTRICLE ID DIMENSION: 4.1 CM
SARS-COV-2 RNA RESP QL NAA+PROBE: NEGATIVE
SL CV LEFT ATRIUM LENGTH A2C: 6 CM
SL CV PED ECHO LEFT VENTRICLE DIASTOLIC VOLUME (MOD BIPLANE) 2D: 94 ML
SL CV PED ECHO LEFT VENTRICLE SYSTOLIC VOLUME (MOD BIPLANE) 2D: 30 ML
SODIUM SERPL-SCNC: 139 MMOL/L (ref 135–147)
SODIUM SERPL-SCNC: 141 MMOL/L (ref 135–147)
SODIUM SERPL-SCNC: 144 MMOL/L (ref 135–147)
SPECIMEN SOURCE: ABNORMAL
SPECIMEN SOURCE: ABNORMAL
T WAVE AXIS: 61 DEGREES
T WAVE AXIS: 64 DEGREES
T WAVE AXIS: 77 DEGREES
TR MAX PG: 32 MMHG
TR PEAK VELOCITY: 2.8 M/S
TRICUSPID VALVE PEAK REGURGITATION VELOCITY: 2.81 M/S
TRIGL SERPL-MCNC: 74 MG/DL
TSH SERPL DL<=0.05 MIU/L-ACNC: 1.64 UIU/ML (ref 0.45–4.5)
VENT BIPAP FIO2: 40 %
VENT BIPAP FIO2: 40 %
VENTRICULAR RATE: 111 BPM
VENTRICULAR RATE: 62 BPM
VENTRICULAR RATE: 68 BPM
WBC # BLD AUTO: 11.32 THOUSAND/UL (ref 4.31–10.16)
WBC # BLD AUTO: 11.57 THOUSAND/UL (ref 4.31–10.16)
WBC # BLD AUTO: 11.76 THOUSAND/UL (ref 4.31–10.16)

## 2022-01-01 RX ORDER — ONDANSETRON 2 MG/ML
4 INJECTION INTRAMUSCULAR; INTRAVENOUS EVERY 6 HOURS PRN
Status: DISCONTINUED | OUTPATIENT
Start: 2022-01-01 | End: 2022-01-01 | Stop reason: HOSPADM

## 2022-01-01 RX ORDER — ACETAMINOPHEN 325 MG/1
650 TABLET ORAL EVERY 6 HOURS PRN
Status: DISCONTINUED | OUTPATIENT
Start: 2022-01-01 | End: 2022-01-01 | Stop reason: SDUPTHER

## 2022-01-01 RX ORDER — METHYLPREDNISOLONE SODIUM SUCCINATE 125 MG/2ML
80 INJECTION, POWDER, LYOPHILIZED, FOR SOLUTION INTRAMUSCULAR; INTRAVENOUS ONCE
Status: COMPLETED | OUTPATIENT
Start: 2022-01-01 | End: 2022-01-01

## 2022-01-01 RX ORDER — HYDRALAZINE HYDROCHLORIDE 20 MG/ML
10 INJECTION INTRAMUSCULAR; INTRAVENOUS EVERY 6 HOURS PRN
Status: DISCONTINUED | OUTPATIENT
Start: 2022-01-01 | End: 2022-01-01

## 2022-01-01 RX ORDER — FLUTICASONE PROPIONATE 50 MCG
2 SPRAY, SUSPENSION (ML) NASAL DAILY
Status: DISCONTINUED | OUTPATIENT
Start: 2022-01-01 | End: 2022-01-01

## 2022-01-01 RX ORDER — HEPARIN SODIUM 5000 [USP'U]/ML
7500 INJECTION, SOLUTION INTRAVENOUS; SUBCUTANEOUS EVERY 8 HOURS SCHEDULED
Status: DISCONTINUED | OUTPATIENT
Start: 2022-01-01 | End: 2022-01-01

## 2022-01-01 RX ORDER — ACETAMINOPHEN 325 MG/1
975 TABLET ORAL ONCE
Status: COMPLETED | OUTPATIENT
Start: 2022-01-01 | End: 2022-01-01

## 2022-01-01 RX ORDER — INSULIN LISPRO 100 [IU]/ML
1-5 INJECTION, SOLUTION INTRAVENOUS; SUBCUTANEOUS
Status: DISCONTINUED | OUTPATIENT
Start: 2022-01-01 | End: 2022-01-01

## 2022-01-01 RX ORDER — LOSARTAN POTASSIUM 25 MG/1
25 TABLET ORAL EVERY MORNING
Status: DISCONTINUED | OUTPATIENT
Start: 2022-01-01 | End: 2022-01-01

## 2022-01-01 RX ORDER — OSELTAMIVIR PHOSPHATE 30 MG/1
30 CAPSULE ORAL EVERY OTHER DAY
Status: DISCONTINUED | OUTPATIENT
Start: 2022-01-01 | End: 2022-01-01

## 2022-01-01 RX ORDER — INSULIN LISPRO 100 [IU]/ML
1-5 INJECTION, SOLUTION INTRAVENOUS; SUBCUTANEOUS EVERY 6 HOURS SCHEDULED
Status: DISCONTINUED | OUTPATIENT
Start: 2022-01-01 | End: 2022-01-01

## 2022-01-01 RX ORDER — IODIXANOL 320 MG/ML
100 INJECTION, SOLUTION INTRAVASCULAR
Status: COMPLETED | OUTPATIENT
Start: 2022-01-01 | End: 2022-01-01

## 2022-01-01 RX ORDER — LORAZEPAM 2 MG/ML
1 INJECTION INTRAMUSCULAR EVERY 4 HOURS PRN
Status: DISCONTINUED | OUTPATIENT
Start: 2022-01-01 | End: 2022-01-01

## 2022-01-01 RX ORDER — ALBUTEROL SULFATE 2.5 MG/3ML
2.5 SOLUTION RESPIRATORY (INHALATION) EVERY 6 HOURS PRN
Status: DISCONTINUED | OUTPATIENT
Start: 2022-01-01 | End: 2022-01-01 | Stop reason: HOSPADM

## 2022-01-01 RX ORDER — MONTELUKAST SODIUM 10 MG/1
10 TABLET ORAL EVERY MORNING
Status: DISCONTINUED | OUTPATIENT
Start: 2022-01-01 | End: 2022-01-01

## 2022-01-01 RX ORDER — DOCUSATE SODIUM 100 MG/1
100 CAPSULE, LIQUID FILLED ORAL 2 TIMES DAILY
Status: DISCONTINUED | OUTPATIENT
Start: 2022-01-01 | End: 2022-01-01

## 2022-01-01 RX ORDER — OSELTAMIVIR PHOSPHATE 30 MG/1
30 CAPSULE ORAL EVERY 12 HOURS SCHEDULED
Status: DISCONTINUED | OUTPATIENT
Start: 2022-01-01 | End: 2022-01-01

## 2022-01-01 RX ORDER — ACETAMINOPHEN 325 MG/1
650 TABLET ORAL EVERY 6 HOURS PRN
Status: DISCONTINUED | OUTPATIENT
Start: 2022-01-01 | End: 2022-01-01 | Stop reason: HOSPADM

## 2022-01-01 RX ORDER — ATORVASTATIN CALCIUM 40 MG/1
40 TABLET, FILM COATED ORAL
Status: DISCONTINUED | OUTPATIENT
Start: 2022-01-01 | End: 2022-01-01

## 2022-01-01 RX ORDER — INSULIN GLARGINE 100 [IU]/ML
10 INJECTION, SOLUTION SUBCUTANEOUS
Status: DISCONTINUED | OUTPATIENT
Start: 2022-01-01 | End: 2022-01-01

## 2022-01-01 RX ORDER — BUDESONIDE AND FORMOTEROL FUMARATE DIHYDRATE 160; 4.5 UG/1; UG/1
2 AEROSOL RESPIRATORY (INHALATION) 2 TIMES DAILY
Status: DISCONTINUED | OUTPATIENT
Start: 2022-01-01 | End: 2022-01-01

## 2022-01-01 RX ORDER — LEVOTHYROXINE SODIUM 0.07 MG/1
75 TABLET ORAL
Status: DISCONTINUED | OUTPATIENT
Start: 2022-01-01 | End: 2022-01-01

## 2022-01-01 RX ORDER — IPRATROPIUM BROMIDE AND ALBUTEROL SULFATE 2.5; .5 MG/3ML; MG/3ML
3 SOLUTION RESPIRATORY (INHALATION) ONCE
Status: COMPLETED | OUTPATIENT
Start: 2022-01-01 | End: 2022-01-01

## 2022-01-01 RX ORDER — BUDESONIDE 0.5 MG/2ML
0.5 INHALANT ORAL
Status: DISCONTINUED | OUTPATIENT
Start: 2022-01-01 | End: 2022-01-01

## 2022-01-01 RX ORDER — ENOXAPARIN SODIUM 100 MG/ML
30 INJECTION SUBCUTANEOUS DAILY
Status: DISCONTINUED | OUTPATIENT
Start: 2022-01-01 | End: 2022-01-01

## 2022-01-01 RX ORDER — ASPIRIN 81 MG/1
81 TABLET ORAL DAILY
Status: DISCONTINUED | OUTPATIENT
Start: 2022-01-01 | End: 2022-01-01

## 2022-01-01 RX ORDER — DORZOLAMIDE HYDROCHLORIDE AND TIMOLOL MALEATE 20; 5 MG/ML; MG/ML
1 SOLUTION/ DROPS OPHTHALMIC 2 TIMES DAILY
Status: DISCONTINUED | OUTPATIENT
Start: 2022-01-01 | End: 2022-01-01 | Stop reason: HOSPADM

## 2022-01-01 RX ORDER — PANTOPRAZOLE SODIUM 40 MG/1
40 TABLET, DELAYED RELEASE ORAL
Status: DISCONTINUED | OUTPATIENT
Start: 2022-01-01 | End: 2022-01-01

## 2022-01-01 RX ORDER — FUROSEMIDE 80 MG
80 TABLET ORAL DAILY
Status: DISCONTINUED | OUTPATIENT
Start: 2022-01-01 | End: 2022-01-01

## 2022-01-01 RX ORDER — LEVALBUTEROL 1.25 MG/.5ML
1.25 SOLUTION, CONCENTRATE RESPIRATORY (INHALATION)
Status: DISCONTINUED | OUTPATIENT
Start: 2022-01-01 | End: 2022-01-01

## 2022-01-01 RX ORDER — NYSTATIN 100000 [USP'U]/G
POWDER TOPICAL 2 TIMES DAILY
Status: DISCONTINUED | OUTPATIENT
Start: 2022-01-01 | End: 2022-01-01 | Stop reason: HOSPADM

## 2022-01-01 RX ORDER — LORAZEPAM 2 MG/ML
1 INJECTION INTRAMUSCULAR EVERY 2 HOUR PRN
Status: DISCONTINUED | OUTPATIENT
Start: 2022-01-01 | End: 2022-01-01 | Stop reason: HOSPADM

## 2022-01-01 RX ADMIN — MORPHINE SULFATE 1 MG: 2 INJECTION, SOLUTION INTRAMUSCULAR; INTRAVENOUS at 01:17

## 2022-01-01 RX ADMIN — FUROSEMIDE 80 MG: 80 TABLET ORAL at 08:13

## 2022-01-01 RX ADMIN — AMLODIPINE BESYLATE 7.5 MG: 5 TABLET ORAL at 08:12

## 2022-01-01 RX ADMIN — IPRATROPIUM BROMIDE 0.5 MG: 0.5 SOLUTION RESPIRATORY (INHALATION) at 08:22

## 2022-01-01 RX ADMIN — HEPARIN SODIUM 7500 UNITS: 5000 INJECTION INTRAVENOUS; SUBCUTANEOUS at 05:08

## 2022-01-01 RX ADMIN — LEVALBUTEROL 1.25 MG: 1.25 SOLUTION, CONCENTRATE RESPIRATORY (INHALATION) at 13:09

## 2022-01-01 RX ADMIN — ASPIRIN 81 MG: 81 TABLET, COATED ORAL at 09:01

## 2022-01-01 RX ADMIN — ACETAMINOPHEN 650 MG: 325 TABLET, FILM COATED ORAL at 01:22

## 2022-01-01 RX ADMIN — SODIUM CHLORIDE 500 ML: 0.9 INJECTION, SOLUTION INTRAVENOUS at 11:06

## 2022-01-01 RX ADMIN — MORPHINE SULFATE 2 MG: 2 INJECTION, SOLUTION INTRAMUSCULAR; INTRAVENOUS at 13:08

## 2022-01-01 RX ADMIN — CEFEPIME HYDROCHLORIDE 1000 MG: 1 INJECTION, POWDER, FOR SOLUTION INTRAMUSCULAR; INTRAVENOUS at 15:35

## 2022-01-01 RX ADMIN — UMECLIDINIUM 1 PUFF: 62.5 AEROSOL, POWDER ORAL at 08:14

## 2022-01-01 RX ADMIN — LORAZEPAM 1 MG: 2 INJECTION INTRAMUSCULAR; INTRAVENOUS at 07:24

## 2022-01-01 RX ADMIN — DORZOLAMIDE HYDROCHLORIDE AND TIMOLOL MALEATE 1 DROP: 20; 5 SOLUTION/ DROPS OPHTHALMIC at 08:14

## 2022-01-01 RX ADMIN — DOCUSATE SODIUM 100 MG: 100 CAPSULE ORAL at 09:01

## 2022-01-01 RX ADMIN — MORPHINE SULFATE 1 MG: 2 INJECTION, SOLUTION INTRAMUSCULAR; INTRAVENOUS at 16:17

## 2022-01-01 RX ADMIN — LEVOTHYROXINE SODIUM 75 MCG: 75 TABLET ORAL at 09:01

## 2022-01-01 RX ADMIN — LORAZEPAM 1 MG: 2 INJECTION INTRAMUSCULAR; INTRAVENOUS at 18:03

## 2022-01-01 RX ADMIN — LORAZEPAM 1 MG: 2 INJECTION INTRAMUSCULAR; INTRAVENOUS at 22:35

## 2022-01-01 RX ADMIN — MONTELUKAST 10 MG: 10 TABLET, FILM COATED ORAL at 09:00

## 2022-01-01 RX ADMIN — IPRATROPIUM BROMIDE 0.5 MG: 0.5 SOLUTION RESPIRATORY (INHALATION) at 19:25

## 2022-01-01 RX ADMIN — OSELTAMIVIR PHOSPHATE 30 MG: 30 CAPSULE ORAL at 00:12

## 2022-01-01 RX ADMIN — METOPROLOL TARTRATE 25 MG: 25 TABLET, FILM COATED ORAL at 08:13

## 2022-01-01 RX ADMIN — DORZOLAMIDE HYDROCHLORIDE AND TIMOLOL MALEATE 1 DROP: 20; 5 SOLUTION/ DROPS OPHTHALMIC at 09:01

## 2022-01-01 RX ADMIN — HEPARIN SODIUM 7500 UNITS: 5000 INJECTION INTRAVENOUS; SUBCUTANEOUS at 00:12

## 2022-01-01 RX ADMIN — MORPHINE SULFATE 1 MG: 2 INJECTION, SOLUTION INTRAMUSCULAR; INTRAVENOUS at 20:26

## 2022-01-01 RX ADMIN — SODIUM CHLORIDE 500 ML: 0.9 INJECTION, SOLUTION INTRAVENOUS at 05:06

## 2022-01-01 RX ADMIN — BUDESONIDE AND FORMOTEROL FUMARATE DIHYDRATE 2 PUFF: 160; 4.5 AEROSOL RESPIRATORY (INHALATION) at 08:14

## 2022-01-01 RX ADMIN — IPRATROPIUM BROMIDE 0.5 MG: 0.5 SOLUTION RESPIRATORY (INHALATION) at 07:22

## 2022-01-01 RX ADMIN — DOCUSATE SODIUM 100 MG: 100 CAPSULE ORAL at 08:14

## 2022-01-01 RX ADMIN — LOSARTAN POTASSIUM 25 MG: 25 TABLET, FILM COATED ORAL at 08:13

## 2022-01-01 RX ADMIN — ASPIRIN 81 MG: 81 TABLET, COATED ORAL at 08:15

## 2022-01-01 RX ADMIN — INSULIN LISPRO 1 UNITS: 100 INJECTION, SOLUTION INTRAVENOUS; SUBCUTANEOUS at 23:49

## 2022-01-01 RX ADMIN — FUROSEMIDE 80 MG: 80 TABLET ORAL at 09:01

## 2022-01-01 RX ADMIN — CEFEPIME HYDROCHLORIDE 1000 MG: 1 INJECTION, POWDER, FOR SOLUTION INTRAMUSCULAR; INTRAVENOUS at 05:05

## 2022-01-01 RX ADMIN — INSULIN LISPRO 3 UNITS: 100 INJECTION, SOLUTION INTRAVENOUS; SUBCUTANEOUS at 18:34

## 2022-01-01 RX ADMIN — LEVOTHYROXINE SODIUM 75 MCG: 75 TABLET ORAL at 05:12

## 2022-01-01 RX ADMIN — BUDESONIDE AND FORMOTEROL FUMARATE DIHYDRATE 2 PUFF: 160; 4.5 AEROSOL RESPIRATORY (INHALATION) at 09:02

## 2022-01-01 RX ADMIN — IPRATROPIUM BROMIDE 0.5 MG: 0.5 SOLUTION RESPIRATORY (INHALATION) at 13:09

## 2022-01-01 RX ADMIN — INSULIN GLARGINE 10 UNITS: 100 INJECTION, SOLUTION SUBCUTANEOUS at 23:42

## 2022-01-01 RX ADMIN — IPRATROPIUM BROMIDE AND ALBUTEROL SULFATE 3 ML: .5; 3 SOLUTION RESPIRATORY (INHALATION) at 06:35

## 2022-01-01 RX ADMIN — IPRATROPIUM BROMIDE 0.5 MG: 0.5 SOLUTION RESPIRATORY (INHALATION) at 14:07

## 2022-01-01 RX ADMIN — HEPARIN SODIUM 7500 UNITS: 5000 INJECTION INTRAVENOUS; SUBCUTANEOUS at 15:34

## 2022-01-01 RX ADMIN — OSELTAMIVIR PHOSPHATE 30 MG: 30 CAPSULE ORAL at 09:00

## 2022-01-01 RX ADMIN — UMECLIDINIUM 1 PUFF: 62.5 AEROSOL, POWDER ORAL at 09:01

## 2022-01-01 RX ADMIN — ENOXAPARIN SODIUM 30 MG: 40 INJECTION SUBCUTANEOUS at 16:00

## 2022-01-01 RX ADMIN — OSELTAMIVIR PHOSPHATE 30 MG: 30 CAPSULE ORAL at 08:13

## 2022-01-01 RX ADMIN — CEFEPIME HYDROCHLORIDE 1000 MG: 1 INJECTION, POWDER, FOR SOLUTION INTRAMUSCULAR; INTRAVENOUS at 03:20

## 2022-01-01 RX ADMIN — OSELTAMIVIR PHOSPHATE 30 MG: 30 CAPSULE ORAL at 23:40

## 2022-01-01 RX ADMIN — DORZOLAMIDE HYDROCHLORIDE AND TIMOLOL MALEATE 1 DROP: 20; 5 SOLUTION/ DROPS OPHTHALMIC at 18:38

## 2022-01-01 RX ADMIN — FLUTICASONE PROPIONATE 2 SPRAY: 50 SPRAY, METERED NASAL at 09:01

## 2022-01-01 RX ADMIN — INSULIN LISPRO 1 UNITS: 100 INJECTION, SOLUTION INTRAVENOUS; SUBCUTANEOUS at 23:43

## 2022-01-01 RX ADMIN — CEFEPIME HYDROCHLORIDE 1000 MG: 1 INJECTION, POWDER, FOR SOLUTION INTRAMUSCULAR; INTRAVENOUS at 15:30

## 2022-01-01 RX ADMIN — LOSARTAN POTASSIUM 25 MG: 25 TABLET, FILM COATED ORAL at 09:01

## 2022-01-01 RX ADMIN — IPRATROPIUM BROMIDE 0.5 MG: 0.5 SOLUTION RESPIRATORY (INHALATION) at 13:15

## 2022-01-01 RX ADMIN — FLUTICASONE PROPIONATE 2 SPRAY: 50 SPRAY, METERED NASAL at 08:14

## 2022-01-01 RX ADMIN — METHYLPREDNISOLONE SODIUM SUCCINATE 80 MG: 125 INJECTION, POWDER, FOR SOLUTION INTRAMUSCULAR; INTRAVENOUS at 06:54

## 2022-01-01 RX ADMIN — AMLODIPINE BESYLATE 7.5 MG: 5 TABLET ORAL at 09:00

## 2022-01-01 RX ADMIN — PANTOPRAZOLE SODIUM 40 MG: 40 TABLET, DELAYED RELEASE ORAL at 09:00

## 2022-01-01 RX ADMIN — MONTELUKAST 10 MG: 10 TABLET, FILM COATED ORAL at 08:13

## 2022-01-01 RX ADMIN — IPRATROPIUM BROMIDE 0.5 MG: 0.5 SOLUTION RESPIRATORY (INHALATION) at 20:21

## 2022-01-01 RX ADMIN — METOPROLOL TARTRATE 25 MG: 25 TABLET, FILM COATED ORAL at 23:40

## 2022-01-01 RX ADMIN — NYSTATIN: 100000 POWDER TOPICAL at 17:01

## 2022-01-01 RX ADMIN — DORZOLAMIDE HYDROCHLORIDE AND TIMOLOL MALEATE 1 DROP: 20; 5 SOLUTION/ DROPS OPHTHALMIC at 18:05

## 2022-01-01 RX ADMIN — NYSTATIN: 100000 POWDER TOPICAL at 09:00

## 2022-01-01 RX ADMIN — NYSTATIN: 100000 POWDER TOPICAL at 11:07

## 2022-01-01 RX ADMIN — IODIXANOL 100 ML: 320 INJECTION, SOLUTION INTRAVASCULAR at 06:21

## 2022-01-01 RX ADMIN — ACETAMINOPHEN 975 MG: 325 TABLET, FILM COATED ORAL at 05:47

## 2022-01-01 RX ADMIN — METOPROLOL TARTRATE 25 MG: 25 TABLET, FILM COATED ORAL at 09:00

## 2022-01-01 RX ADMIN — MORPHINE SULFATE 2 MG: 2 INJECTION, SOLUTION INTRAMUSCULAR; INTRAVENOUS at 09:54

## 2022-01-27 ENCOUNTER — HOSPITAL ENCOUNTER (OUTPATIENT)
Dept: BONE DENSITY | Facility: MEDICAL CENTER | Age: 87
Discharge: HOME | End: 2022-01-27
Payer: MEDICARE

## 2022-01-27 DIAGNOSIS — M85.80 OSTEOPENIA, UNSPECIFIED LOCATION: ICD-10-CM

## 2022-01-27 DIAGNOSIS — Z13.820 SCREENING FOR OSTEOPOROSIS: ICD-10-CM

## 2022-01-27 DIAGNOSIS — M85.89 OTHER SPECIFIED DISORDERS OF BONE DENSITY AND STRUCTURE, MULTIPLE SITES: ICD-10-CM

## 2022-01-27 DIAGNOSIS — Z87.81 HISTORY OF FRACTURE: ICD-10-CM

## 2022-01-27 PROCEDURE — 77080 DXA BONE DENSITY AXIAL: CPT

## 2022-02-01 ENCOUNTER — TELEPHONE (OUTPATIENT)
Dept: ENDOCRINOLOGY | Facility: CLINIC | Age: 87
End: 2022-02-01

## 2022-02-01 NOTE — TELEPHONE ENCOUNTER
----- Message from Sujey Forrest PA-C sent at 1/31/2022 12:44 PM EST -----  DEXA Scan shows Increased risk of fracture- please schedule follow up visit to discuss treatment options for osteoporosis to reduce risk of fracture

## 2022-02-03 ENCOUNTER — OFFICE VISIT (OUTPATIENT)
Dept: ENDOCRINOLOGY | Facility: CLINIC | Age: 87
End: 2022-02-03
Payer: MEDICARE

## 2022-02-03 VITALS
DIASTOLIC BLOOD PRESSURE: 76 MMHG | BODY MASS INDEX: 43.36 KG/M2 | HEART RATE: 80 BPM | SYSTOLIC BLOOD PRESSURE: 130 MMHG | WEIGHT: 222 LBS

## 2022-02-03 DIAGNOSIS — E11.649 TYPE 2 DIABETES MELLITUS WITH HYPOGLYCEMIA WITHOUT COMA, WITH LONG-TERM CURRENT USE OF INSULIN (HCC): ICD-10-CM

## 2022-02-03 DIAGNOSIS — M81.0 OSTEOPOROSIS, UNSPECIFIED OSTEOPOROSIS TYPE, UNSPECIFIED PATHOLOGICAL FRACTURE PRESENCE: Primary | ICD-10-CM

## 2022-02-03 DIAGNOSIS — Z79.4 TYPE 2 DIABETES MELLITUS WITH STABLE PROLIFERATIVE RETINOPATHY OF BOTH EYES, WITH LONG-TERM CURRENT USE OF INSULIN (HCC): ICD-10-CM

## 2022-02-03 DIAGNOSIS — E11.3553 TYPE 2 DIABETES MELLITUS WITH STABLE PROLIFERATIVE RETINOPATHY OF BOTH EYES, WITH LONG-TERM CURRENT USE OF INSULIN (HCC): ICD-10-CM

## 2022-02-03 DIAGNOSIS — I10 PRIMARY HYPERTENSION: ICD-10-CM

## 2022-02-03 DIAGNOSIS — Z79.4 TYPE 2 DIABETES MELLITUS WITH HYPOGLYCEMIA WITHOUT COMA, WITH LONG-TERM CURRENT USE OF INSULIN (HCC): ICD-10-CM

## 2022-02-03 PROCEDURE — 1123F ACP DISCUSS/DSCN MKR DOCD: CPT | Performed by: PHYSICIAN ASSISTANT

## 2022-02-03 PROCEDURE — 99214 OFFICE O/P EST MOD 30 MIN: CPT | Performed by: PHYSICIAN ASSISTANT

## 2022-02-03 NOTE — PROGRESS NOTES
Established Patient Progress Note      Chief Complaint   Patient presents with    Diabetes Type 2        History of Present Illness:   Jad Brush is a 82 y o  female with a history of type 2 diabetes with long term use of insulin since many years ago  Reports complications of retinopathy  She has CKD but nephrology note reports this is not from diabetes  Denies recent illness or hospitalizations  Denies recent severe hypoglycemic or severe hyperglycemic episodes  Denies any issues with her current regimen  home glucose monitoring: are performed regularly 4x per day  Blood sugars range 144-242 over the past two days  (Only Feb 2022 log provided, more info requested)     Current regimen:   Lantus 25 units twice per day   Novolog 6 with meals plus sliding scale  Has hypertension: Taking losartan  Has hyperlipidemia: Taking atorvastatin    Thyroid disorders: hypothyroidism- takes levothyroxine  Osteopenia, hx Tib/fib fx 2002  Recent DEXA scan shows osteopenia with high Frax Score  Doesn't get much calcium in her diet but takes calcium supplement 600mg twice per day and Vitamin D 2,000 units daily  Has had poor balance but getting better, fall after covid booster     Has no teeth, dentures were thrown out past few years, needs new dentures but can't afford   Doing PT/OT    Patient Active Problem List   Diagnosis    [facility]per[facility]) [facility]    Type 2 diabetes mellitus with proliferative retinopathy ([facility] )    Acquired hypothyroidism    Familial combined hyperlipidemia    Anxiety    Morbid obesity with BMI of 40 0-44 9, adult ([facility] )    Anemia in stage 4 chronic kidney disease (HCC)    Chronic venous insufficiency    Type 2 diabetes mellitus with hypoglycemia without coma, with long-term current use of insulin (HCC)    Leukocytosis    Hypertension    Chronic cough    Chronic congestive heart failure (HCC)    Acute bronchitis    Community acquired pneumonia of left lower lobe of lung    Gastroesophageal reflux disease without esophagitis    Urinary incontinence    Localized edema    Nephrosclerosis arteriolar, 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 disease    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, left (HCC)    Sinus bradycardia    History of fracture    Estrogen deficiency    Screening for osteoporosis    Osteopenia    Osteoporosis      Past Medical History:   Diagnosis Date    Adenoma of large intestine     Allergy     last assessed: 10/18/2013    Cataract     Constipation, chronic     Diabetes mellitus (HCC)     anemia vitamin d deficiency glaucoma hypertension hypothyroidism; last assessed: 2012    Disease of thyroid gland     Diverticulosis     Fecal incontinence     Female stress incontinence     Glaucoma     Gout     History of cystocele     Intrinsic sphincter deficiency     Mixed incontinence     Nocturia     Osteoarthritis, chronic     Renal disorder     Senile atrophic vaginitis     Urinary frequency       Past Surgical History:   Procedure Laterality Date    ANKLE SURGERY Bilateral     CATARACT EXTRACTION      CHOLECYSTECTOMY      TUBAL LIGATION        Family History   Problem Relation Age of Onset    Stomach cancer Mother     Diabetes Sister         mellitus     Social History     Tobacco Use    Smoking status: Former Smoker     Quit date: 1989     Years since quittin 8    Smokeless tobacco: Never Used   Substance Use Topics    Alcohol use: Never     Allergies   Allergen Reactions    Ace Inhibitors      Annotation - [id number]: short of breath    Aspirin          Current Outpatient Medications:     acetaminophen (TYLENOL) 325 mg tablet, Take 650 mg by mouth every 6 (six) hours as needed for mild pain, Disp: , Rfl:     albuterol (VENTOLIN HFA) 90 mcg/act inhaler, Inhale 2 puffs every 6 (six) hours as needed for wheezing, Disp: , Rfl:     atorvastatin (LIPITOR) 10 mg tablet, Take 1 tablet (10 mg total) by mouth daily, Disp: 90 tablet, Rfl: 1    budesonide-formoterol (SYMBICORT) 160-4 5 mcg/act inhaler, Inhale 2 puffs 2 (two) times a day Rinse mouth after use , Disp: , Rfl:     calcium carbonate (OS-JENA) 600 MG tablet, 1 tab twice per day , Disp: 60 tablet, Rfl: 6    Cholecalciferol (Vitamin D3) 50 MCG (2000 UT) capsule, Take 1 capsule (2,000 Units total) by mouth daily, Disp: 30 capsule, Rfl: 5    dorzolamide-timolol (COSOPT) 22 3-6 8 MG/ML ophthalmic solution, Administer 1 drop to both eyes 2 (two) times a day, Disp: , Rfl:     fluticasone (FLONASE) 50 mcg/act nasal spray, 2 sprays into each nostril daily , Disp: , Rfl:     furosemide (LASIX) 80 mg tablet, Take 1 tablet (80 mg total) by mouth daily, Disp: 90 tablet, Rfl: 1    Glucosamine-Chondroitin (GLUCOSAMINE CHONDR COMPLEX) 500-400 MG CAPS, Take 1 capsule by mouth 2 (two) times a day, Disp: , Rfl:     Incontinence Supply Disposable (CERTAINTY ADJ UNDERWEAR LARGE) MISC, by Does not apply route daily at bedtime, Disp: 30 each, Rfl: 5    insulin aspart (NovoLOG) 100 units/mL injection, INJECT 4 UNITS THREE TIMES A DAY WITH MEALS  IN ADDITION TO SLIDING SCALE;SUBCUTANEOUSLY AS PER SLIDING SCALE: THREE TIMES A DAY WITH MEALS 201-250= 4 UNITS 251-300= 6 UNITS 301-350= 8 UNITS 351-400= 10 UNITS 401-450= 12 UNITS 451-500= 14 UNITS (Patient taking differently: INJECT 6 UNITS THREE TIMES A DAY WITH MEALS  IN ADDITION TO SLIDING SCALE;SUBCUTANEOUSLY AS PER SLIDING SCALE: THREE TIMES A DAY WITH MEALS 201-250= 4 UNITS 251-300= 6 UNITS 301-350= 8 UNITS 351-400= 10 UNITS 401-450= 12 UNITS 451-500= 14 UNITS ), Disp: 10 mL, Rfl: 10    insulin glargine (LANTUS) 100 units/mL subcutaneous injection, 30 units daily in AM  (Patient taking differently: 25 units daily in AM  ), Disp: 1 mL, Rfl: 0    levothyroxine 75 mcg tablet, TAKE ONE TABLET BY MOUTH ONCE DAILY AS DIRECTED, Disp: 90 tablet, Rfl: 3    losartan (COZAAR) 25 mg tablet, Take 25 mg by mouth every morning , Disp: , Rfl:     montelukast (SINGULAIR) 10 mg tablet, Take 10 mg by mouth every morning , Disp: , Rfl:     pantoprazole (PROTONIX) 40 mg tablet, Take 1 tablet (40 mg total) by mouth daily before breakfast, Disp: 30 tablet, Rfl: 0    potassium chloride (KLOR-CON M20) 20 mEq tablet, Take 1 tablet (20 mEq total) by mouth daily (Patient taking differently: Take 10 mEq by mouth daily ), Disp: 90 tablet, Rfl: 1    albuterol (2 5 mg/3 mL) 0 083 % nebulizer solution, Take 2 5 mg by nebulization every 6 (six) hours as needed for wheezing or shortness of breath (Patient not taking: Reported on 9/22/2021), Disp: , Rfl:     amLODIPine (NORVASC) 2 5 mg tablet, Take 7 5 mg by mouth daily (Patient not taking: Reported on 8/23/2021), Disp: , Rfl:     fexofenadine (ALLEGRA) 180 MG tablet, Take by mouth (Patient not taking: Reported on 9/22/2021), Disp: , Rfl:     guaiFENesin (ROBITUSSIN) 100 MG/5ML oral liquid, Take 200 mg by mouth 3 (three) times a day as needed for cough (Patient not taking: Reported on 12/9/2021 ), Disp: , Rfl:     ipratropium (ATROVENT) 0 02 % nebulizer solution, Take 0 5 mg by nebulization 4 (four) times a day (Patient not taking: Reported on 12/9/2021 ), Disp: , Rfl:     RELION PEN NEEDLE 31G/8MM 31G X 8 MM MISC, USE AS DIRECTED THREE TIMES DAILY (Patient not taking: Reported on 9/22/2021), Disp: 300 each, Rfl: 3    tiotropium (SPIRIVA RESPIMAT) 1 25 MCG/ACT AERS inhaler, Inhale 2 puffs daily (Patient not taking: Reported on 9/22/2021), Disp: , Rfl:     triamcinolone (KENALOG) 0 1 % cream, , Disp: , Rfl:     Review of Systems   Constitutional: Negative for activity change, appetite change, chills, diaphoresis, fatigue, fever and unexpected weight change  HENT: Positive for dental problem  Negative for trouble swallowing and voice change  Eyes: Negative for visual disturbance  Respiratory: Negative for shortness of breath      Cardiovascular: Negative for chest pain and palpitations  Gastrointestinal: Negative for abdominal pain, constipation and diarrhea  Endocrine: Negative for cold intolerance, heat intolerance, polydipsia, polyphagia and polyuria  Genitourinary: Negative for frequency and menstrual problem  Musculoskeletal: Positive for gait problem  Negative for arthralgias and myalgias  Skin: Negative for rash  Allergic/Immunologic: Negative for food allergies  Neurological: Negative for dizziness and tremors  Hematological: Negative for adenopathy  Psychiatric/Behavioral: Positive for sleep disturbance  All other systems reviewed and are negative  Physical Exam:  Body mass index is 43 36 kg/m²  /76   Pulse 80   Wt 101 kg (222 lb)   BMI 43 36 kg/m²    Wt Readings from Last 3 Encounters:   02/03/22 101 kg (222 lb)   12/09/21 97 1 kg (214 lb)   10/28/21 96 6 kg (213 lb)       Physical Exam  Vitals reviewed  Constitutional:       General: She is not in acute distress  Appearance: She is well-developed  HENT:      Head: Normocephalic and atraumatic  Eyes:      Conjunctiva/sclera: Conjunctivae normal       Pupils: Pupils are equal, round, and reactive to light  Neck:      Thyroid: No thyromegaly  Cardiovascular:      Rate and Rhythm: Normal rate and regular rhythm  Heart sounds: Normal heart sounds  Pulmonary:      Effort: Pulmonary effort is normal  No respiratory distress  Breath sounds: Normal breath sounds  No wheezing or rales  Abdominal:      General: Bowel sounds are normal  There is no distension  Palpations: Abdomen is soft  Tenderness: There is no abdominal tenderness  Musculoskeletal:         General: Normal range of motion  Cervical back: Normal range of motion and neck supple  Skin:     General: Skin is warm and dry  Neurological:      Mental Status: She is alert and oriented to person, place, and time           Labs:   Lab Results   Component Value Date    HGBA1C 8 0 (H) 01/18/2022 HGBA1C 8 0 01/18/2022    HGBA1C 8 3 (H) 11/23/2021     Lab Results   Component Value Date    CREATININE 2 06 (H) 02/27/2021    CREATININE 2 28 (H) 02/26/2021    CREATININE 2 34 (H) 07/21/2019    BUN 39 (H) 02/27/2021     04/25/2015    K 4 5 02/27/2021     02/27/2021    CO2 31 02/27/2021     eGFR   Date Value Ref Range Status   02/27/2021 22 ml/min/1 73sq m Final     Lab Results   Component Value Date    CHOL 162 04/25/2015    HDL 40 06/21/2019    TRIG 87 06/21/2019     Lab Results   Component Value Date    ALT 14 02/26/2021    AST 11 02/26/2021    ALKPHOS 101 02/26/2021    BILITOT 0 28 04/25/2015     Lab Results   Component Value Date    KNU8LYISOYNM 2 204 02/26/2021    OLJ3ULKQPZOC 3 320 06/21/2019    TLY8ZRCEACWT 3 630 02/18/2019     Lab Results   Component Value Date    FREET4 0 9 04/25/2015       Impression & Plan:    Problem List Items Addressed This Visit        Endocrine    Type 2 diabetes mellitus with proliferative retinopathy ([facility] )    Type 2 diabetes mellitus with hypoglycemia without coma, with long-term current use of insulin ([facility] )     Control is stable  No changes today since we only have a few glucose readings  Requested January glucose log  Lab Results   Component Value Date    HGBA1C 8 0 (H) 01/18/2022            Relevant Orders    Hemoglobin A1C    Comprehensive metabolic panel       Cardiovascular and Mediastinum    Hypertension     Stable on current treatment  Musculoskeletal and Integument    Osteoporosis - Primary     Recent DEXA scan shows Osteoporosis  Recommend treatment with Medication to reduce risk of fracture along with Calcium and Vitamin D supplement  Due to CKD, will avoid bisphosphonate  Discussed option of Prolia and patient information provided  She would like to discuss this with her daughter before making a decision about treatment    This could be given in our office or at the Nursing facility however it is importance to give prolia consistently every 6 months as missed dose or delayed dose would increase risk of fracture  Relevant Orders    Comprehensive metabolic panel    Vitamin D 25 hydroxy    PTH, intact          Orders Placed This Encounter   Procedures    Hemoglobin A1C     Standing Status:   Future     Standing Expiration Date:   2/3/2023    Comprehensive metabolic panel     This is a patient instruction: Patient fasting for 8 hours or longer recommended  Standing Status:   Future     Standing Expiration Date:   2/3/2023    Vitamin D 25 hydroxy     Standing Status:   Future     Standing Expiration Date:   2/3/2023    PTH, intact     Standing Status:   Future     Standing Expiration Date:   2/3/2023       There are no Patient Instructions on file for this visit  Discussed with the patient and all questioned fully answered  She will call me if any problems arise  Follow-up appointment in 6 months       Counseled patient on diagnostic results, prognosis, risk and benefit of treatment options, instruction for management, importance of treatment compliance, Risk  factor reduction and impressions    Rose Mary Michelle PA-C

## 2022-02-10 PROBLEM — M81.0 OSTEOPOROSIS: Status: ACTIVE | Noted: 2022-02-10

## 2022-02-10 NOTE — ASSESSMENT & PLAN NOTE
Recent DEXA scan shows Osteoporosis  Recommend treatment with Medication to reduce risk of fracture along with Calcium and Vitamin D supplement  Due to CKD, will avoid bisphosphonate  Discussed option of Prolia and patient information provided  She would like to discuss this with her daughter before making a decision about treatment  This could be given in our office or at the Nursing facility however it is importance to give prolia consistently every 6 months as missed dose or delayed dose would increase risk of fracture

## 2022-02-10 NOTE — ASSESSMENT & PLAN NOTE
Control is stable  No changes today since we only have a few glucose readings  Requested January glucose log     Lab Results   Component Value Date    HGBA1C 8 0 (H) 01/18/2022

## 2022-03-04 ENCOUNTER — TELEPHONE (OUTPATIENT)
Dept: FAMILY MEDICINE CLINIC | Facility: CLINIC | Age: 87
End: 2022-03-04

## 2022-05-06 ENCOUNTER — TELEPHONE (OUTPATIENT)
Dept: NEPHROLOGY | Facility: CLINIC | Age: 87
End: 2022-05-06

## 2022-05-06 NOTE — TELEPHONE ENCOUNTER
Appointment Confirmation   Person confirmed appointment with  If not patient, name of the person Nursing home     Date and time of appointment 05/19 12:30   Patient acknowledged and will be at appointment?  yes   Did you advise the patient that they will need a urine sample if they are a new patient? no   Did you advise the patient to bring their current medications for verification? (including any OTC) no   Additional Information

## 2022-05-09 ENCOUNTER — OFFICE VISIT (OUTPATIENT)
Dept: NEPHROLOGY | Facility: CLINIC | Age: 87
End: 2022-05-09
Payer: MEDICARE

## 2022-05-09 VITALS
HEART RATE: 70 BPM | HEIGHT: 60 IN | SYSTOLIC BLOOD PRESSURE: 128 MMHG | DIASTOLIC BLOOD PRESSURE: 78 MMHG | BODY MASS INDEX: 43.36 KG/M2

## 2022-05-09 DIAGNOSIS — N18.4 STAGE 4 CHRONIC KIDNEY DISEASE (HCC): ICD-10-CM

## 2022-05-09 DIAGNOSIS — I50.30 DIASTOLIC HEART FAILURE, UNSPECIFIED HF CHRONICITY (HCC): ICD-10-CM

## 2022-05-09 DIAGNOSIS — I12.9 NEPHROSCLEROSIS ARTERIOLAR, STAGE 1-4 OR UNSPECIFIED CHRONIC KIDNEY DISEASE: Primary | ICD-10-CM

## 2022-05-09 PROCEDURE — 99214 OFFICE O/P EST MOD 30 MIN: CPT | Performed by: INTERNAL MEDICINE

## 2022-05-09 NOTE — LETTER
May 9, 2022     Milo Michaud, [address]    Patient: Colten Brito   YOB: 1935   Date of Visit: 5/9/2022       Dear Dr Kerwin Masters Recipients: Thank you for referring Melinda Allen to me for evaluation  Below are my notes for this consultation  If you have questions, please do not hesitate to call me  I look forward to following your patient along with you  Sincerely,        Olivia Black MD        CC: No Recipients  Olivia Black MD  5/9/2022 12:55 PM  Sign when Signing Visit  NEPHROLOGY PROGRESS NOTE    Colten Brito 82 y o  female MRN: [id number]  Unit/Bed#:  Encounter: [id number]  Reason for Consult:  Chronic renal insufficiency    Patient is here for routine follow-up she says she has been doing well  Cannot recall any hospitalizations although she did recount when she was admitted with COVID in the past   Other than that no changes and she did tell me they took away a few of her medications  ASSESSMENT/PLAN:  1  Renal    Patient's chronic kidney disease likely due to nephrosclerosis as in the past she had no significant proteinuria so that tells me it is not due to diabetic nephropathy and most significant intrinsic disease as result heavy proteinuria  She had urinalysis in the past was actually negative for protein  Patient's creatinine is 2 1 it tends to fluctuate over time between 1 7-2 2 likely related to changes in volume status as she is on diuretic  Overall she appears well compensated clinically reviewed comfortably eating well  Creatinine is stable at her baseline  Blood pressure is under good control  Hemoglobin A1c around 8% and she is on losartan as well  Blood pressure is excellent  Will continue current medications with no changes  Labs and follow-up as scheduled    Asked to call if there is any problems or concerns before next visit      SUBJECTIVE:  Review of Systems   Constitutional: Negative for chills, decreased appetite, diaphoresis and fever  HENT: Negative  Eyes: Positive for vision loss in right eye  Reduced vision left eye  Cardiovascular: Negative for chest pain, dyspnea on exertion, orthopnea and palpitations  Respiratory: Negative  Negative for cough, shortness of breath, sputum production and wheezing  Gastrointestinal: Negative for abdominal pain, diarrhea, nausea and vomiting  Genitourinary: Negative for dysuria, flank pain, hematuria and incomplete emptying  Neurological: Negative for dizziness, focal weakness, headaches and light-headedness  Psychiatric/Behavioral: Negative for altered mental status, depression, hallucinations and hypervigilance  OBJECTIVE:  Current Weight:    [email or web address] com:     Blood pressure 128/78, pulse 70, height 5' (1 524 m)  , Body mass index is 43 36 kg/m²  [unfilled]    Physical Exam: /78 (BP Location: Left arm, Patient Position: Sitting, Cuff Size: Standard)   Pulse 70   Ht 5' (1 524 m)   BMI 43 36 kg/m²   Physical Exam  Constitutional:       General: She is not in acute distress  Appearance: She is not toxic-appearing or diaphoretic  HENT:      Head: Normocephalic and atraumatic  Mouth/Throat:      Mouth: Mucous membranes are moist    Eyes:      General: No scleral icterus  Extraocular Movements: Extraocular movements intact  Cardiovascular:      Rate and Rhythm: Normal rate and regular rhythm  Heart sounds: No friction rub  No gallop  Comments: Trace edema  Pulmonary:      Effort: Pulmonary effort is normal  No respiratory distress  Breath sounds: Normal breath sounds  No wheezing, rhonchi or rales  Abdominal:      General: Bowel sounds are normal  There is no distension  Palpations: Abdomen is soft  Tenderness: There is no abdominal tenderness  There is no rebound  Musculoskeletal:      Cervical back: Normal range of motion and neck supple     Neurological:      Mental Status: She is alert  Mental status is at baseline  Psychiatric:         Mood and Affect: Mood normal          Behavior: Behavior normal          Thought Content:  Thought content normal          Judgment: Judgment normal          Medications:    Current Outpatient Medications:     acetaminophen (TYLENOL) 325 mg tablet, Take 650 mg by mouth every 6 (six) hours as needed for mild pain, Disp: , Rfl:     albuterol (VENTOLIN HFA) 90 mcg/act inhaler, Inhale 2 puffs every 6 (six) hours as needed for wheezing, Disp: , Rfl:     atorvastatin (LIPITOR) 10 mg tablet, Take 1 tablet (10 mg total) by mouth daily, Disp: 90 tablet, Rfl: 1    budesonide-formoterol (SYMBICORT) 160-4 5 mcg/act inhaler, Inhale 2 puffs 2 (two) times a day Rinse mouth after use , Disp: , Rfl:     calcium carbonate (OS-JENA) 600 MG tablet, 1 tab twice per day , Disp: 60 tablet, Rfl: 6    Cholecalciferol (Vitamin D3) 50 MCG (2000 UT) capsule, Take 1 capsule (2,000 Units total) by mouth daily, Disp: 30 capsule, Rfl: 5    dorzolamide-timolol (COSOPT) 22 3-6 8 MG/ML ophthalmic solution, Administer 1 drop to both eyes 2 (two) times a day, Disp: , Rfl:     fluticasone (FLONASE) 50 mcg/act nasal spray, 2 sprays into each nostril daily , Disp: , Rfl:     furosemide (LASIX) 80 mg tablet, Take 1 tablet (80 mg total) by mouth daily, Disp: 90 tablet, Rfl: 1    Glucosamine-Chondroitin (GLUCOSAMINE CHONDR COMPLEX) 500-400 MG CAPS, Take 1 capsule by mouth 2 (two) times a day, Disp: , Rfl:     Incontinence Supply Disposable (CERTAINTY ADJ UNDERWEAR LARGE) MISC, by Does not apply route daily at bedtime, Disp: 30 each, Rfl: 5    insulin aspart (NovoLOG) 100 units/mL injection, INJECT 4 UNITS THREE TIMES A DAY WITH MEALS  IN ADDITION TO SLIDING SCALE;SUBCUTANEOUSLY AS PER SLIDING SCALE: THREE TIMES A DAY WITH MEALS 201-250= 4 UNITS 251-300= 6 UNITS 301-350= 8 UNITS 351-400= 10 UNITS 401-450= 12 UNITS 451-500= 14 UNITS (Patient taking differently: INJECT 6 UNITS THREE TIMES A DAY WITH MEALS  IN ADDITION TO SLIDING SCALE;SUBCUTANEOUSLY AS PER SLIDING SCALE: THREE TIMES A DAY WITH MEALS 201-250= 4 UNITS 251-300= 6 UNITS 301-350= 8 UNITS 351-400= 10 UNITS 401-450= 12 UNITS 451-500= 14 UNITS ), Disp: 10 mL, Rfl: 10    insulin glargine (LANTUS) 100 units/mL subcutaneous injection, 30 units daily in AM  (Patient taking differently: 25 units daily in AM  ), Disp: 1 mL, Rfl: 0    levothyroxine 75 mcg tablet, TAKE ONE TABLET BY MOUTH ONCE DAILY AS DIRECTED, Disp: 90 tablet, Rfl: 3    losartan (COZAAR) 25 mg tablet, Take 25 mg by mouth every morning , Disp: , Rfl:     pantoprazole (PROTONIX) 40 mg tablet, Take 1 tablet (40 mg total) by mouth daily before breakfast, Disp: 30 tablet, Rfl: 0    potassium chloride (KLOR-CON M20) 20 mEq tablet, Take 1 tablet (20 mEq total) by mouth daily (Patient taking differently: Take 10 mEq by mouth daily ), Disp: 90 tablet, Rfl: 1    albuterol (2 5 mg/3 mL) 0 083 % nebulizer solution, Take 2 5 mg by nebulization every 6 (six) hours as needed for wheezing or shortness of breath (Patient not taking: Reported on 9/22/2021), Disp: , Rfl:     amLODIPine (NORVASC) 2 5 mg tablet, Take 7 5 mg by mouth daily (Patient not taking: Reported on 8/23/2021), Disp: , Rfl:     fexofenadine (ALLEGRA) 180 MG tablet, Take by mouth (Patient not taking: Reported on 9/22/2021), Disp: , Rfl:     guaiFENesin (ROBITUSSIN) 100 MG/5ML oral liquid, Take 200 mg by mouth 3 (three) times a day as needed for cough (Patient not taking: Reported on 12/9/2021 ), Disp: , Rfl:     ipratropium (ATROVENT) 0 02 % nebulizer solution, Take 0 5 mg by nebulization 4 (four) times a day (Patient not taking: Reported on 12/9/2021 ), Disp: , Rfl:     montelukast (SINGULAIR) 10 mg tablet, Take 10 mg by mouth every morning  (Patient not taking: Reported on 5/9/2022 ), Disp: , Rfl:     RELION PEN NEEDLE 31G/8MM 31G X 8 MM MISC, USE AS DIRECTED THREE TIMES DAILY (Patient not taking: Reported on 9/22/2021), Disp: 300 each, Rfl: 3    tiotropium (SPIRIVA RESPIMAT) 1 25 MCG/ACT AERS inhaler, Inhale 2 puffs daily (Patient not taking: Reported on 9/22/2021), Disp: , Rfl:     triamcinolone (KENALOG) 0 1 % cream, , Disp: , Rfl:     Laboratory Results:  Lab Results   Component Value Date    WBC 10 11 02/27/2021    HGB 9 5 (L) 02/27/2021    HCT 31 6 (L) 02/27/2021    MCV 90 02/27/2021     02/27/2021     Lab Results   Component Value Date    SODIUM 140 02/27/2021    K 4 5 02/27/2021     02/27/2021    CO2 31 02/27/2021    BUN 39 (H) 02/27/2021    CREATININE 2 06 (H) 02/27/2021    GLUC 126 02/27/2021    CALCIUM 8 4 02/27/2021     Lab Results   Component Value Date    CALCIUM 8 4 02/27/2021     No results found for: LABPROT

## 2022-05-09 NOTE — PATIENT INSTRUCTIONS
You are here for follow-up sounds like her health has been doing well with no hospitalizations and you even gotten rid of some your medications  Your creatinine level which is the blood test for the kidney function most recently was 2 1 which is about the same as it was last visit  Looking at some labs it will fluctuated times from the high 1s to the low 2s and that could be due to the medications your on the water pill it cetera  In the past there was no significant protein in the urine so that tells me you do not have diabetic kidney disease  Regardless of course it is good to have good blood sugar control  Continue current medications  Blood pressure is well controlled  Labs and follow-up as scheduled                                                                                                          b

## 2022-05-09 NOTE — PROGRESS NOTES
NEPHROLOGY PROGRESS NOTE    Jelani Rios 82 y o  female MRN: [id number]  Unit/Bed#:  Encounter: [id number]  Reason for Consult:  Chronic renal insufficiency    Patient is here for routine follow-up she says she has been doing well  Cannot recall any hospitalizations although she did recount when she was admitted with COVID in the past   Other than that no changes and she did tell me they took away a few of her medications  ASSESSMENT/PLAN:  1  Renal    Patient's chronic kidney disease likely due to nephrosclerosis as in the past she had no significant proteinuria so that tells me it is not due to diabetic nephropathy and most significant intrinsic disease as result heavy proteinuria  She had urinalysis in the past was actually negative for protein  Patient's creatinine is 2 1 it tends to fluctuate over time between 1 7-2 2 likely related to changes in volume status as she is on diuretic  Overall she appears well compensated clinically reviewed comfortably eating well  Creatinine is stable at her baseline  Blood pressure is under good control  Hemoglobin A1c around 8% and she is on losartan as well  Blood pressure is excellent  Will continue current medications with no changes  Labs and follow-up as scheduled    Asked to call if there is any problems or concerns before next visit  SUBJECTIVE:  Review of Systems   Constitutional: Negative for chills, decreased appetite, diaphoresis and fever  HENT: Negative  Eyes: Positive for vision loss in right eye  Reduced vision left eye  Cardiovascular: Negative for chest pain, dyspnea on exertion, orthopnea and palpitations  Respiratory: Negative  Negative for cough, shortness of breath, sputum production and wheezing  Gastrointestinal: Negative for abdominal pain, diarrhea, nausea and vomiting  Genitourinary: Negative for dysuria, flank pain, hematuria and incomplete emptying     Neurological: Negative for dizziness, focal weakness, headaches and light-headedness  Psychiatric/Behavioral: Negative for altered mental status, depression, hallucinations and hypervigilance  OBJECTIVE:  Current Weight:    [email or web address] com:     Blood pressure 128/78, pulse 70, height 5' (1 524 m)  , Body mass index is 43 36 kg/m²  [unfilled]    Physical Exam: /78 (BP Location: Left arm, Patient Position: Sitting, Cuff Size: Standard)   Pulse 70   Ht 5' (1 524 m)   BMI 43 36 kg/m²   Physical Exam  Constitutional:       General: She is not in acute distress  Appearance: She is not toxic-appearing or diaphoretic  HENT:      Head: Normocephalic and atraumatic  Mouth/Throat:      Mouth: Mucous membranes are moist    Eyes:      General: No scleral icterus  Extraocular Movements: Extraocular movements intact  Cardiovascular:      Rate and Rhythm: Normal rate and regular rhythm  Heart sounds: No friction rub  No gallop  Comments: Trace edema  Pulmonary:      Effort: Pulmonary effort is normal  No respiratory distress  Breath sounds: Normal breath sounds  No wheezing, rhonchi or rales  Abdominal:      General: Bowel sounds are normal  There is no distension  Palpations: Abdomen is soft  Tenderness: There is no abdominal tenderness  There is no rebound  Musculoskeletal:      Cervical back: Normal range of motion and neck supple  Neurological:      Mental Status: She is alert  Mental status is at baseline  Psychiatric:         Mood and Affect: Mood normal          Behavior: Behavior normal          Thought Content:  Thought content normal          Judgment: Judgment normal          Medications:    Current Outpatient Medications:     acetaminophen (TYLENOL) 325 mg tablet, Take 650 mg by mouth every 6 (six) hours as needed for mild pain, Disp: , Rfl:     albuterol (VENTOLIN HFA) 90 mcg/act inhaler, Inhale 2 puffs every 6 (six) hours as needed for wheezing, Disp: , Rfl:     atorvastatin (LIPITOR) 10 mg tablet, Take 1 tablet (10 mg total) by mouth daily, Disp: 90 tablet, Rfl: 1    budesonide-formoterol (SYMBICORT) 160-4 5 mcg/act inhaler, Inhale 2 puffs 2 (two) times a day Rinse mouth after use , Disp: , Rfl:     calcium carbonate (OS-JENA) 600 MG tablet, 1 tab twice per day , Disp: 60 tablet, Rfl: 6    Cholecalciferol (Vitamin D3) 50 MCG (2000 UT) capsule, Take 1 capsule (2,000 Units total) by mouth daily, Disp: 30 capsule, Rfl: 5    dorzolamide-timolol (COSOPT) 22 3-6 8 MG/ML ophthalmic solution, Administer 1 drop to both eyes 2 (two) times a day, Disp: , Rfl:     fluticasone (FLONASE) 50 mcg/act nasal spray, 2 sprays into each nostril daily , Disp: , Rfl:     furosemide (LASIX) 80 mg tablet, Take 1 tablet (80 mg total) by mouth daily, Disp: 90 tablet, Rfl: 1    Glucosamine-Chondroitin (GLUCOSAMINE CHONDR COMPLEX) 500-400 MG CAPS, Take 1 capsule by mouth 2 (two) times a day, Disp: , Rfl:     Incontinence Supply Disposable (CERTAINTY ADJ UNDERWEAR LARGE) MISC, by Does not apply route daily at bedtime, Disp: 30 each, Rfl: 5    insulin aspart (NovoLOG) 100 units/mL injection, INJECT 4 UNITS THREE TIMES A DAY WITH MEALS  IN ADDITION TO SLIDING SCALE;SUBCUTANEOUSLY AS PER SLIDING SCALE: THREE TIMES A DAY WITH MEALS 201-250= 4 UNITS 251-300= 6 UNITS 301-350= 8 UNITS 351-400= 10 UNITS 401-450= 12 UNITS 451-500= 14 UNITS (Patient taking differently: INJECT 6 UNITS THREE TIMES A DAY WITH MEALS  IN ADDITION TO SLIDING SCALE;SUBCUTANEOUSLY AS PER SLIDING SCALE: THREE TIMES A DAY WITH MEALS 201-250= 4 UNITS 251-300= 6 UNITS 301-350= 8 UNITS 351-400= 10 UNITS 401-450= 12 UNITS 451-500= 14 UNITS ), Disp: 10 mL, Rfl: 10    insulin glargine (LANTUS) 100 units/mL subcutaneous injection, 30 units daily in AM  (Patient taking differently: 25 units daily in AM  ), Disp: 1 mL, Rfl: 0    levothyroxine 75 mcg tablet, TAKE ONE TABLET BY MOUTH ONCE DAILY AS DIRECTED, Disp: 90 tablet, Rfl: 3    losartan (COZAAR) 25 mg tablet, Take 25 mg by mouth every morning , Disp: , Rfl:     pantoprazole (PROTONIX) 40 mg tablet, Take 1 tablet (40 mg total) by mouth daily before breakfast, Disp: 30 tablet, Rfl: 0    potassium chloride (KLOR-CON M20) 20 mEq tablet, Take 1 tablet (20 mEq total) by mouth daily (Patient taking differently: Take 10 mEq by mouth daily ), Disp: 90 tablet, Rfl: 1    albuterol (2 5 mg/3 mL) 0 083 % nebulizer solution, Take 2 5 mg by nebulization every 6 (six) hours as needed for wheezing or shortness of breath (Patient not taking: Reported on 9/22/2021), Disp: , Rfl:     amLODIPine (NORVASC) 2 5 mg tablet, Take 7 5 mg by mouth daily (Patient not taking: Reported on 8/23/2021), Disp: , Rfl:     fexofenadine (ALLEGRA) 180 MG tablet, Take by mouth (Patient not taking: Reported on 9/22/2021), Disp: , Rfl:     guaiFENesin (ROBITUSSIN) 100 MG/5ML oral liquid, Take 200 mg by mouth 3 (three) times a day as needed for cough (Patient not taking: Reported on 12/9/2021 ), Disp: , Rfl:     ipratropium (ATROVENT) 0 02 % nebulizer solution, Take 0 5 mg by nebulization 4 (four) times a day (Patient not taking: Reported on 12/9/2021 ), Disp: , Rfl:     montelukast (SINGULAIR) 10 mg tablet, Take 10 mg by mouth every morning  (Patient not taking: Reported on 5/9/2022 ), Disp: , Rfl:     RELION PEN NEEDLE 31G/8MM 31G X 8 MM MISC, USE AS DIRECTED THREE TIMES DAILY (Patient not taking: Reported on 9/22/2021), Disp: 300 each, Rfl: 3    tiotropium (SPIRIVA RESPIMAT) 1 25 MCG/ACT AERS inhaler, Inhale 2 puffs daily (Patient not taking: Reported on 9/22/2021), Disp: , Rfl:     triamcinolone (KENALOG) 0 1 % cream, , Disp: , Rfl:     Laboratory Results:  Lab Results   Component Value Date    WBC 10 11 02/27/2021    HGB 9 5 (L) 02/27/2021    HCT 31 6 (L) 02/27/2021    MCV 90 02/27/2021     02/27/2021     Lab Results   Component Value Date    SODIUM 140 02/27/2021    K 4 5 02/27/2021     02/27/2021    CO2 31 02/27/2021    BUN 39 (H) 02/27/2021    CREATININE 2 06 (H) 02/27/2021    GLUC 126 02/27/2021    CALCIUM 8 4 02/27/2021     Lab Results   Component Value Date    CALCIUM 8 4 02/27/2021     No results found for: LABPROT

## 2022-05-31 ENCOUNTER — TELEPHONE (OUTPATIENT)
Dept: ENDOCRINOLOGY | Facility: CLINIC | Age: 87
End: 2022-05-31

## 2022-05-31 ENCOUNTER — OFFICE VISIT (OUTPATIENT)
Dept: ENDOCRINOLOGY | Facility: CLINIC | Age: 87
End: 2022-05-31
Payer: MEDICARE

## 2022-05-31 VITALS
BODY MASS INDEX: 43.19 KG/M2 | HEIGHT: 60 IN | HEART RATE: 67 BPM | WEIGHT: 220 LBS | DIASTOLIC BLOOD PRESSURE: 60 MMHG | SYSTOLIC BLOOD PRESSURE: 110 MMHG

## 2022-05-31 DIAGNOSIS — Z79.4 TYPE 2 DIABETES MELLITUS WITH HYPOGLYCEMIA WITHOUT COMA, WITH LONG-TERM CURRENT USE OF INSULIN (HCC): Primary | ICD-10-CM

## 2022-05-31 DIAGNOSIS — E11.65 TYPE 2 DIABETES MELLITUS WITH HYPERGLYCEMIA, WITH LONG-TERM CURRENT USE OF INSULIN (HCC): ICD-10-CM

## 2022-05-31 DIAGNOSIS — E03.9 ACQUIRED HYPOTHYROIDISM: ICD-10-CM

## 2022-05-31 DIAGNOSIS — Z79.4 TYPE 2 DIABETES MELLITUS WITH HYPERGLYCEMIA, WITH LONG-TERM CURRENT USE OF INSULIN (HCC): ICD-10-CM

## 2022-05-31 DIAGNOSIS — E11.649 TYPE 2 DIABETES MELLITUS WITH HYPOGLYCEMIA WITHOUT COMA, WITH LONG-TERM CURRENT USE OF INSULIN (HCC): Primary | ICD-10-CM

## 2022-05-31 DIAGNOSIS — M81.0 OSTEOPOROSIS, UNSPECIFIED OSTEOPOROSIS TYPE, UNSPECIFIED PATHOLOGICAL FRACTURE PRESENCE: ICD-10-CM

## 2022-05-31 LAB — SL AMB POCT HEMOGLOBIN AIC: 7.5 (ref ?–6.5)

## 2022-05-31 PROCEDURE — 99215 OFFICE O/P EST HI 40 MIN: CPT | Performed by: INTERNAL MEDICINE

## 2022-05-31 PROCEDURE — 83036 HEMOGLOBIN GLYCOSYLATED A1C: CPT | Performed by: INTERNAL MEDICINE

## 2022-05-31 PROCEDURE — 95250 CONT GLUC MNTR PHYS/QHP EQP: CPT | Performed by: INTERNAL MEDICINE

## 2022-05-31 RX ORDER — GLUCAGON HYDROCHLORIDE 1 MG
KIT INJECTION AS NEEDED
COMMUNITY
Start: 2022-04-28

## 2022-05-31 RX ORDER — INSULIN GLARGINE 100 [IU]/ML
25 INJECTION, SOLUTION SUBCUTANEOUS EVERY 12 HOURS SCHEDULED
COMMUNITY

## 2022-05-31 NOTE — PROGRESS NOTES
Continous glucose monitoring chandrakant placement     Date/Time 5/31/2022 3:41 PM     Performed by  Kierra Romero     Authorized by Nam Ram MD      Universal Protocol   Procedure performed by:  Consent: Verbal consent obtained  Written consent obtained  Risks and benefits: risks, benefits and alternatives were discussed  Consent given by: power of   Timeout called at: 5/31/2022 3:41 PM   Patient understanding: patient states understanding of the procedure being performed  Patient consent: the patient's understanding of the procedure matches consent given  Procedure consent: procedure consent matches procedure scheduled  Relevant documents: relevant documents present and verified  Test results: test results available and properly labeled  Site marked: the operative site was marked  Patient identity confirmed: verbally with patient       Procedure Details   Procedure Notes: Chandrakant Pro sensor placed today ( left arm) Patient will retunf in 14 days for her Vega Marilyn pro removal  and interpretation     Patient tolerance: patient tolerated the procedure well with no immediate complications

## 2022-05-31 NOTE — PROGRESS NOTES
Theador Number 82 y o  female MRN: [id number]    Encounter: [id number]      Assessment/Plan     Problem List Items Addressed This Visit        Endocrine    Acquired hypothyroidism     Continue levothyroxine at current dose           Type 2 diabetes mellitus with hyperglycemia, with long-term current use of insulin ([facility]       Lab Results   Component Value Date    HGBA1C 7 5 (A) 05/31/2022   Fingersticks fairly stable with occasional excursions-will set her for a diagnostic trial of continuous glucose monitor to get a better understanding of her blood sugar patterns           Relevant Medications    insulin glargine (Semglee) 100 units/mL subcutaneous injection    GlucaGen HypoKit 1 MG injection    Other Relevant Orders    Comprehensive metabolic panel Lab Collect    Continous glucose monitoring joreg placement    Continous glucose monitoring jorge intrepretation    Type 2 diabetes mellitus with hypoglycemia without coma, with long-term current use of insulin ([facility]) - Primary    Relevant Medications    insulin glargine (Semglee) 100 units/mL subcutaneous injection    GlucaGen HypoKit 1 MG injection    Other Relevant Orders    POCT hemoglobin A1c (Completed)    HEMOGLOBIN A1C W/ EAG ESTIMATION Lab Collect    Continous glucose monitoring jorge placement    Continous glucose monitoring jorge intrepretation       Musculoskeletal and Integument    Osteoporosis     Counseled on increased risk of fracture-continue calcium and vitamin-D supplementations-fall prevention  Discussed starting Prolia and she is agreeable-will set her up           Relevant Orders    Comprehensive metabolic panel Lab Collect          CC: Diabetes    History of Present Illness     HPI:  31-year-old female with type 2 diabetes on insulin therapy-diabetes complicated by retinopathy, chronic kidney disease, hypertension, hyperlipidemia, hypothyroidism and osteoporosis seen in follow-up      Current regimen:   Lantus 25 units twice per day   Novolog 6 with meals plus sliding scale  Fingersticks are being checked 4 times a day and most numbers range between 150s to 250s with occasional excursions and rare fingersticks in 60s to 80s range   She states that she does keep a snack with her to take overnight if she feels hypoglycemic  No severe hypo or hyperglycemia since her last visit    She is ambulating with a walker-undergoing PT  Denies any recent fall    Had declined pharmacological therapy for osteoporosis  Review of Systems    Historical Information   Past Medical History:   Diagnosis Date    Adenoma of large intestine     Allergy     last assessed: 10/18/2013    Cataract     Constipation, chronic     Diabetes mellitus ([address] )     anemia vitamin d deficiency glaucoma hypertension hypothyroidism; last assessed: 2012    Disease of thyroid gland     Diverticulosis     Fecal incontinence     Female stress incontinence     Glaucoma     Gout     History of cystocele     Intrinsic sphincter deficiency     Mixed incontinence     Nocturia     Osteoarthritis, chronic     Renal disorder     Senile atrophic vaginitis     Urinary frequency      Past Surgical History:   Procedure Laterality Date    ANKLE SURGERY Bilateral     CATARACT EXTRACTION      CHOLECYSTECTOMY      TUBAL LIGATION       Social History   Social History     Substance and Sexual Activity   Alcohol Use Never     Social History     Substance and Sexual Activity   Drug Use No     Social History     Tobacco Use   Smoking Status Former Smoker    Quit date: 1989    Years since quittin 1   Smokeless Tobacco Never Used     Family History:   Family History   Problem Relation Age of Onset    Stomach cancer Mother     Diabetes Sister         mellitus       Meds/Allergies   Current Outpatient Medications   Medication Sig Dispense Refill    acetaminophen (TYLENOL) 325 mg tablet Take 650 mg by mouth every 6 (six) hours as needed for mild pain      albuterol (2 5 mg/3 mL) 0  083 % nebulizer solution Take 2 5 mg by nebulization every 6 (six) hours as needed for wheezing or shortness of breath      albuterol (PROVENTIL HFA,VENTOLIN HFA) 90 mcg/act inhaler Inhale 2 puffs every 6 (six) hours as needed for wheezing      atorvastatin (LIPITOR) 10 mg tablet Take 1 tablet (10 mg total) by mouth daily 90 tablet 1    calcium carbonate (OS-JENA) 600 MG tablet 1 tab twice per day   60 tablet 6    Cholecalciferol (Vitamin D3) 50 MCG (2000 UT) capsule Take 1 capsule (2,000 Units total) by mouth daily 30 capsule 5    dorzolamide-timolol (COSOPT) 22 3-6 8 MG/ML ophthalmic solution Administer 1 drop to both eyes 2 (two) times a day      furosemide (LASIX) 80 mg tablet Take 1 tablet (80 mg total) by mouth daily 90 tablet 1    Glucosamine-Chondroitin 500-400 MG CAPS Take 1 capsule by mouth 2 (two) times a day      Incontinence Supply Disposable (CERTAINTY ADJ UNDERWEAR LARGE) MISC by Does not apply route daily at bedtime 30 each 5    insulin aspart (NovoLOG) 100 units/mL injection INJECT 4 UNITS THREE TIMES A DAY WITH MEALS  IN ADDITION TO SLIDING SCALE;SUBCUTANEOUSLY AS PER SLIDING SCALE: THREE TIMES A DAY WITH MEALS 201-250= 4 UNITS 251-300= 6 UNITS 301-350= 8 UNITS 351-400= 10 UNITS 401-450= 12 UNITS 451-500= 14 UNITS (Patient taking differently: INJECT 6 UNITS THREE TIMES A DAY WITH MEALS  IN ADDITION TO SLIDING SCALE;SUBCUTANEOUSLY AS PER SLIDING SCALE: THREE TIMES A DAY WITH MEALS 201-250= 4 UNITS 251-300= 6 UNITS 301-350= 8 UNITS 351-400= 10 UNITS 401-450= 12 UNITS 451-500= 14 UNITS) 10 mL 10    insulin glargine (Semglee) 100 units/mL subcutaneous injection Inject 25 Units under the skin every 12 (twelve) hours      levothyroxine 75 mcg tablet TAKE ONE TABLET BY MOUTH ONCE DAILY AS DIRECTED 90 tablet 3    losartan (COZAAR) 25 mg tablet Take 25 mg by mouth every morning       pantoprazole (PROTONIX) 40 mg tablet Take 1 tablet (40 mg total) by mouth daily before breakfast 30 tablet 0    potassium chloride (KLOR-CON M20) 20 mEq tablet Take 1 tablet (20 mEq total) by mouth daily (Patient taking differently: Take 10 mEq by mouth daily) 90 tablet 1    triamcinolone (KENALOG) 0 1 % cream       amLODIPine (NORVASC) 2 5 mg tablet Take 7 5 mg by mouth daily (Patient not taking: No sig reported)      budesonide-formoterol (SYMBICORT) 160-4 5 mcg/act inhaler Inhale 2 puffs 2 (two) times a day Rinse mouth after use  (Patient not taking: Reported on 5/31/2022)      fexofenadine (ALLEGRA) 180 MG tablet Take by mouth (Patient not taking: No sig reported)      fluticasone (FLONASE) 50 mcg/act nasal spray 2 sprays into each nostril daily  (Patient not taking: Reported on 5/31/2022)      GlucaGen HypoKit 1 MG injection if needed      guaiFENesin (ROBITUSSIN) 100 MG/5ML oral liquid Take 200 mg by mouth 3 (three) times a day as needed for cough (Patient not taking: No sig reported)      insulin glargine (LANTUS) 100 units/mL subcutaneous injection 30 units daily in AM  (Patient not taking: Reported on 5/31/2022) 1 mL 0    ipratropium (ATROVENT) 0 02 % nebulizer solution Take 0 5 mg by nebulization 4 (four) times a day (Patient not taking: No sig reported)      montelukast (SINGULAIR) 10 mg tablet Take 10 mg by mouth every morning  (Patient not taking: No sig reported)      RELION PEN NEEDLE 31G/8MM 31G X 8 MM MISC USE AS DIRECTED THREE TIMES DAILY (Patient not taking: No sig reported) 300 each 3    tiotropium (SPIRIVA RESPIMAT) 1 25 MCG/ACT AERS inhaler Inhale 2 puffs daily (Patient not taking: No sig reported)       No current facility-administered medications for this visit  Allergies   Allergen Reactions    Ace Inhibitors      Annotation - [id number]: short of breath    Aspirin        Objective   Vitals: Blood pressure 110/60, pulse 67, height 5' (1 524 m), weight 99 8 kg (220 lb)  Physical Exam  Vitals reviewed  Constitutional:       Appearance: Normal appearance  She is obese   She is not ill-appearing or diaphoretic  HENT:      Head: Normocephalic and atraumatic  Eyes:      General: No scleral icterus  Extraocular Movements: Extraocular movements intact  Cardiovascular:      Rate and Rhythm: Normal rate and regular rhythm  Heart sounds: Normal heart sounds  No murmur heard  Pulmonary:      Effort: Pulmonary effort is normal  No respiratory distress  Breath sounds: Normal breath sounds  No wheezing  Abdominal:      General: There is no distension  Palpations: Abdomen is soft  Tenderness: There is no abdominal tenderness  There is no guarding  Musculoskeletal:      Cervical back: Neck supple  Right lower leg: Edema present  Left lower leg: Edema present  Lymphadenopathy:      Cervical: No cervical adenopathy  Skin:     General: Skin is warm and dry  Neurological:      General: No focal deficit present  Mental Status: She is alert and oriented to person, place, and time  Gait: Gait abnormal    Psychiatric:         Mood and Affect: Mood normal          Behavior: Behavior normal          Thought Content: Thought content normal          Judgment: Judgment normal          The history was obtained from the review of the chart, patient  Lab Results:   Lab Results   Component Value Date/Time    Hemoglobin A1C 7 5 (A) 05/31/2022 03:02 PM    Hemoglobin A1C 8 0 (H) 01/18/2022 05:44 AM    Hemoglobin A1C 8 0 01/18/2022 12:00 AM    Hemoglobin A1C 8 3 (H) 11/23/2021 06:05 AM    Hemoglobin A1C 8 3 11/23/2021 12:00 AM    Hemoglobin A1C 8 4 (H) 08/31/2021 07:40 AM           Imaging Studies:     Portions of the record may have been created with voice recognition software  Occasional wrong word or "sound a like" substitutions may have occurred due to the inherent limitations of voice recognition software  Read the chart carefully and recognize, using context, where substitutions have occurred

## 2022-06-01 ENCOUNTER — TELEPHONE (OUTPATIENT)
Dept: FAMILY MEDICINE CLINIC | Facility: CLINIC | Age: 87
End: 2022-06-01

## 2022-06-01 ENCOUNTER — TELEPHONE (OUTPATIENT)
Dept: ADMINISTRATIVE | Facility: OTHER | Age: 87
End: 2022-06-01

## 2022-06-01 PROBLEM — E11.65 TYPE 2 DIABETES MELLITUS WITH HYPERGLYCEMIA, WITH LONG-TERM CURRENT USE OF INSULIN (HCC): Status: ACTIVE | Noted: 2022-06-01

## 2022-06-01 PROBLEM — Z79.4 TYPE 2 DIABETES MELLITUS WITH HYPERGLYCEMIA, WITH LONG-TERM CURRENT USE OF INSULIN (HCC): Status: ACTIVE | Noted: 2022-06-01

## 2022-06-01 NOTE — TELEPHONE ENCOUNTER
Upon review of the In Basket request and the patient's chart, initial outreach has been made via telephone call, please see Contacts section for details        Last exam 2020  Dianna rivas     Thank you  Gina Richardson MA

## 2022-06-01 NOTE — TELEPHONE ENCOUNTER
----- Message from Rikki Broussard sent at 5/31/2022  2:49 PM EDT -----  Regarding: HM DM EYE EXAM  05/31/22 2:49 PM    Hello, our patient Miguel Gibson has had Diabetic Eye Exam completed/performed   Please assist in updating the patient chart by calling the office Dr Don Godinez office @ [phone]  Thank you,  Ary Phillips  [facility] Diarrhea/abdominal pain

## 2022-06-01 NOTE — TELEPHONE ENCOUNTER
Upon review of the In Basket request we were able to locate, review, and update the patient chart as requested for Diabetic Eye Exam     Any additional questions or concerns should be emailed to the Practice Liaisons via [email or web address] email, please do not reply via In Basket      Thank you  Chris Ryan MA

## 2022-06-01 NOTE — TELEPHONE ENCOUNTER
Patient resides at [address], she follows with Gwen Ham (in house provider)    Please remove Dr Gosia Lancaster as PCP

## 2022-06-01 NOTE — TELEPHONE ENCOUNTER
----- Message from Daysi Brumfield sent at 5/31/2022  2:49 PM EDT -----  Regarding: HM DM FOOT EXAM  05/31/22 2:50 PM    Hello, our patient Lavetta Nageotte has had Diabetic Foot Exam completed/performed  Please assist in updating the patient chart by calling the office Dr Krystian Saleem @ [phone]   Fax# [phone]  Thank you,  Ary Phillips  [facility]

## 2022-06-01 NOTE — ASSESSMENT & PLAN NOTE
Lab Results   Component Value Date    HGBA1C 7 5 (A) 05/31/2022   Fingersticks fairly stable with occasional excursions-will set her for a diagnostic trial of continuous glucose monitor to get a better understanding of her blood sugar patterns

## 2022-06-01 NOTE — ASSESSMENT & PLAN NOTE
Counseled on increased risk of fracture-continue calcium and vitamin-D supplementations-fall prevention    Discussed starting Prolia and she is agreeable-will set her up

## 2022-06-14 ENCOUNTER — CLINICAL SUPPORT (OUTPATIENT)
Dept: ENDOCRINOLOGY | Facility: CLINIC | Age: 87
End: 2022-06-14
Payer: MEDICARE

## 2022-06-14 ENCOUNTER — TELEPHONE (OUTPATIENT)
Dept: ENDOCRINOLOGY | Facility: CLINIC | Age: 87
End: 2022-06-14

## 2022-06-14 DIAGNOSIS — Z79.4 TYPE 2 DIABETES MELLITUS WITH HYPERGLYCEMIA, WITH LONG-TERM CURRENT USE OF INSULIN (HCC): ICD-10-CM

## 2022-06-14 DIAGNOSIS — E11.65 TYPE 2 DIABETES MELLITUS WITH HYPERGLYCEMIA, WITH LONG-TERM CURRENT USE OF INSULIN (HCC): ICD-10-CM

## 2022-06-14 PROCEDURE — 95251 CONT GLUC MNTR ANALYSIS I&R: CPT | Performed by: PHYSICIAN ASSISTANT

## 2022-06-14 NOTE — PROGRESS NOTES
Procedures      CGM Interpretation  Colten Brito   Device used [facility],  Professional Use- Physician Provided Equipment  Indication: Type 2 Diabetes  More than 72 hours of data was reviewed  Report to be scanned to chart  Date Range: 5/31/2022-6/14/2022  Analysis of data:   Average Glucose: 175  Coefficient of Variation: 30    Time in Target Range: 57%   Time Above Range: 32% high, 10%    Time Below Range: 1%   Interpretation of data:   Overall stable but with occasional mild hypeglycemia overnight/after lunch and Hyperglycemia after breakfast/dinner    According to Dr Melton office note, insulin dosing is:  Lantus 25 units twice per day   Novolog 6 with meals plus sliding scale  [address] to confirm and make adjustments, left message  PLAN:  Reduce Lantus to 25 units morning, 20 units at night     Change Novolog to 8 units before breakfast  Change Novolog to 5 units before Lunch  Change Novolog to 8 units before Dinner    Change Novolog Sliding scale to before meals only   (not at bedtime)  201-250: 2 units  251-300: 4 units  301-350: 6 units  351-400: 8 units  Over 400: 10 units  Send Glucose log in 2 weeks for review

## 2022-06-14 NOTE — PROGRESS NOTES
Continous glucose monitoring jorge intrepretation     Date/Time 6/14/2022 1:35 PM     Performed by  Sharlene Waters     Authorized by Gina Hernández MD      Universal Protocol   Procedure performed by:  Consent: Verbal consent obtained  Written consent obtained    Consent given by: patient  Timeout called at: 6/14/2022 1:35 PM   Patient understanding: patient states understanding of the procedure being performed  Patient consent: the patient's understanding of the procedure matches consent given  Procedure consent: procedure consent matches procedure scheduled  Relevant documents: relevant documents present and verified  Test results: test results available and properly labeled  Site marked: the operative site was marked     Procedure Details   Procedure Notes: Pt came in today for his LibrePro sensor removal an interpretation   Patient tolerance: patient tolerated the procedure well with no immediate complications

## 2022-06-14 NOTE — TELEPHONE ENCOUNTER
Reviewed CGM download and reccomendations for insulin adjustments made in note  I did contact the nursing facility but no nurse was available and message was left  Please contact nursing home regarding instructions if no return call in the next few days  Non-urgent- no severe/frequent hypoglycemia on download

## 2022-06-14 NOTE — Clinical Note
I called and left message with nursing staff for insulin adjustments, If they return call please give them info from the "Plan" portion of my CGM interpretation

## 2022-06-15 NOTE — TELEPHONE ENCOUNTER
Called nursing home and spoke to [address] [place] [address]  She said the nursing director is requesting info regarding any changes be faxed to them at [phone]  Faxed clinical support note from yesterday regarding [facility] [address] [facility] staff @ [phone]

## 2022-06-24 ENCOUNTER — HOSPITAL ENCOUNTER (EMERGENCY)
Facility: HOSPITAL | Age: 87
Discharge: HOME | End: 2022-06-24
Attending: EMERGENCY MEDICINE | Admitting: EMERGENCY MEDICINE
Payer: MEDICARE

## 2022-06-24 VITALS
HEART RATE: 67 BPM | OXYGEN SATURATION: 95 % | TEMPERATURE: 97.6 F | RESPIRATION RATE: 19 BRPM | DIASTOLIC BLOOD PRESSURE: 69 MMHG | SYSTOLIC BLOOD PRESSURE: 166 MMHG

## 2022-06-24 DIAGNOSIS — S50.312A ABRASION OF SKIN OF LEFT ELBOW: Primary | ICD-10-CM
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
## 2022-06-24 DIAGNOSIS — S80.212A ABRASION, LEFT KNEE, INITIAL ENCOUNTER: ICD-10-CM

## 2022-06-24 PROCEDURE — 99283 EMERGENCY DEPT VISIT LOW MDM: CPT

## 2022-06-24 PROCEDURE — 90471 IMMUNIZATION ADMIN: CPT

## 2022-06-24 PROCEDURE — 90715 TDAP VACCINE 7 YRS/> IM: CPT | Performed by: PHYSICIAN ASSISTANT

## 2022-06-24 PROCEDURE — 99282 EMERGENCY DEPT VISIT SF MDM: CPT | Performed by: PHYSICIAN ASSISTANT

## 2022-06-24 RX ORDER — GINSENG 100 MG
1 CAPSULE ORAL ONCE
Status: COMPLETED | OUTPATIENT
Start: 2022-06-24 | End: 2022-06-24

## 2022-06-24 RX ADMIN — BACITRACIN ZINC 1 SMALL APPLICATION: 500 OINTMENT TOPICAL at 12:17

## 2022-06-24 RX ADMIN — TETANUS TOXOID, REDUCED DIPHTHERIA TOXOID AND ACELLULAR PERTUSSIS VACCINE, ADSORBED 0.5 ML: 5; 2.5; 8; 8; 2.5 SUSPENSION INTRAMUSCULAR at 12:16

## 2022-06-24 NOTE — ED PROVIDER NOTES
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 Taking?    Cholecalciferol (Vitamin D3) 50 MCG (2000 UT) capsule  Outside Facility (Specify) No No   Sig: Take 1 capsule (2,000 Units total) by mouth daily   GlucaGen HypoKit 1 MG injection  Outside Facility (Specify) Yes No   Sig: if needed   Glucosamine-Chondroitin 500-400 MG CAPS  Outside Facility (Specify) Yes No   Sig: Take 1 capsule by mouth 2 (two) times a day   Incontinence Supply Disposable (CERTAINTY ADJ UNDERWEAR LARGE) MISC  Outside Facility (Specify) No No   Sig: by Does not apply route daily at bedtime   RELION PEN NEEDLE 31G/8MM 31G X 8 MM MISC  Outside Facility (Specify) No No   Sig: USE AS DIRECTED THREE TIMES DAILY   Patient not taking: No sig reported   acetaminophen (TYLENOL) 325 mg tablet  Outside Facility (Specify) Yes No   Sig: Take 650 mg by mouth every 6 (six) hours as needed for mild pain   albuterol (2 5 mg/3 mL) 0 083 % nebulizer solution  Outside Facility (Specify) Yes No   Sig: Take 2 5 mg by nebulization every 6 (six) hours as needed for wheezing or shortness of breath   albuterol (PROVENTIL HFA,VENTOLIN HFA) 90 mcg/act inhaler  Outside Facility (Specify) Yes No   Sig: Inhale 2 puffs every 6 (six) hours as needed for wheezing   amLODIPine (NORVASC) 2 5 mg tablet  Outside Facility (Specify) Yes No   Sig: Take 7 5 mg by mouth daily   Patient not taking: No sig reported   atorvastatin (LIPITOR) 10 mg tablet  Outside Facility (Specify) No No   Sig: Take 1 tablet (10 mg total) by mouth daily   budesonide-formoterol (SYMBICORT) 160-4 5 mcg/act inhaler  Outside Facility (Specify) Yes No   Sig: Inhale 2 puffs 2 (two) times a day Rinse mouth after use  Patient not taking: Reported on 2022   calcium carbonate (OS-JENA) 600 MG tablet  Outside Facility (Specify) No No   Si tab twice per day     dorzolamide-timolol (COSOPT) 22 3-6 8 MG/ML ophthalmic solution  Outside Facility (Specify) Yes No   Sig: Administer 1 drop to both eyes 2 (two) times a day   fexofenadine (ALLEGRA) 180 MG tablet  Outside Facility (Specify) Yes No   Sig: Take by mouth   Patient not taking: No sig reported   fluticasone (FLONASE) 50 mcg/act nasal spray  Outside Facility (Specify) Yes No   Si sprays into each nostril daily    Patient not taking: Reported on 2022   furosemide (LASIX) 80 mg tablet  Outside Facility (Specify) No No   Sig: Take 1 tablet (80 mg total) by mouth daily   guaiFENesin (ROBITUSSIN) 100 MG/5ML oral liquid  Outside Facility (Specify) Yes No   Sig: Take 200 mg by mouth 3 (three) times a day as needed for cough   Patient not taking: No sig reported   insulin aspart (NovoLOG) 100 units/mL injection  Outside Facility (Specify) No No   Sig: INJECT 4 UNITS THREE TIMES A DAY WITH MEALS  IN ADDITION TO SLIDING SCALE;SUBCUTANEOUSLY AS PER SLIDING SCALE: THREE TIMES A DAY WITH MEALS 201-250= 4 UNITS 251-300= 6 UNITS 301-350= 8 UNITS 351-400= 10 UNITS 401-450= 12 UNITS 451-500= 14 UNITS   Patient taking differently: INJECT 6 UNITS THREE TIMES A DAY WITH MEALS  IN ADDITION TO SLIDING SCALE;SUBCUTANEOUSLY AS PER SLIDING SCALE: THREE TIMES A DAY WITH MEALS 201-250= 4 UNITS 251-300= 6 UNITS 301-350= 8 UNITS 351-400= 10 UNITS 401-450= 12 UNITS 451-500= 14 UNITS   insulin glargine (LANTUS) 100 units/mL subcutaneous injection  Outside Facility (Specify) No No   Si units daily in AM    Patient not taking: Reported on 2022   insulin glargine (Semglee) 100 units/mL subcutaneous injection  Outside Facility (Specify) Yes No   Sig: Inject 25 Units under the skin every 12 (twelve) hours   ipratropium (ATROVENT) 0 02 % nebulizer solution  Outside Facility (Specify) Yes No   Sig: Take 0 5 mg by nebulization 4 (four) times a day   Patient not taking: No sig reported   levothyroxine 75 mcg tablet  Outside Facility (Specify) No No   Sig: TAKE ONE TABLET BY MOUTH ONCE DAILY AS DIRECTED   losartan (COZAAR) 25 mg tablet  Outside Facility (Specify) Yes No   Sig: Take 25 mg by mouth every morning    montelukast (SINGULAIR) 10 mg tablet  Outside Facility (Specify) Yes No   Sig: Take 10 mg by mouth every morning    Patient not taking: No sig reported   pantoprazole (PROTONIX) 40 mg tablet  Outside Facility (Specify) No No   Sig: Take 1 tablet (40 mg total) by mouth daily before breakfast   potassium chloride (KLOR-CON M20) 20 mEq tablet  Outside Facility (Specify) No No   Sig: Take 1 tablet (20 mEq total) by mouth daily   Patient taking differently: Take 10 mEq by mouth daily   tiotropium (SPIRIVA RESPIMAT) 1 25 MCG/ACT AERS inhaler  Outside Facility (Specify) Yes No   Sig: Inhale 2 puffs daily   Patient not taking: No sig reported   triamcinolone (KENALOG) 0 1 % cream  Outside Facility (Specify) Yes No      Facility-Administered Medications: None       Past Medical History:   Diagnosis Date    Adenoma of large intestine     Allergy     last assessed: 10/18/2013    Cataract     Constipation, chronic     [facility]     anemia vitamin d deficiency glaucoma hypertension hypothyroidism; last assessed: 2012    Disease of thyroid gland     Diverticulosis     Fecal incontinence     Female stress incontinence     Glaucoma     Gout     History of cystocele     Intrinsic sphincter deficiency     Mixed incontinence     Nocturia     Osteoarthritis, chronic     Renal disorder     Senile atrophic vaginitis     Urinary frequency        Past Surgical History:   Procedure Laterality Date    ANKLE SURGERY Bilateral     CATARACT EXTRACTION      CHOLECYSTECTOMY      TUBAL LIGATION         Family History   Problem Relation Age of Onset    Stomach cancer Mother     Diabetes Sister         mellitus     I have reviewed and agree with the history as documented  E-Cigarette/Vaping    E-Cigarette Use Never User      E-Cigarette/Vaping Substances    Nicotine No     THC No     CBD No     Flavoring No     Other No     Unknown No      Social History     Tobacco Use    Smoking status: Former Smoker     Quit date: 1989     Years since quittin 1    Smokeless tobacco: Never Used   Vaping Use    Vaping Use: Never used   Substance Use Topics    Alcohol use: Never    Drug use: No       Review of Systems   Constitutional: Negative for fatigue and fever  HENT: Negative for nosebleeds, rhinorrhea and voice change  Eyes: Negative for visual disturbance  Respiratory: Negative for cough and shortness of breath  Cardiovascular: Negative for chest pain  Gastrointestinal: Negative for abdominal pain and vomiting  Musculoskeletal: Negative for back pain, gait problem, joint swelling and neck pain  Skin: Positive for wound  Neurological: Negative for dizziness, seizures, syncope, speech difficulty, weakness, numbness and headaches  Psychiatric/Behavioral: Negative for confusion  All other systems reviewed and are negative  Physical Exam  Physical Exam  Vitals and nursing note reviewed  Constitutional:       General: She is not in acute distress  Appearance: She is well-developed  She is obese  She is not ill-appearing, toxic-appearing or diaphoretic  HENT:      Head: Normocephalic and atraumatic  Right Ear: External ear normal       Left Ear: External ear normal       Nose: Nose normal  No rhinorrhea  Mouth/Throat:      Mouth: Mucous membranes are moist       Pharynx: Oropharynx is clear  Eyes:      Conjunctiva/sclera: Conjunctivae normal       Pupils: Pupils are equal, round, and reactive to light  Cardiovascular:      Rate and Rhythm: Normal rate and regular rhythm  Heart sounds: Normal heart sounds  No murmur heard  Pulmonary:      Effort: Pulmonary effort is normal  No respiratory distress  Breath sounds: Normal breath sounds  Abdominal:      Palpations: Abdomen is soft  Tenderness: There is no abdominal tenderness  There is no right CVA tenderness, left CVA tenderness, guarding or rebound  Musculoskeletal:         General: Normal range of motion  Left elbow: Normal  No swelling or deformity  Normal range of motion  No tenderness  Cervical back: Normal, full passive range of motion without pain, normal range of motion and neck supple  No signs of trauma  No pain with movement or spinous process tenderness  Normal range of motion  Thoracic back: Normal       Lumbar back: Normal       Left knee: Normal  No swelling, deformity, ecchymosis or bony tenderness  Normal range of motion  No tenderness  Right lower leg: Edema present  Left lower leg: Edema present  Comments: Lower leg skin changes from chronic venous stasis   Skin:     General: Skin is warm and dry  Comments: +left posterior elbow: half the size of a dime size skin tear, clean, not bleeding, skin completely out  +left lateral anterior knee: 1cm by 2cm skin tear, skin over top intact  No bleeding  Clean wound      Neurological:      General: No focal deficit present  Mental Status: She is alert and oriented to person, place, and time  Mental status is at baseline  Cranial Nerves: No cranial nerve deficit  Motor: No weakness  Psychiatric:         Mood and Affect: Mood normal          Behavior: Behavior normal          Vital Signs  ED Triage Vitals [06/24/22 1149]   Temperature Pulse Respirations Blood Pressure SpO2   97 6 °F (36 4 °C) 67 19 (!) 189/72 95 %      Temp Source Heart Rate Source Patient Position - Orthostatic VS BP Location FiO2 (%)   Oral Monitor Lying Right arm --      Pain Score       --           Vitals:    06/24/22 1149 06/24/22 1200   BP: (!) 189/72 166/69   Pulse: 67    Patient Position - Orthostatic VS: Lying          Visual Acuity      ED Medications  Medications   tetanus-diphtheria-acellular pertussis (BOOSTRIX) IM injection 0 5 mL (0 5 mL Intramuscular Given 6/24/22 1216)   bacitracin topical ointment 1 small application (1 small application Topical Given 6/24/22 1217)       Diagnostic Studies  Results Reviewed     None                 No orders to display              Procedures  Procedures         ED Course             cleansed left knee and left elbow wounds with NS, then applied bacitracin ointment  Placed Tegaderm over left knee and xeroform with Band-Aid over left elbow  no imaging needed at this time given mechanism and clinical presentation  No labs needed given type of fall  SBIRT 22yo+    Flowsheet Row Most Recent Value   SBIRT (25 yo +)    In order to provide better care to our patients, we are screening all of our patients for alcohol and drug use  Would it be okay to ask you these screening questions? Yes Filed at: 06/24/2022 1154   Initial Alcohol Screen: US AUDIT-C     1  How often do you have a drink containing alcohol? 0 Filed at: 06/24/2022 115   2  How many drinks containing alcohol do you have on a typical day you are drinking? 0 Filed at: 06/24/2022 1154   3b  FEMALE Any Age, or MALE 65+:  How often do you have 4 or more drinks on one occassion? 0 Filed at: 06/24/2022 1159   Audit-C Score 0 Filed at: 06/24/2022 1159   TOMA: How many times in the past year have you    Used an illegal drug or used a prescription medication for non-medical reasons? Never Filed at: 06/24/2022 1159                MDM    Disposition  Final diagnoses:   Abrasion of skin of left elbow   Abrasion, left knee, initial encounter   Fall, initial encounter     Time reflects when diagnosis was documented in both MDM as applicable and the Disposition within this note     Time User Action Codes Description Comment    6/24/2022 12:13 PM Hyacinth Hernandez Add [S50 312A] Abrasion of skin of left elbow     6/24/2022 12:13 PM Veronica Zurita Add [S80 212A] Abrasion, left knee, initial encounter     6/24/2022 12:13 PM Hyacinth Hernandez Add [W19  Treva Bebeto, initial encounter       ED Disposition     ED Disposition   Discharge    Condition   Stable    Date/Time   Fri Jun 24, 2022 12:13 PM    Comment   Sarah Matt discharge to home/self care  Follow-up Information     Follow up With Specialties Details Why Contact Info Additional Information    James Monterroso, DO Family Medicine In 1 week  [address]  [address] [place] [address]  [phone]       [address] [place] [address] Emergency Department Emergency Medicine  If symptoms worsen [facility] [id number]  [address] [place] [address] Emergency Department, [address] [place] [address], [id number]          Patient's Medications   Discharge Prescriptions    No medications on file       No discharge procedures on file      PDMP Review     None          ED Provider  Electronically Signed by           Saadia Perez PA-C  06/24/22 3939

## 2022-06-24 NOTE — DISCHARGE INSTRUCTIONS
Apply antibiotic ointment 3-4X/day for 1 week left elbow and left knee  Keep covered  Look for signs of infections

## 2022-06-24 NOTE — ED NOTES
lorena p/u at [address] [place] [address], [address] [place] [address]  06/24/22 2226

## 2022-06-24 NOTE — ED NOTES
[facility] contacted regarding transport home, will call back with p/u time     Ricky Alegria RN  06/24/22 5022 done

## 2022-06-24 NOTE — TELEPHONE ENCOUNTER
06/24/22 1:19 PM        Thank you for your request  Your request has been received, reviewed, and the patient chart updated  The PCP has successfully been removed with a patient attribution note  This message will now be completed          Thank you  Sue De Guzman

## 2022-08-25 ENCOUNTER — LAB REQUISITION (OUTPATIENT)
Dept: LAB | Facility: HOSPITAL | Age: 87
End: 2022-08-25
Payer: MEDICARE

## 2022-08-25 DIAGNOSIS — I50.9 HEART FAILURE, UNSPECIFIED (HCC): ICD-10-CM

## 2022-08-25 DIAGNOSIS — E03.9 HYPOTHYROIDISM, UNSPECIFIED: ICD-10-CM

## 2022-08-25 DIAGNOSIS — E78.5 HYPERLIPIDEMIA, UNSPECIFIED: ICD-10-CM

## 2022-08-25 DIAGNOSIS — I10 ESSENTIAL (PRIMARY) HYPERTENSION: ICD-10-CM

## 2022-08-25 DIAGNOSIS — E11.9 TYPE 2 DIABETES MELLITUS WITHOUT COMPLICATIONS (HCC): ICD-10-CM

## 2022-08-25 LAB
ALBUMIN SERPL BCP-MCNC: 2.8 G/DL (ref 3.5–5)
ALP SERPL-CCNC: 90 U/L (ref 46–116)
ALT SERPL W P-5'-P-CCNC: 14 U/L (ref 12–78)
ANION GAP SERPL CALCULATED.3IONS-SCNC: 5 MMOL/L (ref 4–13)
AST SERPL W P-5'-P-CCNC: 9 U/L (ref 5–45)
BASOPHILS # BLD AUTO: 0.06 THOUSANDS/ΜL (ref 0–0.1)
BASOPHILS NFR BLD AUTO: 1 % (ref 0–1)
BILIRUB SERPL-MCNC: 0.26 MG/DL (ref 0.2–1)
BUN SERPL-MCNC: 46 MG/DL (ref 5–25)
CALCIUM ALBUM COR SERPL-MCNC: 9.4 MG/DL (ref 8.3–10.1)
CALCIUM SERPL-MCNC: 8.4 MG/DL (ref 8.3–10.1)
CHLORIDE SERPL-SCNC: 101 MMOL/L (ref 96–108)
CHOLEST SERPL-MCNC: 97 MG/DL
CO2 SERPL-SCNC: 31 MMOL/L (ref 21–32)
CREAT SERPL-MCNC: 2.19 MG/DL (ref 0.6–1.3)
EOSINOPHIL # BLD AUTO: 0.34 THOUSAND/ΜL (ref 0–0.61)
EOSINOPHIL NFR BLD AUTO: 4 % (ref 0–6)
ERYTHROCYTE [DISTWIDTH] IN BLOOD BY AUTOMATED COUNT: 15.2 % (ref 11.6–15.1)
EST. AVERAGE GLUCOSE BLD GHB EST-MCNC: 180 MG/DL
GFR SERPL CREATININE-BSD FRML MDRD: 20 ML/MIN/1.73SQ M
GLUCOSE P FAST SERPL-MCNC: 134 MG/DL (ref 65–99)
HBA1C MFR BLD: 7.9 %
HCT VFR BLD AUTO: 32.9 % (ref 34.8–46.1)
HDLC SERPL-MCNC: 47 MG/DL
HGB BLD-MCNC: 9.5 G/DL (ref 11.5–15.4)
IMM GRANULOCYTES # BLD AUTO: 0.02 THOUSAND/UL (ref 0–0.2)
IMM GRANULOCYTES NFR BLD AUTO: 0 % (ref 0–2)
LDLC SERPL CALC-MCNC: 32 MG/DL (ref 0–100)
LYMPHOCYTES # BLD AUTO: 1.4 THOUSANDS/ΜL (ref 0.6–4.47)
LYMPHOCYTES NFR BLD AUTO: 15 % (ref 14–44)
MCH RBC QN AUTO: 26.2 PG (ref 26.8–34.3)
MCHC RBC AUTO-ENTMCNC: 28.9 G/DL (ref 31.4–37.4)
MCV RBC AUTO: 91 FL (ref 82–98)
MONOCYTES # BLD AUTO: 0.67 THOUSAND/ΜL (ref 0.17–1.22)
MONOCYTES NFR BLD AUTO: 7 % (ref 4–12)
NEUTROPHILS # BLD AUTO: 7.07 THOUSANDS/ΜL (ref 1.85–7.62)
NEUTS SEG NFR BLD AUTO: 73 % (ref 43–75)
NONHDLC SERPL-MCNC: 50 MG/DL
NRBC BLD AUTO-RTO: 0 /100 WBCS
NT-PROBNP SERPL-MCNC: 355 PG/ML
PLATELET # BLD AUTO: 371 THOUSANDS/UL (ref 149–390)
PMV BLD AUTO: 10.1 FL (ref 8.9–12.7)
POTASSIUM SERPL-SCNC: 4.7 MMOL/L (ref 3.5–5.3)
PROT SERPL-MCNC: 6.6 G/DL (ref 6.4–8.4)
RBC # BLD AUTO: 3.62 MILLION/UL (ref 3.81–5.12)
SODIUM SERPL-SCNC: 137 MMOL/L (ref 135–147)
T4 FREE SERPL-MCNC: 1.14 NG/DL (ref 0.76–1.46)
TRIGL SERPL-MCNC: 91 MG/DL
TSH SERPL DL<=0.05 MIU/L-ACNC: 3.49 UIU/ML (ref 0.45–4.5)
WBC # BLD AUTO: 9.56 THOUSAND/UL (ref 4.31–10.16)

## 2022-08-25 PROCEDURE — 84443 ASSAY THYROID STIM HORMONE: CPT | Performed by: NURSE PRACTITIONER

## 2022-08-25 PROCEDURE — 85025 COMPLETE CBC W/AUTO DIFF WBC: CPT | Performed by: NURSE PRACTITIONER

## 2022-08-25 PROCEDURE — 83036 HEMOGLOBIN GLYCOSYLATED A1C: CPT | Performed by: NURSE PRACTITIONER

## 2022-08-25 PROCEDURE — 83880 ASSAY OF NATRIURETIC PEPTIDE: CPT | Performed by: NURSE PRACTITIONER

## 2022-08-25 PROCEDURE — 80053 COMPREHEN METABOLIC PANEL: CPT | Performed by: NURSE PRACTITIONER

## 2022-08-25 PROCEDURE — 80061 LIPID PANEL: CPT | Performed by: NURSE PRACTITIONER

## 2022-08-25 PROCEDURE — 84439 ASSAY OF FREE THYROXINE: CPT | Performed by: NURSE PRACTITIONER

## 2022-09-29 ENCOUNTER — APPOINTMENT (OUTPATIENT)
Dept: LAB | Facility: HOSPITAL | Age: 87
End: 2022-09-29
Attending: INTERNAL MEDICINE
Payer: MEDICARE

## 2022-09-29 ENCOUNTER — TELEPHONE (OUTPATIENT)
Dept: NEPHROLOGY | Facility: CLINIC | Age: 87
End: 2022-09-29

## 2022-09-29 DIAGNOSIS — I12.9 NEPHROSCLEROSIS ARTERIOLAR, STAGE 1-4 OR UNSPECIFIED CHRONIC KIDNEY DISEASE: ICD-10-CM

## 2022-09-29 LAB
ANION GAP SERPL CALCULATED.3IONS-SCNC: 8 MMOL/L (ref 4–13)
BUN SERPL-MCNC: 46 MG/DL (ref 5–25)
CALCIUM SERPL-MCNC: 8.7 MG/DL (ref 8.3–10.1)
CHLORIDE SERPL-SCNC: 102 MMOL/L (ref 96–108)
CO2 SERPL-SCNC: 29 MMOL/L (ref 21–32)
CREAT SERPL-MCNC: 1.88 MG/DL (ref 0.6–1.3)
GFR SERPL CREATININE-BSD FRML MDRD: 24 ML/MIN/1.73SQ M
GLUCOSE SERPL-MCNC: 208 MG/DL (ref 65–140)
POTASSIUM SERPL-SCNC: 4.6 MMOL/L (ref 3.5–5.3)
SODIUM SERPL-SCNC: 139 MMOL/L (ref 135–147)

## 2022-09-29 PROCEDURE — 80048 BASIC METABOLIC PNL TOTAL CA: CPT

## 2022-09-29 PROCEDURE — 36415 COLL VENOUS BLD VENIPUNCTURE: CPT

## 2022-09-29 NOTE — TELEPHONE ENCOUNTER
----- Message from Pilo Cespedes MD sent at 9/29/2022 12:08 PM EDT -----  Let her knoiw creatinine 1 8 which is improved and in baseline range   ----- Message -----  From: Lab, Background User  Sent: 9/29/2022  12:06 PM EDT  To: Pilo Cespedes MD

## 2022-09-29 NOTE — TELEPHONE ENCOUNTER
I called the facility patient is residing in but no answer  I lm for her daughter milo , advised labs are stable no changes at this time

## 2022-10-12 PROBLEM — Z13.820 SCREENING FOR OSTEOPOROSIS: Status: RESOLVED | Noted: 2021-12-09 | Resolved: 2022-10-12

## 2022-10-27 ENCOUNTER — LAB REQUISITION (OUTPATIENT)
Dept: LAB | Facility: HOSPITAL | Age: 87
End: 2022-10-27
Payer: MEDICARE

## 2022-10-27 DIAGNOSIS — E55.9 VITAMIN D DEFICIENCY, UNSPECIFIED: ICD-10-CM

## 2022-10-27 DIAGNOSIS — I10 ESSENTIAL (PRIMARY) HYPERTENSION: ICD-10-CM

## 2022-10-27 DIAGNOSIS — D64.9 ANEMIA, UNSPECIFIED: ICD-10-CM

## 2022-10-27 DIAGNOSIS — E11.9 TYPE 2 DIABETES MELLITUS WITHOUT COMPLICATIONS (HCC): ICD-10-CM

## 2022-10-27 DIAGNOSIS — E78.5 HYPERLIPIDEMIA, UNSPECIFIED: ICD-10-CM

## 2022-10-27 DIAGNOSIS — E03.9 HYPOTHYROIDISM, UNSPECIFIED: ICD-10-CM

## 2022-10-27 DIAGNOSIS — R53.83 OTHER FATIGUE: ICD-10-CM

## 2022-10-27 LAB
25(OH)D3 SERPL-MCNC: 38 NG/ML (ref 30–100)
ALBUMIN SERPL BCP-MCNC: 2.7 G/DL (ref 3.5–5)
ALP SERPL-CCNC: 107 U/L (ref 46–116)
ALT SERPL W P-5'-P-CCNC: 12 U/L (ref 12–78)
ANION GAP SERPL CALCULATED.3IONS-SCNC: 5 MMOL/L (ref 4–13)
AST SERPL W P-5'-P-CCNC: 7 U/L (ref 5–45)
BASOPHILS # BLD AUTO: 0.06 THOUSANDS/ÂΜL (ref 0–0.1)
BASOPHILS NFR BLD AUTO: 1 % (ref 0–1)
BILIRUB SERPL-MCNC: 0.33 MG/DL (ref 0.2–1)
BUN SERPL-MCNC: 48 MG/DL (ref 5–25)
CALCIUM ALBUM COR SERPL-MCNC: 9.7 MG/DL (ref 8.3–10.1)
CALCIUM SERPL-MCNC: 8.7 MG/DL (ref 8.3–10.1)
CHLORIDE SERPL-SCNC: 105 MMOL/L (ref 96–108)
CHOLEST SERPL-MCNC: 98 MG/DL
CO2 SERPL-SCNC: 29 MMOL/L (ref 21–32)
CREAT SERPL-MCNC: 2.02 MG/DL (ref 0.6–1.3)
EOSINOPHIL # BLD AUTO: 0.42 THOUSAND/ÂΜL (ref 0–0.61)
EOSINOPHIL NFR BLD AUTO: 5 % (ref 0–6)
ERYTHROCYTE [DISTWIDTH] IN BLOOD BY AUTOMATED COUNT: 16.3 % (ref 11.6–15.1)
EST. AVERAGE GLUCOSE BLD GHB EST-MCNC: 209 MG/DL
GFR SERPL CREATININE-BSD FRML MDRD: 22 ML/MIN/1.73SQ M
GLUCOSE P FAST SERPL-MCNC: 215 MG/DL (ref 65–99)
HBA1C MFR BLD: 8.9 %
HCT VFR BLD AUTO: 38.2 % (ref 34.8–46.1)
HDLC SERPL-MCNC: 46 MG/DL
HGB BLD-MCNC: 11 G/DL (ref 11.5–15.4)
IMM GRANULOCYTES # BLD AUTO: 0.04 THOUSAND/UL (ref 0–0.2)
IMM GRANULOCYTES NFR BLD AUTO: 0 % (ref 0–2)
LDLC SERPL CALC-MCNC: 33 MG/DL (ref 0–100)
LYMPHOCYTES # BLD AUTO: 1.62 THOUSANDS/ÂΜL (ref 0.6–4.47)
LYMPHOCYTES NFR BLD AUTO: 17 % (ref 14–44)
MCH RBC QN AUTO: 26.5 PG (ref 26.8–34.3)
MCHC RBC AUTO-ENTMCNC: 28.8 G/DL (ref 31.4–37.4)
MCV RBC AUTO: 92 FL (ref 82–98)
MONOCYTES # BLD AUTO: 0.59 THOUSAND/ÂΜL (ref 0.17–1.22)
MONOCYTES NFR BLD AUTO: 6 % (ref 4–12)
NEUTROPHILS # BLD AUTO: 6.57 THOUSANDS/ÂΜL (ref 1.85–7.62)
NEUTS SEG NFR BLD AUTO: 71 % (ref 43–75)
NONHDLC SERPL-MCNC: 52 MG/DL
NRBC BLD AUTO-RTO: 0 /100 WBCS
PLATELET # BLD AUTO: 320 THOUSANDS/UL (ref 149–390)
PMV BLD AUTO: 11.1 FL (ref 8.9–12.7)
POTASSIUM SERPL-SCNC: 4.8 MMOL/L (ref 3.5–5.3)
PROT SERPL-MCNC: 7.2 G/DL (ref 6.4–8.4)
RBC # BLD AUTO: 4.15 MILLION/UL (ref 3.81–5.12)
SODIUM SERPL-SCNC: 139 MMOL/L (ref 135–147)
T4 FREE SERPL-MCNC: 1.14 NG/DL (ref 0.76–1.46)
TRIGL SERPL-MCNC: 97 MG/DL
TSH SERPL DL<=0.05 MIU/L-ACNC: 2.77 UIU/ML (ref 0.45–4.5)
WBC # BLD AUTO: 9.3 THOUSAND/UL (ref 4.31–10.16)

## 2022-10-27 PROCEDURE — 80061 LIPID PANEL: CPT | Performed by: NURSE PRACTITIONER

## 2022-10-27 PROCEDURE — 82306 VITAMIN D 25 HYDROXY: CPT | Performed by: NURSE PRACTITIONER

## 2022-10-27 PROCEDURE — 84443 ASSAY THYROID STIM HORMONE: CPT | Performed by: NURSE PRACTITIONER

## 2022-10-27 PROCEDURE — 84439 ASSAY OF FREE THYROXINE: CPT | Performed by: NURSE PRACTITIONER

## 2022-10-27 PROCEDURE — 80053 COMPREHEN METABOLIC PANEL: CPT | Performed by: NURSE PRACTITIONER

## 2022-10-27 PROCEDURE — 83036 HEMOGLOBIN GLYCOSYLATED A1C: CPT | Performed by: NURSE PRACTITIONER

## 2022-10-27 PROCEDURE — 85025 COMPLETE CBC W/AUTO DIFF WBC: CPT | Performed by: NURSE PRACTITIONER

## 2022-12-25 PROBLEM — R07.2 PRECORDIAL PAIN: Status: ACTIVE | Noted: 2022-01-01

## 2022-12-25 PROBLEM — J96.01 ACUTE RESPIRATORY FAILURE WITH HYPOXIA (HCC): Status: ACTIVE | Noted: 2022-01-01

## 2022-12-25 PROBLEM — R07.9 CHEST PAIN: Status: ACTIVE | Noted: 2022-01-01

## 2022-12-25 NOTE — ASSESSMENT & PLAN NOTE
- [facility] with acute onset [facility]  -Report pain started immediately after she leaned over to pick something up  - YISSEL score = 4  - initial troponin in ED was 151--332--395; continue trending  - initial EKG shows "Sinus tachycardia with occasional and consecutive Premature ventricular complexes and Fusion complexes  Left axis deviation" ---no acute ischemic findings  - ED reports cardiology recommending ASA 81mg qd, however patient has evidence of aspirin allergy in the past  -Discussed with daughter at bedside, she reports no true allergy to aspirin; has taken it for many years in the past, restart  -  B-blocker (metoprolol 25mg bid), statin (Lipitor 40mg)  -Audiology recommending monitor off heparin drip  - analgesia with Morphine 4mg IV q4h PRN  - trend cardiac enzymes q3h x 3  - supplementary O2 to maintain sats >94%  - check lipid panel, A1C  - consult to cardiology; monitor on telemetry  - NPO >12am in the event that a stress test or LHC may be needed  - Echo  - cardiac diet

## 2022-12-25 NOTE — H&P
[facility]  H&P- Kaiden [place] 1935, 82 y o  female MRN: [id number]  Unit/Bed#: ED 23 Encounter: [id number]  Primary Care Provider: No primary care provider on file     Date and time admitted to hospital: 12/25/2022  4:24 AM    * Acute respiratory failure with hypoxia [facility])  Assessment & Plan  Patient presents via EMS for chest pain; lives at [facility]  In the ED, noted to be hypoxic and requiring up to 10 L nasal cannula; currently on BiPAP  On 12/22; had positive influenza A test at [facility]; RSV and influenza B at that time were negative  CTA chest ruled out PE; shows chronic consolidations but unclear if any new superimposed bacterial pneumonia present  Will monitor off antibiotics at this time; procalcitonin pending  Test for COVID; droplet and airborne precautions for now given presumed influenza  Patient has confirmed DNR/DNI status; seen by critical care, recommended stepdown level 2  Pulmonology consult, duo nebs, continue home Symbicort, Flonase, Singulair, Spiriva  BiPAP as needed; start Tamiflu; supportive care  Consider palliative consult based on improvement with above treatment plan      Chest pain  Assessment & Plan  - Patient presents from [facility] facility with acute onset chest pain  -Report pain started immediately after she leaned over to pick something up  - YISSEL score = 4  - initial troponin in ED was 151--332--395; continue trending  - initial EKG shows "Sinus tachycardia with occasional and consecutive Premature ventricular complexes and Fusion complexes  Left axis deviation" ---no acute ischemic findings  - ED reports cardiology recommending ASA 81mg qd, however patient has evidence of aspirin allergy in the past  -Discussed with daughter at bedside, she reports no true allergy to aspirin; has taken it for many years in the past, restart  -  B-blocker (metoprolol 25mg bid), statin (Lipitor 40mg)  -Cardiology recommending monitor off heparin drip  - supplementary O2 to maintain sats >94%  - check lipid panel, A1C  - consult to cardiology; monitor on telemetry  - NPO >12am in the event that a stress test or LHC may be needed  - Echo  - cardiac diet        Type 2 diabetes mellitus with hyperglycemia, with long-term current use of insulin (HCC)  Assessment & Plan  Lab Results   Component Value Date    HGBA1C 8 9 (H) 10/27/2022       No results for input(s): POCGLU in the last 72 hours  Blood Sugar Average: Last 72 hrs:     SSI; Subcutaneous Insulin Order Set  Blood Glucose checks TIDWM and QHS (Q6H for NPO patients)  Hold oral medications  Blood Glucose goal while inpatient is 140-180  Reduce basal insulin by 25-50% while inpatient  Consistent Carbohydrate Diet      Gastroesophageal reflux disease without esophagitis  Assessment & Plan  Continue PPI    Chronic congestive heart failure (HCC)  Assessment & Plan  Wt Readings from Last 3 Encounters:   05/31/22 99 8 kg (220 lb)   02/03/22 101 kg (222 lb)   12/09/21 97 1 kg (214 lb)     Presents with chest pain; noted to have shortness of breath  History of heart failure with preserved ejection fraction; echo in 2019 shows EF 69% with no regional wall motion abnormalities  Unclear if CHF component of underlying shortness of breath; continue home dose Lasix  Low sodium diet; ins and outs and daily weights          Anemia in stage 4 chronic kidney disease [facility])  Assessment & Plan  Lab Results   Component Value Date    EGFR 19 12/25/2022    EGFR 22 10/27/2022    EGFR 24 09/29/2022    CREATININE 2 27 (H) 12/25/2022    CREATININE 2 02 (H) 10/27/2022    CREATININE 1 88 (H) 09/29/2022       Anxiety  Assessment & Plan  Noted, monitor and treat symptomatically    Acquired hypothyroidism  Assessment & Plan  Continue L T4  Check TSH      VTE Prophylaxis: Enoxaparin (Lovenox)  / sequential compression device   Code Status: DNR/DNI  POLST: POLST form is not discussed and not completed at this time    Discussion with family: Discussed treatment plan with family and patient who agree with current plan; encouraged to ask questions and participate  Anticipated Length of Stay:  Patient will be admitted on an Inpatient basis with an anticipated length of stay of greater than 2 midnights  Justification for Hospital Stay: Respiratory failure    Total Time for Visit, including Counseling / Coordination of Care: 60 minutes  Greater than 50% of this total time spent on direct patient counseling and coordination of care  Chief Complaint:   Chest pain    History of Present Illness:    Star Bal is a 82 y o  female with past medical history of hypothyroidism, CKD 4, GERD, CHF, peripheral artery disease, and type 2 diabetes who presents from her assisted living facility overnight for acute onset of chest pain  Patient lethargic and altered on  exam, unable to provide any history  But as per family at bedside and ED provider, patient reported bending over and upon standing back up, experienced chest pain that was reported to assisted-living facility  Patient transferred to [address] [place] [address] via EMS; in the ED, troponins noted to be elevated and climbing; patient also found to be hypoxic  Appears to be worsening in the ED and now on BiPAP and unresponsive  Seen by critical care who recommended stepdown level 2; this patient DNR/DNI as confirmed by family  Family also report that patient had [place] in early 2020; states since then, her respiratory status has not been back to normal   She does not wear oxygen at baseline  Was diagnosed with flu 3 days prior at [facility] and will be placed on precautions prior to transfer to floor      Review of Systems:    Review of Systems   Unable to perform ROS: Mental status change        Past Medical and Surgical History:     Past Medical History:   Diagnosis Date   • Adenoma of large intestine    • Allergy     last assessed: 10/18/2013   • Cataract    • Constipation, chronic    • Diabetes mellitus (HCC)     anemia vitamin d deficiency glaucoma hypertension hypothyroidism; last assessed: 5/7/2012   • Disease of thyroid gland    • Diverticulosis    • Fecal incontinence    • Female stress incontinence    • Glaucoma    • Gout    • History of cystocele    • Intrinsic sphincter deficiency    • Mixed incontinence    • Nocturia    • Osteoarthritis, chronic    • Renal disorder    • Senile atrophic vaginitis    • Urinary frequency        Past Surgical History:   Procedure Laterality Date   • ANKLE SURGERY Bilateral    • CATARACT EXTRACTION     • CHOLECYSTECTOMY     • TUBAL LIGATION         Meds/Allergies:    Prior to Admission medications    Medication Sig Start Date End Date Taking? Authorizing Provider   acetaminophen (TYLENOL) 325 mg tablet Take 650 mg by mouth every 6 (six) hours as needed for mild pain    Historical Provider, MD   albuterol (2 5 mg/3 mL) 0 083 % nebulizer solution Take 2 5 mg by nebulization every 6 (six) hours as needed for wheezing or shortness of breath    Historical Provider, MD   albuterol (PROVENTIL HFA,VENTOLIN HFA) 90 mcg/act inhaler Inhale 2 puffs every 6 (six) hours as needed for wheezing    Historical Provider, MD   amLODIPine (NORVASC) 2 5 mg tablet Take 7 5 mg by mouth daily  Patient not taking: No sig reported    Historical Provider, MD   atorvastatin (LIPITOR) 10 mg tablet Take 1 tablet (10 mg total) by mouth daily 2/19/19   Adilson Sampson DO   budesonide-formoterol (SYMBICORT) 160-4 5 mcg/act inhaler Inhale 2 puffs 2 (two) times a day Rinse mouth after use  Patient not taking: Reported on 5/31/2022    Historical Provider, MD   calcium carbonate (OS-JENA) 600 MG tablet 1 tab twice per day   12/9/21   Ellie Pereira PA-C   Cholecalciferol (Vitamin D3) 50 MCG (2000 UT) capsule Take 1 capsule (2,000 Units total) by mouth daily 12/9/21   Tai Everett PA-C   dorzolamide-timolol (COSOPT) 22 3-6 8 MG/ML ophthalmic solution Administer 1 drop to both eyes 2 (two) times a day    Elizabeth Ely MD   fexofenadine (ALLEGRA) 180 MG tablet Take by mouth  Patient not taking: No sig reported    Historical Provider, MD   fluticasone (FLONASE) 50 mcg/act nasal spray 2 sprays into each nostril daily   Patient not taking: Reported on 5/31/2022 3/22/21   Historical Provider, MD   furosemide (LASIX) 80 mg tablet Take 1 tablet (80 mg total) by mouth daily 1/23/19   Chela Raya DO   GlucaGen HypoKit 1 MG injection if needed 4/28/22   Historical Provider, MD   Glucosamine-Chondroitin 500-400 MG CAPS Take 1 capsule by mouth 2 (two) times a day    Historical Provider, MD   guaiFENesin (ROBITUSSIN) 100 MG/5ML oral liquid Take 200 mg by mouth 3 (three) times a day as needed for cough  Patient not taking: No sig reported    Historical Provider, MD   Incontinence Supply Disposable (CERTAINTY ADJ UNDERWEAR LARGE) MISC by Does not apply route daily at bedtime 10/15/18   Chela Raya DO   insulin aspart (NovoLOG) 100 units/mL injection INJECT 4 UNITS THREE TIMES A DAY WITH MEALS  IN ADDITION TO SLIDING SCALE;SUBCUTANEOUSLY AS PER SLIDING SCALE: THREE TIMES A DAY WITH MEALS 201-250= 4 UNITS 251-300= 6 UNITS 301-350= 8 UNITS 351-400= 10 UNITS 401-450= 12 UNITS 451-500= 14 UNITS  Patient taking differently: INJECT 6 UNITS THREE TIMES A DAY WITH MEALS  IN ADDITION TO SLIDING SCALE;SUBCUTANEOUSLY AS PER SLIDING SCALE: THREE TIMES A DAY WITH MEALS 201-250= 4 UNITS 251-300= 6 UNITS 301-350= 8 UNITS 351-400= 10 UNITS 401-450= 12 UNITS 451-500= 14 UNITS 8/31/21   Tai Everett PA-C   insulin glargine (LANTUS) 100 units/mL subcutaneous injection 30 units daily in AM   Patient not taking: Reported on 5/31/2022 8/23/21   Tai Everett PA-C   insulin glargine (Semglee) 100 units/mL subcutaneous injection Inject 25 Units under the skin every 12 (twelve) hours    Historical Provider, MD   ipratropium (ATROVENT) 0 02 % nebulizer solution Take 0 5 mg by nebulization 4 (four) times a day  Patient not taking: No sig reported    Historical Provider, MD   levothyroxine 75 mcg tablet TAKE ONE TABLET BY MOUTH ONCE DAILY AS DIRECTED 19   Valeria Cornea, DO   losartan (COZAAR) 25 mg tablet Take 25 mg by mouth every morning  3/15/21   Historical Provider, MD   montelukast (SINGULAIR) 10 mg tablet Take 10 mg by mouth every morning   Patient not taking: No sig reported    Historical Provider, MD   pantoprazole (PROTONIX) 40 mg tablet Take 1 tablet (40 mg total) by mouth daily before breakfast 19   Juventino Kumar MD   potassium chloride (KLOR-CON M20) 20 mEq tablet Take 1 tablet (20 mEq total) by mouth daily  Patient taking differently: Take 10 mEq by mouth daily 19   Valeria Cornea, DO   RELION PEN NEEDLE 31G/8MM 31G X 8 MM MISC USE AS DIRECTED THREE TIMES DAILY  Patient not taking: No sig reported 19   Valeria Cornea, DO   tiotropium (SPIRIVA RESPIMAT) 1 25 MCG/ACT AERS inhaler Inhale 2 puffs daily  Patient not taking: No sig reported    Historical Provider, MD   triamcinolone (KENALOG) 0 1 % cream  3/22/21   Historical Provider, MD     I have reviewed home medications using allscripts  Allergies: Allergies   Allergen Reactions   • Ace Inhibitors      [facility] [id number]: short of breath   • Aspirin        Social History:     Marital Status:     Occupation: Retired  Patient Pre-hospital Living Situation: Assisted living facility  Patient Pre-hospital Level of Mobility: Full  Patient Pre-hospital Diet Restrictions: Diabetic  Substance Use History:   Social History     Substance and Sexual Activity   Alcohol Use Never     Social History     Tobacco Use   Smoking Status Former   • Types: Cigarettes   • Quit date: 1989   • Years since quittin 6   Smokeless Tobacco Never     Social History     Substance and Sexual Activity   Drug Use No       Family History:    Family History   Problem Relation Age of Onset   • Stomach cancer Mother    • Diabetes Sister mellitus       Physical Exam:     Vitals:   Blood Pressure: 116/56 (12/25/22 1200)  Pulse: 58 (12/25/22 1200)  Temperature: 98 7 °F (37 1 °C) (12/25/22 0439)  Temp Source: Oral (12/25/22 0439)  Respirations: 16 (12/25/22 1200)  SpO2: 100 % (12/25/22 1200)    Physical Exam  Vitals and nursing note reviewed  Constitutional:       General: She is not in acute distress  Appearance: She is well-developed  She is ill-appearing  HENT:      Head: Normocephalic and atraumatic  Eyes:      Conjunctiva/sclera: Conjunctivae normal    Cardiovascular:      Rate and Rhythm: Normal rate  Heart sounds: No murmur heard  Pulmonary:      Effort: Pulmonary effort is normal  No respiratory distress  Breath sounds: Wheezing present  Abdominal:      Palpations: Abdomen is soft  Tenderness: There is no abdominal tenderness  Musculoskeletal:         General: No swelling  Cervical back: Neck supple  Right lower leg: Edema present  Left lower leg: Edema present  Skin:     General: Skin is warm and dry  Findings: Erythema (Circumferential erythema on left lower extremity-mid shin to ankle) present  Comments: Ecchymotic lesions bilateral upper extremities   Neurological:      Comments: Alert and oriented X0, GCS 9           Additional Data:     Lab Results: I have personally reviewed pertinent reports  Results from last 7 days   Lab Units 12/25/22  0454   WBC Thousand/uL 11 32*   HEMOGLOBIN g/dL 11 7   HEMATOCRIT % 39 2   PLATELETS Thousands/uL 237   NEUTROS PCT % 82*   LYMPHS PCT % 10*   MONOS PCT % 7   EOS PCT % 0     Results from last 7 days   Lab Units 12/25/22  0454   SODIUM mmol/L 141   POTASSIUM mmol/L 4 4   CHLORIDE mmol/L 102   CO2 mmol/L 33*   BUN mg/dL 48*   CREATININE mg/dL 2 27*   ANION GAP mmol/L 6   CALCIUM mg/dL 8 4   ALBUMIN g/dL 3 0*   TOTAL BILIRUBIN mg/dL 0 20   ALK PHOS U/L 99   ALT U/L 16   AST U/L 18   GLUCOSE RANDOM mg/dL 263*                       Imaging:  I have personally reviewed pertinent reports  CTA dissection protocol chest abdomen pelvis w wo contrast   Final Result by Patricio Litten, MD (12/25 4322)      1  Chronic bilateral, upper lobe predominant lung consolidations and nodules as well as prominent mediastinal lymph nodes  Chronic inflammatory or granulomatous processes such as granulomatous polyangiitis or sarcoidosis should be considered  Areas of    acute infection would be difficult to entirely exclude given the extent of opacities, but distribution appears largely similar to 2019  Time course since 2019 essentially excludes neoplastic process  Recommend pulmonology consultation  2   No dissection or other acute vascular abnormalities  3   Multiple pancreatic cystic lesions, 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  Recommend nonurgent outpatient follow-up with MRI/MRCP on an intention to treat basis  Note: The study was marked in EPIC for significant notification  Imaging follow-up reminder notification was scheduled in the electronic medical record  Workstation performed: [id number]             EKG, Pathology, and Other Studies Reviewed on Admission:   EKG: Sinus tachycardia with occasional and consecutive Premature ventricular complexes and Fusion complexes  Left axis deviation  Low voltage QRS  Inferior infarct , age undetermined  Cannot rule out Anteroseptal infarct (cited on or before 17-JUL-2019)  Abnormal ECG  When compared with ECG of 26-FEB-2021 04:02,  Significant changes have occurred  · Confirmed by Juju Canchola ([id number]) on 12/25/2022     Allscripts / Epic Records Reviewed: Yes     ** Please Note: This note has been constructed using a voice recognition system   **

## 2022-12-25 NOTE — ASSESSMENT & PLAN NOTE
Wt Readings from Last 3 Encounters:   05/31/22 99 8 kg (220 lb)   02/03/22 101 kg (222 lb)   12/09/21 97 1 kg (214 lb)     Presents with chest pain; noted to have shortness of breath  History of heart failure with preserved ejection fraction; echo in 2019 shows EF 69% with no regional wall motion abnormalities  Unclear if CHF component of underlying shortness of breath; continue home dose Lasix  Low sodium diet; ins and outs and daily weights

## 2022-12-25 NOTE — ASSESSMENT & PLAN NOTE
Patient presents via EMS for chest pain; lives at [facility]  In the ED, noted to be hypoxic and requiring up to 10 L nasal cannula; currently on BiPAP  On 12/22; had positive influenza A test at [facility]; RSV and influenza B at that time were negative  CTA chest ruled out PE; shows chronic consolidations but unclear if any new superimposed bacterial pneumonia present  Will monitor off antibiotics at this time; procalcitonin pending  Test for COVID; droplet and airborne precautions for now given presumed influenza  Patient has confirmed DNR/DNI status; seen by critical care, recommended stepdown level 2  Pulmonology consult, duo nebs, continue home Symbicort, Flonase, Singulair, Spiriva  BiPAP as needed; start Tamiflu; supportive care  Consider palliative consult based on improvement with above treatment plan

## 2022-12-25 NOTE — ED CARE HANDOFF
Emergency Department Sign Out Note        Sign out and transfer of care from [facility]  See Separate Emergency Department note  The patient, Babatunde Hernandez, was evaluated by the previous provider for weakness, dyspnea, chest pain  Workup Completed:  -Awaiting CTA chest/abd/pelvis at time of my sign on    ED Course / Workup Pending (followup):   Labs Reviewed   CBC AND DIFFERENTIAL - Abnormal       Result Value Ref Range Status    WBC 11 32 (*) 4 31 - 10 16 Thousand/uL Final    RBC 4 24  3 81 - 5 12 Million/uL Final    Hemoglobin 11 7  11 5 - 15 4 g/dL Final    Hematocrit 39 2  34 8 - 46 1 % Final    MCV 93  82 - 98 fL Final    MCH 27 6  26 8 - 34 3 pg Final    MCHC 29 8 (*) 31 4 - 37 4 g/dL Final    RDW 15 4 (*) 11 6 - 15 1 % Final    MPV 10 7  8 9 - 12 7 fL Final    Platelets 561  859 - 390 Thousands/uL Final    nRBC 0  /100 WBCs Final    Neutrophils Relative 82 (*) 43 - 75 % Final    Immat GRANS % 1  0 - 2 % Final    Lymphocytes Relative 10 (*) 14 - 44 % Final    Monocytes Relative 7  4 - 12 % Final    Eosinophils Relative 0  0 - 6 % Final    Basophils Relative 0  0 - 1 % Final    Neutrophils Absolute 9 24 (*) 1 85 - 7 62 Thousands/µL Final    Immature Grans Absolute 0 09  0 00 - 0 20 Thousand/uL Final    Lymphocytes Absolute 1 13  0 60 - 4 47 Thousands/µL Final    Monocytes Absolute 0 82  0 17 - 1 22 Thousand/µL Final    Eosinophils Absolute 0 01  0 00 - 0 61 Thousand/µL Final    Basophils Absolute 0 03  0 00 - 0 10 Thousands/µL Final   COMPREHENSIVE METABOLIC PANEL - Abnormal    Sodium 141  135 - 147 mmol/L Final    Potassium 4 4  3 5 - 5 3 mmol/L Final    Chloride 102  96 - 108 mmol/L Final    CO2 33 (*) 21 - 32 mmol/L Final    ANION GAP 6  4 - 13 mmol/L Final    BUN 48 (*) 5 - 25 mg/dL Final    Creatinine 2 27 (*) 0 60 - 1 30 mg/dL Final    Comment: Standardized to IDMS reference method    Glucose 263 (*) 65 - 140 mg/dL Final    Comment: If the patient is fasting, the ADA then defines impaired fasting glucose as > 100 mg/dL and diabetes as > or equal to 123 mg/dL  Specimen collection should occur prior to Sulfasalazine administration due to the potential for falsely depressed results  Specimen collection should occur prior to Sulfapyridine administration due to the potential for falsely elevated results  Calcium 8 4  8 3 - 10 1 mg/dL Final    Corrected Calcium 9 2  8 3 - 10 1 mg/dL Final    AST 18  5 - 45 U/L Final    Comment: Specimen collection should occur prior to Sulfasalazine administration due to the potential for falsely depressed results  ALT 16  12 - 78 U/L Final    Comment: Specimen collection should occur prior to Sulfasalazine administration due to the potential for falsely depressed results  Alkaline Phosphatase 99  46 - 116 U/L Final    Total Protein 8 2  6 4 - 8 4 g/dL Final    Albumin 3 0 (*) 3 5 - 5 0 g/dL Final    Total Bilirubin 0 20  0 20 - 1 00 mg/dL Final    Comment: Use of this assay is not recommended for patients undergoing treatment with eltrombopag due to the potential for falsely elevated results      eGFR 19  ml/min/1 73sq m Final    Narrative:     [place] guidelines for Chronic Kidney Disease (CKD):   •  Stage 1 with normal or high GFR (GFR > 90 mL/min/1 73 square meters)  •  Stage 2 Mild CKD (GFR = 60-89 mL/min/1 73 square meters)  •  Stage 3A Moderate CKD (GFR = 45-59 mL/min/1 73 square meters)  •  Stage 3B Moderate CKD (GFR = 30-44 mL/min/1 73 square meters)  •  Stage 4 Severe CKD (GFR = 15-29 mL/min/1 73 square meters)  •  Stage 5 End Stage CKD (GFR <15 mL/min/1 73 square meters)  Note: GFR calculation is accurate only with a steady state creatinine   HS TROPONIN I 0HR - Abnormal    hs TnI 0hr 151 (*) "Refer to ACS Flowchart"- see link ng/L Final    Comment:                                              Initial (time 0) result  If >=50 ng/L, Myocardial injury suggested ;  Type of myocardial injury and treatment strategy  to be determined  If 5-49 ng/L, a delta result at 2 hours and or 4 hours will be needed to further evaluate  If <4 ng/L, and chest pain has been >3 hours since onset, patient may qualify for discharge based on the HEART score in the ED  If <5 ng/L and <3hours since onset of chest pain, a delta result at 2 hours will be needed to further evaluate  HS Troponin 99th Percentile URL of a Health Population=12 ng/L with a 95% Confidence Interval of 8-18 ng/L  Second Troponin (time 2 hours)  If calculated delta >= 20 ng/L,  Myocardial injury suggested ; Type of myocardial injury and treatment strategy to be determined  If 5-49 ng/L and the calculated delta is 5-19 ng/L, consult medical service for evaluation  Continue evaluation for ischemia on ecg and other possible etiology and repeat hs troponin at 4 hours  If delta is <5 ng/L at 2 hours, consider discharge based on risk stratification via the HEART score (if in ED), or YISSEL risk score in IP/Observation  HS Troponin 99th Percentile URL of a Health Population=12 ng/L with a 95% Confidence Interval of 8-18 ng/L    HS TROPONIN I 2HR - Abnormal    hs TnI 2hr 332 (*) "Refer to ACS Flowchart"- see link ng/L Final    Comment:                                              Initial (time 0) result  If >=50 ng/L, Myocardial injury suggested ;  Type of myocardial injury and treatment strategy  to be determined  If 5-49 ng/L, a delta result at 2 hours and or 4 hours will be needed to further evaluate  If <4 ng/L, and chest pain has been >3 hours since onset, patient may qualify for discharge based on the HEART score in the ED  If <5 ng/L and <3hours since onset of chest pain, a delta result at 2 hours will be needed to further evaluate  HS Troponin 99th Percentile URL of a Health Population=12 ng/L with a 95% Confidence Interval of 8-18 ng/L      Second Troponin (time 2 hours)  If calculated delta >= 20 ng/L,  Myocardial injury suggested ; Type of myocardial injury and treatment strategy to be determined  If 5-49 ng/L and the calculated delta is 5-19 ng/L, consult medical service for evaluation  Continue evaluation for ischemia on ecg and other possible etiology and repeat hs troponin at 4 hours  If delta is <5 ng/L at 2 hours, consider discharge based on risk stratification via the HEART score (if in ED), or YISSEL risk score in IP/Observation  HS Troponin 99th Percentile URL of a Health Population=12 ng/L with a 95% Confidence Interval of 8-18 ng/L  Delta 2hr hsTnI 181 (*) <20 ng/L Final   HS TROPONIN I 4HR - Abnormal    hs TnI 4hr 395 (*) "Refer to ACS Flowchart"- see link ng/L Final    Comment:                                              Initial (time 0) result  If >=50 ng/L, Myocardial injury suggested ;  Type of myocardial injury and treatment strategy  to be determined  If 5-49 ng/L, a delta result at 2 hours and or 4 hours will be needed to further evaluate  If <4 ng/L, and chest pain has been >3 hours since onset, patient may qualify for discharge based on the HEART score in the ED  If <5 ng/L and <3hours since onset of chest pain, a delta result at 2 hours will be needed to further evaluate  HS Troponin 99th Percentile URL of a Health Population=12 ng/L with a 95% Confidence Interval of 8-18 ng/L  Second Troponin (time 2 hours)  If calculated delta >= 20 ng/L,  Myocardial injury suggested ; Type of myocardial injury and treatment strategy to be determined  If 5-49 ng/L and the calculated delta is 5-19 ng/L, consult medical service for evaluation  Continue evaluation for ischemia on ecg and other possible etiology and repeat hs troponin at 4 hours  If delta is <5 ng/L at 2 hours, consider discharge based on risk stratification via the HEART score (if in ED), or YISSEL risk score in IP/Observation  HS Troponin 99th Percentile URL of a Health Population=12 ng/L with a 95% Confidence Interval of 8-18 ng/L      Delta 4hr hsTnI 244 (*) <20 ng/L Final NT-BNP PRO (BRAIN NATRIURETIC PEPTIDE) - Abnormal    NT-proBNP 1,837 (*) <450 pg/mL Final   POCT GLUCOSE - Abnormal    POC Glucose 305 (*) 65 - 140 mg/dl Final   NOVEL CORONAVIRUS (COVID-19), PCR [facility] - Normal    SARS-CoV-2 Negative  Negative Final    Comment:      Narrative:     FOR PEDIATRIC PATIENTS - copy/paste COVID Guidelines URL to browser: [email or web address]  [employer]    SARS-CoV-2 assay is a Nucleic Acid Amplification assay intended for the  qualitative detection of nucleic acid from SARS-CoV-2 in nasopharyngeal  swabs  Results are for the presumptive identification of SARS-CoV-2 RNA  Positive results are indicative of infection with SARS-CoV-2, the virus  causing COVID-19, but do not rule out bacterial infection or co-infection  with other viruses  Laboratories within the [place] and its  territories are required to report all positive results to the appropriate  public health authorities  Negative results do not preclude SARS-CoV-2  infection and should not be used as the sole basis for treatment or other  patient management decisions  Negative results must be combined with  clinical observations, patient history, and epidemiological information  This test has not been FDA cleared or approved  This test has been authorized by FDA under an Emergency Use Authorization  (EUA)  This test is only authorized for the duration of time the  declaration that circumstances exist justifying the authorization of the  emergency use of an in vitro diagnostic tests for detection of SARS-CoV-2  virus and/or diagnosis of COVID-19 infection under section 564(b)(1) of  the Act, 21 U  S C  837UXR-6(D)(1), unless the authorization is terminated  or revoked sooner  The test has been validated but independent review by FDA  and CLIA is pending  Test performed using [employer]: This RT-PCR assay targets N2,  a region unique to SARS-CoV-2   A conserved region in the E-gene was chosen  for pan-Sarbecovirus detection which includes SARS-CoV-2  According to CMS-2020-01-R, this platform meets the definition of high-throughput technology  BLOOD GAS, ARTERIAL     CTA dissection protocol chest abdomen pelvis w wo contrast   Final Result      1  Chronic bilateral, upper lobe predominant lung consolidations and nodules as well as prominent mediastinal lymph nodes  Chronic inflammatory or granulomatous processes such as granulomatous polyangiitis or sarcoidosis should be considered  Areas of    acute infection would be difficult to entirely exclude given the extent of opacities, but distribution appears largely similar to 2019  Time course since 2019 essentially excludes neoplastic process  Recommend pulmonology consultation  2   No dissection or other acute vascular abnormalities  3   Multiple pancreatic cystic lesions, 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  Recommend nonurgent outpatient follow-up with MRI/MRCP on an intention to treat basis  Note: The study was marked in EPIC for significant notification  Imaging follow-up reminder notification was scheduled in the electronic medical record              Workstation performed: [id number]                 HEART Risk Score    Flowsheet Row Most Recent Value   Heart Score Risk Calculator    History 1 Filed at: 12/25/2022 0627   ECG 1 Filed at: 12/25/2022 9959   Age 2 Filed at: 12/25/2022 9251   Risk Factors 2 Filed at: 12/25/2022 8708   Troponin 2 Filed at: 12/25/2022 4640   HEART Score 8 Filed at: 12/25/2022 4232                      YISSEL Risk Score    Flowsheet Row Most Recent Value   Age >= 72 1 Filed at: 12/25/2022 1105   Known CAD (stenosis >= 50%) 0 Filed at: 12/25/2022 1105   Recent (<=24 hrs) Service Angina 1 Filed at: 12/25/2022 1105   ST Deviation >= 0 5 mm 0 Filed at: 12/25/2022 1105   3+ CAD Risk Factors (FHx, HTN, HLP, DM, Smoker) 1 Filed at: 12/25/2022 1105   Aspirin Use Past 7 Days 0 Filed at: 12/25/2022 1105 Elevated Cardiac Markers 1 Filed at: 12/25/2022 1105   YISSEL Risk Score (Calculated) 4 Filed at: 12/25/2022 1105                  ED Course as of 12/25/22 1315   Sun Dec 25, 2022   0630 Diffuse wheezing and diminished airflow on my examination  Oxygen in the 80's on NC  Switched to NRB and initating duoneb and solumedrol  0646 O2 99% at this time  Duoneb in process   0736 hs TnI 2hr(!): 332  Suspected to be secondary to demand ischemia  HR, O2 improved at this time   0940 [place], rapid breathing noted which appears to be worsening  Pt with worsening mental status, responding only to pain at this time  1015 RT paged - discussed ABG, likely low utility given no intubation, can trial BiPAP which son is agreeable to  Placed on bipap at this time     Procedures  MDM  Number of Diagnoses or Management Options  Altered mental status  Elevated troponin  Influenza  Respiratory failure ([facility] [address] )  Diagnosis management comments: Worsening dyspnea, weakness as well as onset of chest pain this morning following diagnosis of influenza 3 days ago  By time of my sign on, patient with worsening work of breathing and deterioration in mental status  Diminished lung sounds noted at that time, wheezing also appreciated  Given duoneb, solumedrol with improvement in wheezing as well as oxygen to high 90's on NRB  Unfortunately the patient continued to have worsening respiratory effort despite overall stable vitals  Troponins x3 trending upwards although EKG's remain nonischemic  Discussed with Dr Naomi Izaguirre, on call cardiology  - recommends addition of aspirin, no heparin at this time  Aspirin allergy listed, son unsure of reaction  Discussed with family patient's worsening respiratory status and decline in condition, reviewed DNR/DNI status which is confirmed by POA  Pt remains on BiPAP at time of admission to medicine          Amount and/or Complexity of Data Reviewed  Clinical lab tests: reviewed  Tests in the radiology section of CPT®: reviewed            Disposition  Final diagnoses:   Influenza   Respiratory failure ([facility] [address] )   Altered mental status   Elevated troponin     Time reflects when diagnosis was documented in both MDM as applicable and the Disposition within this note     Time User Action Codes Description Comment    12/25/2022 10:32 AM Payton Paddock Add [J11 1] Influenza     12/25/2022 10:32 AM Payton Paddock Add [J96 90] Respiratory failure ([facility] [address] )     12/25/2022 10:33 AM Payton Paddock Add [R41 82] Altered mental status     12/25/2022 10:33 AM Payton Paddock Add [R77 8] Elevated troponin     12/25/2022 12:05 PM Ynes Conn Add [J96 01] Acute respiratory failure with hypoxia ([facility] [address] )     12/25/2022 12:12 PM Ynes Conn Add [R07 9] Chest pain       ED Disposition     ED Disposition   Admit    Condition   Stable    Date/Time   Sun Dec 25, 2022 10:32 AM    Comment   Case was discussed with Dr Komal Siddiqui and the patient's admission status was agreed to be Admission Status: inpatient status to the service of Dr Komal Siddiqui  Follow-up Information    None       Current Discharge Medication List      CONTINUE these medications which have NOT CHANGED    Details   acetaminophen (TYLENOL) 325 mg tablet Take 650 mg by mouth every 6 (six) hours as needed for mild pain      albuterol (2 5 mg/3 mL) 0 083 % nebulizer solution Take 2 5 mg by nebulization every 6 (six) hours as needed for wheezing or shortness of breath      albuterol (PROVENTIL HFA,VENTOLIN HFA) 90 mcg/act inhaler Inhale 2 puffs every 6 (six) hours as needed for wheezing    Comments: Substitution to a formulary equivalent within the same pharmaceutical class is authorized        amLODIPine (NORVASC) 2 5 mg tablet Take 7 5 mg by mouth daily      atorvastatin (LIPITOR) 10 mg tablet Take 1 tablet (10 mg total) by mouth daily  Qty: 90 tablet, Refills: 1    Associated Diagnoses: Mixed hyperlipidemia      budesonide-formoterol (SYMBICORT) 160-4 5 mcg/act inhaler Inhale 2 puffs 2 (two) times a day Rinse mouth after use  calcium carbonate (OS-JENA) 600 MG tablet 1 tab twice per day  Qty: 60 tablet, Refills: 6    Comments: Should be taken 3-4 hours after levothyroxine  Associated Diagnoses: Osteopenia, unspecified location      Cholecalciferol (Vitamin D3) 50 MCG (2000 UT) capsule Take 1 capsule (2,000 Units total) by mouth daily  Qty: 30 capsule, Refills: 5    Associated Diagnoses: Osteopenia, unspecified location      dorzolamide-timolol (COSOPT) 22 3-6 8 MG/ML ophthalmic solution Administer 1 drop to both eyes 2 (two) times a day      fexofenadine (ALLEGRA) 180 MG tablet Take by mouth      fluticasone (FLONASE) 50 mcg/act nasal spray 2 sprays into each nostril daily       furosemide (LASIX) 80 mg tablet Take 1 tablet (80 mg total) by mouth daily  Qty: 90 tablet, Refills: 1    Associated Diagnoses: Benign hypertension      GlucaGen HypoKit 1 MG injection if needed      Glucosamine-Chondroitin 500-400 MG CAPS Take 1 capsule by mouth 2 (two) times a day      guaiFENesin (ROBITUSSIN) 100 MG/5ML oral liquid Take 200 mg by mouth 3 (three) times a day as needed for cough      Incontinence Supply Disposable (CERTAINTY ADJ UNDERWEAR LARGE) MISC by Does not apply route daily at bedtime  Qty: 30 each, Refills: 5    Associated Diagnoses: Urinary incontinence, unspecified type      insulin aspart (NovoLOG) 100 units/mL injection INJECT 4 UNITS THREE TIMES A DAY WITH MEALS  IN ADDITION TO SLIDING SCALE;SUBCUTANEOUSLY AS PER SLIDING SCALE: THREE TIMES A DAY WITH MEALS 201-250= 4 UNITS 251-300= 6 UNITS 301-350= 8 UNITS 351-400= 10 UNITS 401-450= 12 UNITS 451-500= 14 UNITS  Qty: 10 mL, Refills: 10    Associated Diagnoses: Type 2 diabetes mellitus with complication, with long-term current use of insulin (HCC)      ! ! 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 ! insulin glargine (Semglee) 100 units/mL subcutaneous injection Inject 25 Units under the skin every 12 (twelve) hours      ipratropium (ATROVENT) 0 02 % nebulizer solution Take 0 5 mg by nebulization 4 (four) times a day      levothyroxine 75 mcg tablet TAKE ONE TABLET BY MOUTH ONCE DAILY AS DIRECTED  Qty: 90 tablet, Refills: 3    Associated Diagnoses: Acquired hypothyroidism      losartan (COZAAR) 25 mg tablet Take 25 mg by mouth every morning       montelukast (SINGULAIR) 10 mg tablet Take 10 mg by mouth every morning       pantoprazole (PROTONIX) 40 mg tablet Take 1 tablet (40 mg total) by mouth daily before breakfast  Qty: 30 tablet, Refills: 0    Associated Diagnoses: Acute kidney injury superimposed on chronic kidney disease ([facility] ); Type 2 diabetes mellitus with hyperglycemia, with long-term current use of insulin ([place] [id number] ); Chronic cough; Acute bronchitis, unspecified organism      potassium chloride (KLOR-CON M20) 20 mEq tablet Take 1 tablet (20 mEq total) by mouth daily  Qty: 90 tablet, Refills: 1    Associated Diagnoses: Hypokalemia      RELION PEN NEEDLE 31G/8MM 31G X 8 MM MISC USE AS DIRECTED THREE TIMES DAILY  Qty: 300 each, Refills: 3    Associated Diagnoses: Type 2 diabetes mellitus without complication, with long-term current use of insulin ([facility])      tiotropium (SPIRIVA RESPIMAT) 1 25 MCG/ACT AERS inhaler Inhale 2 puffs daily      triamcinolone (KENALOG) 0 1 % cream        !! - Potential duplicate medications found  Please discuss with provider  No discharge procedures on file         ED Provider  Electronically Signed by     Gaurav Virk PA-C  12/25/22 4205

## 2022-12-25 NOTE — ED PROVIDER NOTES
History  Chief Complaint   Patient presents with   • Chest Pain     Pt arrived via EMS from [address]  Pt states "I was stupid and leaned forward and now I have this pain in between my girls that is going up to between my shoulder " Pt also states that she has lower back pain that has gotten worse since yesterday when she leaned forward  Pt O2 at 72% on room air  Pt at 93% on 4L  Pt states feeling sob  This is an 44-year-old female past medical history of DM2, hypothyroidism, CKD, PAD, HTN, CHF BIBA presenting for evaluation of chest pain  Patient presenting from [facility]  She states that "I was stupid and leaned forward and started with chest pain"  Patient states that the pain radiates between her shoulders and her lower back  She endorses shortness of breath as well with chest pain  She has a history of heart failure, she was 70% on room air, is currently on 4 L oxygen  She is unable to explain with the pain in her chest feels like  She denies any nausea or vomiting  She denies any confusion at this time  History provided by:  Patient and EMS personnel   used: No    Chest Pain  Pain location:  Substernal area  Pain radiates to: Lower back and mid back  Pain radiates to the back: yes    Pain severity:  Moderate  Onset quality:  Sudden  Timing:  Constant  Progression:  Unchanged  Chronicity:  New  Context: movement and at rest    Relieved by:  None tried  Ineffective treatments:  None tried  Associated symptoms: back pain, cough and shortness of breath    Associated symptoms: no abdominal pain, no dizziness, no fever, no headache, no nausea, no numbness, not vomiting and no weakness    Risk factors: diabetes mellitus and hypertension        Prior to Admission Medications   Prescriptions Last Dose Informant Patient Reported? Taking?    Cholecalciferol (Vitamin D3) 50 MCG (2000 UT) capsule   No No   Sig: Take 1 capsule (2,000 Units total) by mouth daily   GlucaGen HypoKit 1 MG injection   Yes No   Sig: if needed   Glucosamine-Chondroitin 500-400 MG CAPS   Yes No   Sig: Take 1 capsule by mouth 2 (two) times a day   Incontinence Supply Disposable (CERTAINTY ADJ UNDERWEAR LARGE) MISC   No No   Sig: by Does not apply route daily at bedtime   RELION PEN NEEDLE 31G/8MM 31G X 8 MM MISC   No No   Sig: USE AS DIRECTED THREE TIMES DAILY   Patient not taking: No sig reported   acetaminophen (TYLENOL) 325 mg tablet   Yes No   Sig: Take 650 mg by mouth every 6 (six) hours as needed for mild pain   albuterol (2 5 mg/3 mL) 0 083 % nebulizer solution   Yes No   Sig: Take 2 5 mg by nebulization every 6 (six) hours as needed for wheezing or shortness of breath   albuterol (PROVENTIL HFA,VENTOLIN HFA) 90 mcg/act inhaler   Yes No   Sig: Inhale 2 puffs every 6 (six) hours as needed for wheezing   amLODIPine (NORVASC) 2 5 mg tablet   Yes No   Sig: Take 7 5 mg by mouth daily   Patient not taking: No sig reported   atorvastatin (LIPITOR) 10 mg tablet   No No   Sig: Take 1 tablet (10 mg total) by mouth daily   budesonide-formoterol (SYMBICORT) 160-4 5 mcg/act inhaler   Yes No   Sig: Inhale 2 puffs 2 (two) times a day Rinse mouth after use  Patient not taking: Reported on 2022   calcium carbonate (OS-JENA) 600 MG tablet   No No   Si tab twice per day     dorzolamide-timolol (COSOPT) 22 3-6 8 MG/ML ophthalmic solution   Yes No   Sig: Administer 1 drop to both eyes 2 (two) times a day   fexofenadine (ALLEGRA) 180 MG tablet   Yes No   Sig: Take by mouth   Patient not taking: No sig reported   fluticasone (FLONASE) 50 mcg/act nasal spray   Yes No   Si sprays into each nostril daily    Patient not taking: Reported on 2022   furosemide (LASIX) 80 mg tablet   No No   Sig: Take 1 tablet (80 mg total) by mouth daily   guaiFENesin (ROBITUSSIN) 100 MG/5ML oral liquid   Yes No   Sig: Take 200 mg by mouth 3 (three) times a day as needed for cough   Patient not taking: No sig reported   insulin aspart (NovoLOG) 100 units/mL injection   No No   Sig: INJECT 4 UNITS THREE TIMES A DAY WITH MEALS  IN ADDITION TO SLIDING SCALE;SUBCUTANEOUSLY AS PER SLIDING SCALE: THREE TIMES A DAY WITH MEALS 201-250= 4 UNITS 251-300= 6 UNITS 301-350= 8 UNITS 351-400= 10 UNITS 401-450= 12 UNITS 451-500= 14 UNITS   Patient taking differently: INJECT 6 UNITS THREE TIMES A DAY WITH MEALS  IN ADDITION TO SLIDING SCALE;SUBCUTANEOUSLY AS PER SLIDING SCALE: THREE TIMES A DAY WITH MEALS 201-250= 4 UNITS 251-300= 6 UNITS 301-350= 8 UNITS 351-400= 10 UNITS 401-450= 12 UNITS 451-500= 14 UNITS   insulin glargine (LANTUS) 100 units/mL subcutaneous injection   No No   Si units daily in AM    Patient not taking: Reported on 2022   insulin glargine (Semglee) 100 units/mL subcutaneous injection   Yes No   Sig: Inject 25 Units under the skin every 12 (twelve) hours   ipratropium (ATROVENT) 0 02 % nebulizer solution   Yes No   Sig: Take 0 5 mg by nebulization 4 (four) times a day   Patient not taking: No sig reported   levothyroxine 75 mcg tablet   No No   Sig: TAKE ONE TABLET BY MOUTH ONCE DAILY AS DIRECTED   losartan (COZAAR) 25 mg tablet   Yes No   Sig: Take 25 mg by mouth every morning    montelukast (SINGULAIR) 10 mg tablet   Yes No   Sig: Take 10 mg by mouth every morning    Patient not taking: No sig reported   pantoprazole (PROTONIX) 40 mg tablet   No No   Sig: Take 1 tablet (40 mg total) by mouth daily before breakfast   potassium chloride (KLOR-CON M20) 20 mEq tablet   No No   Sig: Take 1 tablet (20 mEq total) by mouth daily   Patient taking differently: Take 10 mEq by mouth daily   tiotropium (SPIRIVA RESPIMAT) 1 25 MCG/ACT AERS inhaler   Yes No   Sig: Inhale 2 puffs daily   Patient not taking: No sig reported   triamcinolone (KENALOG) 0 1 % cream   Yes No      Facility-Administered Medications: None       Past Medical History:   Diagnosis Date   • Adenoma of large intestine    • Allergy     last assessed: 10/18/2013   • Cataract    • Constipation, chronic    • Diabetes mellitus (HCC)     anemia vitamin d deficiency glaucoma hypertension hypothyroidism; last assessed: 2012   • Disease of thyroid gland    • Diverticulosis    • Fecal incontinence    • Female stress incontinence    • Glaucoma    • Gout    • History of cystocele    • Intrinsic sphincter deficiency    • Mixed incontinence    • Nocturia    • Osteoarthritis, chronic    • Renal disorder    • Senile atrophic vaginitis    • Urinary frequency        Past Surgical History:   Procedure Laterality Date   • ANKLE SURGERY Bilateral    • CATARACT EXTRACTION     • CHOLECYSTECTOMY     • TUBAL LIGATION         Family History   Problem Relation Age of Onset   • Stomach cancer Mother    • Diabetes Sister         mellitus     I have reviewed and agree with the history as documented  E-Cigarette/Vaping   • E-Cigarette Use Never User      E-Cigarette/Vaping Substances   • Nicotine No    • THC No    • CBD No    • Flavoring No    • Other No    • Unknown No      Social History     Tobacco Use   • Smoking status: Former     Types: Cigarettes     Quit date: 1989     Years since quittin 6   • Smokeless tobacco: Never   Vaping Use   • Vaping Use: Never used   Substance Use Topics   • Alcohol use: Never   • Drug use: No       Review of Systems   Constitutional: Negative for chills and fever  HENT: Positive for congestion  Eyes: Negative for visual disturbance  Respiratory: Positive for cough and shortness of breath  Cardiovascular: Positive for chest pain  Negative for leg swelling  Gastrointestinal: Negative for abdominal pain, constipation, diarrhea, nausea and vomiting  Genitourinary: Negative for dysuria, frequency, hematuria and urgency  Musculoskeletal: Positive for back pain  Negative for myalgias and neck pain  Skin: Negative for color change and rash  Neurological: Negative for dizziness, weakness, light-headedness, numbness and headaches     All other systems reviewed and are negative  Physical Exam  Physical Exam  Vitals reviewed  Constitutional:       General: She is not in acute distress  Appearance: Normal appearance  She is well-developed and well-groomed  She is not ill-appearing, toxic-appearing or diaphoretic  Interventions: Nasal cannula in place  HENT:      Head: Normocephalic and atraumatic  Right Ear: External ear normal       Left Ear: External ear normal       Nose: Nose normal  No congestion or rhinorrhea  Mouth/Throat:      Mouth: Mucous membranes are moist       Pharynx: Oropharynx is clear  No oropharyngeal exudate or posterior oropharyngeal erythema  Eyes:      General: No scleral icterus  Right eye: No discharge  Left eye: No discharge  Extraocular Movements: Extraocular movements intact  Conjunctiva/sclera: Conjunctivae normal    Cardiovascular:      Rate and Rhythm: Normal rate and regular rhythm  Pulses: Normal pulses  Heart sounds: No murmur heard  No friction rub  No gallop  Pulmonary:      Effort: Pulmonary effort is normal  No respiratory distress  Breath sounds: Decreased breath sounds and wheezing present  No rhonchi or rales  Comments: 4 L NC, no respiratory distress  Wheezing, decreased breath sounds  Abdominal:      General: Abdomen is flat  There is no distension  Palpations: Abdomen is soft  Tenderness: There is no abdominal tenderness  There is no right CVA tenderness, left CVA tenderness, guarding or rebound  Musculoskeletal:         General: No deformity  Normal range of motion  Cervical back: Normal range of motion and neck supple  Right lower le+ Edema present  Left lower le+ Edema present  Skin:     General: Skin is warm and dry  Coloration: Skin is not jaundiced or pale  Findings: No rash  Neurological:      General: No focal deficit present  Mental Status: She is alert        GCS: GCS eye subscore is 4  GCS verbal subscore is 5  GCS motor subscore is 6  Cranial Nerves: Cranial nerves 2-12 are intact  No facial asymmetry  Motor: Tremor present  No pronator drift  Comments: A&O x 1  Unsure baseline  No cranial nerve deficit, no facial palsy  No pronator drift  Strength and sensation intact  No aphasia  No dysarthria  Extinction intact  Psychiatric:         Mood and Affect: Mood normal          Behavior: Behavior normal  Behavior is cooperative           Vital Signs  ED Triage Vitals   Temperature Pulse Respirations Blood Pressure SpO2   12/25/22 0439 12/25/22 0431 12/25/22 0431 12/25/22 0431 12/25/22 0431   98 7 °F (37 1 °C) 94 21 (!) 178/84 93 %      Temp Source Heart Rate Source Patient Position - Orthostatic VS BP Location FiO2 (%)   12/25/22 0439 12/25/22 0431 12/25/22 0431 12/25/22 0431 --   Oral Monitor Lying Left arm       Pain Score       12/25/22 0547       1           Vitals:    12/25/22 0431 12/25/22 0530 12/25/22 0616   BP: (!) 178/84 (!) 171/71 132/79   Pulse: 94 94 96   Patient Position - Orthostatic VS: Lying Lying Lying         Visual Acuity      ED Medications  Medications   ipratropium-albuterol (DUO-NEB) 0 5-2 5 mg/3 mL inhalation solution 3 mL (has no administration in time range)   methylPREDNISolone sodium succinate (Solu-MEDROL) injection 80 mg (has no administration in time range)   sodium chloride 0 9 % bolus 500 mL (500 mL Intravenous New Bag 12/25/22 0506)   acetaminophen (TYLENOL) tablet 975 mg (975 mg Oral Given 12/25/22 0547)   iodixanol (VISIPAQUE) 320 MG/ML injection 100 mL (100 mL Intravenous Given 12/25/22 0621)       Diagnostic Studies  Results Reviewed     Procedure Component Value Units Date/Time    HS Troponin 0hr (reflex protocol) [[id number]]  (Abnormal) Collected: 12/25/22 0454    Lab Status: Final result Specimen: Blood Updated: 12/25/22 0519     hs TnI 0hr 151 ng/L     HS Troponin I 2hr [[id number]]     Lab Status: No result Specimen: Blood Comprehensive metabolic panel [[id number]]  (Abnormal) Collected: 12/25/22 0454    Lab Status: Final result Specimen: Blood Updated: 12/25/22 0511     Sodium 141 mmol/L      Potassium 4 4 mmol/L      Chloride 102 mmol/L      CO2 33 mmol/L      ANION GAP 6 mmol/L      BUN 48 mg/dL      Creatinine 2 27 mg/dL      Glucose 263 mg/dL      Calcium 8 4 mg/dL      Corrected Calcium 9 2 mg/dL      AST 18 U/L      ALT 16 U/L      Alkaline Phosphatase 99 U/L      Total Protein 8 2 g/dL      Albumin 3 0 g/dL      Total Bilirubin 0 20 mg/dL      eGFR 19 ml/min/1 73sq m     Narrative:      National Kidney Disease Foundation guidelines for Chronic Kidney Disease (CKD):   •  Stage 1 with normal or high GFR (GFR > 90 mL/min/1 73 square meters)  •  Stage 2 Mild CKD (GFR = 60-89 mL/min/1 73 square meters)  •  Stage 3A Moderate CKD (GFR = 45-59 mL/min/1 73 square meters)  •  Stage 3B Moderate CKD (GFR = 30-44 mL/min/1 73 square meters)  •  Stage 4 Severe CKD (GFR = 15-29 mL/min/1 73 square meters)  •  Stage 5 End Stage CKD (GFR <15 mL/min/1 73 square meters)  Note: GFR calculation is accurate only with a steady state creatinine    CBC and differential [[id number]]  (Abnormal) Collected: 12/25/22 0454    Lab Status: Final result Specimen: Blood Updated: 12/25/22 0456     WBC 11 32 Thousand/uL      RBC 4 24 Million/uL      Hemoglobin 11 7 g/dL      Hematocrit 39 2 %      MCV 93 fL      MCH 27 6 pg      MCHC 29 8 g/dL      RDW 15 4 %      MPV 10 7 fL      Platelets 502 Thousands/uL      nRBC 0 /100 WBCs      Neutrophils Relative 82 %      Immat GRANS % 1 %      Lymphocytes Relative 10 %      Monocytes Relative 7 %      Eosinophils Relative 0 %      Basophils Relative 0 %      Neutrophils Absolute 9 24 Thousands/µL      Immature Grans Absolute 0 09 Thousand/uL      Lymphocytes Absolute 1 13 Thousands/µL      Monocytes Absolute 0 82 Thousand/µL      Eosinophils Absolute 0 01 Thousand/µL      Basophils Absolute 0 03 Thousands/µL CTA dissection protocol chest abdomen pelvis w wo contrast    (Results Pending)              Procedures  ECG 12 Lead Documentation Only    Date/Time: 12/25/2022 4:33 AM  Performed by: Rajani Canseco PA-C  Authorized by: Rajani Canseco PA-C     Indications / Diagnosis:  Chest Pain  ECG reviewed by me, the ED Provider: yes (Also reviewed by Dr David Pacheco)    Patient location:  ED  Previous ECG:     Previous ECG:  Compared to current    Similarity:  Changes noted  Interpretation:     Interpretation: abnormal    Rate:     ECG rate:  111    ECG rate assessment: tachycardic    Rhythm:     Rhythm: sinus rhythm    Ectopy:     Ectopy: PVCs    QRS:     QRS axis:  Left    QRS intervals:  Normal  Conduction:     Conduction: normal               ED Course  ED Course as of 12/25/22 0638   Sun Dec 25, 2022   0521 Discussed with Dr Bhakti Marie w/ radiology for approval of dissection study  Agrees that presentation is concerning and can proceed with study  Will need renal on board after CTA for kidney GFR of 19  SBIRT 22yo+    Flowsheet Row Most Recent Value   SBIRT (25 yo +)    In order to provide better care to our patients, we are screening all of our patients for alcohol and drug use  Would it be okay to ask you these screening questions? No Filed at: 12/25/2022 0535                    MDM  Number of Diagnoses or Management Options  Diagnosis management comments:     Patient presenting for evaluation of chest pain that started after bending over  Patient states the chest pain radiates between her shoulders and to her low back  Initially patient's BP in the right upper extremity was 225/110s, once switched to the left upper extremity BP was noted to be 178/84  At this point concerned for possible dissection 2/2 chest pain, radiation to the upper back and differing BPs in bilateral upper extremities  CBC ordered for evaluation of anemia and infection    CMP for evaluation of LFTs, kidney function and electrolyte abnormalities  Patient on 4 L nasal cannula, according to son this is a new, patient does not usually require oxygen  Patient did get diagnosis of influenza A on 12/22/2022  Troponin and EKG ordered for evaluation of cardiac cause of symptoms  Significant changes on EKG, troponin of 151  Heart score of 8  Patient will need to be admitted  CTA pending  Spoke with Dr Brittney Rm radiologist as patient's GFR is 19 today  Agrees that that CTA dissection study should likely be done secondary to chest pain that radiates to the shoulders and differing BPs in bilateral upper extremities  Renal will need to be involved s/p CTA for management of GFR  CTA pending read at this time  S/o to GLENN JEFF  Patient to be admitted for respiratory failure in the setting of hypoxia with new NC requirement  Troponin of 151, will need delta  Influenza infection  Patient will need renal involved 2/2 GFR of 19 and contrasted study  According to son patient is DNR/DNI  Amount and/or Complexity of Data Reviewed  Clinical lab tests: ordered and reviewed  Tests in the radiology section of CPT®: ordered and reviewed  Tests in the medicine section of CPT®: ordered and reviewed  Obtain history from someone other than the patient: yes  Review and summarize past medical records: yes  Discuss the patient with other providers: yes  Independent visualization of images, tracings, or specimens: yes    Risk of Complications, Morbidity, and/or Mortality  Presenting problems: high  Management options: high        Disposition  Final diagnoses:   None     ED Disposition     None      Follow-up Information    None         Patient's Medications   Discharge Prescriptions    No medications on file       No discharge procedures on file      PDMP Review     None          ED Provider  Electronically Signed by Maria F[facility] HospitalTOMER  12/25/22 0904

## 2022-12-25 NOTE — ASSESSMENT & PLAN NOTE
Lab Results   Component Value Date    HGBA1C 8 9 (H) 10/27/2022       No results for input(s): POCGLU in the last 72 hours      Blood Sugar Average: Last 72 hrs:     SSI; Subcutaneous Insulin Order Set  Blood Glucose checks TIDWM and QHS (Q6H for NPO patients)  Hold oral medications  Blood Glucose goal while inpatient is 140-180  Reduce basal insulin by 25-50% while inpatient  Consistent Carbohydrate Diet

## 2022-12-25 NOTE — RESPIRATORY THERAPY NOTE
Per RN and physician order, obtained ABG from R-brachial artery  Per pulmonologist, no BIPAP changes  Will continue settings as tolerated

## 2022-12-25 NOTE — ASSESSMENT & PLAN NOTE
Lab Results   Component Value Date    EGFR 19 12/25/2022    EGFR 22 10/27/2022    EGFR 24 09/29/2022    CREATININE 2 27 (H) 12/25/2022    CREATININE 2 02 (H) 10/27/2022    CREATININE 1 88 (H) 09/29/2022

## 2022-12-25 NOTE — CONSULTS
Consult - Cardiology   Clare Mobley 82 y o  female MRN: [id number]  Unit/Bed#: ED 23 Encounter: [id number]        Reason For Consult: Abnormal troponin  Outpatient Cardiologist:               ASSESSMENT:  1  Abnormal troponin 151 --> 332 --> 395  a  Echo 7/18/2019: EF 69%, no wall motion abnormalities, mild MR  b  EKG sinus rhythm with occasional PVCs, no dynamic ST segment changes suggestive of ischemia  2  Acute hypoxic respiratory failure  a  CT chest with inflammatory process, possible polyangiitis or sarcoidosis no obvious pulmonary edema  b  Patient on BiPAP at the time of this consult  3  Chronic kidney disease, baseline creatinine 2-2 5  4  Type 2 diabetes  5  Chronic diastolic heart failure    PLAN/ DISCUSSION:     • Suspect elevated cardiac enzymes are from increased demand in setting of respiratory failure  • Her EKG is not suggestive of acute ischemia  • Agree with aspirin  • Check echocardiogram  • Continue supportive care with BiPAP therapy  • Add on NT proBNP lab work  • Further recommendation to follow echo    History Of Present Illness:  82-year-old female without any prior care from her cardiologist   She resides in a local nursing home  Family at bedside provides history  She is essentially wheelchair bound  She has no cardiac history that the family knows of  She is cared for by the nursing home doctor  At baseline she is not maintained on supplemental oxygen  She was apparently recently diagnosed with the flu at the nursing home  She has been feeling unwell for several days  She did report some burning chest discomfort and possibly some shortness of breath  According to family her oxygen saturations have been low at times down to the 50s prehospital   For this reason she was brought to the emergency department on 12/25/2022  She was hypoxic on arrival in the 70s  She was initially placed on 4 L however soon thereafter required BiPAP therapy to maintain saturations in the 90s  Troponins were checked which have been elevated as above  We have been asked see in consultation for abnormal troponin  Presently she is on BiPAP and not providing history  Past Medical History:        Past Medical History:   Diagnosis Date   • Adenoma of large intestine    • Allergy     last assessed: 10/18/2013   • Cataract    • Constipation, chronic    • Diabetes mellitus ([address] )     anemia vitamin d deficiency glaucoma hypertension hypothyroidism; last assessed: 5/7/2012   • Disease of thyroid gland    • Diverticulosis    • Fecal incontinence    • Female stress incontinence    • Glaucoma    • Gout    • History of cystocele    • Intrinsic sphincter deficiency    • Mixed incontinence    • Nocturia    • Osteoarthritis, chronic    • Renal disorder    • Senile atrophic vaginitis    • Urinary frequency       Past Surgical History:   Procedure Laterality Date   • ANKLE SURGERY Bilateral    • CATARACT EXTRACTION     • CHOLECYSTECTOMY     • TUBAL LIGATION          Allergy:        Allergies   Allergen Reactions   • Ace Inhibitors      [facility] - [id number]: short of breath   • Aspirin        Medications:       Prior to Admission medications    Medication Sig Start Date End Date Taking?  Authorizing Provider   acetaminophen (TYLENOL) 325 mg tablet Take 650 mg by mouth every 6 (six) hours as needed for mild pain    Historical Provider, MD   albuterol (2 5 mg/3 mL) 0 083 % nebulizer solution Take 2 5 mg by nebulization every 6 (six) hours as needed for wheezing or shortness of breath    Historical Provider, MD   albuterol (PROVENTIL HFA,VENTOLIN HFA) 90 mcg/act inhaler Inhale 2 puffs every 6 (six) hours as needed for wheezing    Historical Provider, MD   amLODIPine (NORVASC) 2 5 mg tablet Take 7 5 mg by mouth daily  Patient not taking: No sig reported    Historical Provider, MD   atorvastatin (LIPITOR) 10 mg tablet Take 1 tablet (10 mg total) by mouth daily 2/19/19   Steff Vu,    budesonide-formoterol [facility] 160-4 5 mcg/act inhaler Inhale 2 puffs 2 (two) times a day Rinse mouth after use  Patient not taking: Reported on 5/31/2022    Historical Provider, MD   calcium carbonate (OS-JENA) 600 MG tablet 1 tab twice per day   12/9/21   Sridevi Roca PA-C   Cholecalciferol (Vitamin D3) 50 MCG (2000 UT) capsule Take 1 capsule (2,000 Units total) by mouth daily 12/9/21   Tai Everett PA-C   dorzolamide-timolol (COSOPT) 22 3-6 8 MG/ML ophthalmic solution Administer 1 drop to both eyes 2 (two) times a day    Jesus Camacho MD   fexofenadine (ALLEGRA) 180 MG tablet Take by mouth  Patient not taking: No sig reported    Historical Provider, MD   fluticasone (FLONASE) 50 mcg/act nasal spray 2 sprays into each nostril daily   Patient not taking: Reported on 5/31/2022 3/22/21   Historical Provider, MD   furosemide (LASIX) 80 mg tablet Take 1 tablet (80 mg total) by mouth daily 1/23/19   Alfred Filter, DO   GlucaGen HypoKit 1 MG injection if needed 4/28/22   Historical Provider, MD   Glucosamine-Chondroitin 500-400 MG CAPS Take 1 capsule by mouth 2 (two) times a day    Historical Provider, MD   guaiFENesin (ROBITUSSIN) 100 MG/5ML oral liquid Take 200 mg by mouth 3 (three) times a day as needed for cough  Patient not taking: No sig reported    Historical Provider, MD   Incontinence Supply Disposable (CERTAINTY ADJ UNDERWEAR LARGE) MISC by Does not apply route daily at bedtime 10/15/18   Alfred Filter, DO   insulin aspart (NovoLOG) 100 units/mL injection INJECT 4 UNITS THREE TIMES A DAY WITH MEALS  IN ADDITION TO SLIDING SCALE;SUBCUTANEOUSLY AS PER SLIDING SCALE: THREE TIMES A DAY WITH MEALS 201-250= 4 UNITS 251-300= 6 UNITS 301-350= 8 UNITS 351-400= 10 UNITS 401-450= 12 UNITS 451-500= 14 UNITS  Patient taking differently: INJECT 6 UNITS THREE TIMES A DAY WITH MEALS  IN ADDITION TO SLIDING SCALE;SUBCUTANEOUSLY AS PER SLIDING SCALE: THREE TIMES A DAY WITH MEALS 201-250= 4 UNITS 251-300= 6 UNITS 301-350= 8 UNITS 351-400= 10 UNITS 401-450= 12 UNITS 451-500= 14 UNITS 8/31/21   Tai Everett PA-C   insulin glargine (LANTUS) 100 units/mL subcutaneous injection 30 units daily in AM   Patient not taking: Reported on 5/31/2022 8/23/21   Chino Veliz PA-C   insulin glargine (Semglee) 100 units/mL subcutaneous injection Inject 25 Units under the skin every 12 (twelve) hours    Historical Provider, MD   ipratropium (ATROVENT) 0 02 % nebulizer solution Take 0 5 mg by nebulization 4 (four) times a day  Patient not taking: No sig reported    Historical Provider, MD   levothyroxine 75 mcg tablet TAKE ONE TABLET BY MOUTH ONCE DAILY AS DIRECTED 2/16/19   Chela Raya DO   losartan (COZAAR) 25 mg tablet Take 25 mg by mouth every morning  3/15/21   Historical Provider, MD   montelukast (SINGULAIR) 10 mg tablet Take 10 mg by mouth every morning   Patient not taking: No sig reported    Historical Provider, MD   pantoprazole (PROTONIX) 40 mg tablet Take 1 tablet (40 mg total) by mouth daily before breakfast 7/21/19   Yossi Camarillo MD   potassium chloride (KLOR-CON M20) 20 mEq tablet Take 1 tablet (20 mEq total) by mouth daily  Patient taking differently: Take 10 mEq by mouth daily 6/28/19   Chela Raya DO   RELION PEN NEEDLE 31G/8MM 31G X 8 MM MISC USE AS DIRECTED THREE TIMES DAILY  Patient not taking: No sig reported 2/2/19   Chela Raya DO   tiotropium (SPIRIVA RESPIMAT) 1 25 MCG/ACT AERS inhaler Inhale 2 puffs daily  Patient not taking: No sig reported    Historical Provider, MD   triamcinolone (KENALOG) 0 1 % cream  3/22/21   Historical Provider, MD       Family History:     Family History   Problem Relation Age of Onset   • Stomach cancer Mother    • Diabetes Sister         mellitus        Social History:       Social History     Socioeconomic History   • Marital status:       Spouse name: None   • Number of children: None   • Years of education: None   • Highest education level: None   Occupational History   • None   Tobacco Use • Smoking status: Former     Types: Cigarettes     Quit date: 1989     Years since quittin 6   • Smokeless tobacco: Never   Vaping Use   • Vaping Use: Never used   Substance and Sexual Activity   • Alcohol use: Never   • Drug use: No   • Sexual activity: None   Other Topics Concern   • None   Social History Narrative   • None     Social Determinants of Health     Financial Resource Strain: Not on file   Food Insecurity: Not on file   Transportation Needs: Not on file   Physical Activity: Not on file   Stress: Not on file   Social Connections: Not on file   Intimate Partner Violence: Not on file   Housing Stability: Not on file       ROS:  14 point ROS negative except as outlined above  Remainder review of systems is negative    Exam:  General: Elderly female, on BiPAP  Head: Normocephalic, atraumatic  Eyes:  EOMI  Pupils - equal, round, reactive to accomodation  No icterus  Normal Conjunctiva  Oropharynx: moist and normal-appearing mucosa  Neck: supple, symmetrical, trachea midline and no JVD  Heart:  RRR, No: murmer, rub or gallop, S1 & S2 normal   Respiratory effort / Chest Inspection: unlabored  Lungs:  normal air entry, lungs clear to auscultation and no rales, rhonchi or wheezing   Abdomen: flat, normal findings: bowel sounds normal and soft, non-tender  Lower Limbs:  no pitting edema  Pulses[de-identified]  RLE - DP: present 2+                 LLE - DP: present 2+  Musculoskeletal: ROM grossly normal        DATA:      ECG:                     Telemetry: bradycardic  HR 50s          Echocardiogram:           Ischemic Testing:         Weights: Wt Readings from Last 3 Encounters:   22 99 8 kg (220 lb)   22 101 kg (222 lb)   21 97 1 kg (214 lb)   , There is no height or weight on file to calculate BMI           Lab Studies:             Results from last 7 days   Lab Units 22  0454   WBC Thousand/uL 11 32*   HEMOGLOBIN g/dL 11 7   HEMATOCRIT % 39 2   PLATELETS Thousands/uL 237   , Results from last 7 days   Lab Units 12/25/22  0454   POTASSIUM mmol/L 4 4   CHLORIDE mmol/L 102   CO2 mmol/L 33*   BUN mg/dL 48*   CREATININE mg/dL 2 27*   CALCIUM mg/dL 8 4   ALK PHOS U/L 99   ALT U/L 16   AST U/L 18

## 2022-12-25 NOTE — CONSULTS
Consultation - Pulmonary Medicine   Raquel Omer 82 y o  female MRN: [id number]  Unit/Bed#: E4 -01 Encounter: [id number]      Assessment & Plan:   Acute hypoxic respiratory failure  Influenza a infection  Abnormal chest CT  Chronic bronchitis  CHF      · Patient is dependent on BiPAP at this time  ABG pending  · CT chest reviewed, minimal changes compared to prior imaging however given her elevated white count and respiratory status would consider addition of cefepime  · F/u cultures including MRSA  · Tamiflu  · Pt DNR/DNI  Family would likely pursue comfort measures if no significant improvement in next 24-48 hours  Consider palliative care consult  · Diuretics per primary  · Discussed with nursing and family at bedside  Reached out to SLIM via TT  History of Present Illness   Physician Requesting Consult: Juan F Dasilva MD  Reason for Consult / Principal Problem: hypoxia  Hx and PE limited by: mental status  HPI: Raquel Omer is a 82y o  year old female who presents from her nursing home apparently with acute onset chest pain  Patient is lethargic and altered on exam and unable to participate in history  She tested positive for influenza A 3 days ago  Her respiratory status worsen an right ear requiring BiPAP  She has not been able to come off of it  She is confirmed DNR DNI  Family is not aware to what extent her respiratory symptoms were prior to admission  Regarding pulmonary history, she had severe COVID 19 infection in April 2020  Since then she has been diagnosis chronic bronchitis  She does not use oxygen at baseline      Consults    Review of Systems   Unable to perform ROS: Mental status change       Historical Information   Past Medical History:   Diagnosis Date   • Adenoma of large intestine    • Allergy     last assessed: 10/18/2013   • Cataract    • Constipation, chronic    • Diabetes mellitus ([facility] [address] )     anemia vitamin d deficiency glaucoma hypertension hypothyroidism; last assessed: 2012   • Disease of thyroid gland    • Diverticulosis    • Fecal incontinence    • Female stress incontinence    • Glaucoma    • Gout    • History of cystocele    • Intrinsic sphincter deficiency    • Mixed incontinence    • Nocturia    • Osteoarthritis, chronic    • Renal disorder    • Senile atrophic vaginitis    • Urinary frequency      Past Surgical History:   Procedure Laterality Date   • ANKLE SURGERY Bilateral    • CATARACT EXTRACTION     • CHOLECYSTECTOMY     • TUBAL LIGATION       Social History   Social History     Substance and Sexual Activity   Alcohol Use Never     Social History     Substance and Sexual Activity   Drug Use No     E-Cigarette/Vaping   • E-Cigarette Use Never User      E-Cigarette/Vaping Substances   • Nicotine No    • THC No    • CBD No    • Flavoring No    • Other No    • Unknown No      Social History     Tobacco Use   Smoking Status Former   • Types: Cigarettes   • Quit date: 1989   • Years since quittin 6   Smokeless Tobacco Never       Family History:   Family History   Problem Relation Age of Onset   • Stomach cancer Mother    • Diabetes Sister         mellitus       Meds/Allergies   all current active meds have been reviewed    Allergies   Allergen Reactions   • Ace Inhibitors      Annotation - [id number]: short of breath   • Aspirin        Objective   Vitals: Blood pressure 107/52, pulse 55, temperature (!) 97 °F (36 1 °C), temperature source Temporal, resp  rate 18, SpO2 96 %  ,There is no height or weight on file to calculate BMI      Intake/Output Summary (Last 24 hours) at 2022 1352  Last data filed at 2022 1033  Gross per 24 hour   Intake 500 ml   Output --   Net 500 ml     Invasive Devices     Peripheral Intravenous Line  Duration           Peripheral IV 21 Right Antecubital 667 days    Peripheral IV 22 Right Forearm <1 day    Peripheral IV 22 Right Wrist <1 day                Physical Exam  Vitals reviewed  Constitutional:       Appearance: She is ill-appearing  Cardiovascular:      Rate and Rhythm: Normal rate  Pulmonary:      Comments: Patient on BiPAP  Decreased breath sounds  Abdominal:      Tenderness: There is no abdominal tenderness  There is no guarding  Musculoskeletal:      Right lower leg: No edema  Left lower leg: No edema  Skin:     General: Skin is warm and dry  Neurological:      Mental Status: She is disoriented  Psychiatric:         Speech: She is noncommunicative  Lab Results: I have personally reviewed pertinent lab results  Imaging Studies: I have personally reviewed pertinent reports  and I have personally reviewed pertinent films in PACS  EKG, Pathology, and Other Studies: I have personally reviewed pertinent reports      VTE Prophylaxis: Enoxaparin (Lovenox)    Code Status: Level 3 - DNAR and DNI  Advance Directive and Living Will:      Power of :    POLST:

## 2022-12-25 NOTE — ED ATTENDING ATTESTATION
12/25/2022  ILorie MD, saw and evaluated the patient  I have discussed the patient with the resident/non-physician practitioner and agree with the resident's/non-physician practitioner's findings, Plan of Care, and MDM as documented in the resident's/non-physician practitioner's note, except where noted  All available labs and Radiology studies were reviewed  I was present for key portions of any procedure(s) performed by the resident/non-physician practitioner and I was immediately available to provide assistance  At this point I agree with the current assessment done in the Emergency Department  I have conducted an independent evaluation of this patient a history and physical is as follows:  Patient is an 81 yo female, sign out from night team for pending CTA Dissection study, pt with hx of DM, HTN, CHF, CKD, found out to have Flu at [facility]; c/o chest and back pain, hypoxic 70% on arrival, put on 4 LPM O2 with O2 sats in 90s; Labs noted for elevated Troponin, CTA negative for dissection  Patient progressively for less responsive, stable vitals, put on BIPAP  Patient is DNR/DNI  Son at bedside was informed about the the deterioration  Case was discussed with Cardiology, CC and SLIM by AP      ED Course         Critical Care Time  Procedures

## 2022-12-26 PROBLEM — J10.1 INFLUENZA A: Status: ACTIVE | Noted: 2022-01-01

## 2022-12-26 NOTE — WOUND OSTOMY CARE
Consult Note - Wound   Gee Fuentes 82 y o  female MRN: [id number]  Unit/Bed#: E4 -01 Encounter: [id number]      History and Present Illness:  82year old female presented to the hospital with chest pain from her assisted living  Patient's history significant for DM, incontinence, former smoker  Assessment Findings:   Patient agreeable to assessment  She is lethargic, but answers yes/no to questions  Requires assist x 2 to turn in bed  Incontinent of bowel and bladder  Bilateral heels intact with preventative foam dressings in place  1   Present on admission deep tissue pressure injury to right sacrum--very small dark purple, non-blanchable area and scabbing with surrounding blanchable erythema, scar tissue, and hyperpigmentation  2   Hyperpigmentation to left sacrum present on admission--tan, blanchable pink scar tissue to left sacrum  Epidermis intact, fragile, dry  See flowsheet for wound details  Patient would benefit from urinary incontinence management--purwick--if tolerated  Discussed with primary RN  Wound Care Plan:   1-Apply Allevyn Life foam dressing to bilateral heels for prevention  Daniel with P   Peel back at least daily for skin assessment and re-apply  Change dressing every 3 days and PRN  2-Elevate heels off of bed/chair surface to offload pressure  3-Offloading air cushion in chair when out of bed  4-Moisturize skin daily with skin nourishing cream   5-Turn/reposition every 2 hours while in bed using positioning wedges; and weight shift frequently while in chair for pressure re-distribution on skin  6-Hydraguard lotion to scaling on lower legs twice daily  7-Sacrum/buttocks--cleanse with soap and water, pat dry  Apply Calazime paste three times daily and as needed with incontinence care  Wound care team to follow  Plan of care reviewed with primary RN      Wound 12/26/22 Pressure Injury Sacrum Right (Active)   Wound Image   12/26/22 0945   Wound Description Light purple;Pink; Tan 12/26/22 1434   Pressure Injury Stage DTPI 12/26/22 1434   Blanka-wound Assessment Hyperpigmented;Pink 12/26/22 1434   Wound Length (cm) 2 cm 12/26/22 1434   Wound Width (cm) 1 5 cm 12/26/22 1434   Wound Depth (cm) 0 cm 12/26/22 1434   Wound Surface Area (cm^2) 3 cm^2 12/26/22 1434   Wound Volume (cm^3) 0 cm^3 12/26/22 1434   Calculated Wound Volume (cm^3) 0 cm^3 12/26/22 1434   Drainage Amount None 12/26/22 1434   Treatments Cleansed 12/26/22 1434   Dressing Protective barrier 12/26/22 1434   Patient Tolerance Tolerated well 12/26/22 1434       Wound 12/26/22 Sacrum Left (Active)   Wound Image   12/26/22 1434   Wound Description Blunt;Pink 12/26/22 1434   Blanka-wound Assessment Pink; Intact 12/26/22 1434   Wound Length (cm) 1 5 cm 12/26/22 1434   Wound Width (cm) 1 cm 12/26/22 1434   Wound Depth (cm) 0 cm 12/26/22 1434   Wound Surface Area (cm^2) 1 5 cm^2 12/26/22 1434   Wound Volume (cm^3) 0 cm^3 12/26/22 1434   Calculated Wound Volume (cm^3) 0 cm^3 12/26/22 1434   Drainage Amount None 12/26/22 1434   Treatments Cleansed 12/26/22 1434   Dressing Protective barrier 12/26/22 1434   Patient Tolerance Tolerated well 12/26/22 [address] BSN, RN, [employer]

## 2022-12-26 NOTE — ASSESSMENT & PLAN NOTE
Wt Readings from Last 3 Encounters:   12/26/22 99 8 kg (220 lb)   05/31/22 99 8 kg (220 lb)   02/03/22 101 kg (222 lb)     · Echocardiogram 12/26/2020 reveals ejection fraction 11%, grade 2 diastolic dysfunction  · proBNP 1837  · Cardiology input appreciated  · Continue furosemide 80 mg daily  · Monitor I&O's

## 2022-12-26 NOTE — PLAN OF CARE
Problem: Potential for Falls  Goal: Patient will remain free of falls  Description: INTERVENTIONS:  - Educate patient/family on patient safety including physical limitations  - Instruct patient to call for assistance with activity   - Consult OT/PT to assist with strengthening/mobility   - Keep Call bell within reach  - Keep bed low and locked with side rails adjusted as appropriate  - Keep care items and personal belongings within reach  - Initiate and maintain comfort rounds  - Make Fall Risk Sign visible to staff  - Offer Toileting every 2 Hours, in advance of need  - Initiate/Maintain bed and chair alarm  - Obtain necessary fall risk management equipment: alarms, call bell  - Apply yellow socks and bracelet for high fall risk patients  - Consider moving patient to room near nurses station  Outcome: Progressing     Problem: MOBILITY - ADULT  Goal: Maintain or return to baseline ADL function  Description: INTERVENTIONS:  -  Assess patient's ability to carry out ADLs; assess patient's baseline for ADL function and identify physical deficits which impact ability to perform ADLs (bathing, care of mouth/teeth, toileting, grooming, dressing, etc )  - Assess/evaluate cause of self-care deficits   - Assess range of motion  - Assess patient's mobility; develop plan if impaired  - Assess patient's need for assistive devices and provide as appropriate  - Encourage maximum independence but intervene and supervise when necessary  - Involve family in performance of ADLs  - Assess for home care needs following discharge   - Consider OT consult to assist with ADL evaluation and planning for discharge  - Provide patient education as appropriate  Outcome: Progressing  Goal: Maintains/Returns to pre admission functional level  Description: INTERVENTIONS:  - Perform BMAT or MOVE assessment daily    - Set and communicate daily mobility goal to care team and patient/family/caregiver     - Collaborate with rehabilitation services on mobility goals if consulted  - Perform Range of Motion 3 times a day  - Reposition patient every 2 hours    - Dangle patient 3 times a day  - Stand patient 3 times a day  - Ambulate patient 3 times a day  - Out of bed to chair 3 times a day   - Out of bed for meals 3 times a day  - Out of bed for toileting  - Record patient progress and toleration of activity level   Outcome: Progressing     Problem: Prexisting or High Potential for Compromised Skin Integrity  Goal: Skin integrity is maintained or improved  Description: INTERVENTIONS:  - Identify patients at risk for skin breakdown  - Assess and monitor skin integrity  - Assess and monitor nutrition and hydration status  - Monitor labs   - Assess for incontinence   - Turn and reposition patient  - Assist with mobility/ambulation  - Relieve pressure over bony prominences  - Avoid friction and shearing  - Provide appropriate hygiene as needed including keeping skin clean and dry  - Evaluate need for skin moisturizer/barrier cream  - Collaborate with interdisciplinary team   - Patient/family teaching  - Consider wound care consult   Outcome: Progressing     Problem: PAIN - ADULT  Goal: Verbalizes/displays adequate comfort level or baseline comfort level  Description: Interventions:  - Encourage patient to monitor pain and request assistance  - Assess pain using appropriate pain scale  - Administer analgesics based on type and severity of pain and evaluate response  - Implement non-pharmacological measures as appropriate and evaluate response  - Consider cultural and social influences on pain and pain management  - Notify physician/advanced practitioner if interventions unsuccessful or patient reports new pain  Outcome: Progressing     Problem: DISCHARGE PLANNING  Goal: Discharge to home or other facility with appropriate resources  Description: INTERVENTIONS:  - Identify barriers to discharge w/patient and caregiver  - Arrange for needed discharge resources and transportation as appropriate  - Identify discharge learning needs (meds, wound care, etc )  - Arrange for interpretive services to assist at discharge as needed  - Refer to Case Management Department for coordinating discharge planning if the patient needs post-hospital services based on physician/advanced practitioner order or complex needs related to functional status, cognitive ability, or social support system  Outcome: Progressing     Problem: Knowledge Deficit  Goal: Patient/family/caregiver demonstrates understanding of disease process, treatment plan, medications, and discharge instructions  Description: Complete learning assessment and assess knowledge base    Interventions:  - Provide teaching at level of understanding  - Provide teaching via preferred learning methods  Outcome: Progressing     Problem: CARDIOVASCULAR - ADULT  Goal: Maintains optimal cardiac output and hemodynamic stability  Description: INTERVENTIONS:  - Monitor I/O, vital signs and rhythm  - Monitor for S/S and trends of decreased cardiac output  - Administer and titrate ordered vasoactive medications to optimize hemodynamic stability  - Assess quality of pulses, skin color and temperature  - Assess for signs of decreased coronary artery perfusion  - Instruct patient to report change in severity of symptoms  Outcome: Progressing  Goal: Absence of cardiac dysrhythmias or at baseline rhythm  Description: INTERVENTIONS:  - Continuous cardiac monitoring, vital signs, obtain 12 lead EKG if ordered  - Administer antiarrhythmic and heart rate control medications as ordered  - Monitor electrolytes and administer replacement therapy as ordered  Outcome: Progressing     Problem: RESPIRATORY - ADULT  Goal: Achieves optimal ventilation and oxygenation  Description: INTERVENTIONS:  - Assess for changes in respiratory status  - Assess for changes in mentation and behavior  - Position to facilitate oxygenation and minimize respiratory effort  - Oxygen administered by appropriate delivery if ordered  - Initiate smoking cessation education as indicated  - Encourage broncho-pulmonary hygiene including cough, deep breathe, Incentive Spirometry  - Assess the need for suctioning and aspirate as needed  - Assess and instruct to report SOB or any respiratory difficulty  - Respiratory Therapy support as indicated  Outcome: Progressing     Problem: METABOLIC, FLUID AND ELECTROLYTES - ADULT  Goal: Glucose maintained within target range  Description: INTERVENTIONS:  - Monitor Blood Glucose as ordered  - Assess for signs and symptoms of hyperglycemia and hypoglycemia  - Administer ordered medications to maintain glucose within target range  - Assess nutritional intake and initiate nutrition service referral as needed  Outcome: Progressing

## 2022-12-26 NOTE — ASSESSMENT & PLAN NOTE
· Patient presents via EMS for chest pain; lives at [facility]  · In the ED, noted to be hypoxic and requiring up to 10 L nasal cannula; currently on 3L  · On 12/22; had positive influenza A test at [facility]; RSV and influenza B at that time were negative  · CTA chest ruled out PE; shows chronic consolidations but unclear if any new superimposed bacterial pneumonia present  · Procalcitonin 2 5  · Continue empiric antibiotics, Tamiflu  · Patient has confirmed DNR/DNI status; seen by critical care, recommended stepdown level 2  · continue home Symbicort, Flonase, Singulair, Spiriva  · BiPAP as needed; start Tamiflu; supportive care  Consider palliative consult based on improvement with above treatment plan

## 2022-12-26 NOTE — SPEECH THERAPY NOTE
Speech Language/Pathology  Speech/Language Pathology  Assessment    Patient Name: Cindy Duncan  PIFQO'O Date: 12/26/2022     Problem List  Principal Problem:    Acute respiratory failure with hypoxia ([facility] )  Active Problems:    Acquired hypothyroidism    Anxiety    Anemia in stage 4 chronic kidney disease ([facility] )    Chronic congestive heart failure (HCC)    Gastroesophageal reflux disease without esophagitis    Type 2 diabetes mellitus with hyperglycemia, with long-term current use of insulin ([place] [address] )    Chest pain    Past Medical History  Past Medical History:   Diagnosis Date   • Adenoma of large intestine    • Allergy     last assessed: 10/18/2013   • Cataract    • Constipation, chronic    • Diabetes mellitus ([place] [address] )     anemia vitamin d deficiency glaucoma hypertension hypothyroidism; last assessed: 5/7/2012   • Disease of thyroid gland    • Diverticulosis    • Fecal incontinence    • Female stress incontinence    • Glaucoma    • Gout    • History of cystocele    • Intrinsic sphincter deficiency    • Mixed incontinence    • Nocturia    • Osteoarthritis, chronic    • Renal disorder    • Senile atrophic vaginitis    • Urinary frequency      Past Surgical History  Past Surgical History:   Procedure Laterality Date   • ANKLE SURGERY Bilateral    • CATARACT EXTRACTION     • CHOLECYSTECTOMY     • TUBAL LIGATION            Bedside Swallow Evaluation:    Summary:  Pt presented fully alert and verbal  (reportedly was obtunded yesterday)  Took multiple sips of thin liquid by consecutive sips WNL  Adamantly refused to eat anything, even w/ daughter encouraging  Denied any nausea  I asked what she likes to eat  Stated "hotdogs"  Daughter reported sh can not have anything "chewy" since she lost her dentures  Pt denied difficulty  Limited assessment but tolerance for thin liquids was WNL  I suspect she is close to or at her baseline  No oral/pharyngeal s/s w/ thin liquids       Recommendations:  Diet: Dysphagia 3 dental soft for now  Liquid: thin  Meds: states she takes them w/ water  Supervision: assist as needed  Positioning:Upright  Pt to take PO/Meds only when fully alert and upright  Oral care  Aspiration precautions  Reflux precautions  Therapy Prognosis: favorable  Prognosis considerations:seems to be close to or at baseline  Frequency: ? 1-3 f/u visits    Consider consult w/:  Rehab  Nutrition    Goal(s):  Dysphagia LTG  -Patient will demonstrate safe and effective oral intake (without overt s/s significant oral/pharyngeal dysphagia including s/s penetration or aspiration) for the highest appropriate diet level  1 Pt will tolerate least restrictive diet w/out s/s aspiration or oral/pharyngeal difficulties  2 Pt will will effectively masticate and transfer solids w/out s/s dysphagia/aspiration  3 Pt will tolerate thin liquids w/out s/s aspiration  H&P/Admit info/ pertinent provider notes: (PMH noted above)  Attestation signed by Gino Joseph MD at 12/25/2022  2:46 PM     Split/Shared Statement:  I have seen and examined the patient 12/25/2022  I have discussed the patient's care with the advanced practitioner  I personally reviewed the laboratory data and Films reviewed on PACS  I agree with the findings, assessment, and plan as outlined in the note by Rivera Francis PA-C  Acute hypoxic and hypercapnic respiratory failure with acute encephalopathy secondary to influenza A infection and abnormal CT scan with atelectasis and scattered areas of groundglass and consolidation probably multifocal pneumonia  From pulmonary standpoint continue oxygen and BiPAP as needed to maintain pulse ox more than 88% and to correct hypercapnia and probably improve mental status  Further management of her influenza as per primary team   We suggest antibiotics with ceftriaxone or cefepime for possible superimposed pneumonia although no much signs or symptoms  But also patient cannot give any history    Trend procalcitonin and leukocytosis and fever  Check MRSA swab  patient is DNR/DNI  Family may pursue comfort measures if no significant improvement in the next 1-2 days  Jett Garcia MD    Chief Complaint:   Chest pain  History of Present Illness:  Marilu Iglesias is a 82 y o  female with past medical history of hypothyroidism, CKD 4, GERD, CHF, peripheral artery disease, and type 2 diabetes who presents from her assisted living facility overnight for acute onset of chest pain  Patient lethargic and altered on  exam, unable to provide any history  But as per family at bedside and ED provider, patient reported bending over and upon standing back up, experienced chest pain that was reported to assisted-living facility  Patient transferred to [address] [place] [address] via EMS; in the ED, troponins noted to be elevated and climbing; patient also found to be hypoxic  Appears to be worsening in the ED and now on BiPAP and unresponsive  Seen by critical care who recommended stepdown level 2; this patient DNR/DNI as confirmed by family  Family also report that patient had [place] in early 2020; states since then, her respiratory status has not been back to normal   She does not wear oxygen at baseline  Was diagnosed with flu 3 days prior at [facility] and will be placed on precautions prior to transfer to floor  Previous MBS:  No VBS  Seen bedside 7/20/19  Summary:  Pt presents w/ no overt s/s aspiration  She is now stating she does not recall having a coughing/choking episode while eating  Pt coughed several times when seated upright prior to breakfast, but no cough w/ pills/water, pancakes, turkey lafleur, coffee, small amount of radha (disliked), cheerios, milk  She frequently talks while eating  Recommendations:  Diet: regular diet   Assist w/ tray set up due to vision (pointed vision impaired sign outside of room), cut food  Liquid:thin  Meds:whole w/ thin as tolerated  Supervision: assist as needed due to vision, intermittent  Positioning:Upright  Strategies: Pt to take PO/Meds only when fully alert and upright  Allow pt to sit upright for a few minutes prior to starting meal   Oral care  Limit speaking while chewing/swallowing  Eval only, No f/u tx indicated  Special Studies:  1  Chronic bilateral, upper lobe predominant lung consolidations and nodules as well as prominent mediastinal lymph nodes  Chronic inflammatory or granulomatous processes such as granulomatous polyangiitis or sarcoidosis should be considered  Areas of   acute infection would be difficult to entirely exclude given the extent of opacities, but distribution appears largely similar to 2019  Time course since 2019 essentially excludes neoplastic process  Recommend pulmonology consultation  2   No dissection or other acute vascular abnormalities  3   Multiple pancreatic cystic lesions, 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 no  If yes, was nursing notified/was it addressed? na    Reason for consult:  R/o aspiration  Determine safest and least restrictive diet  Change in mental status  Pt was completely obtunded yesterday  Now alert  much improved w/ bipap  h/o dysphagia, daughter reported dentures were lost and pt can not have chewy things though pt states she eats hotdogs  Precautions:  Contact  Droplet    Food Allergies: none   Current Diet: npo   Premorbid diet: Regular (see above)   O2 requirement: nc   Social/Prior living abode   Voice/Speech: wnl   Follows commands: basic   Cognitive status: Alert and verabl     Oral mech exam:  Dentition:edentulous (dentures lost)  Secretion management:mouth dry  Oral mech grossly wnl  Oral care completed    Items administered:  Offered jello, aplelsauce, or anything she wanted  She adamantly refused and would only drink         Esophageal stage:  H/o GERD    Results d/w:  Pt, nursing, family, physician

## 2022-12-26 NOTE — ASSESSMENT & PLAN NOTE
· Patient presents from intermediate facility with acute onset chest pain  · Report pain started immediately after she leaned over to pick something up  · YISSEL score = 4  · initial troponin in ED was 151--332--395; continue trending  · Cardiology input appreciated  · Expected elevated cardiac enzymes secondary to demand ischemia from respiratory failure  · Continue aspirin, statin

## 2022-12-26 NOTE — PHYSICAL THERAPY NOTE
PT EVALUATION 11:21-11:41  Treat: 11:41-11:55    82 y o     [id number]    Respiratory failure (HCC) [J96 90]  Altered mental status [R41 82]  Chest pain [R07 9]  Influenza [J11 1]  Elevated troponin [R77 8]  Acute respiratory failure with hypoxia (HCC) [J96 01]    Past Medical History:   Diagnosis Date    Adenoma of large intestine     Allergy     last assessed: 10/18/2013    Cataract     Constipation, chronic     Diabetes mellitus ([address] )     anemia vitamin d deficiency glaucoma hypertension hypothyroidism; last assessed: 5/7/2012    Disease of thyroid gland     Diverticulosis     Fecal incontinence     Female stress incontinence     Glaucoma     Gout     History of cystocele     Intrinsic sphincter deficiency     Mixed incontinence     Nocturia     Osteoarthritis, chronic     Renal disorder     Senile atrophic vaginitis     Urinary frequency          Past Surgical History:   Procedure Laterality Date    ANKLE SURGERY Bilateral     CATARACT EXTRACTION      CHOLECYSTECTOMY      TUBAL LIGATION        12/26/22 1121   PT Last Visit   PT Visit Date 12/26/22   Note Type   Note type Evaluation  (and treatment )   Additional Comments received supine  Pain Assessment   Pain Rating: FLACC (Rest) - Face 0   Pain Rating: FLACC (Rest) - Legs 0   Pain Rating: FLACC (Rest) - Activity 0   Pain Rating: FLACC (Rest) - Cry 0   Pain Rating: FLACC (Rest) - Consolability 0   Score: FLACC (Rest) 0   Restrictions/Precautions   Weight Bearing Precautions Per Order No   Other Precautions Cognitive; Chair Alarm; Bed Alarm;Multiple lines;Telemetry;O2;Fall Risk;Visual impairment;Droplet precautions   Home Living   Type of Home Assisted living  ([facility])   Home Layout One level   [facility]   Additional Comments resides at [address] [place] [address]  Prior Function   Level of [place] Needs assistance with ADLs; Needs assistance with IADLS;Needs assistance with functional mobility   Lives With Facility staff   Receives Help From Personal care attendant   IADLs Family/Friend/Other provides transportation; Family/Friend/Other provides meals; Family/Friend/Other provides medication management   Falls in the last 6 months 1 to 4  (2)   Comments Prior to admission performs transfers and ambulation with RW limited distances  Self propels WC to bathroom, however longer distances to meals staff pushes patient in [address]  General   Additional Pertinent History Pt is 81 y/o female admitted with respiratory failure, flu, Non-MI related troponin elevation  PT consulted  Family/Caregiver Present Yes   Cognition   Overall Cognitive Status Impaired   Arousal/Participation Responsive   Orientation Level Oriented to person   Following Commands Follows one step commands with increased time or repetition   Comments Slight cognitive decline reported by family  Subjective   Subjective Agreeable to OOB  No pain  RUE Assessment   RUE Assessment WFL  (grossly at least 3/5 observed with functional reach and grasp )   LUE Assessment   LUE Assessment WFL  (grossly at least 3/5 observed wtih functional reach and grasp )   RLE Assessment   RLE Assessment WFL  (grossly 3+/5)   LLE Assessment   LLE Assessment WFL  (grossly 3+/5)   Vision-Basic Assessment   Visual History Macular degeneration;Glaucoma   Bed Mobility   Supine to Sit 3  Moderate assistance   Additional items Assist x 2; Increased time required;Verbal cues;LE management   Additional Comments Increased time to complete transitions needed with guidance related to vision  Transfers   Sit to Stand 4  Minimal assistance   Additional items Assist x 2; Increased time required;Verbal cues   Stand to Sit 4  Minimal assistance   Additional items Assist x 2; Increased time required;Verbal cues   Stand pivot 3  Moderate assistance   Additional items Assist x 2; Increased time required;Verbal cues   Additional Comments Increased guidance with use of RW 2* visual impairments     Ambulation/Elevation   Gait pattern Improper Weight shift; Shuffling; Inconsistent rhonda; Foward flexed   Gait Assistance 3  Moderate assist   Additional items Assist x 2;Verbal cues; Tactile cues   Assistive Device Rolling walker   Distance 3'x1  O2 sat on 3L 88%, improved to 95% with seated  Ambulation/Elevation Additional Comments Guidance of RW needed 2* visual impairments  Balance   Static Sitting Fair   Dynamic Sitting Fair -   Static Standing Poor +   Dynamic Standing Poor   Ambulatory Poor   Endurance Deficit   Endurance Deficit Yes   Endurance Deficit Description fatigue, hypoxia, weakness  Activity Tolerance   Activity Tolerance Patient limited by fatigue;Treatment limited secondary to medical complications (Comment)   Medical Staff Made Aware NurseMissy  OT: [place]:Pt seen for co-evaluation/treatment with skilled Occupational Therapist 2* clinically unstable/unpredictable presentation, medical complexity, fall risk, cognitive impairments, functional/physical limitations, impaired functional balance, decreased safety awareness, limited activity tolerance which is decline from PLOF and may impact overall functional mobility/mobility safety  Nurse Made Aware yes   Assessment   Prognosis Guarded   Problem List Decreased strength;Decreased range of motion;Decreased endurance; Impaired balance;Decreased mobility; Decreased cognition; Impaired judgement;Decreased safety awareness; Impaired vision;Obesity; Decreased skin integrity   Assessment Star Bal is a 82 y o  female with past medical history of hypothyroidism, CKD 4, GERD, CHF, peripheral artery disease, and type 2 diabetes who presents from her assisted living facility overnight for acute onset of chest pain  No use of home O2  Admitted with acute respiratory failure with hypoxia related to fluA+ with superimposed PNA  Troponin elevation    Per cardiology: Considering her presentation and evaluation her troponin elevation is likely secondary to non-MI related myocardial injury from tachycardia and possible systemic infection  PT consulted  Up with assist orders  Prior to admission resides at [address] [place] [address] [facility]  Use of RW for tranfers and short distances, however WC for locomotion primarily  Short distances propelling self to bathroom, A for propulsion to meals via staff  Poor vision reported with hx of macular degeneration and glaucoma per family report at bedside  Currently presents with functional limitations related to decreased activity tolerance, balance, strength, impaired cognition, visual impairments, obesity, decreased functional mobility and ability to perform locomotion  O2 at 3L  ModA of 2 for supine to sit transitions  Sly of 2 for transfers  ModA of 2 to take few steps at bedside with RW support  Gait slow, unsteady, decreased foot clearance, flexed posture  Guidance of RW needed and increased tactile and verbal cues related to visual impairments  Risk for falls given cognition coupled with visual and functional impairments as noted above  Will benefit from skilled PT in order to address impairments and optimize functional outcomes while lessening caregiver assist   The patient's AM-PAC Basic Mobility Inpatient Short Form Raw Score is 11  A Raw score of less than or equal to 16 suggests the patient may benefit from discharge to post-acute rehabilitation services  Please also refer to the recommendation of the Physical Therapist for safe discharge planning  May benefit from rehab upon d/c from hospital to address impairments and optimize outcomes  Given increased assist from baseline, [facility] would need to be able to care for patient at current level of function which at present requires Ax2  PT will follow and progress per POC, see treatment note following evaluation for mobility progression  Goals   Patient Goals get better   STG Expiration Date 01/05/23   Short Term Goal #1 10 days: 1)  Pt will perform bed mobility with Sly in order to improve function  2)  Perform all transfers with Sly in order to improve ability to negotiate safely in home environment  3) Amb with least restrictive AD > 20'x1 with Sly in order to demonstrate ability to negotiate in home environment  4)  Improve overall strength and balance 1/2 grade in order to optimize ability to perform functional tasks and reduce fall risk  5) Increase activity tolerance to 30 minutes in order to improve endurance to functional tasks  6)  Perform WC mobility on level surfaces for 50 ft using most appropriate propulsion technique and S in order to improve locomotion in home environment while reducing risk for falls  7) PT for ongoing patient and family/caregiver education, DME needs and d/c planning in order to promote highest level of function in least restrictive environment  PT Treatment Day 1   Plan   Treatment/Interventions Functional transfer training;LE strengthening/ROM; Therapeutic exercise; Endurance training;Patient/family training;Equipment eval/education; Bed mobility; Compensatory technique education;Gait training;Continued evaluation;Spoke to nursing;OT;Family   PT Frequency 3-5x/wk   Recommendation   PT Discharge Recommendation Post acute rehabilitation services  (vs return to facility with increased assistance and continued PT, provided facility able to care for patient at current level of function  )   AM-PAC Basic Mobility Inpatient   Turning in Bed Without Bedrails 2   Lying on Back to Sitting on Edge of Flat Bed 1   Moving Bed to Chair 2   Standing Up From Chair 3   Walk in Room 2   Climb 3-5 Stairs 1   Basic Mobility Inpatient Raw Score 11   Basic Mobility Standardized Score 30 25   Highest Level Of Mobility   JH-HLM Goal 4: Move to chair/commode   JH-HLM Achieved 4: Move to chair/commode   Additional Treatment Session   Start Time 1141   End Time 1155   Treatment Assessment Seen for treatment following evaluation  Focus on transfers, standing tolerance, balance and use of RW support for mobility    Transfers sit to stand with Sly of 2  Standing tolerance x 2 minutes with RW support  Static stand balance fair-, dynamic poor  Stand to sit with Sly of 2  A for repositioning in chair  LE elevated and positioned to improve midline sitting  O2 sats to 86%, improved to 95% with seated OOB in recliner chair, alarm in place and soft care cushion in place  Equipment Use RW   Additional Treatment Day 1   End of Consult   Patient Position at End of Consult Bedside chair;Bed/Chair alarm activated; All needs within reach     Hx/personal factors: co-morbidities, home alone, advanced age, mutliple lines, telemetry, use of AD, dec cognition, pain, h/o of falls, fall risk, assist w/ ADL's, obesity, and O2  Examination: dec mobility, dec balance, dec endurance, dec amb, risk for falls, pain, dec cognition, assessed body system, balance, endurance, amb, D/C disposition & fall risk, impairements in locomotion, musculoskeletal, balance, endurance, posture, coordination, vision  Clinical: unpredictable (ongoing medical status, abnormal lab values, risk for falls, and imaging test/result pending)  Complexity: high Ben Etienne, PT

## 2022-12-26 NOTE — PLAN OF CARE
Problem: PHYSICAL THERAPY ADULT  Goal: Performs mobility at highest level of function for planned discharge setting  See evaluation for individualized goals  Description: Treatment/Interventions: Functional transfer training, LE strengthening/ROM, Therapeutic exercise, Endurance training, Patient/family training, Equipment eval/education, Bed mobility, Compensatory technique education, Gait training, Continued evaluation, Spoke to nursing, OT, Family          See flowsheet documentation for full assessment, interventions and recommendations  Outcome: Progressing  Note: Prognosis: Guarded  Problem List: Decreased strength, Decreased range of motion, Decreased endurance, Impaired balance, Decreased mobility, Decreased cognition, Impaired judgement, Decreased safety awareness, Impaired vision, Obesity, Decreased skin integrity  Assessment: Coni Tony is a 82 y o  female with past medical history of hypothyroidism, CKD 4, GERD, CHF, peripheral artery disease, and type 2 diabetes who presents from her assisted living facility overnight for acute onset of chest pain  No use of home O2  Admitted with acute respiratory failure with hypoxia related to fluA+ with superimposed PNA  Troponin elevation  Per cardiology: Considering her presentation and evaluation her troponin elevation is likely secondary to non-MI related myocardial injury from tachycardia and possible systemic infection  PT consulted  Up with assist orders  Prior to admission resides at [address] [place] [address]  Use of RW for tranfers and short distances, however WC for locomotion primarily  Short distances propelling self to bathroom, A for propulsion to meals via staff  Poor vision reported with hx of macular degeneration and glaucoma per family report at bedside   Currently presents with functional limitations related to decreased activity tolerance, balance, strength, impaired cognition, visual impairments, obesity, decreased functional mobility and ability to perform locomotion  O2 at 3L  ModA of 2 for supine to sit transitions  Elizabeth of 2 for transfers  ModA of 2 to take few steps at bedside with RW support  Gait slow, unsteady, decreased foot clearance, flexed posture  Guidance of RW needed and increased tactile and verbal cues related to visual impairments  Risk for falls given cognition coupled with visual and functional impairments as noted above  Will benefit from skilled PT in order to address impairments and optimize functional outcomes while lessening caregiver assist   The patient's AM-PAC Basic Mobility Inpatient Short Form Raw Score is 11  A Raw score of less than or equal to 16 suggests the patient may benefit from discharge to post-acute rehabilitation services  Please also refer to the recommendation of the Physical Therapist for safe discharge planning  May benefit from rehab upon d/c from hospital to address impairments and optimize outcomes  Given increased assist from baseline, [facility] would need to be able to care for patient at current level of function which at present requires Ax2  PT will follow and progress per POC, see treatment note following evaluation for mobility progression  PT Discharge Recommendation: Post acute rehabilitation services (vs return to facility with increased assistance and continued PT, provided facility able to care for patient at current level of function )    See flowsheet documentation for full assessment

## 2022-12-26 NOTE — ASSESSMENT & PLAN NOTE
Lab Results   Component Value Date    EGFR 9 12/26/2022    EGFR 19 12/25/2022    EGFR 22 10/27/2022    CREATININE 3 98 (H) 12/26/2022    CREATININE 2 27 (H) 12/25/2022    CREATININE 2 02 (H) 10/27/2022

## 2022-12-26 NOTE — DISCHARGE INSTR - OTHER ORDERS
Wound Care Plan:   1-Hydraguard lotion to scaling on lower legs and heels twice daily  2-Elevate heels off of bed/chair surface to offload pressure  3-Offloading air cushion in chair when out of bed  4-Moisturize skin daily with skin nourishing cream   5-Turn/reposition every 2 hours while in bed using positioning wedges; and weight shift frequently while in chair for pressure re-distribution on skin  6-Sacrum/buttocks--cleanse with soap and water, pat dry  Apply Calazime paste three times daily and as needed with incontinence care

## 2022-12-26 NOTE — OCCUPATIONAL THERAPY NOTE
Occupational Therapy Evaluation(time=1120-1150)     Patient Name: Raquel Omer  [id number] Date: 12/26/2022  Problem List  Principal Problem:    Acute respiratory failure with hypoxia ([facility] )  Active Problems:    Acquired hypothyroidism    Anxiety    Anemia in stage 4 chronic kidney disease (HCC)    Chronic congestive heart failure (HCC)    Gastroesophageal reflux disease without esophagitis    Type 2 diabetes mellitus with hyperglycemia, with long-term current use of insulin (HCC)    Chest pain    Past Medical History  Past Medical History:   Diagnosis Date    Adenoma of large intestine     Allergy     last assessed: 10/18/2013    Cataract     Constipation, chronic     Diabetes mellitus ([facility] )     anemia vitamin d deficiency glaucoma hypertension hypothyroidism; last assessed: 5/7/2012    Disease of thyroid gland     Diverticulosis     Fecal incontinence     Female stress incontinence     Glaucoma     Gout     History of cystocele     Intrinsic sphincter deficiency     Mixed incontinence     Nocturia     Osteoarthritis, chronic     Renal disorder     Senile atrophic vaginitis     Urinary frequency      Past Surgical History  Past Surgical History:   Procedure Laterality Date    ANKLE SURGERY Bilateral     CATARACT EXTRACTION      CHOLECYSTECTOMY      TUBAL LIGATION           12/26/22 1150   Note Type   Note type Evaluation   Pain Assessment   Pain Assessment Tool FLACC   Pain Rating: FLACC (Rest) - Face 0   Pain Rating: FLACC (Rest) - Legs 0   Pain Rating: FLACC (Rest) - Activity 0   Pain Rating: FLACC (Rest) - Cry 0   Pain Rating: FLACC (Rest) - Consolability 0   Score: FLACC (Rest) 0   Restrictions/Precautions   Weight Bearing Precautions Per Order No   Other Precautions Cognitive; Chair Alarm; Bed Alarm;Multiple lines;O2;Fall Risk;Visual impairment; Airborne/isolation   Home Living   Type of Home Assisted living  ([facility])   Home Layout One level   [facility]   Prior Function   Lives With Facility staff   Falls in the last 6 months 1 to 4  (2)   Comments PTA family(dtr, son) states pt had assistance with her ADLs; states independence with transfers, ambulation--with RW, limited distance; independent with w/ mobility; +falls=2   Lifestyle   Autonomy PTA family(dtr, son) states pt had assistance with her ADLs; states independence with transfers, ambulation--with RW, limited distance; independent with w/ mobility; +falls=2   Reciprocal Relationships 4 children   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 [employer]   Intrinsic Gratification spending time with family   Subjective   Subjective "I can knock you out "   ADL   Where Assessed Edge of bed   Eating Assistance 5  Supervision/Setup   Grooming Assistance 5  Supervision/Setup   UB Bathing Assistance 4  Minimal Assistance   LB [address] 2  Maximal [address] 3  Moderate Assistance   LB Dressing Assistance 2  Maximal [address]  1  Total Assistance   Bed Mobility   Rolling R 4  Minimal assistance   Additional items Assist x 1; Increased time required;Verbal cues;LE management   Rolling L 4  Minimal assistance   Additional items Assist x 1; Increased time required;Verbal cues;LE management   Supine to Sit 3  Moderate assistance   Additional items Assist x 2; Increased time required;LE management   Transfers   Sit to Stand 4  Minimal assistance   Additional items Assist x 2   Stand to Sit 4  Minimal assistance   Additional items Assist x 2; Increased time required;Verbal cues   Functional Mobility   Functional Mobility 3  Moderate assistance   Additional Comments x2   Additional items Rolling walker   Balance   Static Sitting Fair   Dynamic Sitting Fair -   Static Standing Poor +   Dynamic Standing Poor   Activity Tolerance   Activity Tolerance Patient limited by fatigue   Medical Staff Made Aware nsg P T    RUE Assessment   RUE Assessment WFL   RUE Strength   RUE Overall Strength Within Functional Limits - able to perform ADL tasks with strength  (4/5 throughout)   LUE Assessment   LUE Assessment WFL   LUE Strength   LUE Overall Strength Within Functional Limits - able to perform ADL tasks with strength  (4/5 throughout)   Hand Function   Gross Motor Coordination Functional   Fine Motor Coordination Functional   Sensation   Light Touch No apparent deficits   Proprioception   Proprioception No apparent deficits   Vision-Basic Assessment   Visual History Macular degeneration;Glaucoma   Vision - Complex Assessment   Acuity   (impaired)   Psychosocial   Psychosocial (WDL) X   Patient Behaviors/Mood Flat affect; Cooperative   Perception   Inattention/Neglect Appears intact   Cognition   Overall Cognitive Status Impaired   Arousal/Participation Alert   Attention Attends with cues to redirect   Orientation Level Oriented to person;Disoriented to place; Disoriented to time;Disoriented to situation   Memory Decreased short term memory;Decreased recall of precautions   Following Commands Follows one step commands with increased time or repetition   Comments family states slight hx cognitive decline   Assessment   Limitation Decreased ADL status; Decreased UE strength;Decreased Safe judgement during ADL;Decreased cognition;Decreased endurance;Decreased high-level ADLs   Prognosis Fair   Assessment Pt is a 88y/o female admitted to the hospital 2* symptoms of chest pain, becoming unresponsive in ED  Pt noted with elevated troponins, acute respiratory failure  Pt with PMH hypothyroidism, CKD 4, GERD, CHF, peripheral artery disease, type 2 diabetes, recent(12/22) influenza  PTA family(dtr, son) states pt had assistance with her ADLs; states independence with transfers, ambulation--with RW, limited distance; independent with w/ mobility; +falls=2   During initial eval, pt demonstrated deficits with her functional balance, functional mobility, ADL status, transfer safety, b/l UE strength, activity tolerance(currently fair=15-20mins), and cognition(i e memory, orientation, problem-solving)  Pt would benefit from continued OT tx for the above deficits  2-3xwk/1-2wks  The patient's raw score on the AM-PAC Daily Activity inpatient short form is 14, standardized score is 33 39, less than 39 4  Patients at this level are likely to benefit from discharge to post-acute rehabilitation services  Please refer to the recommendation of the Occupational Therapist for safe discharge planning  Goals   Patient Goals "to get better"   STG Time Frame   (1-7 days)   Short Term Goal #1 Pt will demonstrate improved activity tolerance to good(20-30mins) and standing tolerance to 3-5mins to assist with ADLs  Short Term Goal #2 Pt will demonstrate supervision with their sit-stand transfers to assist with completion of their LE dressing  Short Term Goal  Pt will demonstrate proper walker/transfer safety 100% of the time  LTG Time Frame   (7-10 days)   Long Term Goal #1 Pt will demonstrate improved functional balance by 1 grade to assist with ADLs/transfers  Long Term Goal #2 Pt will demonstrate supervision with her UE and Sly with LE bathing/dressing  Long Term Goal Pt will tolerate continued cognitive/home-safety assessment and appropriate d/c recommendations will be provided  Plan   Treatment Interventions ADL retraining;Functional transfer training;UE strengthening/ROM; Endurance training;Cognitive reorientation;Patient/family training;Equipment evaluation/education; Compensatory technique education;Continued evaluation   Goal Expiration Date 01/09/23   OT Treatment Day 0   OT Frequency 2-3x/wk   Recommendation   OT Discharge Recommendation Return to facility with rehabilitation services   [facility] Daily Activity Inpatient   Lower Body Dressing 2   Bathing 2   Toileting 1   Upper Body Dressing 3   Grooming 3   Eating 3   Daily Activity Raw Score 14   Daily Activity Standardized Score (Calc for Raw Score >=11) 33 39   AM-PAC Applied Cognition Inpatient   Following a Speech/Presentation 3   Understanding Ordinary Conversation 3   Taking Medications 2   Remembering Where Things Are Placed or Put Away 2   Remembering List of 4-5 Errands 2   Taking Care of Complicated Tasks 2   Applied Cognition Raw Score 14   Applied Cognition Standardized Score 32 02   Osiel Carrillo

## 2022-12-26 NOTE — PLAN OF CARE
Problem: Potential for Falls  Goal: Patient will remain free of falls  Description: INTERVENTIONS:  - Educate patient/family on patient safety including physical limitations  - Instruct patient to call for assistance with activity   - Consult OT/PT to assist with strengthening/mobility   - Keep Call bell within reach  - Keep bed low and locked with side rails adjusted as appropriate  - Keep care items and personal belongings within reach  - Initiate and maintain comfort rounds  - Make Fall Risk Sign visible to staff  - Offer Toileting every  Hours, in advance of need  - Initiate/Maintain alarm  - Obtain necessary fall risk management equipment:   - Apply yellow socks and bracelet for high fall risk patients  - Consider moving patient to room near nurses station  Outcome: Progressing     Problem: MOBILITY - ADULT  Goal: Maintain or return to baseline ADL function  Description: INTERVENTIONS:  -  Assess patient's ability to carry out ADLs; assess patient's baseline for ADL function and identify physical deficits which impact ability to perform ADLs (bathing, care of mouth/teeth, toileting, grooming, dressing, etc )  - Assess/evaluate cause of self-care deficits   - Assess range of motion  - Assess patient's mobility; develop plan if impaired  - Assess patient's need for assistive devices and provide as appropriate  - Encourage maximum independence but intervene and supervise when necessary  - Involve family in performance of ADLs  - Assess for home care needs following discharge   - Consider OT consult to assist with ADL evaluation and planning for discharge  - Provide patient education as appropriate  Outcome: Progressing  Goal: Maintains/Returns to pre admission functional level  Description: INTERVENTIONS:  - Perform BMAT or MOVE assessment daily    - Set and communicate daily mobility goal to care team and patient/family/caregiver     - Collaborate with rehabilitation services on mobility goals if consulted  - Perform Range of Motion  times a day  - Reposition patient every  hours    - Dangle patient  times a day  - Stand patient  times a day  - Ambulate patient  times a day  - Out of bed to chair  times a day   - Out of bed for meal times a day  - Out of bed for toileting  - Record patient progress and toleration of activity level   Outcome: Progressing     Problem: Prexisting or High Potential for Compromised Skin Integrity  Goal: Skin integrity is maintained or improved  Description: INTERVENTIONS:  - Identify patients at risk for skin breakdown  - Assess and monitor skin integrity  - Assess and monitor nutrition and hydration status  - Monitor labs   - Assess for incontinence   - Turn and reposition patient  - Assist with mobility/ambulation  - Relieve pressure over bony prominences  - Avoid friction and shearing  - Provide appropriate hygiene as needed including keeping skin clean and dry  - Evaluate need for skin moisturizer/barrier cream  - Collaborate with interdisciplinary team   - Patient/family teaching  - Consider wound care consult   Outcome: Progressing     Problem: PAIN - ADULT  Goal: Verbalizes/displays adequate comfort level or baseline comfort level  Description: Interventions:  - Encourage patient to monitor pain and request assistance  - Assess pain using appropriate pain scale  - Administer analgesics based on type and severity of pain and evaluate response  - Implement non-pharmacological measures as appropriate and evaluate response  - Consider cultural and social influences on pain and pain management  - Notify physician/advanced practitioner if interventions unsuccessful or patient reports new pain  Outcome: Progressing     Problem: DISCHARGE PLANNING  Goal: Discharge to home or other facility with appropriate resources  Description: INTERVENTIONS:  - Identify barriers to discharge w/patient and caregiver  - Arrange for needed discharge resources and transportation as appropriate  - Identify discharge learning needs (meds, wound care, etc )  - Arrange for interpretive services to assist at discharge as needed  - Refer to Case Management Department for coordinating discharge planning if the patient needs post-hospital services based on physician/advanced practitioner order or complex needs related to functional status, cognitive ability, or social support system  Outcome: Progressing     Problem: Knowledge Deficit  Goal: Patient/family/caregiver demonstrates understanding of disease process, treatment plan, medications, and discharge instructions  Description: Complete learning assessment and assess knowledge base    Interventions:  - Provide teaching at level of understanding  - Provide teaching via preferred learning methods  Outcome: Progressing     Problem: CARDIOVASCULAR - ADULT  Goal: Maintains optimal cardiac output and hemodynamic stability  Description: INTERVENTIONS:  - Monitor I/O, vital signs and rhythm  - Monitor for S/S and trends of decreased cardiac output  - Administer and titrate ordered vasoactive medications to optimize hemodynamic stability  - Assess quality of pulses, skin color and temperature  - Assess for signs of decreased coronary artery perfusion  - Instruct patient to report change in severity of symptoms  Outcome: Progressing  Goal: Absence of cardiac dysrhythmias or at baseline rhythm  Description: INTERVENTIONS:  - Continuous cardiac monitoring, vital signs, obtain 12 lead EKG if ordered  - Administer antiarrhythmic and heart rate control medications as ordered  - Monitor electrolytes and administer replacement therapy as ordered  Outcome: Progressing     Problem: RESPIRATORY - ADULT  Goal: Achieves optimal ventilation and oxygenation  Description: INTERVENTIONS:  - Assess for changes in respiratory status  - Assess for changes in mentation and behavior  - Position to facilitate oxygenation and minimize respiratory effort  - Oxygen administered by appropriate delivery if ordered  - Initiate smoking cessation education as indicated  - Encourage broncho-pulmonary hygiene including cough, deep breathe, Incentive Spirometry  - Assess the need for suctioning and aspirate as needed  - Assess and instruct to report SOB or any respiratory difficulty  - Respiratory Therapy support as indicated  Outcome: Progressing     Problem: METABOLIC, FLUID AND ELECTROLYTES - ADULT  Goal: Glucose maintained within target range  Description: INTERVENTIONS:  - Monitor Blood Glucose as ordered  - Assess for signs and symptoms of hyperglycemia and hypoglycemia  - Administer ordered medications to maintain glucose within target range  - Assess nutritional intake and initiate nutrition service referral as needed  Outcome: Progressing

## 2022-12-26 NOTE — PLAN OF CARE
Problem: OCCUPATIONAL THERAPY ADULT  Goal: Performs self-care activities at highest level of function for planned discharge setting  See evaluation for individualized goals  Description: Treatment Interventions: ADL retraining, Functional transfer training, UE strengthening/ROM, Endurance training, Cognitive reorientation, Patient/family training, Equipment evaluation/education, Compensatory technique education, Continued evaluation          See flowsheet documentation for full assessment, interventions and recommendations  Note: Limitation: Decreased ADL status, Decreased UE strength, Decreased Safe judgement during ADL, Decreased cognition, Decreased endurance, Decreased high-level ADLs  Prognosis: Fair  Assessment: Pt is a 88y/o female admitted to the hospital 2* symptoms of chest pain, becoming unresponsive in ED  Pt noted with elevated troponins, acute respiratory failure  Pt with PMH hypothyroidism, CKD 4, GERD, CHF, peripheral artery disease, type 2 diabetes, recent(12/22) influenza  PTA family(dtr, son) states pt had assistance with her ADLs; states independence with transfers, ambulation--with RW, limited distance; independent with w/ mobility; +falls=2  During initial eval, pt demonstrated deficits with her functional balance, functional mobility, ADL status, transfer safety, b/l UE strength, activity tolerance(currently fair=15-20mins), and cognition(i e memory, orientation, problem-solving)  Pt would benefit from continued OT tx for the above deficits  2-3xwk/1-2wks  The patient's raw score on the AM-PAC Daily Activity inpatient short form is 14, standardized score is 33 39, less than 39 4  Patients at this level are likely to benefit from discharge to post-acute rehabilitation services  Please refer to the recommendation of the Occupational Therapist for safe discharge planning       OT Discharge Recommendation: Return to facility with rehabilitation services

## 2022-12-26 NOTE — ASSESSMENT & PLAN NOTE
Lab Results   Component Value Date    HGBA1C 9 6 (H) 12/25/2022       Recent Labs     12/26/22  0821 12/26/22  1223 12/26/22  1240 12/26/22  1555   POCGLU 128 113 114 137       Blood Sugar Average: Last 72 hrs:  (P) 186 375   · Hold oral hypoglycemics  · Continue Lantus 10 units daily at lower dose  · Monitor Accu-Cheks, sliding scale for coverage

## 2022-12-26 NOTE — PROGRESS NOTES
Progress Note - Pulmonary   Katherine Smith 82 y o  female MRN: [id number]  Unit/Bed#: E4 -01 Encounter: [id number]    Assessment & Plan:  Acute hypoxic and hypercapnic respiratory failure  Influenza A infection  Abnormal chest CT with findings suggestive of superimposed multifocal bacterial pneumonia  Chronic bronchitis  CHF    · Patient was requiring BiPAP all day yesterday  Currently on 2 L by nasal cannula with respirations unlabored  Significantly elevated pCO2 and respiratory acidosis yesterday  Follow-up on repeat blood gas today  Continue BiPAP during sleep and as needed  · Would continue cefepime given elevated procalcitonin and CT findings  · Follow-up MRSA culture  · Continue Tamiflu  · Diuretics per primary  · Discussed with patient's family at bedside    Subjective:   12 point review of systems unable to be obtained secondary to patient's mental status    Objective:     Vitals: Blood pressure 102/53, pulse (!) 54, temperature 98 8 °F (37 1 °C), temperature source Temporal, resp  rate 20, height 5' (1 524 m), weight 99 8 kg (220 lb), SpO2 91 %  ,Body mass index is 42 97 kg/m²  No intake or output data in the 24 hours ending 12/26/22 1400    Invasive Devices     Peripheral Intravenous Line  Duration           Peripheral IV 12/26/22 Left;Ventral (anterior) Forearm <1 day                Physical Exam:   General appearance:  Not in acute distress  Head: Normocephalic, without obvious abnormality, atraumatic  Lungs:  Decreased breath sounds  Heart: Regular rate and rhythm, S1, S2 normal, no murmur  Abdomen:  No appreciable distension or tenderness  Extremities:  No significant edema or tenderness  Skin: Warm and dry  Neurologic:  Patient is confused and minimally responsive/interactive      Labs: I have personally reviewed pertinent lab results  Imaging and other studies: I have personally reviewed pertinent reports

## 2022-12-26 NOTE — PROGRESS NOTES
[address]  Progress Note - Fran Sanchez 1935, 82 y o  female MRN: [id number]  Unit/Bed#: E4 -01 Encounter: [id number]  Primary Care Provider: No primary care provider on file     Date and time admitted to hospital: 12/25/2022  4:24 AM    * Acute respiratory failure with hypoxia [facility])  Assessment & Plan  · Patient presents via EMS for chest pain; lives at [facility]  · In the ED, noted to be hypoxic and requiring up to 10 L nasal cannula; currently on 3L  · On 12/22; had positive influenza A test at [facility]; RSV and influenza B at that time were negative  · CTA chest ruled out PE; shows chronic consolidations but unclear if any new superimposed bacterial pneumonia present  · Procalcitonin 2 5  · Continue empiric antibiotics, Tamiflu  · Patient has confirmed DNR/DNI status; seen by critical care, recommended stepdown level 2  · continue home Symbicort, Flonase, Singulair, Spiriva  · BiPAP as needed; start Tamiflu; supportive care  Consider palliative consult based on improvement with above treatment plan      Influenza A  Assessment & Plan  · Continue Tamiflu D2/5  · Contact and droplet precautions    Chest pain  Assessment & Plan  · Patient [facility] from [facility] facility with acute onset chest pain  · Report pain started immediately after she leaned over to pick something up  · YISSEL score = 4  · initial troponin in ED was 151--332--395; continue trending  · Cardiology input appreciated  · Expected elevated cardiac enzymes secondary to demand ischemia from respiratory failure  · Continue aspirin, statin    Type 2 diabetes mellitus with hyperglycemia, with long-term current use of insulin [facility])  Assessment & Plan  Lab Results   Component Value Date    HGBA1C 9 6 (H) 12/25/2022       Recent Labs     12/26/22  0821 12/26/22  1223 12/26/22  1240 12/26/22  1555   POCGLU 128 113 114 137       Blood Sugar Average: Last 72 hrs:  (P) 186 375   · Hold oral hypoglycemics  · Continue Lantus 10 units daily at lower dose  · Monitor Accu-Cheks, sliding scale for coverage    Gastroesophageal reflux disease without esophagitis  Assessment & Plan  · Continue PPI    Chronic congestive heart failure (HCC)  Assessment & Plan  Wt Readings from Last 3 Encounters:   22 99 8 kg (220 lb)   22 99 8 kg (220 lb)   22 101 kg (222 lb)     · Echocardiogram 2020 reveals ejection fraction 85%, grade 2 diastolic dysfunction  · proBNP 1837  · Cardiology input appreciated  · Continue furosemide 80 mg daily  · Monitor I&O's    Anemia in stage 4 chronic kidney disease [facility])  Assessment & Plan  Lab Results   Component Value Date    EGFR 9 2022    EGFR 19 2022    EGFR 22 10/27/2022    CREATININE 3 98 (H) 2022    CREATININE 2 27 (H) 2022    CREATININE 2 02 (H) 10/27/2022       Anxiety  Assessment & Plan  · Noted, monitor and treat symptomatically    Acquired hypothyroidism  Assessment & Plan  · Continue levothyroxine  · TSH normal        VTE Pharmacologic Prophylaxis:   Pharmacologic: heparin    Patient Centered Rounds: I have performed bedside rounds with nursing staff today  Education and Discussions with Family / Patient: Updated children at bedside    Time Spent for Care: 38 min  More than 50% of total time spent on counseling and coordination of care as described above  Current Length of Stay: 1 day(s)    Current Patient Status: Inpatient   Certification Statement: The patient will continue to require additional inpatient hospital stay due to Acute hypoxic respiratory failure    Discharge Plan / Estimated Discharge Date: TBD    Code Status: Level 3 - DNAR and DNI      Subjective:   Patient seen and examined at bedside, awake during morning rounds, working with physical therapy, family at bedside      Objective:     Vitals:   Temp (24hrs), Av 4 °F (36 9 °C), Min:97 9 °F (36 6 °C), Max:98 8 °F (37 1 °C)    Temp:  [97 9 °F (36 6 °C)-98 8 °F (37 1 °C)] 98 4 °F (36 9 °C)  HR:  [54-64] 58  Resp:  [16-20] 20  BP: ()/(53-63) 94/57  SpO2:  [91 %-100 %] 94 %  Body mass index is 42 97 kg/m²  Input and Output Summary (last 24 hours):     No intake or output data in the 24 hours ending 12/26/22 0855    Physical Exam:    Constitutional: Patient is in  no acute distress  HEENT:  Normocephalic, atraumatic  Cardiovascular: Normal S1S2, RRR, No murmurs/rubs/gallops appreciated  Pulmonary:  Bilateral air entry decreased  Abdominal: Soft, Bowel sounds present, Non-tender, Non-distended  Extremities:  No cyanosis, clubbing or edema  Neurological: awake, alert  Skin:  Warm, dry    Additional Data:     Labs:    Results from last 7 days   Lab Units 12/26/22  0822   WBC Thousand/uL 11 57*   HEMOGLOBIN g/dL 10 6*   HEMATOCRIT % 37 3   PLATELETS Thousands/uL 231   NEUTROS PCT % 77*   LYMPHS PCT % 13*   MONOS PCT % 9   EOS PCT % 0     Results from last 7 days   Lab Units 12/26/22  0822   POTASSIUM mmol/L 5 5*   CHLORIDE mmol/L 104   CO2 mmol/L 32   BUN mg/dL 68*   CREATININE mg/dL 3 98*   CALCIUM mg/dL 7 7*   ALK PHOS U/L 85   ALT U/L 23   AST U/L 22            I Have Reviewed All Lab Data Listed Above      Invasive Devices     Peripheral Intravenous Line  Duration           Peripheral IV 12/26/22 Left;Ventral (anterior) Forearm <1 day                     Recent Cultures (last 7 days):           Last 24 Hours Medication List:   Current Facility-Administered Medications   Medication Dose Route Frequency Provider Last Rate   • acetaminophen  650 mg Oral Q6H PRN Rowena Lopez MD     • albuterol  2 5 mg Nebulization Q6H PRN Rowena Lopez MD     • amLODIPine  7 5 mg Oral Daily Rowena Lopez MD     • aspirin  81 mg Oral Daily Rowena Lopez MD     • atorvastatin  40 mg Oral Daily With James Verma MD     • budesonide-formoterol  2 puff Inhalation BID Rowena Lopez MD     • cefepime  1,000 mg Intravenous Q12H Rowena Lopez MD 1,000 mg (12/26/22 6699)   • docusate sodium  100 mg Oral BID Navi Hutson MD     • dorzolamide-timolol  1 drop Both Eyes BID Navi Hutson MD     • fluticasone  2 spray Nasal Daily Navi Hutson MD     • furosemide  80 mg Oral Daily Navi Hutson MD     • heparin (porcine)  7,500 Units Subcutaneous [facility] Bambi Hogue MD     • hydrALAZINE  10 mg Intravenous Q6H PRN Navi Hutson MD     • insulin glargine  10 Units Subcutaneous HS Navi Hutson MD     • insulin lispro  1-5 Units Subcutaneous Q6H [address] Navi Hutson MD     • insulin lispro  1-5 Units Subcutaneous HS Navi Hutson MD     • ipratropium  0 5 mg Nebulization 4x Daily Navi Hutson MD     • levothyroxine  75 mcg Oral Early Morning Navi Hutson MD     • losartan  25 mg Oral QAM Navi Hutson MD     • metoprolol tartrate  25 mg Oral Q12H [address] Navi Hutson MD     • montelukast  10 mg Oral QAM Navi Hutson MD     • ondansetron  4 mg Intravenous Q6H PRN Navi Hutson MD     • oseltamivir  30 mg Oral Q12H [address] Navi Hutson MD     • pantoprazole  40 mg Oral Daily Before Breakfast Navi Hutson MD     • umeclidinium  1 puff Inhalation Daily Navi Hutson MD          Today, Patient Was Seen By: Bambi Hogue MD

## 2022-12-27 NOTE — ASSESSMENT & PLAN NOTE
· Patient presents via EMS for chest pain; lives at [facility]  · In the ED, noted to be hypoxic and requiring up to 10 L nasal cannula;   · On 12/22; had positive influenza A test at [facility]; RSV and influenza B at that time were negative  · CTA chest ruled out PE; shows chronic consolidations but unclear if any new superimposed bacterial pneumonia present  · Procalcitonin 2 5   · Currently on BiPAP      · Lengthy discussion with family at bedside regarding patient's continued decline, encephalopathic, requiring BiPAP, poor p o  intake and worsening renal function  Family agreeable for transition to comfort care    ·   Hospice consult placed  · Transition to level 4 comfort, medications morphine, Ativan as needed

## 2022-12-27 NOTE — CASE MANAGEMENT
Case Management Progress Note    Patient name Cindy Filter  Location East 4 /E4 MS [phone] MRN [id number]  : 1935 Date 2022       LOS (days): 2  Geometric Mean LOS (GMLOS) (days): 3 50  Days to GMLOS:1 3        OBJECTIVE:        Current admission status: Inpatient  Preferred Pharmacy:   [address] [place] [address]  Phone: [phone] Fax: [phone]    Primary Care Provider: No primary care provider on file  Primary Insurance: MEDICARE  Secondary Insurance: Edmundo SANTOS    PROGRESS NOTE:    CM met w/ pt son at bedside to discuss d/c planning  Son confirmed family will be pursuing hospice care of pt  Son accepting of [facility] hospice referral & for a liaison to come out and talk to them  CM made referral in [facility]  CM to continue to follow pt care & d/c

## 2022-12-27 NOTE — PROGRESS NOTES
Progress Note - Cardiology   Fran Sanchez 82 y o  female MRN: [id number]  Unit/Bed#: E4 -01 Encounter: [id number]          Assessment / Plan  Acute hypoxic respiratory failure, D/T influenza A, possible superimposed bacterial pneumonia  CKD 4, DINORA    Baseline creatinine 1 8-2 2 ~~> 2 27 on arrival and now 5 67  Metabolic encephalopathy  Nonischemic myocardial injury (reason for consultation)   HS Troponin 151, 332, 395,180  HFpEF:    -Echo 12/26/2022: LVEF 66%, grade 2 DD, NL RV size/funct ,  Mild MR & TR    PASP 46  Hypertension   OP Rx: Losartan 25 mg daily, Lasix 80 mg daily, Norvasc 7 5 mg daily    Other  Diabetes mellitus  Hypothyroidism    Potassium 5 1, BUN 83, creatinine 5 32     · Acidotic/hypercarbic yesterday for which she was placed on BiPAP with clinical improvement and weaned to nasal cannula --> subsequent clinical deterioration: Presently seems somewhat confused/lethargic though recent 90% saturation on 4 L--> being placed on BiPAP at the time my visit --> follow response  · Tamiflu and antibiotics continue per pulm and primary service  · with worsening creatinine diuretics are stopped and being managed by nephrology to whom we defer   · Per note of nephrology discussion with daughter Aram Stacy) indicates opposition to renal replacement therapy   · With history of hypertension she is currently maintaining a low normal BP trend on Lopressor 25 mg twice daily, Norvasc 7 5 mg daily (prehospital losartan and Lasix held in light of DINORA)        Subjective:  Patient offers no complaint though she is somewhat confused when seen      Vitals:  Vitals:    12/26/22 1000   Weight: 99 8 kg (220 lb)   ,  Vitals:    12/27/22 0726 12/27/22 0822 12/27/22 0826 12/27/22 0829   BP: 116/58      BP Location: Left arm      Pulse: 66   65   Resp: 20      Temp: (!) 97 °F (36 1 °C)      TempSrc: Temporal      SpO2: 97% 95% 90%    Weight:       Height:           Exam:  General: Somewhat lethargic but arousable and verbally responsive but confused   heart: Regular with controlled rate    Unchanged basilar WAYNE  Respiratory effort: Some mild increase in effort but in no distress  Lungs: Gatter coarse breath sounds without rales  Lower Limbs: Mild dependent pitting edema               Medications:    Current Facility-Administered Medications:   •  acetaminophen (TYLENOL) tablet 650 mg, 650 mg, Oral, Q6H PRN, May Orr MD, 650 mg at 12/27/22 0122  •  albuterol inhalation solution 2 5 mg, 2 5 mg, Nebulization, Q6H PRN, May Orr MD  •  [START ON 12/28/2022] amLODIPine (NORVASC) tablet 7 5 mg, 7 5 mg, Oral, Daily, HEDY Christianson  •  aspirin (ECOTRIN LOW STRENGTH) EC tablet 81 mg, 81 mg, Oral, Daily, May Orr MD, 81 mg at 12/27/22 0815  •  atorvastatin (LIPITOR) tablet 40 mg, 40 mg, Oral, Daily With Bette Moreno MD  •  budesonide-formoterol [facility]) 160-4 5 mcg/act inhaler 2 puff, 2 puff, Inhalation, BID, May Orr MD, 2 puff at 12/27/22 3076  •  cefepime (MAXIPIME) 1,000 mg in dextrose 5 % 50 mL IVPB, 1,000 mg, Intravenous, Q12H, May Orr MD, Last Rate: 100 mL/hr at 12/27/22 0505, 1,000 mg at 12/27/22 0505  •  docusate sodium (COLACE) capsule 100 mg, 100 mg, Oral, BID, May Orr MD, 100 mg at 12/27/22 8705  •  dorzolamide-timolol (COSOPT) 22 3-6 8 MG/ML ophthalmic solution 1 drop, 1 drop, Both Eyes, BID, May Orr MD, 1 drop at 12/27/22 1220  •  fluticasone (FLONASE) 50 mcg/act nasal spray 2 spray, 2 spray, Nasal, Daily, May Orr MD, 2 spray at 12/27/22 7530  •  heparin (porcine) subcutaneous injection 7,500 Units, 7,500 Units, Subcutaneous, Q8H [address], Ele Lyman MD, 7,500 Units at 12/27/22 0508  •  hydrALAZINE (APRESOLINE) injection 10 mg, 10 mg, Intravenous, Q6H PRN, May Orr MD  •  insulin glargine (LANTUS) subcutaneous injection 10 Units 0 1 mL, 10 Units, Subcutaneous, HS, May Orr MD, 10 Units at 12/25/22 1204  •  insulin lispro (HumaLOG) 100 units/mL subcutaneous injection 1-5 Units, 1-5 Units, Subcutaneous, Q6H [address], 1 Units at 12/25/22 2349 **AND** Fingerstick Glucose (POCT), , , Q6H, Janiya Alves MD  •  insulin lispro (HumaLOG) 100 units/mL subcutaneous injection 1-5 Units, 1-5 Units, Subcutaneous, HS, Janiya Alves MD, 1 Units at 12/25/22 2343  •  levothyroxine tablet 75 mcg, 75 mcg, Oral, Early Morning, Janiya Alves MD, 75 mcg at 12/27/22 2881  •  metoprolol tartrate (LOPRESSOR) tablet 25 mg, 25 mg, Oral, Q12H [address], Janiya Alves MD, 25 mg at 12/27/22 0813  •  montelukast (SINGULAIR) tablet 10 mg, 10 mg, Oral, QAM, Janiya Alves MD, 10 mg at 12/27/22 0813  •  nystatin (MYCOSTATIN) powder, , Topical, BID, Surekha Freedman MD  •  [facility]ron [facility] injection 4 mg, 4 mg, Intravenous, Q6H PRN, Janiya Alves MD  •  [START ON 12/28/2022] oseltamivir (TAMIFLU) capsule 30 mg, 30 mg, Oral, Every Other Day, Surekha Freedman MD  •  pantoprazole (PROTONIX) EC tablet 40 mg, 40 mg, Oral, Daily Before Breakfast, Janiya Alves MD, 40 mg at 12/26/22 0900  •  sodium chloride 0 9 % bolus 500 mL, 500 mL, Intravenous, Once, HEDY Valle  •  umeclidinium 62 5 mcg/actuation inhaler AEPB 1 puff, 1 puff, Inhalation, Daily, Janiya Alves MD, 1 puff at 12/27/22 0814      Labs/Data:        Results from last 7 days   Lab Units 12/27/22  0445 12/26/22  0822 12/25/22  0454   WBC Thousand/uL 11 76* 11 57* 11 32*   HEMOGLOBIN g/dL 10 4* 10 6* 11 7   HEMATOCRIT % 36 4 37 3 39 2   PLATELETS Thousands/uL 222 231 237     Results from last 7 days   Lab Units 12/27/22  0445 12/26/22  0822 12/25/22  0454   POTASSIUM mmol/L 5 1 5 5* 4 4   CHLORIDE mmol/L 101 104 102   CO2 mmol/L 30 32 33*   BUN mg/dL 88* 68* 48*   CREATININE mg/dL 5 32* 3 98* 2 27*

## 2022-12-27 NOTE — HOSPICE NOTE
Hospice referral received   Updated CM [place] and son Jimbo Melara that hospice would evaluate in AM

## 2022-12-27 NOTE — ASSESSMENT & PLAN NOTE
Wt Readings from Last 3 Encounters:   12/26/22 99 8 kg (220 lb)   05/31/22 99 8 kg (220 lb)   02/03/22 101 kg (222 lb)     · Echocardiogram 12/26/2020 reveals ejection fraction 45%, grade 2 diastolic dysfunction  · proBNP 1837  · Cardiology input appreciated  · Diuretics on hold given acute kidney injury

## 2022-12-27 NOTE — ASSESSMENT & PLAN NOTE
Lab Results   Component Value Date    HGBA1C 9 6 (H) 12/25/2022       Recent Labs     12/26/22  1827 12/27/22  0005 12/27/22  0549 12/27/22  1206   POCGLU 117 85 84 93       Blood Sugar Average: Last 72 hrs:  (P) 529 [id number]   · Hold oral hypoglycemics  · Continue Lantus 10 units daily at lower dose  · Monitor Accu-Cheks, sliding scale for coverage

## 2022-12-27 NOTE — PROGRESS NOTES
Progress Note - Pulmonary   Grayce Gross 82 y o  female MRN: [id number]  Unit/Bed#: E4 -01 Encounter: [id number]    Assessment/Plan:    1 Acute hypoxic and hypercapnic respiratory failure likely multifaceted as listed below      -Currently 4 L at 90%, patient does not wear home O2      -Continued saturations greater than 89%      -Pulmonary toileting: Deep breathing cough, OOB as tolerated, IS Q 1 hr      -Patient still remains significantly fatigued/lethargic-we will order ABG    2  Influenza A with possible superimposed bacterial pneumonia       -Procalcitonin - 2 53-we will repeat       -Day # 3/5-Tamiflu/cefepime       -MRSA pending       -Have a component of aspiration as well recommend speech once lethargy has resolved        -Recommend repeat imaging in 4 to 6 weeks    3  Possibly acute on chronic grade 2 diastolic CHF w/ moderate PHTN likely WHO group II & III        -12/2/2022-echo   EF 66%   PA pressure 46 mmHg        -Patient regimen 80 mg daily currently on hold        -Cardiology following          4  Chronic bronchitis        -Inpatient: Budesonide twice daily,  Xopenex/Atrovent 3 times daily        -Home regimen: Symbicort 160-4 5 MCG 2 puffs twice daily, SpiRiva Respimat 1 25 MCG 2 puffs daily, albuterol nebulizer every 6 as needed         -We will need close pulmonary follow-up upon discharge    5  DINORA on CKD      -Baseline 2 2-2 5       -Nephrology following       -Currently maintained on gentle hydration      Subjective:    Robert Varner was comfortably sitting in her bed  She was difficult to arouse and was only able to respond with one-word questions  No significant overnight events reported  Currently denying any fevers, chills, abscess, headaches, night sweats, pleuritic chest pain, or palpitations  Objective:    Vitals: Blood pressure 104/50, pulse 55, temperature 98 1 °F (36 7 °C), temperature source Temporal, resp   rate 20, height 5' (1 524 m), weight 99 8 kg (220 lb), SpO2 98 % 4L,Body mass index is 42 97 kg/m²  No intake or output data in the 24 hours ending 12/27/22 1139    Invasive Devices     Peripheral Intravenous Line  Duration           Peripheral IV 12/26/22 Left;Ventral (anterior) Forearm 1 day                Physical Exam:     Physical Exam  Constitutional:       General: She is not in acute distress  Appearance: Normal appearance  She is obese  She is ill-appearing  HENT:      Head: Normocephalic and atraumatic  Nose: Nose normal  No congestion or rhinorrhea  Mouth/Throat:      Mouth: Mucous membranes are dry  Pharynx: No oropharyngeal exudate or posterior oropharyngeal erythema  Cardiovascular:      Rate and Rhythm: Normal rate and regular rhythm  Pulses: Normal pulses  Heart sounds: Normal heart sounds  No murmur heard  No friction rub  No gallop  Pulmonary:      Effort: Pulmonary effort is normal  No respiratory distress  Breath sounds: No stridor  No wheezing, rhonchi or rales  Comments: Significantly distant breath sounds-diminished aeration  Chest:      Chest wall: No tenderness  Abdominal:      General: Abdomen is flat  Bowel sounds are normal  There is no distension  Palpations: Abdomen is soft  There is no mass  Musculoskeletal:         General: No swelling or tenderness  Normal range of motion  Cervical back: Normal range of motion  No rigidity or tenderness  Skin:     General: Skin is warm and dry  Coloration: Skin is not jaundiced or pale  Neurological:      General: No focal deficit present  Mental Status: She is alert  Mental status is at baseline  She is disoriented  Comments: Disoriented to- 2, fatigued   Psychiatric:         Mood and Affect: Mood normal          Behavior: Behavior normal          Labs:    I have personally reviewed pertinent lab results CBC:   Lab Results   Component Value Date    WBC 11 76 (H) 12/27/2022    HGB 10 4 (L) 12/27/2022    HCT 36 4 12/27/2022 MCV 95 12/27/2022     12/27/2022    MCH 27 0 12/27/2022    MCHC 28 6 (L) 12/27/2022    RDW 15 3 (H) 12/27/2022    MPV 10 7 12/27/2022    NRBC 0 12/27/2022   , CMP:   Lab Results   Component Value Date    SODIUM 139 12/27/2022    K 5 1 12/27/2022     12/27/2022    CO2 30 12/27/2022    BUN 88 (H) 12/27/2022    CREATININE 5 32 (H) 12/27/2022    CALCIUM 7 3 (L) 12/27/2022    EGFR 6 12/27/2022       Imaging and other studies: I have personally reviewed pertinent films in PACS     Chest CT- 12/25/2022 bilateral upper lobe lung consolidations sinal lymph nodes

## 2022-12-27 NOTE — PROGRESS NOTES
[address] [facility]  Progress Note - Sushila Galeano 1935, 82 y o  female MRN: [id number]  Unit/Bed#: E4 -01 Encounter: [id number]  Primary Care Provider: No primary care provider on file  Date and time admitted to hospital: 12/25/2022  4:24 AM    * Acute respiratory failure with hypoxia [facility])  Assessment & Plan  · Patient presents via EMS for chest pain; lives at [facility]  · In the ED, noted to be hypoxic and requiring up to 10 L nasal cannula;   · On 12/22; had positive influenza A test at [facility]; RSV and influenza B at that time were negative  · CTA chest ruled out PE; shows chronic consolidations but unclear if any new superimposed bacterial pneumonia present  · Procalcitonin 2 5   · Currently on BiPAP      · Lengthy discussion with family at bedside regarding patient's continued decline, encephalopathic, requiring BiPAP, poor p o  intake and worsening renal function  Family agreeable for transition to comfort care    ·   Hospice consult placed  · Transition to level 4 comfort, medications morphine, Ativan as needed    Influenza A  Assessment & Plan  · Continue Tamiflu D2/5  · Contact and droplet precautions    Chest pain  Assessment & Plan  · [facility] with [facility] onset chest pain  · Report pain started immediately after she leaned over to pick something up  · YISSEL score = 4  · initial troponin in ED was 151--332--395; continue trending  · Cardiology input appreciated  · Expected elevated cardiac enzymes secondary to demand ischemia from respiratory failure  · Continue aspirin, statin    Type 2 diabetes mellitus with hyperglycemia, with long-term current use of insulin [facility])  Assessment & Plan  Lab Results   Component Value Date    HGBA1C 9 6 (H) 12/25/2022       Recent Labs     12/26/22  1827 12/27/22  0005 12/27/22  0549 12/27/22  1206   POCGLU 117 85 84 93       Blood Sugar Average: Last 72 hrs:  (P) 929 [id number]   · Hold oral hypoglycemics  · Continue Lantus 10 units daily at lower dose  · Monitor Accu-Cheks, sliding scale for coverage    Gastroesophageal reflux disease without esophagitis  Assessment & Plan  · Continue PPI    Chronic congestive heart failure (HCC)  Assessment & Plan  Wt Readings from Last 3 Encounters:   12/26/22 99 8 kg (220 lb)   05/31/22 99 8 kg (220 lb)   02/03/22 101 kg (222 lb)     · Echocardiogram 12/26/2020 reveals ejection fraction 99%, grade 2 diastolic dysfunction  · proBNP 1837  · Cardiology input appreciated  · Diuretics on hold given acute kidney injury    Anemia in stage 4 chronic kidney disease [facility])  Assessment & Plan  Lab Results   Component Value Date    EGFR 6 12/27/2022    EGFR 9 12/26/2022    EGFR 19 12/25/2022    CREATININE 5 32 (H) 12/27/2022    CREATININE 3 98 (H) 12/26/2022    CREATININE 2 27 (H) 12/25/2022       Anxiety  Assessment & Plan  · Noted, monitor and treat symptomatically    Acquired hypothyroidism  Assessment & Plan  · levothyroxine    Acute kidney injury ([address] )  Assessment & Plan  · 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  · Likely secondary to prerenal azotemia, poor p o  intake, diuresis, ARB and recently received IV contrast  · Creatinine 5 32  · Baseline creatinine 2 2-2 5  · Neurology input appreciated  · Hold diuretics and ARB  · Family would not want to pursue renal placement therapy if needed  · Gentle IV hydration as per nephrology  · Patient now transition to comfort care        Patient Centered Rounds: I have performed bedside rounds with nursing staff today  Discussions with Specialists or Other Care Team Provider: Pulmonary, nephrology    Education and Discussions with Family / Patient: Discussed with son and daughter at bedside    Time Spent for Care: 45 minutes  More than 50% of total time spent on counseling and coordination of care as described above      Current Length of Stay: 2 day(s)    Current Patient Status: Inpatient Certification Statement: The patient will continue to require additional inpatient hospital stay due to Acute hypoxic restaurant failure, acute kidney injury    Discharge Plan / Estimated Discharge Date: TBD    Code Status: Level 4 - Comfort Care      Subjective:   Patient seen and examined at bedside, lethargic, on BiPAP, has not been eating  Placed on restraints overnight as she was trying to remove her BiPAP and oxygen    Objective:     Vitals:   Temp (24hrs), Av 6 °F (36 4 °C), Min:97 °F (36 1 °C), Max:98 1 °F (36 7 °C)    Temp:  [97 °F (36 1 °C)-98 1 °F (36 7 °C)] 98 1 °F (36 7 °C)  HR:  [55-66] 55  Resp:  [20] 20  BP: ()/(44-59) 104/50  SpO2:  [90 %-98 %] 95 %  Body mass index is 42 97 kg/m²  Input and Output Summary (last 24 hours):     No intake or output data in the 24 hours ending 22 1554    Physical Exam:    Constitutional: Patient is on BiPAP  HEENT:  Normocephalic, atraumatic  Cardiovascular: Normal S1S2, RRR, No murmurs/rubs/gallops appreciated  Pulmonary:  Bilateral air entry, No rhonchi/rales/wheezing appreciated  Abdominal: Soft, Bowel sounds present, Non-tender, Non-distended  Extremities:  No cyanosis, clubbing or edema  Neurological: Lethargic, does open eyes spontaneously and moves extremities  Skin:  Warm, dry    Additional Data:     Labs:    Results from last 7 days   Lab Units 22  0445   WBC Thousand/uL 11 76*   HEMOGLOBIN g/dL 10 4*   HEMATOCRIT % 36 4   PLATELETS Thousands/uL 222   NEUTROS PCT % 78*   LYMPHS PCT % 15   MONOS PCT % 6   EOS PCT % 0     Results from last 7 days   Lab Units 22  0445 22  0822   POTASSIUM mmol/L 5 1 5 5*   CHLORIDE mmol/L 101 104   CO2 mmol/L 30 32   BUN mg/dL 88* 68*   CREATININE mg/dL 5 32* 3 98*   CALCIUM mg/dL 7 3* 7 7*   ALK PHOS U/L  --  85   ALT U/L  --  23   AST U/L  --  22            I Have Reviewed All Lab Data Listed Above      Invasive Devices     Peripheral Intravenous Line  Duration           Peripheral IV 12/26/22 Left;Ventral (anterior) Forearm 1 day                    Recent Cultures (last 7 days):           Last 24 Hours Medication List:   Current Facility-Administered Medications   Medication Dose Route Frequency Provider Last Rate   • acetaminophen  650 mg Oral Q6H PRN Zeyad Guajardo MD     • albuterol  2 5 mg Nebulization Q6H PRN Zeyad Guajardo MD     • docusate sodium  100 mg Oral BID Zeyad Guajardo MD     • dorzolamide-timolol  1 drop Both Eyes BID Zeyad Guajardo MD     • LORazepam  1 mg Intravenous Q4H PRN Yogi Foley MD     • morphine injection  1 mg Intravenous Q4H PRN Yogi Foley MD     • nystatin   Topical BID Yogi Foley MD     • ondansetron  4 mg Intravenous Q6H PRN Zeyad Guajardo MD     • [START ON 12/28/2022] oseltamivir  30 mg Oral Every Other Day Yogi Foley MD     • pantoprazole  40 mg Oral Daily Before Breakfast Zeyad Guajardo MD     • sodium chloride  500 mL Intravenous Once Orvis ApaHEDY 500 mL (12/27/22 1106)        Today, Patient Was Seen By: Yogi Foley MD

## 2022-12-27 NOTE — NURSING NOTE
Call placed to floor to follow up after extravasation of contrast into L FA after CTA Chest/Abd/Pelvis on 12/25/2022  Per RN Renate, no swelling, blistering, hardness, redness or other changes in skin color, changes in sensation or increase/decrease in temperature of skin noted    Continued management per inpatient care team

## 2022-12-27 NOTE — ASSESSMENT & PLAN NOTE
· Patient presents from skilled nursing facility with acute onset chest pain  · Report pain started immediately after she leaned over to pick something up  · YISSEL score = 4  · initial troponin in ED was 151--332--395; continue trending  · Cardiology input appreciated  · Expected elevated cardiac enzymes secondary to demand ischemia from respiratory failure  · Continue aspirin, statin

## 2022-12-27 NOTE — MALNUTRITION/BMI
This medical record reflects one or more clinical indicators suggestive of  morbid obesity  Malnutrition Findings:                                 BMI Findings:  Adult BMI Classifications: Morbid Obesity 40-44 9        Body mass index is 42 97 kg/m²  See Nutrition note dated 12/27/22 for additional details  Completed nutrition assessment is viewable in the nutrition documentation

## 2022-12-27 NOTE — CONSULTS
Consultation - Nephrology   Dana Maldonado 82 y o  female MRN: [id number]  Unit/Bed#: E4 -01 Encounter: [id number]    ASSESSMENT/PLAN:    DINORA on CKD 4  -Baseline creatinine: 1 8-2 2, follows with Dr Candy Lu as OP   -Creatinine on admission 2 27, most recent creatinine 5 32  -Etiology: Suspect multifactorial due to prerenal azotemia in the setting of decreased oral intake with encephalopathy, LOURDES due to dye exposure on 12/25, diuretic use, compensatory admission with losartan use as outpatient  -UA: Ordered not yet obtained  -Renal imaging: CTA chest abdomen pelvis on 12/25, bilateral atrophy with cortical thinning right greater than left, ostial stenosis of the right renal artery, no hydronephrosis  -Avoid hypotension, avoid nephrotoxins, avoid NSAIDS  -Trend BMP  -Urinary retention protocol  -Per discussion with daughter Francine Khan at bedside would not pursue renal replacement therapy if indicated understanding risk of death  -will pursue supportive care for now, consideration for transition to comfort measures later this afternoon per discussion with primary team  -recommend renally dosing Tamiflu GFR under 10  -Stop Lasix, give gentle IV fluid hydration  -Continue to hold losartan    Hypertension/blood pressure  -OP regimen: Losartan 25 mg daily, Lasix 80 mg daily, Norvasc 7 5 mg daily  -Current regimen: Norvasc 7 5 mg daily with adjusted hold parameters, Lopressor 25 mg twice daily  -Recent blood pressures: Has had episodes of hypotension, just hold parameters for Norvasc, low threshold to discontinue indefinitely    Hyperkalemia  -Calcium was 5 5 yesterday most recently 5 1  -Setting of DINORA  -Continue to hold losartan/diuretic, give gentle IV fluid  -Low potassium diet when resuming oral intake    Anemia  -Hgb 10 4  -Recommend transfuse for goal greater than 7 per primary team    Acute hypercapnic and hypoxemic respiratory failure  -Requiring supplemental oxygen/as needed BiPAP on admission  -No baseline oxygen requirements, currently on 4 L nasal cannula  -CTA chest with patchy groundglass and consolidative opacities, multiple solid noncalcified pulmonary nodules  -Pulmonary consulted  -Suspect etiology is multifactorial: flu positive, currently on Tamiflu recommend renally dosing, on antibiotics per primary team    Metabolic encephalopathy  -Suspect multifactorial in setting of acute illness  -Care per primary team    Heart failure preserved ejection fraction  -12/26 TTE, EF 66% with grade 2 diastolic dysfunction, mild pulmonary hypertension, elevated right-sided pressures, blunted respiratory changes of IVC  -Outpatient diuretic is Lasix 80 mg daily which is on hold, will give gentle IV fluid hydration  -Daily weights, monitor intake and output    Additional Medical Problems: Elevated troponin, morbid obesity, chest pain, GERD, DM2 last A1c is 9 6, hypothyroidism    HISTORY OF PRESENT ILLNESS:  Requesting Physician: Emelina Marshall MD  Reason for Consult: DINORA on CKD 11 Gregory Vitale is a 82y o  year old female who was admitted to [address] [address] after presenting with chest pain and change in mental status  Patient is a poor historian, per discussion with daughter at bedside patient was at her rehab facility and reported an episode of chest pain when bending over, later was noted to be minimally responsive and was brought to the emergency department for evaluation  On admission was found to be flu positive, required supplemental oxygen and BiPAP for hypercapnia and hypoxemia, received CTA on admission rule out dissection in the setting of atypical chest pain, has noted to have progressive DINORA with increase in creatinine  A renal consultation is requested today for assistance in the management of DINORA on CKD 4      PAST MEDICAL HISTORY:  Past Medical History:   Diagnosis Date   • Adenoma of large intestine    • Allergy     last assessed: 10/18/2013   • Cataract    • Constipation, chronic    • Diabetes mellitus ([facility] [address] )     anemia vitamin d deficiency glaucoma hypertension hypothyroidism; last assessed: 2012   • Disease of thyroid gland    • Diverticulosis    • Fecal incontinence    • Female stress incontinence    • Glaucoma    • Gout    • History of cystocele    • Intrinsic sphincter deficiency    • Mixed incontinence    • Nocturia    • Osteoarthritis, chronic    • Renal disorder    • Senile atrophic vaginitis    • Urinary frequency        PAST SURGICAL HISTORY:  Past Surgical History:   Procedure Laterality Date   • ANKLE SURGERY Bilateral    • CATARACT EXTRACTION     • CHOLECYSTECTOMY     • TUBAL LIGATION         ALLERGIES:  Allergies   Allergen Reactions   • Ace Inhibitors      [facility] [id number]: short of breath   • Aspirin        SOCIAL HISTORY:  Social History     Substance and Sexual Activity   Alcohol Use Never     Social History     Substance and Sexual Activity   Drug Use No     Social History     Tobacco Use   Smoking Status Former   • Types: Cigarettes   • Quit date: 1989   • Years since quittin 6   Smokeless Tobacco Never       FAMILY HISTORY:  Family History   Problem Relation Age of Onset   • Stomach cancer Mother    • Diabetes Sister         mellitus       MEDICATIONS:    Current Facility-Administered Medications:   •  acetaminophen (TYLENOL) tablet 650 mg, 650 mg, Oral, Q6H PRN, Dat Kaur MD, 650 mg at 22 0122  •  albuterol inhalation solution 2 5 mg, 2 5 mg, Nebulization, Q6H PRN, Dat Kaur MD  •  [START ON 2022] amLODIPine (NORVASC) tablet 7 5 mg, 7 5 mg, Oral, Daily, HEDY Qiu  •  aspirin (ECOTRIN LOW STRENGTH) EC tablet 81 mg, 81 mg, Oral, Daily, Dat Kaur MD, 81 mg at 22 0815  •  atorvastatin (LIPITOR) tablet 40 mg, 40 mg, Oral, Daily With Dinner, Dat Kaur MD  •  budesonide-formoterol [facility]) 160-4 5 mcg/act inhaler 2 puff, 2 puff, Inhalation, BID, Dat Kaur MD, 2 puff at 22 0814  •  cefepime (MAXIPIME) 1,000 mg in dextrose 5 % 50 mL IVPB, 1,000 mg, Intravenous, Q12H, Kane Meyer MD, Last Rate: 100 mL/hr at 12/27/22 0505, 1,000 mg at 12/27/22 0505  •  docusate sodium (COLACE) capsule 100 mg, 100 mg, Oral, BID, Kane Meyer MD, 100 mg at 12/27/22 3700  •  dorzolamide-timolol (COSOPT) 22 3-6 8 MG/ML ophthalmic solution 1 drop, 1 drop, Both Eyes, BID, Kane Meyer MD, 1 drop at 12/27/22 6506  •  fluticasone (FLONASE) 50 mcg/act nasal spray 2 spray, 2 spray, Nasal, Daily, Kane Meyer MD, 2 spray at 12/27/22 9937  •  heparin (porcine) subcutaneous injection 7,500 Units, 7,500 Units, Subcutaneous, Q8H [facility], Chintan Alarcon MD, 7,500 Units at 12/27/22 0508  •  hydrALAZINE (APRESOLINE) injection 10 mg, 10 mg, Intravenous, Q6H PRN, Kane Meyer MD  •  insulin glargine (LANTUS) subcutaneous injection 10 Units 0 1 mL, 10 Units, Subcutaneous, HS, Kane Meyer MD, 10 Units at 12/25/22 2342  •  insulin lispro (HumaLOG) 100 units/mL subcutaneous injection 1-5 Units, 1-5 Units, Subcutaneous, Q6H [facility], 1 Units at 12/25/22 2349 **AND** Fingerstick Glucose (POCT), , , Q6H, Kane Meyer MD  •  insulin lispro (HumaLOG) 100 units/mL subcutaneous injection 1-5 Units, 1-5 Units, Subcutaneous, HS, Kane Meyer MD, 1 Units at 12/25/22 2343  •  levothyroxine tablet 75 mcg, 75 mcg, Oral, Early Morning, Kane Meyer MD, 75 mcg at 12/27/22 5800  •  metoprolol tartrate (LOPRESSOR) tablet 25 mg, 25 mg, Oral, Q12H [facility], Kane Meyer MD, 25 mg at 12/27/22 0813  •  montelukast (SINGULAIR) tablet 10 mg, 10 mg, Oral, QAM, Kane Meyer MD, 10 mg at 12/27/22 0813  •  ondansetron [facility]) injection 4 mg, 4 mg, Intravenous, Q6H PRN, Kane Meyer MD  •  [START ON 12/28/2022] oseltamivir (TAMIFLU) capsule 30 mg, 30 mg, Oral, Every Other Day, Chintan Alarcon MD  •  pantoprazole (PROTONIX) EC tablet 40 mg, 40 mg, Oral, Daily Before Breakfast, Kane Meyer MD, 40 mg at 12/26/22 0900  •  sodium chloride 0 9 % bolus 500 mL, 500 mL, Intravenous, Once, Giovany Romano Nida Taylor  •  umeclidinium 62 5 mcg/actuation inhaler AEPB 1 puff, 1 puff, Inhalation, Daily, Seamus Kramer MD, 1 puff at 12/27/22 3809    REVIEW OF SYSTEMS:  Review of Systems   Unable to perform ROS: Mental status change      A complete 10 point review of systems was performed and found to be negative unless otherwise noted above or in the HPI  PHYSICAL EXAM:  Current Weight: Weight - Scale: 99 8 kg (220 lb)  First Weight: Weight - Scale: 99 8 kg (220 lb)  Vitals:    12/27/22 0726 12/27/22 0822 12/27/22 0826 12/27/22 0829   BP: 116/58      BP Location: Left arm      Pulse: 66   65   Resp: 20      Temp: (!) 97 °F (36 1 °C)      TempSrc: Temporal      SpO2: 97% 95% 90%    Weight:       Height:         No intake or output data in the 24 hours ending 12/27/22 1028  Physical Exam  Vitals and nursing note reviewed  Constitutional:       Appearance: She is obese  She is ill-appearing  Comments: Opens eyes/interactive to verbal stimuli, answers with simple yes or no   HENT:      Head: Normocephalic and atraumatic  Nose: Nose normal       Mouth/Throat:      Mouth: Mucous membranes are dry  Eyes:      General: No scleral icterus  Cardiovascular:      Rate and Rhythm: Regular rhythm  Bradycardia present  Pulses: Normal pulses  Pulmonary:      Effort: Pulmonary effort is normal       Breath sounds: No wheezing or rales  Comments: Poor inspiratory effort  Abdominal:      General: Abdomen is flat  There is no distension  Palpations: Abdomen is soft  Tenderness: There is no abdominal tenderness  Musculoskeletal:      Cervical back: Neck supple  Comments: Trace lower extremity edema bilaterally   Skin:     General: Skin is warm and dry  Capillary Refill: Capillary refill takes less than 2 seconds     Neurological:      Comments: Awakens/opens eyes/interacts minimally to verbal stimuli, confused   Psychiatric:         Mood and Affect: Mood normal           Invasive Devices: Lab Results:   Results from last 7 days   Lab Units 12/27/22  0445 12/26/22  0822 12/25/22  0454   WBC Thousand/uL 11 76* 11 57* 11 32*   HEMOGLOBIN g/dL 10 4* 10 6* 11 7   HEMATOCRIT % 36 4 37 3 39 2   PLATELETS Thousands/uL 222 231 237   POTASSIUM mmol/L 5 1 5 5* 4 4   CHLORIDE mmol/L 101 104 102   CO2 mmol/L 30 32 33*   BUN mg/dL 88* 68* 48*   CREATININE mg/dL 5 32* 3 98* 2 27*   CALCIUM mg/dL 7 3* 7 7* 8 4   MAGNESIUM mg/dL  --  2 2  --    PHOSPHORUS mg/dL  --  6 6*  --    ALK PHOS U/L  --  85 99   ALT U/L  --  23 16   AST U/L  --  22 18       I have personally reviewed the blood work as stated above and in my note  I have personally reviewed CTA  imaging studies  I have personally reviewed internal medicine and pulmonology note

## 2022-12-27 NOTE — ASSESSMENT & PLAN NOTE
Lab Results   Component Value Date    EGFR 6 12/27/2022    EGFR 9 12/26/2022    EGFR 19 12/25/2022    CREATININE 5 32 (H) 12/27/2022    CREATININE 3 98 (H) 12/26/2022    CREATININE 2 27 (H) 12/25/2022

## 2022-12-27 NOTE — ASSESSMENT & PLAN NOTE
[facility] ([facility]) Osteoporosis and [facility]  [place] [address] [place] [address], [address] [place] [address],[place] [address]  Phone [phone]  Fax [phone]    NAME:  Etienne Villatoro  :  1947  CONSULT DATE:    2021  MOST RECENT VISIT:  2021  TODAYS DATE:  2021    Labs @ [facility] 2020    CONSULTATION REQUESTED BY: Liv Anand MD  OTHERS WHO NEED REPORTS: Tiffanie Bravo MD    PROBLEMS. Low bone density by DXA 2012, T-scores not valid in the spine, -1.1 in the left total hip    Family history of osteoporosis, mother, sister    2017 L trimalleolar ankle fx, L tibial plateau (fell)     R great toe    2019 R femoral neck (fell)    2021 L distal femur fx (twisted); large enchondroma above, hardware below  Surgical menopause age 42  Decreased kidney function 2020 Cr 1.0 GFR 54. Diabetes mellitus, type 2  Fibromyalgia  GERD  Hypertension  Scleroderma  Peripheral neuropathy  COPD  Congestive heart failure    CURRENT MANAGEMENT FOR BONE HEALTH/OSTEOPOROSIS. Calcium, 300 mg from low calcium foods,  300 mg yogurt, 200 mg cottage cheese   diet MVI Ca+D other total    Calcium 800 380  600  mg/d   Vitamin D  1000  1000  IU/d     25-OH D 34 ng/mL 2012 (desirable is 30-60 ng/mL)  Exercise, nothing regular  Pharmacologic therapy: none    PREVIOUS BONE-ACTIVE MEDICATIONS. none    OTHER CURRENT MEDICATIONS (SELECTED): metoprolol, Lasix, Eliquid, Cymbalta, Cardizem, Effexor, pantoprazole, liraglutide, gabapentin, insulin,   OTC MEDICATIONS (SELECTED): none    CHIEF COMPLAINT. Broken bones    HISTORY OF PRESENT ILLNESS: See problem list for chronic/inactive conditions. Ms. Jessica Hooper is a 74-year-old woman who was found to have low bone density by DXA in 2012. She has had multiple fractures.     FOR FULL DETAILS OF FAMILY HISTORY, PAST MEDICAL AND SURGICAL HISTORY, SOCIAL HISTORY, AND REVIEW OF SYSTEMS, SEE PATIENT QUESTIONNAIRE OF · 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  · Likely secondary to prerenal azotemia, poor p o  intake, diuresis, ARB and recently received IV contrast  · Creatinine 5 32  · Baseline creatinine 2 2-2 5  · Neurology input appreciated  · Hold diuretics and ARB  · Family would not want to pursue renal placement therapy if needed  · Gentle IV hydration as per nephrology  · Patient now transition to comfort care comparing serial scans. A significant increase or decrease is based on precision studies done at our center according to the ISCD protocol with a least significant change of 0.030 g/cm2. Proximal Femur (left)   Date Fem. neck Trochanter Total hip   05/14/2012* 0.740 --- 0.813   05/18/2015* 0.740 --- 0.854   10/10/2017* 0.680 --- 0.813   01/24/2020* 0.706 --- 0.813   06/17/2021 0.616 0.412 0.716   *Done with GLOG 428 equipment. BMD converted to Hologic units. IMPRESSION:  BONE DENSITY IS LOW. BMD DECREASED IN THE TOTAL HIP 8798-5529 AND 2713-6231 AND DECREASED IN THE FEMORAL NECK 8060-5513 AND 3983-8309. WITH HER HISTORY OF HIP FRACTURE, THE CORRECT CLINICAL DIAGNOSIS IS OSTEOPOROSIS.      Labs: 12/2020 Ca 9.3 Cr 1.0 Cr 54. Imaging: DXA printouts reviewed. Other medical records: Letter Dr. Lance Hinds 4/27/21    ASSESSMENT. Osteoporosis, bone density lower than desirable with right hip fracture 2019. BMD decreased 1419-5744. Without effective treatment, fracture risk is high. Recent fracture of the left distal femur was in part a pathological fracture due to endochondroma in the shaft and hardware in the proximal tibia. DIAGNOSTIC PLANS. Blood tests: 25-OH vitamin D. I will be in touch with the patient to review lab results. THERAPEUTIC PLANS. Calcium, target 1200 mg daily; we discussed recommended calcium intake and the effects of excessive calcium intake from supplements. I provided a handout with information about how to calculate daily calcium intake. Advised to reduce calcium supplement to 300 mg/d. Vitamin D, recommended amount to be determined after review of 25-OH D lab result. Exercise, she is doing the best she can.   Pharmacologic therapy, we discussed long-term treatment options for osteoporosis that have evidence for broad spectrum anti-fracture efficacy (reduce the risk of vertebral, hip and other nonvertebral fractures); alendronate (Fosamax), zoledronate (Reclast) and Prolia (denosumab). I explained dosing instructions, the mechanism of action, side effects (which are uncommon) and safety concerns (which are rare). I would also consider risedronate (Actonel) but it usually more expensive than alendronate. I focused on Reclast and Prolia. The patient selected Prolia. We will make the necessary arrangements. Return appointment with DXA in 1 year. Total time on the date the encounter was 60-74 minutes. Sebastián Brink MD, Director, [facility]) Osteoporosis and Bone Health Services    CC: Darrall Lefort MD Pasco Sous MD Mohammed Hayward MD Linnea Graver MD

## 2022-12-27 NOTE — PLAN OF CARE
Problem: Potential for Falls  Goal: Patient will remain free of falls  Description: INTERVENTIONS:  - Educate patient/family on patient safety including physical limitations  - Instruct patient to call for assistance with activity   - Consult OT/PT to assist with strengthening/mobility   - Keep Call bell within reach  - Keep bed low and locked with side rails adjusted as appropriate  - Keep care items and personal belongings within reach  - Initiate and maintain comfort rounds  - Make Fall Risk Sign visible to staff  - Offer Toileting every  Hours, in advance of need  - Initiate/Maintain alarm  - Obtain necessary fall risk management equipment:   - Apply yellow socks and bracelet for high fall risk patients  - Consider moving patient to room near nurses station  Outcome: Progressing     Problem: MOBILITY - ADULT  Goal: Maintain or return to baseline ADL function  Description: INTERVENTIONS:  -  Assess patient's ability to carry out ADLs; assess patient's baseline for ADL function and identify physical deficits which impact ability to perform ADLs (bathing, care of mouth/teeth, toileting, grooming, dressing, etc )  - Assess/evaluate cause of self-care deficits   - Assess range of motion  - Assess patient's mobility; develop plan if impaired  - Assess patient's need for assistive devices and provide as appropriate  - Encourage maximum independence but intervene and supervise when necessary  - Involve family in performance of ADLs  - Assess for home care needs following discharge   - Consider OT consult to assist with ADL evaluation and planning for discharge  - Provide patient education as appropriate  Outcome: Progressing  Goal: Maintains/Returns to pre admission functional level  Description: INTERVENTIONS:  - Perform BMAT or MOVE assessment daily    - Set and communicate daily mobility goal to care team and patient/family/caregiver     - Collaborate with rehabilitation services on mobility goals if consulted  - Perform Range of Motion  times a day  - Reposition patient every  hours    - Dangle patient  times a day  - Stand patient  times a day  - Ambulate patient  times a day  - Out of bed to chair  times a day   - Out of bed for meals  times a day  - Out of bed for toileting  - Record patient progress and toleration of activity level   Outcome: Progressing     Problem: Prexisting or High Potential for Compromised Skin Integrity  Goal: Skin integrity is maintained or improved  Description: INTERVENTIONS:  - Identify patients at risk for skin breakdown  - Assess and monitor skin integrity  - Assess and monitor nutrition and hydration status  - Monitor labs   - Assess for incontinence   - Turn and reposition patient  - Assist with mobility/ambulation  - Relieve pressure over bony prominences  - Avoid friction and shearing  - Provide appropriate hygiene as needed including keeping skin clean and dry  - Evaluate need for skin moisturizer/barrier cream  - Collaborate with interdisciplinary team   - Patient/family teaching  - Consider wound care consult   Outcome: Progressing     Problem: PAIN - ADULT  Goal: Verbalizes/displays adequate comfort level or baseline comfort level  Description: Interventions:  - Encourage patient to monitor pain and request assistance  - Assess pain using appropriate pain scale  - Administer analgesics based on type and severity of pain and evaluate response  - Implement non-pharmacological measures as appropriate and evaluate response  - Consider cultural and social influences on pain and pain management  - Notify physician/advanced practitioner if interventions unsuccessful or patient reports new pain  Outcome: Progressing     Problem: DISCHARGE PLANNING  Goal: Discharge to home or other facility with appropriate resources  Description: INTERVENTIONS:  - Identify barriers to discharge w/patient and caregiver  - Arrange for needed discharge resources and transportation as appropriate  - Identify discharge learning needs (meds, wound care, etc )  - Arrange for interpretive services to assist at discharge as needed  - Refer to Case Management Department for coordinating discharge planning if the patient needs post-hospital services based on physician/advanced practitioner order or complex needs related to functional status, cognitive ability, or social support system  Outcome: Progressing     Problem: Knowledge Deficit  Goal: Patient/family/caregiver demonstrates understanding of disease process, treatment plan, medications, and discharge instructions  Description: Complete learning assessment and assess knowledge base    Interventions:  - Provide teaching at level of understanding  - Provide teaching via preferred learning methods  Outcome: Progressing     Problem: CARDIOVASCULAR - ADULT  Goal: Maintains optimal cardiac output and hemodynamic stability  Description: INTERVENTIONS:  - Monitor I/O, vital signs and rhythm  - Monitor for S/S and trends of decreased cardiac output  - Administer and titrate ordered vasoactive medications to optimize hemodynamic stability  - Assess quality of pulses, skin color and temperature  - Assess for signs of decreased coronary artery perfusion  - Instruct patient to report change in severity of symptoms  Outcome: Progressing  Goal: Absence of cardiac dysrhythmias or at baseline rhythm  Description: INTERVENTIONS:  - Continuous cardiac monitoring, vital signs, obtain 12 lead EKG if ordered  - Administer antiarrhythmic and heart rate control medications as ordered  - Monitor electrolytes and administer replacement therapy as ordered  Outcome: Progressing     Problem: RESPIRATORY - ADULT  Goal: Achieves optimal ventilation and oxygenation  Description: INTERVENTIONS:  - Assess for changes in respiratory status  - Assess for changes in mentation and behavior  - Position to facilitate oxygenation and minimize respiratory effort  - Oxygen administered by appropriate delivery if ordered  - Initiate smoking cessation education as indicated  - Encourage broncho-pulmonary hygiene including cough, deep breathe, Incentive Spirometry  - Assess the need for suctioning and aspirate as needed  - Assess and instruct to report SOB or any respiratory difficulty  - Respiratory Therapy support as indicated  Outcome: Progressing     Problem: METABOLIC, FLUID AND ELECTROLYTES - ADULT  Goal: Glucose maintained within target range  Description: INTERVENTIONS:  - Monitor Blood Glucose as ordered  - Assess for signs and symptoms of hyperglycemia and hypoglycemia  - Administer ordered medications to maintain glucose within target range  - Assess nutritional intake and initiate nutrition service referral as needed  Outcome: Progressing     Problem: SAFETY,RESTRAINT: NV/NON-SELF DESTRUCTIVE BEHAVIOR  Goal: Remains free of harm/injury (restraint for non violent/non self-detsructive behavior)  Description: INTERVENTIONS:  - Instruct patient/family regarding restraint use   - Assess and monitor physiologic and psychological status   - Provide interventions and comfort measures to meet assessed patient needs   - Identify and implement measures to help patient regain control  - Assess readiness for release of restraint   Outcome: Progressing  Goal: Returns to optimal restraint-free functioning  Description: INTERVENTIONS:  - Assess the patient's behavior and symptoms that indicate continued need for restraint  - Identify and implement measures to help patient regain control  - Assess readiness for release of restraint   Outcome: Progressing     Problem: Nutrition/Hydration-ADULT  Goal: Nutrient/Hydration intake appropriate for improving, restoring or maintaining nutritional needs  Description: Monitor and assess patient's nutrition/hydration status for malnutrition  Collaborate with interdisciplinary team and initiate plan and interventions as ordered    Monitor patient's weight and dietary intake as ordered or per policy  Utilize nutrition screening tool and intervene as necessary  Determine patient's food preferences and provide high-protein, high-caloric foods as appropriate       INTERVENTIONS:  - Monitor oral intake, urinary output, labs, and treatment plans  - Assess nutrition and hydration status and recommend course of action  - Evaluate amount of meals eaten  - Assist patient with eating if necessary   - Allow adequate time for meals  - Recommend/ encourage appropriate diets, oral nutritional supplements, and vitamin/mineral supplements  - Order, calculate, and assess calorie counts as needed  - Recommend, monitor, and adjust tube feedings and TPN/PPN based on assessed needs  - Assess need for intravenous fluids  - Provide specific nutrition/hydration education as appropriate  - Include patient/family/caregiver in decisions related to nutrition  Outcome: Progressing

## 2022-12-27 NOTE — ACP (ADVANCE CARE PLANNING)
Serious Illness Conversation    1  What is your understanding now of where your mother is with her illness? Prognostic Understanding: appropriate understanding of prognosis     2  How much information about what is likely to be ahead with your mothers illness would you like to have? Information: patient wants to be fully informed     3  What did you (clinician) communicate to the patient? Prognostic Communication: Time - I wish we were not in this situation, but I am worried that time may be as short as days if she continue this way with her breathing and not eating     4  If your mothers health situation worsens, what are her most important goals? Goals: be physically comfortable     5  What are the biggest fears and worries about the future and your mothers health? Fears/Worries: pain     6  What abilities are so critical to your mothers life that she cannot imagine living without them? Unacceptable Function: being unconscious, being unable to talk     7  What gives you strength as you think about the future with your mothers illness? 8  If your mother become sicker, how much would she be willing to go through for the possibility of gaining more time? Have a feeding tube: No   Be in the ICU: No    Be on a ventilator: No Be uncomfortable: Yes   Be on dialysis: No    9  How much does your proxy and family know about your priorities and wishes? Discussion Discussion: extensive discussion with family about goals and wishes     Bertha Ask heard you say that keeping your mother comfortable is really important to you  Keeping that in mind, and what we know about your mothers illness, I recommend that we transition to comfort care  This will help us make sure that your mothers treatment plans reflect what’s important to you  How does this plan sound to you? I will do everything I can to help you through this    Patients family verbalized understanding of the plan        Advanced directives

## 2022-12-28 NOTE — ASSESSMENT & PLAN NOTE
· Continue Tamiflu D2/5  · Contact and droplet precautions "right cyst"/breast tenderness L/breast tenderness R/breast lump L

## 2022-12-28 NOTE — HOSPICE NOTE
Hospice Liaison made contact with son Rob Paulosn this AM via telephone  He was open to discussion of transfer to hospice IPU if patient is stable  Hospital physician saying that patient is to remain in the hospital and is not stable for transport

## 2022-12-28 NOTE — ASSESSMENT & PLAN NOTE
Lab Results   Component Value Date    HGBA1C 9 6 (H) 12/25/2022       Recent Labs     12/26/22  1827 12/27/22  0005 12/27/22  0549 12/27/22  1206   POCGLU 117 85 84 93       Blood Sugar Average: Last 72 hrs:  (P) 261 [id number]   · Hold oral hypoglycemics  · Continue Lantus 10 units daily at lower dose  · Monitor Accu-Cheks, sliding scale for coverage

## 2022-12-28 NOTE — ASSESSMENT & PLAN NOTE
Lab Results   Component Value Date    HGBA1C 9 6 (H) 12/25/2022       Recent Labs     12/26/22  1827 12/27/22  0005 12/27/22  0549 12/27/22  1206   POCGLU 117 85 84 93       Blood Sugar Average: Last 72 hrs:  (P) 468 [id number]   · Hold oral hypoglycemics  · Continue Lantus 10 units daily at lower dose  · Monitor Accu-Cheks, sliding scale for coverage

## 2022-12-28 NOTE — NURSING NOTE
Patient with eyes clothes laying in bed, moaning, remains on room air agonal breathing, Unable to communicate  2mg IV morphine administered  Son and other family members at bedside

## 2022-12-28 NOTE — ASSESSMENT & PLAN NOTE
· Patient presents via EMS for chest pain; lives at [facility]  · In the ED, noted to be hypoxic and requiring up to 10 L nasal cannula;   · On 12/22; had positive influenza A test at [facility]; RSV and influenza B at that time were negative  · CTA chest ruled out PE; shows chronic consolidations but unclear if any new superimposed bacterial pneumonia present  · Procalcitonin 2 5   · Currently on BiPAP      · Lengthy discussion with family at bedside regarding patient's continued decline, encephalopathic, requiring BiPAP, poor p o  intake and worsening renal function  Family agreeable for transition to comfort care    ·   Hospice consult placed  · Transition to level 4 comfort, medications morphine, Ativan as needed

## 2022-12-28 NOTE — DEATH NOTE
INPATIENT DEATH NOTE  Ana Rider 82 y o  female MRN: [id number]  Unit/Bed#: E4 -01 Encounter: [id number]    Date, Time and Cause of Death    Date of Death: 22  Time of Death:  4:35 PM  Preliminary Cause of Death: Acute respiratory failure with hypoxia and hypercapnia ([facility] [place] [address] )  Entered by: Nandini Ramos MD[AA1 1]     Attribution     AA1  [address], [place] 22 16:44           Patient's Information  Pronounced by: Dr Feroz Brown  Did the patient's death occur in the ED?: No  Did the patient's death occur in the OR?: No  Did the patient's death occur less than 10 days post-op?: No  Did the patient's death occur within 24 hours of admission?: No  Was code status DNR at the time of death?: Yes    PHYSICAL EXAM:  Unresponsive to noxious stimuli, Spontaneous respirations absent, Breath sounds absent and Heart sounds absent    Medical Examiner notification criteria:  NONE APPLICABLE   Medical Examiner's office notified?:  No    Family Notification  Was the family notified?: Yes, son at bedside    Autopsy Options:  Post-mortem examination declined by next of kin    Primary Service Attending Physician notified?:  yes - Attending:  Ember Arvizu MD    Physician/Resident responsible for completing Discharge Summary:  Dr Feroz Brown

## 2022-12-28 NOTE — CASE MANAGEMENT
Case Management Discharge Planning Note    Patient name Cindy Filter  Location [facility] 4 /E4 [address] 5-* MRN [id number]  : 1935 Date 2022       Current Admission Date: 2022  Current Admission Diagnosis:Acute respiratory failure with hypoxia [facility])   Patient Active Problem List    Diagnosis Date Noted   • Influenza A 2022   • Acute respiratory failure with hypoxia ([place] [address] ) 2022   • Chest pain 2022   • Type 2 diabetes mellitus with hyperglycemia, with long-term current use of insulin ([place] [address] ) 2022   • Osteoporosis 02/10/2022   • History of fracture 2021   • Estrogen deficiency 2021   • Osteopenia 2021   • Sinus bradycardia 2021   • 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, left ([place] [address] ) 2020   • Nephrosclerosis arteriolar, 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 disease 2020   • Urinary incontinence 2020   • Localized edema 2020   • Gastroesophageal reflux disease without esophagitis 10/25/2019   • Community acquired pneumonia of left lower lobe of lung 2019   • Leukocytosis 2019   • Hypertension 2019   • Chronic cough 2019   • Chronic congestive heart failure ([place] [address] ) 2019   • Acute bronchitis 2019   • Type 2 diabetes mellitus with hypoglycemia without coma, with long-term current use of insulin ([place] [address] )    • Chronic venous insufficiency 2019   • Anemia in stage 4 chronic kidney disease ([place] [address] ) 2019   • Morbid obesity with BMI of 40 0-44 9, adult ([place] [address] ) 2019   • PAD (peripheral artery disease) ([place] [address] ) 2017   • Anxiety 2013   • Acute kidney injury ([place] [address] ) 2012   • Familial combined hyperlipidemia 2012   • Type 2 diabetes mellitus with proliferative retinopathy ([place] [address] ) 2012   • Acquired hypothyroidism 2012      LOS (days): 3  Geometric Mean LOS (GMLOS) (days): 3 50  Days to GMLOS:0 5     OBJECTIVE:  Risk of Unplanned Readmission Score: 24 92         Current admission status: Inpatient   Preferred Pharmacy:   [address] [place] [address], [address] [place] [address] [address]  [address] [place] [address]  Phone: [phone] Fax: [phone]    Primary Care Provider: No primary care provider on file  Primary Insurance: MEDICARE  Secondary Insurance: [facility]    DISCHARGE DETAILS:    Per MD and [facility] hospice liaison, patient not stable for transport to hospice IPU at this time  Patient will remain here on Comfort care

## 2022-12-28 NOTE — ASSESSMENT & PLAN NOTE
Wt Readings from Last 3 Encounters:   12/26/22 99 8 kg (220 lb)   05/31/22 99 8 kg (220 lb)   02/03/22 101 kg (222 lb)     · Echocardiogram 12/26/2020 reveals ejection fraction 28%, grade 2 diastolic dysfunction  · proBNP 1837  · Cardiology input appreciated  · Diuretics on hold given acute kidney injury

## 2022-12-28 NOTE — ASSESSMENT & PLAN NOTE
· Patient presents from alf facility with acute onset chest pain  · Report pain started immediately after she leaned over to pick something up  · YISSEL score = 4  · initial troponin in ED was 151--332--395; continue trending  · Cardiology input appreciated  · Expected elevated cardiac enzymes secondary to demand ischemia from respiratory failure  · Continue aspirin, statin

## 2022-12-28 NOTE — NURSING NOTE
Patient moaning in bed  Unable to communicate  Agonal breathing, 1mg ativan  Administered  Son at bed side

## 2022-12-28 NOTE — ASSESSMENT & PLAN NOTE
Wt Readings from Last 3 Encounters:   12/26/22 99 8 kg (220 lb)   05/31/22 99 8 kg (220 lb)   02/03/22 101 kg (222 lb)     · Echocardiogram 12/26/2020 reveals ejection fraction 98%, grade 2 diastolic dysfunction  · proBNP 1837  · Cardiology input appreciated  · Diuretics on hold given acute kidney injury

## 2022-12-28 NOTE — DISCHARGE SUMMARY
[facility]  Discharge- Coni Tony 1935, 82 y o  female MRN: [id number]  Unit/Bed#: E4 -01 Encounter: [id number]  Primary Care Provider: No primary care provider on file  Date and time admitted to hospital: 12/25/2022  4:24 AM    * Acute respiratory failure with hypoxia [facility])  Assessment & Plan  · Patient presents via EMS for chest pain; lives at [facility]  · In the ED, noted to be hypoxic and requiring up to 10 L nasal cannula;   · On 12/22; had positive influenza A test at [facility]; RSV and influenza B at that time were negative  · CTA chest ruled out PE; shows chronic consolidations but unclear if any new superimposed bacterial pneumonia present  · Procalcitonin 2 5   · Currently on BiPAP      · Lengthy discussion with family at bedside regarding patient's continued decline, encephalopathic, requiring BiPAP, poor p o  intake and worsening renal function  Family agreeable for transition to comfort care    ·   Hospice consult placed  · Patient was pronounced dead at 4:35 PM 12/28    Influenza A  Assessment & Plan      Chest pain  Assessment & Plan  · Patient presents from [facility] facility with acute onset chest pain  · Report pain started immediately after she leaned over to pick something up  · YISSEL score = 4  · initial troponin in ED was 151--332--395; continue trending  · Cardiology input appreciated  · Expected elevated cardiac enzymes secondary to demand ischemia from respiratory failure    Type 2 diabetes mellitus with hyperglycemia, with long-term current use of insulin [facility])  Assessment & Plan  Lab Results   Component Value Date    HGBA1C 9 6 (H) 12/25/2022       Recent Labs     12/26/22  1827 12/27/22  0005 12/27/22  0549 12/27/22  1206   POCGLU 117 85 84 93         Gastroesophageal reflux disease without esophagitis  Assessment & Plan  Chronic congestive heart failure (HCC)  Assessment & Plan  Wt Readings from Last 3 Encounters: 22 99 8 kg (220 lb)   22 99 8 kg (220 lb)   22 101 kg (222 lb)     · Echocardiogram 2020 reveals ejection fraction 82%, grade 2 diastolic dysfunction  · proBNP 1837  · Cardiology input appreciated  · Diuretics on hold given acute kidney injury    Anemia in stage 4 chronic kidney disease 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  · Likely secondary to prerenal azotemia, poor p o  intake, diuresis, ARB and recently received IV contrast  · Creatinine 5 32  · Baseline creatinine 2 2-2 5      Transition of Care Discharge Summary - [facility]    Patient Information: Coleen Jones 82 y o  female MRN: [id number]  Unit/Bed#: E4 -01 Encounter: [id number]    Discharging Physician / Practitioner: Rian Tsang MD  PCP: No primary care provider on file  Admission Date: 2022  Discharge Date: 22    Disposition:      Other:       Reason for Admission: Acute hypoxic restaurant failure    Discharge Diagnoses:     Principal Problem:    Acute respiratory failure with hypoxia ([facility] [address] )  Active Problems:    Acute kidney injury ([facility] [address] )    Acquired hypothyroidism    Anxiety    Anemia in stage 4 chronic kidney disease (HCC)    Chronic congestive heart failure (HCC)    Gastroesophageal reflux disease without esophagitis    Type 2 diabetes mellitus with hyperglycemia, with long-term current use of insulin (HCC)    Chest pain    Influenza A  Resolved Problems:    * No resolved hospital problems   *      Consultations During Hospital Stay:  · IP CONSULT TO CARDIOLOGY  · IP CONSULT TO PULMONOLOGY  · IP CONSULT TO NEPHROLOGY  · IP CONSULT TO HOSPICE      Procedures Performed:     · none      Significant Findings / Test Results:     CTA dissection protocol chest abdomen pelvis w wo contrast    Result Date: 2022  · Impression: 1  Chronic bilateral, upper lobe predominant lung consolidations and nodules as well as prominent mediastinal lymph nodes  Chronic inflammatory or granulomatous processes such as granulomatous polyangiitis or sarcoidosis should be considered  Areas of acute infection would be difficult to entirely exclude given the extent of opacities, but distribution appears largely similar to 2019  Time course since 2019 essentially excludes neoplastic process  Recommend pulmonology consultation  2   No dissection or other acute vascular abnormalities  3   Multiple pancreatic cystic lesions, 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  Recommend nonurgent outpatient follow-up with MRI/MRCP on an intention to treat basis  Note: The study was marked in EPIC for significant notification  Imaging follow-up reminder notification was scheduled in the electronic medical record  Workstation performed: [id number]       Hospital Course:     Norma Rankin is a 82 y o  female patient who originally presented to the hospital on 2022 due to dyspnea admitted for acute hypoxic restaurant failure found to have influenza pneumonia  Cardiology and pulmonary were consulted patient was also managed with diuretics  Patient continued decline and required persistent BiPAP, poor p o  intake  Patient's kidney function worsened and nephrology were consulted  After further discussion family agreed for comfort measures  Patient was pronounced on 2022 at 4:35 PM   Family at bedside  Please see above problem list for further details        Condition at Discharge:        Discharge instructions/Information to patient and family:   See after visit summary section titled Discharge Instructions for information provided to patient and family  Discharge Statement:  I spent 35 minutes discharging the patient  This time was spent on the day of discharge  I had direct contact with the patient on the day of discharge  Greater than 50% of the total time was spent examining patient, answering all patient questions, arranging and discussing plan of care with patient as well as directly providing post-discharge instructions  Additional time then spent on discharge activities      ** Please Note: This note has been constructed using a voice recognition system **

## 2022-12-28 NOTE — ASSESSMENT & PLAN NOTE
· Patient presents from custodial facility with acute onset chest pain  · Report pain started immediately after she leaned over to pick something up  · YISSEL score = 4  · initial troponin in ED was 151--332--395; continue trending  · Cardiology input appreciated  · Expected elevated cardiac enzymes secondary to demand ischemia from respiratory failure  · Continue aspirin, statin

## 2022-12-28 NOTE — PROGRESS NOTES
[address]  Progress Note - Tarri Row 1935, 82 y o  female MRN: [id number]  Unit/Bed#: E4 -01 Encounter: [id number]  Primary Care Provider: No primary care provider on file  Date and time admitted to hospital: 12/25/2022  4:24 AM    * Acute respiratory failure with hypoxia [facility])  Assessment & Plan  · Patient presents via EMS for chest pain; lives at [facility]  · In the ED, noted to be hypoxic and requiring up to 10 L nasal cannula;   · On 12/22; had positive influenza A test at [facility]; RSV and influenza B at that time were negative  · CTA chest ruled out PE; shows chronic consolidations but unclear if any new superimposed bacterial pneumonia present  · Procalcitonin 2 5   · Currently on BiPAP      · Lengthy discussion with family at bedside regarding patient's continued decline, encephalopathic, requiring BiPAP, poor p o  intake and worsening renal function  Family agreeable for transition to comfort care    ·   Hospice consult placed  · Transition to level 4 comfort, medications morphine, Ativan as needed    Influenza A  Assessment & Plan  · Continue Tamiflu D2/5  · Contact and droplet precautions    Chest pain  Assessment & Plan  · Patient presents from [facility] facility with acute onset chest pain  · Report pain started immediately after she leaned over to pick something up  · YISSEL score = 4  · initial troponin in ED was 151--332--395; continue trending  · Cardiology input appreciated  · Expected elevated cardiac enzymes secondary to demand ischemia from respiratory failure  · Continue aspirin, statin    Type 2 diabetes mellitus with hyperglycemia, with long-term current use of insulin [facility])  Assessment & Plan  Lab Results   Component Value Date    HGBA1C 9 6 (H) 12/25/2022       Recent Labs     12/26/22  1827 12/27/22  0005 12/27/22  0549 12/27/22  1206   POCGLU 117 85 84 93       Blood Sugar Average: Last 72 hrs:  (P) 135 [id number]   · Hold oral hypoglycemics  · Continue Lantus 10 units daily at lower dose  · Monitor Accu-Cheks, sliding scale for coverage    Gastroesophageal reflux disease without esophagitis  Assessment & Plan  · Continue PPI    Chronic congestive heart failure (HCC)  Assessment & Plan  Wt Readings from Last 3 Encounters:   12/26/22 99 8 kg (220 lb)   05/31/22 99 8 kg (220 lb)   02/03/22 101 kg (222 lb)     · Echocardiogram 12/26/2020 reveals ejection fraction 07%, grade 2 diastolic dysfunction  · proBNP 1837  · Cardiology input appreciated  · Diuretics on hold given acute kidney injury    Anemia in stage 4 chronic kidney disease [facility])  Assessment & Plan  Lab Results   Component Value Date    EGFR 6 12/27/2022    EGFR 9 12/26/2022    EGFR 19 12/25/2022    CREATININE 5 32 (H) 12/27/2022    CREATININE 3 98 (H) 12/26/2022    CREATININE 2 27 (H) 12/25/2022       Anxiety  Assessment & Plan  · Noted, monitor and treat symptomatically    Acquired hypothyroidism  Assessment & Plan  · levothyroxine    Acute kidney injury ([facility] [address] )  Assessment & Plan  · 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  · Likely secondary to prerenal azotemia, poor p o  intake, diuresis, ARB and recently received IV contrast  · Creatinine 5 32  · Baseline creatinine 2 2-2 5  · Neurology input appreciated  · Hold diuretics and ARB  · Family would not want to pursue renal placement therapy if needed  · Gentle IV hydration as per nephrology  · Patient now transition to comfort care          Patient Centered Rounds: I have performed bedside rounds with nursing staff today  Education and Discussions with Family / Patient: Updated son and daughter at bedside    Time Spent for Care: 20 minutes  More than 50% of total time spent on counseling and coordination of care as described above      Current Length of Stay: 3 day(s)    Current Patient Status: Inpatient   Certification Statement: The patient will continue to require additional inpatient hospital stay due to hospice    Discharge Plan / Estimated Discharge Date: TBD    Code Status: Level 4 - Comfort Care      Subjective:   Patient seen and examined at bedside, nonresponsive    Objective:     Vitals:   Temp (24hrs), Av 1 °F (36 7 °C), Min:98 1 °F (36 7 °C), Max:98 1 °F (36 7 °C)    Temp:  [98 1 °F (36 7 °C)] 98 1 °F (36 7 °C)  HR:  [55] 55  Resp:  [20] 20  BP: (104)/(50) 104/50  SpO2:  [95 %-98 %] 95 %  Body mass index is 42 97 kg/m²  Input and Output Summary (last 24 hours):     No intake or output data in the 24 hours ending 22 0834    Physical Exam:    Constitutional: Patient is comfortable, moments of air hunger  HEENT:  Normocephalic, atraumatic  Cardiovascular: Normal S1S2, RRR, No murmurs/rubs/gallops appreciated  Pulmonary:  Bilateral air entry, No rhonchi/rales/wheezing appreciated  Abdominal: Soft, Bowel sounds present, Non-tender, Non-distended  Extremities:  No cyanosis, clubbing or edema  Neurological: Not responsive my to spontaneously move her arms  Skin:  Warm, dry    Additional Data:     Labs:    Results from last 7 days   Lab Units 22  0445   WBC Thousand/uL 11 76*   HEMOGLOBIN g/dL 10 4*   HEMATOCRIT % 36 4   PLATELETS Thousands/uL 222   NEUTROS PCT % 78*   LYMPHS PCT % 15   MONOS PCT % 6   EOS PCT % 0     Results from last 7 days   Lab Units 22  0445 22  0822   POTASSIUM mmol/L 5 1 5 5*   CHLORIDE mmol/L 101 104   CO2 mmol/L 30 32   BUN mg/dL 88* 68*   CREATININE mg/dL 5 32* 3 98*   CALCIUM mg/dL 7 3* 7 7*   ALK PHOS U/L  --  85   ALT U/L  --  23   AST U/L  --  22            I Have Reviewed All Lab Data Listed Above      Invasive Devices     Peripheral Intravenous Line  Duration           Peripheral IV 22 Left;Ventral (anterior) Forearm 2 days                     Recent Cultures (last 7 days):           Last 24 Hours Medication List:   Current Facility-Administered Medications   Medication Dose Route Frequency Provider Last Rate   • acetaminophen  650 mg Oral Q6H PRN Luis Eduardo Paz MD     • albuterol  2 5 mg Nebulization Q6H PRN Luis Eduardo Paz MD     • docusate sodium  100 mg Oral BID Luis Eduardo Paz MD     • dorzolamide-timolol  1 drop Both Eyes BID Luis Eduardo Paz MD     • LORazepam  1 mg Intravenous Q4H PRN Octavio Ruiz MD     • morphine injection  1 mg Intravenous Q4H PRN Octavio Ruiz MD     • nystatin   Topical BID Octavio Ruiz MD     • ondansetron  4 mg Intravenous Q6H PRN Luis Eduardo Paz MD     • oseltamivir  30 mg Oral Every Other Day Octavio Ruiz MD     • pantoprazole  40 mg Oral Daily Before Breakfast Luis Eduardo Paz MD          Today, Patient Was Seen By: Octavio Ruiz MD

## 2022-12-28 NOTE — QUICK NOTE
[facility]  [id number] [place] [address]  P) [phone] (d) [phone]  22      RE:  Doron Gaffney 82 y o  female  : 1935    To whom it may concern,     Doron Gaffney was hospitalized under my care from 2022 to 22  Please excuse family from all appointments and/or work related activities during this interim  Feel free to contact me with any questions if necessary  Sincerely,               Corena Osgood, M D [facility]       Diplomate, [employer] Board of Internal Medicine

## 2022-12-28 NOTE — ASSESSMENT & PLAN NOTE
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

## 2022-12-28 NOTE — ASSESSMENT & PLAN NOTE
· Patient presents via EMS for chest pain; lives at [facility]  · In the ED, noted to be hypoxic and requiring up to 10 L nasal cannula;   · On 12/22; had positive influenza A test at [facility]; RSV and influenza B at that time were negative  · CTA chest ruled out PE; shows chronic consolidations but unclear if any new superimposed bacterial pneumonia present  · Procalcitonin 2 5   · Currently on BiPAP      · Lengthy discussion with family at bedside regarding patient's continued decline, encephalopathic, requiring BiPAP, poor p o  intake and worsening renal function  Family agreeable for transition to comfort care    ·   Hospice consult placed  · Transition to level 4 comfort, medications morphine, Ativan as needed

## 2024-12-30 NOTE — NURSING NOTE
Patient  at [id number], family present at bedside  All belongings taken by family, except a yellow ring on the left hand  Dr Dodd Notice notified, body prepared and security informed  Post mortem release has been completed at this time  N/A